# Patient Record
Sex: FEMALE | Race: WHITE | NOT HISPANIC OR LATINO | Employment: UNEMPLOYED | URBAN - METROPOLITAN AREA
[De-identification: names, ages, dates, MRNs, and addresses within clinical notes are randomized per-mention and may not be internally consistent; named-entity substitution may affect disease eponyms.]

---

## 2017-03-24 ENCOUNTER — ALLSCRIPTS OFFICE VISIT (OUTPATIENT)
Dept: OTHER | Facility: OTHER | Age: 57
End: 2017-03-24

## 2017-06-30 DIAGNOSIS — Z12.31 ENCOUNTER FOR SCREENING MAMMOGRAM FOR MALIGNANT NEOPLASM OF BREAST: ICD-10-CM

## 2017-07-01 ENCOUNTER — ALLSCRIPTS OFFICE VISIT (OUTPATIENT)
Dept: OTHER | Facility: OTHER | Age: 57
End: 2017-07-01

## 2018-01-10 NOTE — MISCELLANEOUS
Message  Return to work or school:    She is able to return to work on  8/25/16      Unable to work 8/22 through 8/24/16 due to sciatica          Signatures   Electronically signed by : Ry Jules MD; Aug 25 2016  9:07AM EST                       (Author)

## 2018-01-13 VITALS
BODY MASS INDEX: 27.46 KG/M2 | WEIGHT: 165 LBS | RESPIRATION RATE: 18 BRPM | HEART RATE: 69 BPM | SYSTOLIC BLOOD PRESSURE: 115 MMHG | DIASTOLIC BLOOD PRESSURE: 77 MMHG

## 2018-01-14 NOTE — MISCELLANEOUS
Message   Recorded as Task   Date: 06/24/2016 09:38 AM, Created By: Vega Mcallister   Task Name: Medical Complaint Callback   Assigned To: [address] [place] [id number]   Regarding Patient: Bisi Lewis, Status: Active   Comment:    Blanca Arias - 24 Jun 2016 9:38 AM     TASK CREATED  Caller: Self; Medical Complaint; [phone] (Home); [phone] (Work)  Pt came in on 6/20/16 LBP and had an x-ray  She was called with the test results and she has compression in the cervical area  Her question is should she come to the f/u on 6/27 1 week or go to a specialist?  [facility] [phone] - 24 Jun 2016 12:52 PM     TASK REPLIED TO: Previously Assigned To [address] [place] [address]  I think since there has been progression in disc disease lumbar spine she should go directly to Pain Management  (Dr Ho Finch)   Kishore 61 - 24 Jun 2016 1:00 PM     TASK EDITED  Lm for callback        Active Problems    1  Depression with anxiety (300 4) (F41 8)   2  Encounter for screening colonoscopy (V76 51) (Z12 11)   3  Encounter for screening mammogram for malignant neoplasm of breast (V76 12)   (Z12 31)   4  Impingement syndrome of right shoulder (726 2) (M75 41)   5  Need for diphtheria-tetanus-pertussis (Tdap) vaccine (V06 1) (Z23)   6  Nicotine dependence (305 1) (F17 200)   7  Right shoulder pain (719 41) (M25 511)   8  Sciatica of left side (724 3) (M54 32)   9  SLAP shoulder tear (840 7) (S43 059A)    Current Meds   1  Betamethasone Sod Phos & Acet 6 (3-3) MG/ML Injection Suspension; USE AS   DIRECTED; To Be Done: [id number]; Status: HOLD FOR - Administration Ordered   2  Meloxicam 15 MG Oral Tablet; TAKE 1 TABLET DAILY AS NEEDED FOR PAIN;   Therapy: 83WAE5936 to ([id number])  Requested for: [phone]; Last   Rx:36Ipo1595 Ordered   3  Meloxicam 7 5 MG Oral Tablet; TAKE 1 TABLET DAILY AS NEEDED; Therapy: 09Apr2015 to (Evaluate:29Ntd6884)  Requested for: [phone]; Last   Rx:09Apr2015 Ordered   4  PredniSONE 10 MG Oral Tablet; 4tabs today and tomorrow then 3tabs for 2 days then   2tabs for 2 days then 1tab for 2days; Therapy: 20Jun2016 to (Evaluate:28Jun2016)  Requested for: 20Jun2016; Last   Rx:20Jun2016 Ordered   5  TraMADol HCl - 50 MG Oral Tablet; Take one or 2 tablets every 6 hours if needed for   pain; Therapy: 45TWI0827 to (Evaluate:38Sck1247); Last Rx:20Jun2016 Ordered    Allergies    1   No Known Drug Allergies    Signatures   Electronically signed by : Kevin Mares MD; Jun 24 2016  2:10PM EST                       (Co-author)

## 2018-01-16 NOTE — RESULT NOTES
Message    Please notify patient that her urine culture is negative for infection  pt aware         Verified Results  (1) URINALYSIS w URINE C/S REFLEX (will reflex a microscopy if leukocytes, occult blood, or nitrites are not within normal limits) [id number] 12:00AM Shanice Hammonds     Test Name Result Flag Reference   COLOR YELLOW  YELLOW   APPEARANCE CLEAR  CLEAR   SPECIFIC GRAVITY 1 022  1 001-1 035   PH 6 0  5 0-8 0   GLUCOSE NEGATIVE  NEGATIVE   BILIRUBIN NEGATIVE  NEGATIVE   KETONES NEGATIVE  NEGATIVE   OCCULT BLOOD NEGATIVE  NEGATIVE   PROTEIN NEGATIVE  NEGATIVE   NITRITE NEGATIVE  NEGATIVE   LEUKOCYTE ESTERASE 1+ A NEGATIVE   WBC 20-40 /HPF A < OR = 5   RBC 3-10 /HPF A < OR = 2   SQUAMOUS EPITHELIAL CELLS 0-5 /HPF  < OR = 5   BACTERIA FEW /HPF A NONE SEEN   CALCIUM OXALATE CRYSTALS FEW /HPF  NONE OR FEW   HYALINE CAST 0-5 /LPF A NONE SEEN   REFLEXIVE URINE CULTURE      CULTURE INDICATED - RESULTS TO FOLLOW     REFLEXIVE URINE CULTURE [id number] 12:00AM Shanice Hammonds     Test Name Result Flag Reference   CULTURE, URINE, ROUTINE      CULTURE, URINE, ROUTINE         MICRO NUMBER:      [id number]    TEST STATUS:       FINAL    SPECIMEN SOURCE:   URINE    SPECIMEN QUALITY:  ADEQUATE    RESULT:            Multiple organisms present, each less than 10,000                       CFU/mL  These organisms, commonly found on                       external and internal genitalia, are considered                       to be colonizers  No further testing performed

## 2018-01-17 NOTE — MISCELLANEOUS
Provider Comments  Provider Comments:   Patient was a no show for today's PE appt  Signatures   Electronically signed by :  DMITRIY Childress Ra; Jul 1 2017 10:12AM EST                       (Author)

## 2018-01-17 NOTE — PROGRESS NOTES
Assessment    1  SLAP shoulder tear (840 7) (T13 654Z)   2  Impingement syndrome of right shoulder (726 2) (G16 31)    Plan  Impingement syndrome of right shoulder    · Follow-up PRN Evaluation and Treatment  Follow-up  Status: Complete  Done:  07EGC1636  Impingement syndrome of right shoulder, SLAP shoulder tear    · Betamethasone Sod Phos & Acet 6 (3-3) MG/ML Injection Suspension   · Joint Bursa Aspiration &/or Injection Major - POC; Status:Complete;   Done: 93FUM9420    Discussion/Summary    65-year-old female with right shoulder tendinosis as well as a superior labral tear  Patient received her second right subacromial space cortisone injection today  She tolerated it well  She was advised to apply a cold compress today, and take NSAIDs as needed  Follow-up as needed if the pain returns  Plan discussed with Dr Yony Bray  Chief Complaint  Right shoulder pain      History of Present Illness  This is a 65-year-old female who comes in for followup of her right shoulder pain  The last subacromial cortisone injection given in June 2015 has lasted until just recently  She denies any new injury  Her symptoms are similar to how they were before her last injection  Review of Systems    Constitutional: No fever, no chills, feels well, no tiredness, no recent weight gain or loss  Eyes: No complaints of eyesight problems, no red eyes  ENT: no loss of hearing, no nosebleeds, no sore throat  Cardiovascular: No complaints of chest pain, no palpitations, no leg claudication or lower extremity edema  Respiratory: no compliants of shortness of breath, no wheezing, no cough  Gastrointestinal: no complaints of abdominal pain, no constipation, no nausea or diarrhea, no vomiting, no bloody stools  Genitourinary: no complaints of dysuria, no incontinence  Musculoskeletal: as noted in HPI  Integumentary: no complaints of skin rash or lesion, no itching or dry skin, no skin wounds     Neurological: no complaints of headache, no confusion, no numbness or tingling, no dizziness  Endocrine: No complaints of muscle weakness, no feelings of weakness, no frequent urination, no excessive thirst    Psychiatric: No suicidal thoughts, no anxiety, no feelings of depression  ROS reviewed  Active Problems    1  Depression with anxiety (300 4) (F41 8)   2  Encounter for screening colonoscopy (V76 51) (Z12 11)   3  Encounter for screening mammogram for malignant neoplasm of breast (V76 12)   (Z12 31)   4  Impingement syndrome of right shoulder (726 2) (M75 41)   5  Need for diphtheria-tetanus-pertussis (Tdap) vaccine (V06 1) (Z23)   6  Nicotine dependence (305 1) (F17 200)   7  Right shoulder pain (719 41) (M25 511)   8  SLAP shoulder tear (840 7) (N93 945K)    Past Medical History    · History of Drug dependence (304 90) (F19 20)    Surgical History    · History of Simple Bunion Exostectomy (Silver Procedure)   · History of Tubal Ligation    Family History    · Family history of Bipolar Disorder NOS   · Family history of Depression   · Family history of hypertension (V17 49) (Z82 49)    · Family history of cardiac disorder (V17 49) (Z82 49)   · Family history of malignant neoplasm of prostate (V16 42) (Z80 42)    · Family history of Bipolar Disorder NOS    Social History    · Alcohol Use (History)   · occasional   · Current Some Day Smoker (305 1)   · History of Drug Use (305 90)   · crack cocaine, percocet, meth   · Former smoker (V15 82) (C97 434)   · quit 1 year ago   · Marital History - Currently     Current Meds   1  Betamethasone Sod Phos & Acet 6 (3-3) MG/ML Injection Suspension; USE AS   DIRECTED; To Be Done: 79QZH3944; Status: HOLD FOR - Administration Ordered   2  Meloxicam 15 MG Oral Tablet; TAKE 1 TABLET DAILY AS NEEDED FOR PAIN;   Therapy: 96BWG1670 to (Carley Ferreira)  Requested for: [phone]; Last   Rx:56Yfd4817 Ordered   3  Meloxicam 7 5 MG Oral Tablet; TAKE 1 TABLET DAILY AS NEEDED;    Therapy: 65VMO3166 to (Evaluate:58Wdo4590)  Requested for: 74 [phone]; Last   Rx:09Apr2015 Ordered    Allergies    1  No Known Drug Allergies    Vitals   Recorded: 89GBG3390 10:12AM   Heart Rate 64   Systolic 091, LUE, Sitting   Diastolic 83, LUE, Sitting   Height 5 ft 5 in   Weight 157 lb    BMI Calculated 26 13   BSA Calculated 1 79     Physical Exam    Right Shoulder: Appearance: Normal  Tenderness: bicipital groove and greater tuberosity, but not the midshaft clavicle, not the distal clavicle, not the deltoid and not the supraspinatus muscle  Diffuse tenderness throughout  ROM: Full except as noted: Motor: 4/5 forward flexion, but 5/5 internal rotation and 5/5 external rotation  Forward flexion: painful restricted AROM 0-130 degrees and normal PROM  Abduction: painful restricted AROM 0-120 degrees  Special Tests: positive Painful Arc, positive Armando test, positive Empty Can test, positive Langston's test and positive Speed's test, but negative Cross Body Adduction test       Procedure    Procedure: Injection of the right subacromial bursa  Indication:  inflammation  Potential complications include bleeding and infection  Risk and benefits were discussed with the patient  Verbal consent was obtained prior to the procedure  Betadine was used to prep the area  ethyl chloride spray was used as a topical anesthetic  A 22-gauge was used to inject 5 mL of 1% Lidocaine and 1 mL of 6mg/mL betamethasone  A bandage was applied  the patient tolerated the procedure well  Complications: none  Follow-up in the office PRN        Signatures   Electronically signed by : Mao Mark [facility]; Jan 29 2016 10:26AM EST                       (Author)    Electronically signed by : Duarte Perkins MD; Jan 29 2016  1:04PM EST                       (Author)

## 2018-05-17 ENCOUNTER — OFFICE VISIT (OUTPATIENT)
Dept: OBGYN CLINIC | Facility: CLINIC | Age: 58
End: 2018-05-17
Payer: COMMERCIAL

## 2018-05-17 VITALS
HEART RATE: 87 BPM | HEIGHT: 64 IN | WEIGHT: 159 LBS | BODY MASS INDEX: 27.14 KG/M2 | SYSTOLIC BLOOD PRESSURE: 112 MMHG | DIASTOLIC BLOOD PRESSURE: 78 MMHG

## 2018-05-17 DIAGNOSIS — S86.911A KNEE STRAIN, RIGHT, INITIAL ENCOUNTER: Primary | ICD-10-CM

## 2018-05-17 PROCEDURE — 99203 OFFICE O/P NEW LOW 30 MIN: CPT | Performed by: ORTHOPAEDIC SURGERY

## 2018-05-17 RX ORDER — ACETAMINOPHEN 500 MG
500 TABLET ORAL EVERY 6 HOURS PRN
COMMUNITY
End: 2019-02-01

## 2018-05-17 RX ORDER — IBUPROFEN 600 MG/1
600 TABLET ORAL EVERY 6 HOURS PRN
COMMUNITY
End: 2019-02-01

## 2018-05-17 NOTE — ASSESSMENT & PLAN NOTE
Findings consistent with right knee strain and quadriceps weakness  Findings and treatment options were discussed with the patient  Recommend formal physical therapy at this time  She was also given a prescription for hinged knee brace to use with activities  Advised patient not to wear at all times since it can promote more muscle weakness  Continue ice and NSAIDs as needed  Follow up in 6 weeks for re-evaluation  All questions were answered to patient's satisfaction

## 2018-05-17 NOTE — LETTER
May 17, 2018     Patient: Teom Brown   YOB: 1960   Date of Visit: 5/17/2018       To Whom it May Concern:    Hannah Bates is under my professional care  She was seen in my office on 5/17/2018  She may return to work with no restrictions  If you have any questions or concerns, please don't hesitate to call           Sincerely,          Mansi Pablo MD        CC: No Recipients
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
## 2018-08-07 ENCOUNTER — OFFICE VISIT (OUTPATIENT)
Dept: OBGYN CLINIC | Facility: CLINIC | Age: 58
End: 2018-08-07
Payer: COMMERCIAL

## 2018-08-07 VITALS
WEIGHT: 159 LBS | HEIGHT: 64 IN | HEART RATE: 96 BPM | SYSTOLIC BLOOD PRESSURE: 111 MMHG | DIASTOLIC BLOOD PRESSURE: 81 MMHG | BODY MASS INDEX: 27.14 KG/M2

## 2018-08-07 DIAGNOSIS — S86.911D STRAIN OF RIGHT KNEE, SUBSEQUENT ENCOUNTER: Primary | ICD-10-CM

## 2018-08-07 PROCEDURE — 99213 OFFICE O/P EST LOW 20 MIN: CPT | Performed by: ORTHOPAEDIC SURGERY
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
## 2018-08-07 NOTE — ASSESSMENT & PLAN NOTE
Findings consistent with right knee strain and quadriceps weakness  Findings and treatment options were discussed with the patient  Discussed importance of attending physical therapy since she continues to have quadriceps weakness  She will obtain the hinged knee brace to use with activities  Continue ice and NSAIDs as needed  Follow up in 6 weeks for re-evaluation  All questions were answered to patient's satisfaction  Plan discussed with Dr Pasquale Stubbs

## 2018-08-20 ENCOUNTER — OFFICE VISIT (OUTPATIENT)
Dept: FAMILY MEDICINE CLINIC | Facility: HOSPITAL | Age: 58
End: 2018-08-20
Payer: COMMERCIAL

## 2018-08-20 VITALS
DIASTOLIC BLOOD PRESSURE: 64 MMHG | HEART RATE: 106 BPM | SYSTOLIC BLOOD PRESSURE: 122 MMHG | HEIGHT: 65 IN | BODY MASS INDEX: 23.13 KG/M2 | WEIGHT: 138.8 LBS | TEMPERATURE: 99 F

## 2018-08-20 DIAGNOSIS — F33.2 SEVERE EPISODE OF RECURRENT MAJOR DEPRESSIVE DISORDER, WITHOUT PSYCHOTIC FEATURES (HCC): Primary | ICD-10-CM

## 2018-08-20 DIAGNOSIS — F11.10 OPIATE ABUSE, CONTINUOUS (HCC): ICD-10-CM

## 2018-08-20 DIAGNOSIS — F41.1 GENERALIZED ANXIETY DISORDER: ICD-10-CM

## 2018-08-20 PROCEDURE — 3008F BODY MASS INDEX DOCD: CPT | Performed by: NURSE PRACTITIONER

## 2018-08-20 PROCEDURE — 99214 OFFICE O/P EST MOD 30 MIN: CPT | Performed by: NURSE PRACTITIONER

## 2018-08-20 RX ORDER — ESCITALOPRAM OXALATE 10 MG/1
TABLET ORAL
Qty: 30 TABLET | Refills: 1 | Status: SHIPPED | OUTPATIENT
Start: 2018-08-20 | End: 2019-02-11 | Stop reason: HOSPADM

## 2018-08-20 NOTE — LETTER
August 20, 2018     Patient: Katy Lara   YOB: 1960   Date of Visit: 8/20/2018       To Whom it May Concern:    Yumiko Ocamposing is under my professional care  She was seen in my office on 8/20/2018  She may return to work on 8/21/18  If you have any questions or concerns, please don't hesitate to call           Sincerely,          DMITRIY Kirk        CC: No Recipients

## 2018-08-20 NOTE — PROGRESS NOTES
Assessment/Plan:    Severe episode of recurrent major depressive disorder, without psychotic features (HCC)  PHQ-9 strongly positive w/score of 25, lengthy discussion re: start of medication and pt agreeable to use of SSRI, also advise she pursue counseling/therapy and she states she is planning to look into; return in 4 weeks for next med check and call sooner w/any acute mood concerns    Generalized anxiety disorder  BARBARA-7 score strongly positive at 20, pt agreeable to start of SSRI and is planning to pursue counseling/therapy    Opiate abuse, continuous  Pt declines need/ability to pursue inpatient rehab so discussed options for outpatient assistance either through [facility] or [facility]        Diagnoses and all orders for this visit:    Severe episode of recurrent major depressive disorder, without psychotic features ([facility] [address] )  -     CBC and differential; Future  -     Comprehensive metabolic panel; Future  -     TSH, 3rd generation; Future  -     Vitamin D 25 hydroxy; Future  -     Vitamin B12; Future  -     escitalopram (LEXAPRO) 10 mg tablet; Take 1/2 tablet daily for 6 days then 1 tablet daily  -     CBC and differential  -     Comprehensive metabolic panel  -     TSH, 3rd generation  -     Vitamin D 25 hydroxy  -     Vitamin B12    Generalized anxiety disorder    Opiate abuse, continuous          Subjective:      Patient ID: Milan Hilario is a 56 y o  female here for an acute visit  States she has always been a depressed kind of person  Has been struggling more this year since losing both parents within 4 months of each other  Crying a lot  In bed not doing anything  Denies SI/HI but could care less if she lives or not  Has lost 20 pounds with not eating well  Wants to get better for her grandchildren  Has been struggling with opiate use since 2007  Getting percocet off the street  Can take up to 10 percocet in one day  Had been to rehab in 2007 for cocaine abuse   Started a new job and unsure if her job will let her go to rehab  Denies ETOH use  Occ smoker  Denies other med use, no benzodiazepines  The following portions of the patient's history were reviewed and updated as appropriate: allergies, current medications, past family history, past medical history, past social history, past surgical history and problem list     Review of Systems   Constitutional: Positive for activity change, appetite change, fatigue and unexpected weight change  Psychiatric/Behavioral: Positive for dysphoric mood  Negative for suicidal ideas  Objective:      /64   Pulse (!) 106   Temp 99 °F (37 2 °C)   Ht 5' 5" (1 651 m)   Wt 63 kg (138 lb 12 8 oz)   BMI 23 10 kg/m²        Physical Exam   Constitutional: She is oriented to person, place, and time  She appears well-developed and well-nourished  HENT:   Head: Normocephalic and atraumatic  Eyes: Conjunctivae are normal  No scleral icterus  Neck: No thyromegaly present  Cardiovascular: Normal rate and regular rhythm  Pulmonary/Chest: Effort normal and breath sounds normal  No respiratory distress  Neurological: She is alert and oriented to person, place, and time  Skin: Skin is warm and dry  Psychiatric: Her behavior is normal  Thought content normal  She does not express impulsivity or inappropriate judgment  She exhibits a depressed mood  She expresses no suicidal ideation  She expresses no suicidal plans and no homicidal plans  Restless, fidgety demeanor  Freely conversive w/good eye contact    Vitals reviewed

## 2018-08-21 NOTE — ASSESSMENT & PLAN NOTE
BARBARA-7 score strongly positive at 20, pt agreeable to start of SSRI and is planning to pursue counseling/therapy

## 2018-08-21 NOTE — ASSESSMENT & PLAN NOTE
PHQ-9 strongly positive w/score of 25, lengthy discussion re: start of medication and pt agreeable to use of SSRI, also advise she pursue counseling/therapy and she states she is planning to look into; return in 4 weeks for next med check and call sooner w/any acute mood concerns

## 2018-08-21 NOTE — ASSESSMENT & PLAN NOTE
Pt declines need/ability to pursue inpatient rehab so discussed options for outpatient assistance either through [facility] or [facility]

## 2018-11-09 ENCOUNTER — TELEPHONE (OUTPATIENT)
Dept: FAMILY MEDICINE CLINIC | Facility: HOSPITAL | Age: 58
End: 2018-11-09

## 2018-11-09 ENCOUNTER — OFFICE VISIT (OUTPATIENT)
Dept: FAMILY MEDICINE CLINIC | Facility: HOSPITAL | Age: 58
End: 2018-11-09
Payer: COMMERCIAL

## 2018-11-09 VITALS
BODY MASS INDEX: 22.95 KG/M2 | SYSTOLIC BLOOD PRESSURE: 110 MMHG | WEIGHT: 142.8 LBS | HEART RATE: 77 BPM | HEIGHT: 66 IN | TEMPERATURE: 98.1 F | DIASTOLIC BLOOD PRESSURE: 84 MMHG

## 2018-11-09 DIAGNOSIS — Z12.31 ENCOUNTER FOR SCREENING MAMMOGRAM FOR MALIGNANT NEOPLASM OF BREAST: ICD-10-CM

## 2018-11-09 DIAGNOSIS — F33.2 SEVERE EPISODE OF RECURRENT MAJOR DEPRESSIVE DISORDER, WITHOUT PSYCHOTIC FEATURES (HCC): Primary | ICD-10-CM

## 2018-11-09 DIAGNOSIS — F41.1 GENERALIZED ANXIETY DISORDER: ICD-10-CM

## 2018-11-09 DIAGNOSIS — F11.10 OPIATE ABUSE, CONTINUOUS (HCC): ICD-10-CM

## 2018-11-09 PROCEDURE — 3008F BODY MASS INDEX DOCD: CPT | Performed by: NURSE PRACTITIONER

## 2018-11-09 PROCEDURE — 99214 OFFICE O/P EST MOD 30 MIN: CPT | Performed by: NURSE PRACTITIONER

## 2018-11-09 NOTE — LETTER
November 16, 2018     Patient: Beverly Mcgregor   YOB: 1960   Date of Visit: 11/9/2018       To Whom It May Concern: It is my medical opinion that Shirin Vilchis may return to work on 11/19/2018  If you have any questions or concerns, please don't hesitate to call           Sincerely,        DMITRIY Falcon    CC: No Recipients

## 2018-11-09 NOTE — LETTER
November 9, 2018     Patient: Mason Gardiner   YOB: 1960   Date of Visit: 11/9/2018       To Whom it May Concern:    Marshall Alex is under my professional care  She was seen in my office on 11/9/2018  She may return to work on TBD  Please excuse week of 10/29/18 and 11/5/18 for anxiety/depression  If you have any questions or concerns, please don't hesitate to call           Sincerely,          EvertDMITRIY Santamaria        CC: No Recipients

## 2018-11-09 NOTE — ASSESSMENT & PLAN NOTE
PHQ-9 worse from 25 to 27 w/SSRI non-compliance  Lengthy discussion re: best plan from here, and given her outpatient non-compliance and worsening in depressive sx's w/suicidal contemplation I feel it is best she be admitted for inpatient management  She is agreeable and willing  We called insurance for covered inpatient provider, Ty Serrano in network  Offered to escort patient to ER to begin intake process and she declines stating she has to make phone calls to  and employer first and will come back today after doing so  She is currently not an active threat to herself or others, so I feel this is an appropriate option  Will call in a few hours to ensure she has presented to ER

## 2018-11-09 NOTE — PROGRESS NOTES
Assessment/Plan:    Severe episode of recurrent major depressive disorder, without psychotic features (HCC)  PHQ-9 worse from 25 to 27 w/SSRI non-compliance  Lengthy discussion re: best plan from here, and given her outpatient non-compliance and worsening in depressive sx's w/suicidal contemplation I feel it is best she be admitted for inpatient management  She is agreeable and willing  We called insurance for covered inpatient provider, Zac Estrada in network  Offered to escort patient to ER to begin intake process and she declines stating she has to make phone calls to  and employer first and will come back today after doing so  She is currently not an active threat to herself or others, so I feel this is an appropriate option  Will call in a few hours to ensure she has presented to ER  Generalized anxiety disorder  BARBARA-7 score worse from 20 to 21 w/SSRI non-compliance  Referred to ER for consideration for admission  Opiate abuse, continuous  Pt agreeable to inpatient admission and plan to pursue through ER today  Diagnoses and all orders for this visit:    Severe episode of recurrent major depressive disorder, without psychotic features ([facility] )    Generalized anxiety disorder    Opiate abuse, continuous ([facility] )    Encounter for screening mammogram for malignant neoplasm of breast     Order given to schedule mammogram    She declines colonoscopy and flu shot  She plans to schedule gyn exam       Subjective:      Patient ID: Abraham Portillo is a 56 y o  female here for an acute visit  States she took lexapro briefly after last appt in August then stopped  Mood has been bad again and has been in bed for past 2 weeks  Has contemplated suicide but does not have a plan in place  "Not there yet"  Lives w/ who is attentive and aware  Sister also a support  Work is giving her one more chance  Cannot risk losing her job  Hasn't done blood work yet as previously ordered     Hasn't gone to specialist for addiction as recommended at last appt  Still taking percocet and oxycontin every day  The following portions of the patient's history were reviewed and updated as appropriate: allergies, current medications, past medical history, past social history and problem list     Review of Systems   Constitutional: Positive for activity change and fatigue  Respiratory: Negative for shortness of breath  Cardiovascular: Positive for chest pain (couple weeks ago "heart felt heavy") and palpitations (1 episode "heart pounding out of chest")  Psychiatric/Behavioral: Positive for dysphoric mood, sleep disturbance and suicidal ideas (no plan but has contemplated)  Negative for self-injury  The patient is nervous/anxious  Sometimes feels "incoherent"         Objective:      /84 (Patient Position: Sitting, Cuff Size: Standard)   Pulse 77   Temp 98 1 °F (36 7 °C) (Tympanic)   Ht 5' 6" (1 676 m)   Wt 64 8 kg (142 lb 12 8 oz)   BMI 23 05 kg/m²          Physical Exam   Constitutional: She is oriented to person, place, and time  She appears well-developed and well-nourished  No distress  HENT:   Head: Normocephalic and atraumatic  Eyes: Conjunctivae are normal  No scleral icterus  Neck: No thyromegaly present  Cardiovascular: Normal rate and regular rhythm  No murmur heard  Pulmonary/Chest: Effort normal and breath sounds normal  No respiratory distress  Neurological: She is alert and oriented to person, place, and time  Skin: Skin is warm and dry  Psychiatric: Her speech is normal and behavior is normal  Judgment and thought content normal  Her mood appears anxious (sl anxious demeanor)  Her affect is not inappropriate  Cognition and memory are normal    Relaxed, freely conversive w/good eye contact   Vitals reviewed

## 2018-11-09 NOTE — ASSESSMENT & PLAN NOTE
BARBARA-7 score worse from 20 to 21 w/SSRI non-compliance  Referred to ER for consideration for admission

## 2018-11-12 NOTE — TELEPHONE ENCOUNTER
Patient has not returned your call  Checked chart and I do not see that she has gone to ER  Would you like us to try and contact her again?

## 2018-11-13 NOTE — TELEPHONE ENCOUNTER
Pt has not gone to hospital yet--She is trying to get all her "ducks in a row" before she can go to hospital  Just wanted to let you know

## 2018-11-13 NOTE — TELEPHONE ENCOUNTER
Please stress importance of presenting to ER if she is continuing w/suicidal ideations  Otherwise, she should continue the lexapro and schedule f/u with me the first week of December

## 2018-11-16 NOTE — TELEPHONE ENCOUNTER
Patient called back  She stated she is currently not having any suicidal thoughts  She wants a referral to a mental health doctor  She said she is currently taking her lexapro and did schedule an appt in the first week in December  Also asking if she can have a note to go back to work this coming Monday 11/19/18

## 2018-11-16 NOTE — TELEPHONE ENCOUNTER
She should call her insurance and see what local psychiatrists are in her network  In the meantime, if she needs ongoing refills of lexapro from me she will need to make an appt with me

## 2018-11-16 NOTE — TELEPHONE ENCOUNTER
Will inform pt to call insurance for psychiatrist   She is scheduled to see you early December    Will you be issuing a note for her to return to work on 11/19/18

## 2018-12-04 ENCOUNTER — OFFICE VISIT (OUTPATIENT)
Dept: FAMILY MEDICINE CLINIC | Facility: HOSPITAL | Age: 58
End: 2018-12-04
Payer: COMMERCIAL

## 2018-12-04 ENCOUNTER — TELEPHONE (OUTPATIENT)
Dept: FAMILY MEDICINE CLINIC | Facility: HOSPITAL | Age: 58
End: 2018-12-04

## 2018-12-04 VITALS
HEART RATE: 72 BPM | DIASTOLIC BLOOD PRESSURE: 80 MMHG | SYSTOLIC BLOOD PRESSURE: 136 MMHG | BODY MASS INDEX: 23.63 KG/M2 | WEIGHT: 141.8 LBS | TEMPERATURE: 97.8 F | HEIGHT: 65 IN

## 2018-12-04 DIAGNOSIS — G47.9 SLEEP DISTURBANCE: ICD-10-CM

## 2018-12-04 DIAGNOSIS — F41.1 GENERALIZED ANXIETY DISORDER: Primary | ICD-10-CM

## 2018-12-04 DIAGNOSIS — F33.2 SEVERE EPISODE OF RECURRENT MAJOR DEPRESSIVE DISORDER, WITHOUT PSYCHOTIC FEATURES (HCC): ICD-10-CM

## 2018-12-04 PROCEDURE — 3008F BODY MASS INDEX DOCD: CPT | Performed by: NURSE PRACTITIONER

## 2018-12-04 PROCEDURE — 99214 OFFICE O/P EST MOD 30 MIN: CPT | Performed by: NURSE PRACTITIONER

## 2018-12-04 NOTE — ASSESSMENT & PLAN NOTE
BARBARA-7 score improved from 21 to 12 w/consistent use of SSRI  Continue same dose and return in 6 weeks for next OV  Call sooner w/any acute mood concerns/questions

## 2018-12-04 NOTE — ASSESSMENT & PLAN NOTE
PHQ-9 score improved from 27 to 10 w/start of lexapro  Continue same dose and return in 6 weeks for next med check  Call sooner w/any acute mood concerns/questions

## 2018-12-04 NOTE — PROGRESS NOTES
Assessment/Plan:    Generalized anxiety disorder  BARBARA-7 score improved from 21 to 12 w/consistent use of SSRI  Continue same dose and return in 6 weeks for next OV  Call sooner w/any acute mood concerns/questions  Severe episode of recurrent major depressive disorder, without psychotic features ([facility] )  PHQ-9 score improved from 27 to 10 w/start of lexapro  Continue same dose and return in 6 weeks for next med check  Call sooner w/any acute mood concerns/questions  Diagnoses and all orders for this visit:    Generalized anxiety disorder    Severe episode of recurrent major depressive disorder, without psychotic features ([facility] )    Sleep disturbance  Comments:  advise she start melotonin 2-3mg nightly      Will review and complete short term disability forms  She has order to complete mammogram    Flu and colonoscopy declined  States she will schedule pap smear w/gyn  Subjective:      Patient ID: Dejon Viera is a 66 y o  female here for follow up  Started lexapro since being here last month  Has been feeling better since 2 weeks after starting  She now feels like doing things and is focusing better at work  Wants to be around family  She has a number to see a counselor  Her biggest issue is trouble sleeping  Hasn't gone to [facility] yet  States she has been using less narcotics recently  Still has to do blood work and mammogram as previously ordered  She brings along forms for short term disability for leave from 10/29-11/19  The following portions of the patient's history were reviewed and updated as appropriate: allergies, current medications, past medical history, past social history and problem list     Review of Systems   Constitutional: Negative for activity change, appetite change, fatigue and unexpected weight change  Respiratory: Negative for cough and shortness of breath  Cardiovascular: Negative for chest pain and palpitations  Psychiatric/Behavioral: Positive for dysphoric mood and sleep disturbance  The patient is nervous/anxious  Objective:      /80   Pulse 72   Temp 97 8 °F (36 6 °C)   Ht 5' 5" (1 651 m)   Wt 64 3 kg (141 lb 12 8 oz)   BMI 23 60 kg/m²          Physical Exam   Constitutional: She is oriented to person, place, and time  She appears well-developed and well-nourished  No distress  HENT:   Head: Normocephalic and atraumatic  Eyes: Conjunctivae are normal  No scleral icterus  Neck: No thyromegaly present  Cardiovascular: Normal rate and regular rhythm  No murmur heard  Pulmonary/Chest: Effort normal and breath sounds normal  No respiratory distress  Lymphadenopathy:     She has no cervical adenopathy  Neurological: She is alert and oriented to person, place, and time  Skin: Skin is warm and dry  Psychiatric: She has a normal mood and affect  Her behavior is normal  Judgment and thought content normal    Relaxed, freely conversive w/good eye contact   Vitals reviewed  PHQ-9 Depression Screening    PHQ-9:    Frequency of the following problems over the past two weeks:       Little interest or pleasure in doing things:  1 - several days  Feeling down, depressed, or hopeless:  0 - not at all  Trouble falling or staying asleep, or sleeping too much:  3 - nearly every day  Feeling tired or having little energy:  1 - several days  Poor appetite or overeatin - more than half the days  Feeling bad about yourself - or that you are a failure or have let yourself or your family down:  1 - several days  Trouble concentrating on things, such as reading the newspaper or watching television:  1 - several days  Moving or speaking so slowly that other people could have noticed   Or the opposite - being so fidgety or restless that you have been moving around a lot more than usual:  1 - several days  Thoughts that you would be better off dead, or of hurting yourself in some way:  0 - not at all  PHQ-2 Score:  1  PHQ-9 Score:  10         BARBARA-7 Flowsheet Screening      Most Recent Value   Over the last two weeks, how often have you been bothered by the following problems? Feeling nervous, anxious, or on edge  1   Not being able to stop or control worrying  3   Worrying too much about different things  3   Trouble relaxing   1   Being so restless that it's hard to sit still  1   Becoming easily annoyed or irritable   1   Feeling afraid as if something awful might happen  2   How difficult have these problems made it for you to do your work, take care of things at home, or get along with other people?    Somewhat difficult   BARBARA Score   12

## 2018-12-04 NOTE — PATIENT INSTRUCTIONS
Anxiety, Ambulatory Care   GENERAL INFORMATION:   Anxiety  is a condition that causes you to feel excessive worry, uneasiness, or fear  Family or work stress, smoking, caffeine, and alcohol can increase your risk for anxiety  Certain medicines or health conditions can also increase your risk  Anxiety may begin gradually and can become a long-term condition if it is not managed or treated  Common symptoms include the following:   · Fatigue or muscle tightness     · Shaking, restlessness, or irritability     · Problems focusing     · Trouble sleeping     · Feeling jumpy, easily startled, or dizzy     · Rapid heartbeat or shortness of breath  Seek immediate care for the following symptoms:   · Chest pain, tightness, or heaviness that may spread to your shoulders, arms, jaw, neck, or back    · Feeling like hurting yourself or someone else    · Dizziness or feeling lightheaded or faint  Treatment for anxiety  may include medicines to help you feel calm and relaxed, and decrease your symptoms  Healthcare providers will treat any medical conditions that may be causing your symptoms  Manage anxiety:   · Go to counseling as directed  Cognitive behavioral therapy can help you understand and change how you react to events that trigger your symptoms  · Find ways to manage your symptoms  Activities such as exercise, meditation, or listening to music can help you relax  · Practice deep breathing  Breathing can change how your body reacts to stress  Focus on taking slow, deep breaths several times a day, or during an anxiety attack  Breathe in through your nose, and out through your mouth  · Avoid caffeine  Caffeine can make your symptoms worse  Avoid foods or drinks that are meant to increase your energy level  · Limit or avoid alcohol  Ask your healthcare provider if alcohol is safe for you  You may not be able to drink alcohol if you take certain anxiety or depression medicines   Limit alcohol to 1 drink per day if you are a woman  Limit alcohol to 2 drinks per day if you are a man  A drink of alcohol is 12 ounces of beer, 5 ounces of wine, or 1½ ounces of liquor  Follow up with your healthcare provider as directed:  Write down your questions so you remember to ask them during your visits  CARE AGREEMENT:   You have the right to help plan your care  Learn about your health condition and how it may be treated  Discuss treatment options with your caregivers to decide what care you want to receive  You always have the right to refuse treatment  The above information is an  only  It is not intended as medical advice for individual conditions or treatments  Talk to your doctor, nurse or pharmacist before following any medical regimen to see if it is safe and effective for you  © 2014 [address] is for End User's use only and may not be sold, redistributed or otherwise used for commercial purposes  All illustrations and images included in CareNotes® are the copyrighted property of [employer] M , [employer]  or [employer]  Depression   AMBULATORY CARE:   Depression  is a medical condition that causes feelings of sadness or hopelessness that do not go away  Depression may cause you to lose interest in things you used to enjoy  These feelings may interfere with your daily life  Common symptoms include the following:   · Appetite changes, or weight gain or loss    · Trouble going to sleep or staying asleep, or sleeping too much    · Fatigue or lack of energy    · Feeling restless, irritable, or withdrawn    · Feeling worthless, hopeless, discouraged, or guilty    · Trouble concentrating, remembering things, doing daily tasks, or making decisions    · Thoughts about hurting or killing yourself  Call 911 for any of the following:   · You think about harming yourself or someone else  Contact your healthcare provider if:   · Your symptoms do not improve      · You cannot make it to your next appointment  · You have new symptoms  · You have questions or concerns about your condition or care  Treatment for depression  may include medicine to improve or balance your mood  Therapy may also be used to treat your depression  A therapist will help you learn to cope with your thoughts and feelings  Therapy can be done alone or in a group  It may also be done with family members or a significant other  Self-care:   · Get regular physical activity  Try to exercise for 30 minutes, 3 to 5 days a week  Work with your healthcare provider to develop an exercise plan that you enjoy  Physical activity may improve your symptoms  · Get enough sleep  Create a routine to help you relax before bed  You can listen to music, read, or do yoga  Try to go to bed and wake up at the same time every day  Sleep is important for emotional health  · Eat a variety of healthy foods from all of the food groups  A healthy meal plan is low in fat, salt, and added sugar  Ask your healthcare provider for more information about a meal plan that is right for you  · Do not drink alcohol or use drugs  Alcohol and drugs can make your symptoms worse  Follow up with your healthcare provider as directed: Your healthcare provider will monitor your progress at follow-up visits  He or she will also monitor your medicine if you take antidepressants  Your healthcare provider will ask if the medicine is helping  Tell him or her about any side effects or problems you may have with your medicine  The type or amount of medicine may need to be changed  Write down your questions so you remember to ask them during your visits  © 2017 [address] Information is for End User's use only and may not be sold, redistributed or otherwise used for commercial purposes  All illustrations and images included in CareNotes® are the copyrighted property of [employer] M , Inc  or [employer]    The above information is an  only  It is not intended as medical advice for individual conditions or treatments  Talk to your doctor, nurse or pharmacist before following any medical regimen to see if it is safe and effective for you

## 2019-01-18 ENCOUNTER — TELEPHONE (OUTPATIENT)
Dept: FAMILY MEDICINE CLINIC | Facility: HOSPITAL | Age: 59
End: 2019-01-18

## 2019-01-18 NOTE — TELEPHONE ENCOUNTER
Pt called, she has been out of work this week due to her depression  She is asking for a doctor's note to return to work on Monday 1/21   PCB

## 2019-02-01 ENCOUNTER — HOSPITAL ENCOUNTER (INPATIENT)
Facility: HOSPITAL | Age: 59
LOS: 10 days | Discharge: HOME | DRG: 885 | End: 2019-02-11
Attending: PSYCHIATRY & NEUROLOGY | Admitting: PSYCHIATRY & NEUROLOGY
Payer: COMMERCIAL

## 2019-02-01 ENCOUNTER — OFFICE VISIT (OUTPATIENT)
Dept: FAMILY MEDICINE CLINIC | Facility: HOSPITAL | Age: 59
End: 2019-02-01
Payer: COMMERCIAL

## 2019-02-01 ENCOUNTER — HOSPITAL ENCOUNTER (EMERGENCY)
Facility: HOSPITAL | Age: 59
Discharge: STILL ADMITTED OR ADMINISTRATIVE | End: 2019-02-01
Attending: EMERGENCY MEDICINE | Admitting: EMERGENCY MEDICINE
Payer: COMMERCIAL

## 2019-02-01 VITALS
RESPIRATION RATE: 14 BRPM | BODY MASS INDEX: 24.83 KG/M2 | HEIGHT: 65 IN | WEIGHT: 149 LBS | OXYGEN SATURATION: 96 % | DIASTOLIC BLOOD PRESSURE: 59 MMHG | TEMPERATURE: 98.9 F | HEART RATE: 59 BPM | SYSTOLIC BLOOD PRESSURE: 115 MMHG

## 2019-02-01 VITALS
OXYGEN SATURATION: 98 % | DIASTOLIC BLOOD PRESSURE: 84 MMHG | SYSTOLIC BLOOD PRESSURE: 120 MMHG | HEIGHT: 65 IN | TEMPERATURE: 97.9 F | WEIGHT: 149 LBS | HEART RATE: 69 BPM | BODY MASS INDEX: 24.83 KG/M2

## 2019-02-01 DIAGNOSIS — F11.10 OPIATE ABUSE, CONTINUOUS (HCC): ICD-10-CM

## 2019-02-01 DIAGNOSIS — F41.9 ANXIETY: ICD-10-CM

## 2019-02-01 DIAGNOSIS — R45.851 SUICIDAL IDEATION: ICD-10-CM

## 2019-02-01 DIAGNOSIS — F41.1 GENERALIZED ANXIETY DISORDER: ICD-10-CM

## 2019-02-01 DIAGNOSIS — F33.2 SEVERE EPISODE OF RECURRENT MAJOR DEPRESSIVE DISORDER, WITHOUT PSYCHOTIC FEATURES (HCC): Primary | ICD-10-CM

## 2019-02-01 DIAGNOSIS — F32.A DEPRESSION: Primary | ICD-10-CM

## 2019-02-01 DIAGNOSIS — F32.A DEPRESSION: ICD-10-CM

## 2019-02-01 LAB
ALBUMIN SERPL BCP-MCNC: 4 G/DL (ref 3.5–5)
ALP SERPL-CCNC: 75 U/L (ref 46–116)
ALT SERPL W P-5'-P-CCNC: 18 U/L (ref 12–78)
AMPHETAMINES SERPL QL SCN: NEGATIVE
ANION GAP SERPL CALCULATED.3IONS-SCNC: 10 MMOL/L (ref 4–13)
AST SERPL W P-5'-P-CCNC: 19 U/L (ref 5–45)
ATRIAL RATE: 56 BPM
ATRIAL RATE: 63 BPM
BACTERIA UR QL AUTO: ABNORMAL /HPF
BARBITURATES UR QL: NEGATIVE
BASOPHILS # BLD AUTO: 0.05 THOUSANDS/ΜL (ref 0–0.1)
BASOPHILS NFR BLD AUTO: 1 % (ref 0–1)
BENZODIAZ UR QL: NEGATIVE
BILIRUB SERPL-MCNC: 0.4 MG/DL (ref 0.2–1)
BILIRUB UR QL STRIP: NEGATIVE
BUN SERPL-MCNC: 10 MG/DL (ref 5–25)
CALCIUM SERPL-MCNC: 8.8 MG/DL (ref 8.3–10.1)
CHLORIDE SERPL-SCNC: 102 MMOL/L (ref 100–108)
CLARITY UR: CLEAR
CO2 SERPL-SCNC: 28 MMOL/L (ref 21–32)
COCAINE UR QL: NEGATIVE
COLOR UR: YELLOW
CREAT SERPL-MCNC: 0.86 MG/DL (ref 0.6–1.3)
EOSINOPHIL # BLD AUTO: 0.46 THOUSAND/ΜL (ref 0–0.61)
EOSINOPHIL NFR BLD AUTO: 8 % (ref 0–6)
ERYTHROCYTE [DISTWIDTH] IN BLOOD BY AUTOMATED COUNT: 12 % (ref 11.6–15.1)
ETHANOL EXG-MCNC: 0 MG/DL
GFR SERPL CREATININE-BSD FRML MDRD: 76 ML/MIN/1.73SQ M
GLUCOSE SERPL-MCNC: 77 MG/DL (ref 65–140)
GLUCOSE UR STRIP-MCNC: NEGATIVE MG/DL
HCT VFR BLD AUTO: 41.5 % (ref 34.8–46.1)
HGB BLD-MCNC: 14 G/DL (ref 11.5–15.4)
HGB UR QL STRIP.AUTO: ABNORMAL
IMM GRANULOCYTES # BLD AUTO: 0.01 THOUSAND/UL (ref 0–0.2)
IMM GRANULOCYTES NFR BLD AUTO: 0 % (ref 0–2)
KETONES UR STRIP-MCNC: NEGATIVE MG/DL
LEUKOCYTE ESTERASE UR QL STRIP: NEGATIVE
LYMPHOCYTES # BLD AUTO: 1.37 THOUSANDS/ΜL (ref 0.6–4.47)
LYMPHOCYTES NFR BLD AUTO: 24 % (ref 14–44)
MCH RBC QN AUTO: 30.8 PG (ref 26.8–34.3)
MCHC RBC AUTO-ENTMCNC: 33.7 G/DL (ref 31.4–37.4)
MCV RBC AUTO: 91 FL (ref 82–98)
METHADONE UR QL: NEGATIVE
MONOCYTES # BLD AUTO: 0.52 THOUSAND/ΜL (ref 0.17–1.22)
MONOCYTES NFR BLD AUTO: 9 % (ref 4–12)
MUCOUS THREADS UR QL AUTO: ABNORMAL
NEUTROPHILS # BLD AUTO: 3.25 THOUSANDS/ΜL (ref 1.85–7.62)
NEUTS SEG NFR BLD AUTO: 58 % (ref 43–75)
NITRITE UR QL STRIP: NEGATIVE
NON-SQ EPI CELLS URNS QL MICRO: ABNORMAL /HPF
NRBC BLD AUTO-RTO: 0 /100 WBCS
OPIATES UR QL SCN: POSITIVE
P AXIS: 49 DEGREES
P AXIS: 70 DEGREES
PCP UR QL: NEGATIVE
PH UR STRIP.AUTO: 6 [PH] (ref 4.5–8)
PLATELET # BLD AUTO: 251 THOUSANDS/UL (ref 149–390)
PMV BLD AUTO: 9.6 FL (ref 8.9–12.7)
POTASSIUM SERPL-SCNC: 3.8 MMOL/L (ref 3.5–5.3)
PR INTERVAL: 120 MS
PR INTERVAL: 128 MS
PROT SERPL-MCNC: 8.1 G/DL (ref 6.4–8.2)
PROT UR STRIP-MCNC: NEGATIVE MG/DL
QRS AXIS: 77 DEGREES
QRS AXIS: 79 DEGREES
QRSD INTERVAL: 74 MS
QRSD INTERVAL: 80 MS
QT INTERVAL: 406 MS
QT INTERVAL: 420 MS
QTC INTERVAL: 405 MS
QTC INTERVAL: 415 MS
RBC # BLD AUTO: 4.54 MILLION/UL (ref 3.81–5.12)
RBC #/AREA URNS AUTO: ABNORMAL /HPF
SODIUM SERPL-SCNC: 140 MMOL/L (ref 136–145)
SP GR UR STRIP.AUTO: 1.02 (ref 1–1.03)
T WAVE AXIS: 59 DEGREES
T WAVE AXIS: 84 DEGREES
THC UR QL: NEGATIVE
UROBILINOGEN UR QL STRIP.AUTO: 0.2 E.U./DL
VENTRICULAR RATE: 56 BPM
VENTRICULAR RATE: 63 BPM
WBC # BLD AUTO: 5.66 THOUSAND/UL (ref 4.31–10.16)
WBC #/AREA URNS AUTO: ABNORMAL /HPF

## 2019-02-01 PROCEDURE — 85025 COMPLETE CBC W/AUTO DIFF WBC: CPT | Performed by: EMERGENCY MEDICINE

## 2019-02-01 PROCEDURE — 36415 COLL VENOUS BLD VENIPUNCTURE: CPT | Performed by: EMERGENCY MEDICINE

## 2019-02-01 PROCEDURE — 80053 COMPREHEN METABOLIC PANEL: CPT | Performed by: EMERGENCY MEDICINE

## 2019-02-01 PROCEDURE — 81001 URINALYSIS AUTO W/SCOPE: CPT | Performed by: PSYCHIATRY & NEUROLOGY

## 2019-02-01 PROCEDURE — 93010 ELECTROCARDIOGRAM REPORT: CPT | Performed by: INTERNAL MEDICINE

## 2019-02-01 PROCEDURE — 99214 OFFICE O/P EST MOD 30 MIN: CPT | Performed by: NURSE PRACTITIONER

## 2019-02-01 PROCEDURE — 93005 ELECTROCARDIOGRAM TRACING: CPT

## 2019-02-01 PROCEDURE — 80307 DRUG TEST PRSMV CHEM ANLYZR: CPT | Performed by: EMERGENCY MEDICINE

## 2019-02-01 PROCEDURE — 99285 EMERGENCY DEPT VISIT HI MDM: CPT

## 2019-02-01 PROCEDURE — 82075 ASSAY OF BREATH ETHANOL: CPT | Performed by: EMERGENCY MEDICINE

## 2019-02-01 RX ORDER — BENZTROPINE MESYLATE 0.5 MG/1
1 TABLET ORAL EVERY 6 HOURS PRN
Status: CANCELLED | OUTPATIENT
Start: 2019-02-01

## 2019-02-01 RX ORDER — HALOPERIDOL 5 MG/ML
5 INJECTION INTRAMUSCULAR EVERY 6 HOURS PRN
Status: DISCONTINUED | OUTPATIENT
Start: 2019-02-01 | End: 2019-02-11 | Stop reason: HOSPADM

## 2019-02-01 RX ORDER — LORAZEPAM 2 MG/ML
2 INJECTION INTRAMUSCULAR EVERY 6 HOURS PRN
Status: DISCONTINUED | OUTPATIENT
Start: 2019-02-01 | End: 2019-02-11 | Stop reason: HOSPADM

## 2019-02-01 RX ORDER — HALOPERIDOL 5 MG/ML
5 INJECTION INTRAMUSCULAR EVERY 6 HOURS PRN
Status: CANCELLED | OUTPATIENT
Start: 2019-02-01

## 2019-02-01 RX ORDER — DICYCLOMINE HCL 20 MG
20 TABLET ORAL EVERY 6 HOURS PRN
Status: CANCELLED | OUTPATIENT
Start: 2019-02-01

## 2019-02-01 RX ORDER — LORAZEPAM 1 MG/1
1 TABLET ORAL EVERY 8 HOURS PRN
Status: CANCELLED | OUTPATIENT
Start: 2019-02-01

## 2019-02-01 RX ORDER — LORAZEPAM 2 MG/ML
2 INJECTION INTRAMUSCULAR EVERY 6 HOURS PRN
Status: CANCELLED | OUTPATIENT
Start: 2019-02-01

## 2019-02-01 RX ORDER — DICYCLOMINE HCL 20 MG
20 TABLET ORAL EVERY 6 HOURS PRN
Status: DISCONTINUED | OUTPATIENT
Start: 2019-02-01 | End: 2019-02-11 | Stop reason: HOSPADM

## 2019-02-01 RX ORDER — CLONIDINE HYDROCHLORIDE 0.1 MG/1
0.1 TABLET ORAL EVERY 8 HOURS PRN
Status: CANCELLED | OUTPATIENT
Start: 2019-02-01

## 2019-02-01 RX ORDER — LORAZEPAM 1 MG/1
1 TABLET ORAL EVERY 8 HOURS PRN
Status: DISCONTINUED | OUTPATIENT
Start: 2019-02-01 | End: 2019-02-11 | Stop reason: HOSPADM

## 2019-02-01 RX ORDER — ACETAMINOPHEN 325 MG/1
650 TABLET ORAL EVERY 6 HOURS PRN
Status: CANCELLED | OUTPATIENT
Start: 2019-02-01

## 2019-02-01 RX ORDER — LOPERAMIDE HYDROCHLORIDE 2 MG/1
2 CAPSULE ORAL EVERY 4 HOURS PRN
Status: CANCELLED | OUTPATIENT
Start: 2019-02-01

## 2019-02-01 RX ORDER — CLONIDINE HYDROCHLORIDE 0.1 MG/1
0.1 TABLET ORAL EVERY 8 HOURS PRN
Status: DISCONTINUED | OUTPATIENT
Start: 2019-02-01 | End: 2019-02-08

## 2019-02-01 RX ORDER — ONDANSETRON 4 MG/1
4 TABLET, ORALLY DISINTEGRATING ORAL EVERY 8 HOURS PRN
Status: DISCONTINUED | OUTPATIENT
Start: 2019-02-01 | End: 2019-02-05

## 2019-02-01 RX ORDER — BENZTROPINE MESYLATE 1 MG/1
1 TABLET ORAL EVERY 6 HOURS PRN
Status: DISCONTINUED | OUTPATIENT
Start: 2019-02-01 | End: 2019-02-11 | Stop reason: HOSPADM

## 2019-02-01 RX ORDER — HALOPERIDOL 5 MG
5 TABLET ORAL EVERY 8 HOURS PRN
Status: CANCELLED | OUTPATIENT
Start: 2019-02-01

## 2019-02-01 RX ORDER — BENZTROPINE MESYLATE 1 MG/ML
1 INJECTION INTRAMUSCULAR; INTRAVENOUS EVERY 6 HOURS PRN
Status: DISCONTINUED | OUTPATIENT
Start: 2019-02-01 | End: 2019-02-11 | Stop reason: HOSPADM

## 2019-02-01 RX ORDER — BENZTROPINE MESYLATE 1 MG/ML
1 INJECTION INTRAMUSCULAR; INTRAVENOUS EVERY 6 HOURS PRN
Status: CANCELLED | OUTPATIENT
Start: 2019-02-01

## 2019-02-01 RX ORDER — MAGNESIUM HYDROXIDE/ALUMINUM HYDROXICE/SIMETHICONE 120; 1200; 1200 MG/30ML; MG/30ML; MG/30ML
30 SUSPENSION ORAL EVERY 4 HOURS PRN
Status: CANCELLED | OUTPATIENT
Start: 2019-02-01

## 2019-02-01 RX ORDER — ACETAMINOPHEN 325 MG/1
650 TABLET ORAL EVERY 6 HOURS PRN
Status: DISCONTINUED | OUTPATIENT
Start: 2019-02-01 | End: 2019-02-11 | Stop reason: HOSPADM

## 2019-02-01 RX ORDER — TRAZODONE HYDROCHLORIDE 50 MG/1
50 TABLET ORAL
Status: DISCONTINUED | OUTPATIENT
Start: 2019-02-01 | End: 2019-02-11 | Stop reason: HOSPADM

## 2019-02-01 RX ORDER — LOPERAMIDE HYDROCHLORIDE 2 MG/1
2 CAPSULE ORAL EVERY 4 HOURS PRN
Status: DISCONTINUED | OUTPATIENT
Start: 2019-02-01 | End: 2019-02-11 | Stop reason: HOSPADM

## 2019-02-01 RX ORDER — ONDANSETRON 4 MG/1
4 TABLET, ORALLY DISINTEGRATING ORAL EVERY 8 HOURS PRN
Status: CANCELLED | OUTPATIENT
Start: 2019-02-01

## 2019-02-01 RX ORDER — MAGNESIUM HYDROXIDE/ALUMINUM HYDROXICE/SIMETHICONE 120; 1200; 1200 MG/30ML; MG/30ML; MG/30ML
30 SUSPENSION ORAL EVERY 4 HOURS PRN
Status: DISCONTINUED | OUTPATIENT
Start: 2019-02-01 | End: 2019-02-11 | Stop reason: HOSPADM

## 2019-02-01 RX ORDER — TRAZODONE HYDROCHLORIDE 50 MG/1
50 TABLET ORAL
Status: CANCELLED | OUTPATIENT
Start: 2019-02-01

## 2019-02-01 RX ORDER — HALOPERIDOL 5 MG
5 TABLET ORAL EVERY 8 HOURS PRN
Status: DISCONTINUED | OUTPATIENT
Start: 2019-02-01 | End: 2019-02-11 | Stop reason: HOSPADM

## 2019-02-01 RX ADMIN — TRAZODONE HYDROCHLORIDE 50 MG: 50 TABLET ORAL at 21:55

## 2019-02-01 RX ADMIN — CLONIDINE HYDROCHLORIDE 0.1 MG: 0.1 TABLET ORAL at 18:03

## 2019-02-01 NOTE — TREATMENT PLAN
Nurse to meet with patient twice daily to discuss treatment, diagnosis, medications and provide support as needed

## 2019-02-01 NOTE — ED NOTES
Patient is accepted at [facility] [place]  Patient is accepted by Dr Sahra Kilgore per Debbie Collier,       Patient may go to the floor once RN report is received, unit to call for report when ready

## 2019-02-01 NOTE — ED PROVIDER NOTES
History  Chief Complaint   Patient presents with    Psychiatric Evaluation     Patient presents from PCP for increasing depression and anxiety  States that she has been unable to "get out of bed" and has been "forcing myself to eat " Admits to intermittent SI with a plan to "drive fast into a pole " Reports 1 year anniversary of parents death last week  Admits to 4-5 year hx of opioid addiction  reports taking pills off the street for her addiction  This is a 26-year-old female who presents for crisis evaluation from her primary care physician's office  Patient states that she has been in anxious and depressed over the past several months after losing both her parents also recent unemployment  She has some suicidal thoughts occasionally with thought of driving her car into a tree there are guns in her house but she states she does not know how to use them  History provided by:  Patient  Psychiatric Evaluation   Presenting symptoms: depression and suicidal thoughts    Degree of incapacity (severity): Unable to specify  Onset quality:  Gradual  Duration:  2 months  Timing:  Constant  Progression:  Worsening  Context: stressful life event    Associated symptoms: anxiety        Prior to Admission Medications   Prescriptions Last Dose Informant Patient Reported?  Taking?   escitalopram (LEXAPRO) 10 mg tablet   No No   Sig: Take 1/2 tablet daily for 6 days then 1 tablet daily      Facility-Administered Medications: None       Past Medical History:   Diagnosis Date    [facility]     Anxiety     Depression     Drug dependence [facility]     Osteoarthritis     Rheumatoid arthritis [facility]     Substance abuse ([facility]     Tubal pregnancy        Past Surgical History:   Procedure Laterality Date    BREAST IMPLANT      BUNIONECTOMY Bilateral 1990    ECTOPIC PREGNANCY SURGERY      TUBAL LIGATION         Family History   Problem Relation Age of Onset    Bipolar disorder Mother     Depression Mother  Hypertension Mother     Heart disease Father         cardiac    Prostate cancer Father     Bipolar disorder Brother      I have reviewed and agree with the history as documented  Social History   Substance Use Topics    Smoking status: Current Some Day Smoker     Packs/day: 0 50     Types: Cigarettes    Smokeless tobacco: Never Used      Comment: quit 1 year ago    Alcohol use No      Comment: occ        Review of Systems   Musculoskeletal: Positive for arthralgias  Psychiatric/Behavioral: Positive for suicidal ideas  The patient is nervous/anxious  All other systems reviewed and are negative  Physical Exam  Physical Exam   Constitutional: She is oriented to person, place, and time  She appears well-developed and well-nourished  No distress  HENT:   Head: Normocephalic and atraumatic  Nose: Nose normal    Mouth/Throat: Oropharynx is clear and moist    Eyes: Pupils are equal, round, and reactive to light  EOM are normal  Right eye exhibits no discharge  Left eye exhibits no discharge  No scleral icterus  Neck: Neck supple  No tracheal deviation present  Cardiovascular: Normal rate, regular rhythm and intact distal pulses  Exam reveals no gallop and no friction rub  No murmur heard  Pulmonary/Chest: Effort normal and breath sounds normal  No stridor  No respiratory distress  She has no wheezes  She has no rales  Abdominal: Soft  Bowel sounds are normal  She exhibits no distension  There is no tenderness  There is no guarding  Musculoskeletal: Normal range of motion  She exhibits no edema, tenderness or deformity  Neurological: She is alert and oriented to person, place, and time  No cranial nerve deficit  Skin: Skin is warm and dry  No rash noted  She is not diaphoretic  Psychiatric:   Anxious cooperative suicidal thoughts of driving her car into a tree  Nursing note and vitals reviewed        Vital Signs  ED Triage Vitals [02/01/19 1025]   Temperature Pulse Respirations Blood Pressure SpO2   98 1 °F (36 7 °C) 85 18 131/87 95 %      Temp Source Heart Rate Source Patient Position - Orthostatic VS BP Location FiO2 (%)   Temporal Monitor Sitting Right arm --      Pain Score       No Pain           Vitals:    02/01/19 1025 02/01/19 1419   BP: 131/87 115/59   Pulse: 85 59   Patient Position - Orthostatic VS: Sitting Sitting       Visual Acuity      ED Medications  Medications - No data to display    Diagnostic Studies  Results Reviewed     Procedure Component Value Units Date/Time    Comprehensive metabolic panel [[id number]] Collected:  02/01/19 1038    Lab Status:  Final result Specimen:  Blood from Arm, Left Updated:  02/01/19 1124     Sodium 140 mmol/L      Potassium 3 8 mmol/L      Chloride 102 mmol/L      CO2 28 mmol/L      ANION GAP 10 mmol/L      BUN 10 mg/dL      Creatinine 0 86 mg/dL      Glucose 77 mg/dL      Calcium 8 8 mg/dL      AST 19 U/L      ALT 18 U/L      Alkaline Phosphatase 75 U/L      Total Protein 8 1 g/dL      Albumin 4 0 g/dL      Total Bilirubin 0 40 mg/dL      eGFR 76 ml/min/1 73sq m     Narrative:         National Kidney Disease Education Program recommendations are as follows:  GFR calculation is accurate only with a steady state creatinine  Chronic Kidney disease less than 60 ml/min/1 73 sq  meters  Kidney failure less than 15 ml/min/1 73 sq  meters  Rapid drug screen, urine [[id number]]  (Abnormal) Collected:  02/01/19 1041    Lab Status:  Final result Specimen:  Urine from Urine, Clean Catch Updated:  02/01/19 1057     Amph/Meth UR Negative     Barbiturate Ur Negative     Benzodiazepine Urine Negative     Cocaine Urine Negative     Methadone Urine Negative     Opiate Urine Positive (A)     PCP Ur Negative     THC Urine Negative    Narrative:         Presumptive report  If requested, specimen will be sent to reference lab for confirmation  FOR MEDICAL PURPOSES ONLY  IF CONFIRMATION NEEDED PLEASE CONTACT THE LAB WITHIN 5 DAYS      Drug Screen Cutoff Levels:  AMPHETAMINE/METHAMPHETAMINES  1000 ng/mL  BARBITURATES     200 ng/mL  BENZODIAZEPINES     200 ng/mL  COCAINE      300 ng/mL  METHADONE      300 ng/mL  OPIATES      300 ng/mL  PHENCYCLIDINE     25 ng/mL  THC       50 ng/mL    CBC and differential [[id number]]  (Abnormal) Collected:  02/01/19 1038    Lab Status:  Final result Specimen:  Blood from Arm, Left Updated:  02/01/19 1047     WBC 5 66 Thousand/uL      RBC 4 54 Million/uL      Hemoglobin 14 0 g/dL      Hematocrit 41 5 %      MCV 91 fL      MCH 30 8 pg      MCHC 33 7 g/dL      RDW 12 0 %      MPV 9 6 fL      Platelets 493 Thousands/uL      nRBC 0 /100 WBCs      Neutrophils Relative 58 %      Immat GRANS % 0 %      Lymphocytes Relative 24 %      Monocytes Relative 9 %      Eosinophils Relative 8 (H) %      Basophils Relative 1 %      Neutrophils Absolute 3 25 Thousands/µL      Immature Grans Absolute 0 01 Thousand/uL      Lymphocytes Absolute 1 37 Thousands/µL      Monocytes Absolute 0 52 Thousand/µL      Eosinophils Absolute 0 46 Thousand/µL      Basophils Absolute 0 05 Thousands/µL     POCT alcohol breath test [[id number]]  (Normal) Resulted:  02/01/19 1040    Lab Status:  Final result Updated:  02/01/19 1040     EXTBreath Alcohol 0                 No orders to display              Procedures  ECG 12 Lead Documentation  Date/Time: 2/1/2019 12:49 PM  Performed by: Fernanda Merino by: Sanjuanita Brumfield     ECG reviewed by me, the ED Provider: yes    Patient location:  ED  Rate:     ECG rate:  56  Rhythm:     Rhythm: sinus rhythm    Conduction:     Conduction: normal    T waves:     T waves: normal             Phone Contacts  ED Phone Contact    ED Course                               MDM    Disposition  Final diagnoses:   Depression   Anxiety   Suicidal ideation     Time reflects when diagnosis was documented in both MDM as applicable and the Disposition within this note     Time User Action Codes Description Comment    2/1/2019 10:31 AM Manish Vilchis Deyvi Berrios [F32 9] Depression     2/1/2019 10:31 AM Mau Aceves [F41 9] Anxiety     2/1/2019 10:31 AM Mau Aceves [R32 793] Suicidal ideation       ED Disposition     ED Disposition Condition Date/Time Comment    Transfer to [facility]  Fri Feb 1, 2019  2:54 PM       MD Documentation      Most Recent Value   Sending MD Dr Annia Peña MD      Follow-up Information    None         Patient's Medications   Discharge Prescriptions    No medications on file     No discharge procedures on file      ED Provider  Electronically Signed by           Nay Ohara DO  02/01/19 5383

## 2019-02-01 NOTE — ED NOTES
Patient refuses offer for meal at this time   Instructed to notify staff is this changes     Pedro Salter RN  02/01/19 4180

## 2019-02-01 NOTE — PROGRESS NOTES
Assessment/Plan:    Severe episode of recurrent major depressive disorder, without psychotic features ([facility] )  Worsening in PHQ-9 score from 10 to 26 w/SI  Pt requests and I agree consideration for admission is reasonable and necessary at this time  Possibility of bipolar discussed  Will refer to ER for consult w/crisis and possible admission  Generalized anxiety disorder  Worsening in BARBARA-7 score form 12 to 21  Will refer to ER for consult w/crisis and possible admission  Diagnoses and all orders for this visit:    Severe episode of recurrent major depressive disorder, without psychotic features ([facility] )    Generalized anxiety disorder    Opiate abuse, continuous ([facility] )      Flu shot declined  Subjective:      Patient ID: Jania Johnson is a 66 y o  female here for follow up  States she has been bad  Lost her job due to her depression  Has only been out once in the past 3 weeks  Usually in bed and not showering much  Forces herself to eat but usually just once/day  States she is ready to be admitted  Still using opiates daily   brought her to appt today  States he is very supportive  + SI, - HI  Considers driving car into something  Wishes she would not wake up from sleep  Brother has bipolar  The following portions of the patient's history were reviewed and updated as appropriate: allergies, current medications, past family history, past medical history, past social history and problem list     Review of Systems   Constitutional: Positive for activity change and appetite change (no appetite, forces herself to eat, ate once yesterday)  Negative for unexpected weight change  Psychiatric/Behavioral: Positive for dysphoric mood and suicidal ideas  Negative for self-injury  The patient is nervous/anxious            Objective:      /84 (Patient Position: Sitting, Cuff Size: Standard)   Pulse 69   Temp 97 9 °F (36 6 °C) (Tympanic)   Ht 5' 5" (1 651 m)   Wt 67 6 kg (149 lb) SpO2 98%   BMI 24 79 kg/m²          Physical Exam   Constitutional: She is oriented to person, place, and time  She appears well-developed and well-nourished  No distress  Appears tired   HENT:   Head: Normocephalic and atraumatic  Eyes: Conjunctivae are normal  No scleral icterus  Pulmonary/Chest: Effort normal  No respiratory distress  Neurological: She is alert and oriented to person, place, and time  Psychiatric: Her speech is normal and behavior is normal  Judgment normal  Cognition and memory are normal  She exhibits a depressed mood  She expresses suicidal ideation  She expresses suicidal plans  She expresses no homicidal plans  Vitals reviewed

## 2019-02-01 NOTE — ASSESSMENT & PLAN NOTE
Worsening in BARBARA-7 score form 12 to 21  Will refer to ER for consult w/crisis and possible admission

## 2019-02-01 NOTE — ED NOTES
Dr Daniel Shabazz at patient bedside to medically evaluate patient       Joss Chavarria RN  02/01/19 7862

## 2019-02-01 NOTE — PROGRESS NOTES
Pt admitted on a 61 51 81 from [facility] ED with increased anxiety, depression and recent SI to "get into car and drive into something"  This week was the one year anniversary of pt's mother's death which has been a major stressor  Pt's father passed away 2 months prior to mother  Pt self reports opiate addiction since 2007  Pt reports daily use of Percocet "30mg", 4-5 pills/day and Oxycontin 80mg, up to 2 pills/day and would like help to quit  Pt smokes 1/2 PPD and is interested in smoking cessation  Pt is currently taking Lexapro prescribed by PCP, but states she has been been taking it sporadically recently as she was too depressed to see the doctor for a new script  Pt is cooperative and pleasant during interview, rambling and appears restless

## 2019-02-01 NOTE — ASSESSMENT & PLAN NOTE
Worsening in PHQ-9 score from 10 to 26 w/SI  Pt requests and I agree consideration for admission is reasonable and necessary at this time  Possibility of bipolar discussed  Will refer to ER for consult w/crisis and possible admission

## 2019-02-01 NOTE — ED NOTES
Insurance Authorization:   Phone call placed to [employer] number: [phone]    Spoke to Maranda Boxer    7 days approved    Level of care: inpatient psych  Review on 2-7-19   Authorization # [id number]

## 2019-02-01 NOTE — ED NOTES
Frandy Brand from crisis states that he will have a crisis worker call unit for consult     Stephanie Mccabe RN  02/01/19 4363

## 2019-02-01 NOTE — ED NOTES
Patient dressed in blue scrubs and 3 bags of belongings placed in locker      Arleen Slaughter RN  02/01/19 4109

## 2019-02-01 NOTE — ED NOTES
Pt presents after seeing her PCP and indicating SI w/ a plan to drive her car into something due to increased depression and increased anxiety  Crisis assessment being completed remotely via phone after speaking to attending physician and ED RN  Pt is oriented X 4 and was able to answer all assessment questions w/ appropriate elaboration  Pt continues to express SI w/ plan to crash her car into a pole  Pt denies any HI  Pt has increased depression and increased anxiety which she relates to losing her parents a yr ago, losing her job recently, and increased opiod abuse  Pt states she has not been taking care of herself, e g  she forces herself to eat due to not having an appetite, she has not been showering, she stays in bed most of the time, and has only left her house 1 time in the past 3 weeks  Pt states she has abused opiods on a daily basis since 2008 and although she recognizes this as a large part of her issue she is afraid to stop taking them because as soon as she goes a little while without them she withdraws and it is scary and hard  Pt states she has been isolating in her room but rarely sleeps more than an hr or two and she has been hearing whispers and seeing shadows at night which she believes is from sleep deprivation  Pt also expresses feeling of paranoia but not to the point of delusions  Pt further states her irritability is increased but she denies having acted out aggressively in any way  Pt is in agreement w/ I/P psych admission  Pt's rights were explained and the voluntary admission paperwork was also explained in detail  Pt is aware that crisis will fax the 201 to the ED and the RN will review it w/ her and have her sign as per our conversation

## 2019-02-02 LAB
CHOLEST SERPL-MCNC: 188 MG/DL (ref 50–200)
HCG SERPL QL: NEGATIVE
HDLC SERPL-MCNC: 72 MG/DL (ref 40–60)
LDLC SERPL CALC-MCNC: 108 MG/DL (ref 0–100)
NONHDLC SERPL-MCNC: 116 MG/DL
TRIGL SERPL-MCNC: 41 MG/DL
TSH SERPL DL<=0.05 MIU/L-ACNC: 6.21 UIU/ML (ref 0.36–3.74)

## 2019-02-02 PROCEDURE — 99223 1ST HOSP IP/OBS HIGH 75: CPT | Performed by: PSYCHIATRY & NEUROLOGY

## 2019-02-02 PROCEDURE — 84443 ASSAY THYROID STIM HORMONE: CPT | Performed by: PSYCHIATRY & NEUROLOGY

## 2019-02-02 PROCEDURE — 84703 CHORIONIC GONADOTROPIN ASSAY: CPT | Performed by: PSYCHIATRY & NEUROLOGY

## 2019-02-02 PROCEDURE — 86592 SYPHILIS TEST NON-TREP QUAL: CPT | Performed by: PSYCHIATRY & NEUROLOGY

## 2019-02-02 PROCEDURE — 80061 LIPID PANEL: CPT | Performed by: PSYCHIATRY & NEUROLOGY

## 2019-02-02 RX ORDER — MIRTAZAPINE 15 MG/1
15 TABLET, FILM COATED ORAL
Status: DISCONTINUED | OUTPATIENT
Start: 2019-02-02 | End: 2019-02-08

## 2019-02-02 RX ORDER — GABAPENTIN 100 MG/1
100 CAPSULE ORAL 2 TIMES DAILY
Status: DISCONTINUED | OUTPATIENT
Start: 2019-02-02 | End: 2019-02-04

## 2019-02-02 RX ORDER — ESCITALOPRAM OXALATE 10 MG/1
10 TABLET ORAL DAILY
Status: DISCONTINUED | OUTPATIENT
Start: 2019-02-02 | End: 2019-02-04

## 2019-02-02 RX ORDER — GABAPENTIN 100 MG/1
100 CAPSULE ORAL
Status: DISCONTINUED | OUTPATIENT
Start: 2019-02-02 | End: 2019-02-04

## 2019-02-02 RX ADMIN — GABAPENTIN 100 MG: 100 CAPSULE ORAL at 14:25

## 2019-02-02 RX ADMIN — MIRTAZAPINE 15 MG: 15 TABLET, FILM COATED ORAL at 21:34

## 2019-02-02 RX ADMIN — GABAPENTIN 100 MG: 100 CAPSULE ORAL at 17:24

## 2019-02-02 RX ADMIN — MAGNESIUM HYDROXIDE 30 ML: 400 SUSPENSION ORAL at 17:24

## 2019-02-02 RX ADMIN — ESCITALOPRAM OXALATE 10 MG: 10 TABLET ORAL at 09:43

## 2019-02-02 RX ADMIN — GABAPENTIN 100 MG: 100 CAPSULE ORAL at 09:43

## 2019-02-02 NOTE — CONSULTS
Consultation - Moises Shelley 66 y o  female MRN: [id number]    Unit/Bed#: [facility] 258-01 Encounter: [id number]      Assessment/Plan     Assessment:  Depression with suicidal features  Elevated TSH  Plan:  Admit to [facility], orders per psychiatry  Pt is without clinical symptoms consistent with hypothyroidism, outpatient follow up indicated for further trending of TSH/T4    History of Present Illness     HPI: Moises Shelley is a 66y o  year old female who presents with increased depression and anxiety, with new suicidal thoughts  No hx of inpatient behavioral health admission  She requires medical clearance for admission to [facility]  She offers no complaints at this time  Inpatient consult for Medical Clearance for [facility] patient  Consult performed by: Debra Medina  Consult ordered by: Joseph Mishra          Review of Systems   Constitutional: Negative for chills, diaphoresis and fever  Eyes: Negative for visual disturbance  Respiratory: Negative for cough and shortness of breath  Cardiovascular: Negative for chest pain and palpitations  Gastrointestinal: Negative for abdominal pain, diarrhea, nausea and vomiting  Genitourinary: Negative for dysuria, flank pain and frequency  Musculoskeletal: Negative for arthralgias and myalgias  Skin: Negative for color change, rash and wound  Allergic/Immunologic: Negative for immunocompromised state  Neurological: Negative for dizziness and light-headedness  Hematological: Does not bruise/bleed easily  Psychiatric/Behavioral: Positive for dysphoric mood and suicidal ideas  Negative for confusion and sleep disturbance  The patient is not nervous/anxious          Historical Information   Past Medical History:   Diagnosis Date    [facility]     Anxiety     Depression     Drug dependence ([facility] )     Osteoarthritis     Rheumatoid arthritis [facility]     Substance abuse ([facility] )     Tubal pregnancy      Past Surgical History:   Procedure Laterality Date  BREAST IMPLANT      BUNIONECTOMY Bilateral 1990    ECTOPIC PREGNANCY SURGERY      TUBAL LIGATION       Social History   History   Alcohol Use No     History   Drug Use    Frequency: 7 0 times per week    Types: Oxycodone, Prescription     Comment: daily abuse of percocet/oxy     History   Smoking Status    Current Some Day Smoker    Packs/day: 0 50    Types: Cigarettes   Smokeless Tobacco    Never Used     Family History: non-contributory    Meds/Allergies   current meds:   Current Facility-Administered Medications   Medication Dose Route Frequency    acetaminophen (TYLENOL) tablet 650 mg  650 mg Oral Q6H PRN    aluminum-magnesium hydroxide-simethicone (MYLANTA) 200-200-20 mg/5 mL oral suspension 30 mL  30 mL Oral Q4H PRN    benztropine (COGENTIN) injection 1 mg  1 mg Intramuscular Q6H PRN    benztropine (COGENTIN) tablet 1 mg  1 mg Oral Q6H PRN    cloNIDine (CATAPRES) tablet 0 1 mg  0 1 mg Oral Q8H PRN    dicyclomine (BENTYL) tablet 20 mg  20 mg Oral Q6H PRN    escitalopram (LEXAPRO) tablet 10 mg  10 mg Oral Daily    gabapentin (NEURONTIN) capsule 100 mg  100 mg Oral BID    gabapentin (NEURONTIN) capsule 100 mg  100 mg Oral After Lunch    haloperidol (HALDOL) tablet 5 mg  5 mg Oral Q8H PRN    haloperidol lactate (HALDOL) injection 5 mg  5 mg Intramuscular Q6H PRN    loperamide (IMODIUM) capsule 2 mg  2 mg Oral Q4H PRN    LORazepam (ATIVAN) 2 mg/mL injection 2 mg  2 mg Intramuscular Q6H PRN    LORazepam (ATIVAN) tablet 1 mg  1 mg Oral Q8H PRN    magnesium hydroxide (MILK OF MAGNESIA) 400 mg/5 mL oral suspension 30 mL  30 mL Oral Daily PRN    mirtazapine (REMERON) tablet 15 mg  15 mg Oral HS    nicotine polacrilex (NICORETTE) gum 2 mg  2 mg Oral Q2H PRN    ondansetron (ZOFRAN-ODT) dispersible tablet 4 mg  4 mg Oral Q8H PRN    traZODone (DESYREL) tablet 50 mg  50 mg Oral HS PRN     No Known Allergies    Objective   Vitals: Blood pressure 110/62, pulse 69, temperature (!) 97 1 °F (36 2 °C), temperature source Tympanic, resp  rate 18, height 5' 5" (1 651 m), weight 64 7 kg (142 lb 9 6 oz), SpO2 97 %  No intake or output data in the 24 hours ending 02/02/19 1001  Invasive Devices          No matching active lines, drains, or airways          Physical Exam   Constitutional: She is oriented to person, place, and time  She appears well-developed and well-nourished  No distress  HENT:   Head: Normocephalic and atraumatic  Mouth/Throat: Oropharynx is clear and moist  No oropharyngeal exudate  Eyes: Pupils are equal, round, and reactive to light  No scleral icterus  Neck: Neck supple  No JVD present  Cardiovascular: Normal rate and regular rhythm  Exam reveals no gallop and no friction rub  No murmur heard  Pulmonary/Chest: No stridor  No respiratory distress  She has no wheezes  She has no rhonchi  She has no rales  Abdominal: She exhibits no distension  There is no tenderness  There is no guarding  Musculoskeletal: She exhibits no edema  Lymphadenopathy:     She has no cervical adenopathy  Neurological: She is alert and oriented to person, place, and time  No cranial nerve deficit  Skin: Skin is warm and dry  No rash noted  She is not diaphoretic  Psychiatric: She has a normal mood and affect  Her behavior is normal  Judgment and thought content normal    Nursing note and vitals reviewed  Lab Results: I have personally reviewed pertinent reports  Imaging Studies: I have personally reviewed pertinent reports  EKG, Pathology, and Other Studies: I have personally reviewed pertinent reports      VTE Prophylaxis: Reason for no pharmacologic prophylaxis Pt ambulatory

## 2019-02-02 NOTE — TREATMENT PLAN
TREATMENT PLAN REVIEW [facility] Jeanette UTGUI Munoz 66 y o  1960 female MRN: [id number]    [address] [place] [address] Room / Bed: Braulio Pillai[facility] Encounter: [id number]          Admit Date/Time:  2/1/2019  4:05 PM    Treatment Team: Attending Provider: Paula Morrow; Patient Care Assistant: Ann-Marie Benoit; Patient Care Technician: Malcolm Guzmán; Patient Care Technician: Silva Resendiz;  Registered Nurse: Shantal Lomas RN    Diagnosis: Principal Problem:    Severe episode of recurrent major depressive disorder, without psychotic features ([place] [address] )  Active Problems:    Generalized anxiety disorder    Opiate abuse, continuous ([place] [address] )    Patient Strengths/Assets: cooperative, compliant with medication, motivation for treatment/growth, patient is on a voluntary commitment    Patient Barriers/Limitations: low self esteem; opioid abuse    Short Term Goals: decrease in depressive symptoms, decrease in anxiety symptoms, decrease in suicidal thoughts    Long Term Goals: improvement in depression, improvement in anxiety, stabilization of mood, free of suicidal thoughts, acceptance of need for psychiatric medications, acceptance of need for psychiatric treatment, acceptance of need for psychiatric follow up after discharge    Progress Towards Goals: starting psychiatric medications as prescribed, starting opioid detoxification protocol    Recommended Treatment: medication management, patient medication education, group therapy, milieu therapy, continued Behavioral Health psychiatric evaluation/assessment process    Treatment Frequency: daily medication monitoring, group and milieu therapy daily, monitoring through interdisciplinary rounds, monitoring through weekly patient care conferences    Expected Discharge Date: 5-7 days    Discharge Plan: referral for outpatient medication management with a psychiatrist, referral for outpatient psychotherapy    Treatment Plan Created/Updated By: Marija Galaviz

## 2019-02-02 NOTE — PROGRESS NOTES
Pt pleasant during interaction  Visible in milieu throughout shift  Discussed events leading to admission  Pt reports abusing opiate pain medications for over ten years  Pt reports long hx of drug use prior to that  Most recently in rehab in 2007 for crack cocaine use  Pt unsure if she wants to go to rehab after treatment here  Discussed that this would most likely be recommended as the next step  Pt said she is going to consider this  Pt denies any opiate w/d s/s presently  Pt reports feeling restless and twitchy overnight and said that she would suddenly jump and wake up out of her sleep  Encouraged pt to notify staff if having difficulty sleeping at night  Reviewed with pt that opiate w/d meds are ordered prn and to notify staff if experiencing any symptoms  Reviewed new scheduled medications with pt  Pt denies SI on the unit  Pt reports that  and sister are good supports for her  Dr Omi Rose notified of elevated TSH

## 2019-02-02 NOTE — PROGRESS NOTES
Belongings brought in by pt    during visit:    At bedside:  [place]  4 shirts  3 socks  2 pants  Brush  Toothbrush  Slippers  Deodorant  Franks  Coloring book  Shampoo  Conditioner  bodywash  Soap    In [place]:  Pink Duffle bag  1 socks  Yehuda decoration  Flip flops  Folder-pen-paper  Shampoo  Conditioner  bodyscrub

## 2019-02-02 NOTE — PROGRESS NOTES
Pt cooperative, polite, appears restless, twisting hands together, and appears to have muscle spasm in neck/shoulder  Pt accepted clonidine prn withdrawal symptoms  Pt visited with , appeared more physically relaxed  Denies symptoms

## 2019-02-02 NOTE — H&P
Psychiatric Evaluation - [address] [place] 66 y o  female MRN: [id number]  Unit/Bed#: [facility] 258-01 Encounter: [id number]    Assessment/Plan   Principal Problem:    Severe episode of recurrent major depressive disorder, without psychotic features ([facility] 75 )  Active Problems:    Generalized anxiety disorder    Opiate abuse, continuous ([place] [address] )    Plan:   1  Check admission labs  2  Collaborate with family for baseline assessment and disposition planning  3  Continue Lexapro 10 mg daily for depression and anxiety management  4  Add gabapentin 100 mg t i d  For pain, anxiety and additional benefit with opioid withdrawal related anxiety management  5  Add mirtazapine 15 mg at bedtime for insomnia, poor appetite, depression and anxiety management  6  Clonidine 0 1 mg q 8 hours p r n  For opiate withdrawal symptoms and additional symptomatic p r n  Management  7  Patient was educated on importance of rehabilitation once stable, but patient needs time to think about this decision  8  Consider naltrexone once patient is stable for discharge  9  Repeat TSH with free T4 level tomorrow  Risks, benefits and possible side effects of Medications:   Risks, benefits, and possible side effects of medications explained to patient and patient verbalizes understanding  Risks of medications in pregnancy explained if female patient  Patient verbalizes understanding and agrees to notify her doctor if she becomes pregnant  Chief Complaint: "I don't want to go on living like this"    History of Present Illness     Patient is a 66 y o  female presents on 61 51 81 with recent worsening of depression, anxiety with poor sleep, decline in appetite, weight loss, guilt, hopelessness, helplessness and suicidal ideations to drive car into something  Patient denies endorsing manic or psychotic symptoms  Patient reports multiple stressors including her recently losing job and last week was her both parents death anniversary  Patient also reported abusing Percocet and oxycodone on a daily basis  Patient reports using Percocet 30 mg tablet 5-6 pills on a daily basis for last 18 years and also reports using 80 mg of OxyContin 1-2 pills daily  During this evaluation patient is denying opioid withdrawal symptoms  Patient was educated on symptoms of opiate withdrawal and educated that she can request p r n  Medication accordingly  Patient was educated on importance of rehabilitation options once stable  After detailed discussion patient agreed with the above treatment planning and is motivated to get better  She reports good support from  at this point  Medical Review Of Systems:  none    Psychiatric Review Of Systems:  sleep: yes  appetite changes: yes  weight changes: yes  energy/anergy: yes  interest/pleasure/anhedonia: yes  somatic symptoms: yes  anxiety/panic: yes  mike: no  guilty/hopeless: yes  self injurious behavior/risky behavior: no    Historical Information     Past Psychiatric History:   No prior inpatient psychiatric admissions  Currently in treatment with PCP  Past Suicide attempts: no  Past Violent behavior: no  Past Psychiatric medication trial: lexapro    Substance Abuse History:  Opioids: see HPI    I spent time with patient in counseling and education on risk of substance abuse  Assessed him motivation and encouraged patient for treatment  Brief intervention done       Social History     Tobacco History     Smoking Status  Current Some Day Smoker Smoking Frequency  0 5 packs/day Smoking Tobacco Type  Cigarettes    Smokeless Tobacco Use  Never Used          Alcohol History     Alcohol Use Status  No          Drug Use     Drug Use Status  Yes Types  Oxycodone, Prescription Frequency  7 times/week Comment  daily abuse of percocet/oxy          Sexual Activity     Sexually Active  Not Asked          Activities of Daily Living    Not Asked               Additional Substance Use Detail     Questions Responses Substance Use Assessment Substance use within the past 12 months    Alcohol Use Frequency Denies use in past 12 months    Cannabis frequency Never used    Comment: Never used on 2/1/2019     Heroin Frequency Denies use in past 12 months    Cocaine frequency Never used    Comment: Never used on 2/1/2019     Crack Cocaine Frequency Prior dependence    Methamphetamine Frequency Past abuse    Crack Cocaine Longest Abstinence rehab in 2007    Narcotic Frequency Daily    Benzodiazepine Frequency Denies use in past 12 months    Amphetamine frequency Denies use in past 12 months    Narcotic Method Pill    Narcotic 1st Use 2007    Narcotic Last Use & Amount 1/31/19    Barbituate Frequency Denies use use in past 12 months    Inhalant frequency Never used    Comment: Never used on 2/1/2019     Hallucinogen frequency Never used    Comment: Never used on 2/1/2019     Ecstasy frequency Never used    Comment: Never used on 2/1/2019     Other drug frequency Never used    Comment: Never used on 2/1/2019     Opiate frequency Daily    Opiate method Pill    Comment: Pill on 2/1/2019     Opiate last use 1/31/19    Comment: 1/31/2019 on 2/1/2019     Last reviewed by Margaret Shipley RN on 2/1/2019        I have assessed this patient for substance use within the past 12 months    Family Psychiatric History:   Brother with history of bipolar  Late Mother with history of depression    Social History:  Lives with   Denies history of abuse  Denies legal history    Past Medical History:   Diagnosis Date    Addiction to drug ([facility] )     Anxiety     Depression     Drug dependence ([facility] )     Osteoarthritis     Rheumatoid arthritis ([facility] )     Substance abuse ([facility] )     Tubal pregnancy            Meds/Allergies   all current active meds have been reviewed  No Known Allergies    Objective   Vital signs in last 24 hours:  Temp:  [97 1 °F (36 2 °C)-98 9 °F (37 2 °C)] 97 1 °F (36 2 °C)  HR:  [59-85] 69  Resp:  [14-18] 18  BP: (102-139)/(54-92) 110/62    No intake or output data in the 24 hours ending 02/02/19 1014    Mental Status Evaluation:  Appearance:  casually dressed   Behavior:  guarded   Speech:  soft   Mood:  anxious and depressed   Affect:  constricted   Language: naming objects and repeating phrases   Thought Process:  circumstantial   Thought Content:  obsessions   Perceptual Disturbances: None   Risk Potential: Suicidal Ideations without plan, Homicidal Ideations none and Potential for Aggression No   Sensorium:  person and place   Cognition:  grossly intact   Consciousness:  awake    Attention: attention span appeared shorter than expected for age   Intellect: normal   Fund of Knowledge: awareness of current events: fair   Insight:  limited   Judgment: limited   Muscle Strength and Tone: arm(s): bilateral   Gait/Station: normal gait/station and normal balance   Motor Activity: no abnormal movements     Memory: Short and long term memory  fair       Laboratory results:    I have personally reviewed all pertinent laboratory/tests results    Labs in last 72 hours:   Recent Labs      02/01/19   1038  02/02/19   0535   WBC  5 66   --    RBC  4 54   --    HGB  14 0   --    HCT  41 5   --    PLT  251   --    RDW  12 0   --    NEUTROABS  3 25   --    SODIUM  140   --    K  3 8   --    CL  102   --    CO2  28   --    BUN  10   --    CREATININE  0 86   --    GLUC  77   --    CALCIUM  8 8   --    AST  19   --    ALT  18   --    ALKPHOS  75   --    TP  8 1   --    ALB  4 0   --    TBILI  0 40   --    CHOLESTEROL   --   188   HDL   --   72*   TRIG   --   41   LDLCALC   --   108*   [id number]   --   6 210*   PREGSERUM   --   Negative     Admission Labs:   Admission on 02/01/2019   Component Date Value    Color, UA 02/01/2019 Yellow     Clarity, UA 02/01/2019 Clear     Specific Gravity, UA 02/01/2019 1 025     pH, UA 02/01/2019 6 0     Leukocytes, UA 02/01/2019 Negative     Nitrite, UA 02/01/2019 Negative     Protein, UA 02/01/2019 Negative     Glucose, UA 02/01/2019 Negative     Ketones, UA 02/01/2019 Negative     Urobilinogen, UA 02/01/2019 0 2     Bilirubin, UA 02/01/2019 Negative     Blood, UA 02/01/2019 Small*    RBC, UA 02/01/2019 0-1*    WBC, UA 02/01/2019 0-1*    Epithelial Cells 02/01/2019 Occasional     Bacteria, UA 02/01/2019 Occasional     MUCUS THREADS 02/01/2019 Occasional*    Preg, Serum 02/02/2019 Negative     Cholesterol 02/02/2019 188     Triglycerides 02/02/2019 41     HDL, Direct 02/02/2019 72*    LDL Calculated 02/02/2019 108*    Non-HDL-Chol (CHOL-HDL) 02/02/2019 116     TSH 3RD GENERATON 02/02/2019 6 210*     Risks / Benefits of Treatment:     Risks, benefits, and possible side effects of medications explained to patient  The patient verbalizes understanding and agreement for treatment  Counseling / Coordination of Care:     Patient's presentation on admission and proposed treatment plan discussed with treatment team   Diagnosis, medication changes and treatment plan reviewed with patient  Recent stressors discussed with patient     Events leading to admission reviewed with patient  Importance of medication and treatment compliance reviewed with patient  Discussed with patient plan for opioid detoxification protocol and gradual taper of medications to prevent withdrawal symptoms  Inpatient Psychiatric Certification:     Certification: Based upon physical, mental and social evaluations, I certify that inpatient psychiatric services are medically necessary for this patient for a duration of 7 midnights for the treatment of Severe episode of recurrent major depressive disorder, without psychotic features ([facility] )    Available alternative community resources do not meet the patient's mental health care needs  I further attest that an established written individualized plan of care has been implemented and is outlined in the patient's medical records        This note has been constructed using a voice recognition system  There may be translation, syntax,  or grammatical errors  If you have any questions, please contact the dictating provider

## 2019-02-02 NOTE — PROGRESS NOTES
Pt appeared to sleep all night until 0515, without disturbance  Currently having lab work drawn  Still resting in bed

## 2019-02-03 LAB
RPR SER QL: NORMAL
TSH SERPL DL<=0.05 MIU/L-ACNC: 1.33 UIU/ML (ref 0.36–3.74)

## 2019-02-03 PROCEDURE — 84443 ASSAY THYROID STIM HORMONE: CPT | Performed by: PSYCHIATRY & NEUROLOGY

## 2019-02-03 PROCEDURE — 99232 SBSQ HOSP IP/OBS MODERATE 35: CPT | Performed by: PSYCHIATRY & NEUROLOGY

## 2019-02-03 RX ADMIN — ESCITALOPRAM OXALATE 10 MG: 10 TABLET ORAL at 09:57

## 2019-02-03 RX ADMIN — MIRTAZAPINE 15 MG: 15 TABLET, FILM COATED ORAL at 21:46

## 2019-02-03 RX ADMIN — GABAPENTIN 100 MG: 100 CAPSULE ORAL at 18:16

## 2019-02-03 RX ADMIN — GABAPENTIN 100 MG: 100 CAPSULE ORAL at 13:59

## 2019-02-03 RX ADMIN — GABAPENTIN 100 MG: 100 CAPSULE ORAL at 09:57

## 2019-02-03 NOTE — PROGRESS NOTES
Pt friendly and cooperative  Pt had difficulty sitting still or twitchy  She asked is this was from withdrawal   Explained that some may be  It should improve  Explained to her that we have withdrawal prn medications if she felt that she needed them  She explained that she felt fine for now  Pt friendly with her roommate  Both are near same age and circumstance  Explained Lexapro and Gabapentin to her in addition to Metabolic Syndrome  Pt accepted education and handouts  Thanked writer  No BONITA, HI and FERMIN

## 2019-02-03 NOTE — PROGRESS NOTES
Progress Note - [address] [place] 66 y o  female MRN: [id number]  Unit/Bed#: [facility] 258-01 Encounter: [id number]    Assessment/Plan   Principal Problem:    Severe episode of recurrent major depressive disorder, without psychotic features ([facility] 75 )  Active Problems:    Generalized anxiety disorder    Opiate abuse, continuous ([facility] 75 )    Subjective:  Patient is compliant with medication with no acute side effects  Patient continues to report no opioid withdrawal symptoms today  Today patient reported constipation to nursing staff but when I saw her patient reports improvement in constipation and agreed with not adding any medications  Patient again educated that all medication for opiate withdrawal symptoms are as needed and she can request them if she had opioid withdrawal symptoms on the unit  Patient agreed with plan of titrating medications gradually  Patient agreed with slow improvement in sleep but appears frequent awakening last night  Discussed that sleep takes time for improvement and medication dosing will not be increased today  Patient reports feeling mild sedation from gabapentin but reports this only last for few minutes  Agreed with plan of titration of Lexapro and gabapentin from tomorrow based on her toleration and response  Patient educated on importance of milieu therapy participation  She is consenting for safety on the unit      Current Medications:    Current Facility-Administered Medications:  acetaminophen 650 mg Oral Q6H PRN [facility]   aluminum-magnesium hydroxide-simethicone 30 mL Oral Q4H PRN [facility]   benztropine 1 mg Intramuscular Q6H PRN [facility]   benztropine 1 mg Oral Q6H PRN [facility]   cloNIDine 0 1 mg Oral Q8H PRN [facility]   dicyclomine 20 mg Oral Q6H PRN [facility]   escitalopram 10 mg Oral Daily [facility]   gabapentin 100 mg Oral BID [facility]   gabapentin 100 mg Oral After Lunch [facility]   haloperidol 5 mg Oral Q8H PRN [facility]   haloperidol lactate 5 mg Intramuscular Q6H PRN [facility]   loperamide 2 mg Oral Q4H PRN [facility]   LORazepam 2 mg Intramuscular Q6H PRN [facility]   LORazepam 1 mg Oral Q8H PRN [facility]   magnesium hydroxide 30 mL Oral Daily PRN [facility]   mirtazapine 15 mg Oral HS [facility]   nicotine polacrilex 2 mg Oral Q2H PRN [facility]   ondansetron 4 mg Oral Q8H PRN [facility]   [facility]ZODone 50 mg Oral HS [facility]       Behavioral Health Medications: all current active meds have been reviewed  Vital signs in last 24 hours:  Temp:  [98 2 °F (36 8 °C)-98 8 °F (37 1 °C)] 98 8 °F (37 1 °C)  HR:  [65-81] 65  Resp:  [18-20] 20  BP: (107-112)/(59-71) 112/71    Laboratory results:    I have personally reviewed all pertinent laboratory/tests results  Labs in last 72 hours:   Recent Labs      02/01/19   1038   02/02/19   0535  02/03/19   0653   WBC  5 66   --    --    --    RBC  4 54   --    --    --    HGB  14 0   --    --    --    HCT  41 5   --    --    --    PLT  251   --    --    --    RDW  12 0   --    --    --    NEUTROABS  3 25   --    --    --    SODIUM  140   --    --    --    K  3 8   --    --    --    CL  102   --    --    --    CO2  28   --    --    --    BUN  10   --    --    --    CREATININE  0 86   --    --    --    GLUC  77   --    --    --    CALCIUM  8 8   --    --    --    AST  19   --    --    --    ALT  18   --    --    --    ALKPHOS  75   --    --    --    TP  8 1   --    --    --    ALB  4 0   --    --    --    TBILI  0 40   --    --    --    CHOLESTEROL   --    --   188   --    HDL   --    --   72*   --    TRIG   --    --   41   --    LDLCALC   --    --   108*   --    [id number]   --    < >  6 210*  1 332   PREGSERUM   --    --   Negative   --     < > = values in this interval not displayed       Admission Labs:   Admission on 02/01/2019   Component Date Value    Color, UA 02/01/2019 Yellow     Clarity, UA 02/01/2019 Clear     Specific Gravity, UA 02/01/2019 1 025     pH, UA 02/01/2019 6 0     Leukocytes, UA 02/01/2019 Negative     Nitrite, UA 02/01/2019 Negative     Protein, UA 02/01/2019 Negative     Glucose, UA 02/01/2019 Negative     Ketones, UA 02/01/2019 Negative     Urobilinogen, UA 02/01/2019 0 2     Bilirubin, UA 02/01/2019 Negative     Blood, UA 02/01/2019 Small*    RBC, UA 02/01/2019 0-1*    WBC, UA 02/01/2019 0-1*    Epithelial Cells 02/01/2019 Occasional     Bacteria, UA 02/01/2019 Occasional     MUCUS THREADS 02/01/2019 Occasional*    Preg, Serum 02/02/2019 Negative     Cholesterol 02/02/2019 188     Triglycerides 02/02/2019 41     HDL, Direct 02/02/2019 72*    LDL Calculated 02/02/2019 108*    Non-HDL-Chol (CHOL-HDL) 02/02/2019 116     TSH 3RD GENERATON 02/02/2019 6 210*    TSH 3RD GENERATON 02/03/2019 1 332        Psychiatric Review of Systems:  Behavior over the last 24 hours:  unchanged  Sleep: insomnia- improving  Appetite: normal  Medication side effects: No  ROS: no complaints    Mental Status Evaluation:  Appearance:  casually dressed   Behavior:  guarded   Speech:  soft   Mood:  anxious and depressed   Affect:  constricted   Language naming objects and repeating phrases   Thought Process:  circumstantial   Thought Content:  obsessions   Perceptual Disturbances: None   Risk Potential: Suicidal Ideations without plan, Homicidal Ideations none and Potential for Aggression No   Sensorium:  person and place   Cognition:  grossly intact   Consciousness:  awake    Attention: attention span appeared shorter than expected for age   Insight:  limited   Judgment: limited   Intellect fair   Gait/Station: normal gait/station and normal balance   Motor Activity: no abnormal movements     Memory: Short and long term memory  fair     Progress Toward Goals: slow progress    Recommended Treatment:   Continue current medication and await for response before considering further titration  Continue with group therapy, milieu therapy and occupational therapy  Continue following current medications:   Current Facility-Administered Medications:  acetaminophen 650 mg Oral Q6H PRN [facility] [place]   aluminum-magnesium hydroxide-simethicone 30 mL Oral Q4H PRN [facility]   benztropine 1 mg Intramuscular Q6H PRN [facility]   benztropine 1 mg Oral Q6H PRN [facility]   cloNIDine 0 1 mg Oral Q8H PRN [facility]   dicyclomine 20 mg Oral Q6H PRN [facility]   escitalopram 10 mg Oral Daily [facility]   gabapentin 100 mg Oral BID [facility]   gabapentin 100 mg Oral After Lunch [facility]   haloperidol 5 mg Oral Q8H PRN [facility]   haloperidol lactate 5 mg Intramuscular Q6H PRN [facility]   loperamide 2 mg Oral Q4H PRN [facility]   LORazepam 2 mg Intramuscular Q6H PRN [facility]   LORazepam 1 mg Oral Q8H PRN [facility]   magnesium hydroxide 30 mL Oral Daily PRN [facility]   mirtazapine 15 mg Oral HS [facility]   nicotine polacrilex 2 mg Oral Q2H PRN [facility]   ondansetron 4 mg Oral Q8H PRN [facility]   traZODone 50 mg Oral HS PRN [facility]       Risks, benefits and possible side effects of Medications:   Risks, benefits, and possible side effects of medications explained to patient and patient verbalizes understanding  Risks of medications in pregnancy explained if female patient  Patient verbalizes understanding and agrees to notify her doctor if she becomes pregnant  This note has been constructed using a voice recognition system  There may be translation, syntax,  or grammatical errors  If you have any questions, please contact the dictating provider

## 2019-02-03 NOTE — PROGRESS NOTES
Pt reports improved sleep last night  Denies restlessness  Woke several times but able to quickly get back to sleep  Pt reports poor appetite but has been eating as much as she can  C/o poor energy  Pt said she doesn't feel like doing anything and has no motivation to get out of bed at this time  Denies SI  Appears depressed  Denies opiate w/d s/s  VSS  Pt was unsure of opiate w/d s/s to be aware of  Reviewed this with pt  Pt reports doing well on new scheduled medicines  Pt c/o constipation  Pt said she had small painful bowel movement yesterday  Requesting stool softener  Denies any other concerns

## 2019-02-03 NOTE — MALNUTRITION/BMI
This medical record reflects one or more clinical indicators suggestive of malnutrition and/or morbid obesity  Malnutrition Findings:      Degree of Malnutrition: Malnutrition of mild degree  Malnutrition Characteristics: Inadequate energy, Weight loss (Pt presents with mild malnutrition as evidenced by < 75% po intake > 5 days, excessive opiate use that reduces appetite and 11% wt loss in 9 months  Treat with diet and supplements as needed  )    BMI Findings: Body mass index is 23 73 kg/m²  See Nutrition note dated 02/03/2019 for additional details  Completed nutrition assessment is viewable in the nutrition documentation

## 2019-02-04 PROCEDURE — 99232 SBSQ HOSP IP/OBS MODERATE 35: CPT | Performed by: PSYCHIATRY & NEUROLOGY

## 2019-02-04 RX ORDER — GABAPENTIN 100 MG/1
200 CAPSULE ORAL
Status: DISCONTINUED | OUTPATIENT
Start: 2019-02-04 | End: 2019-02-05

## 2019-02-04 RX ORDER — GABAPENTIN 100 MG/1
100 CAPSULE ORAL ONCE
Status: COMPLETED | OUTPATIENT
Start: 2019-02-04 | End: 2019-02-04

## 2019-02-04 RX ORDER — GABAPENTIN 100 MG/1
200 CAPSULE ORAL 2 TIMES DAILY
Status: DISCONTINUED | OUTPATIENT
Start: 2019-02-04 | End: 2019-02-05

## 2019-02-04 RX ORDER — ESCITALOPRAM OXALATE 5 MG/1
5 TABLET ORAL ONCE
Status: COMPLETED | OUTPATIENT
Start: 2019-02-04 | End: 2019-02-04

## 2019-02-04 RX ADMIN — GABAPENTIN 100 MG: 100 CAPSULE ORAL at 11:36

## 2019-02-04 RX ADMIN — GABAPENTIN 200 MG: 100 CAPSULE ORAL at 17:32

## 2019-02-04 RX ADMIN — GABAPENTIN 200 MG: 100 CAPSULE ORAL at 13:06

## 2019-02-04 RX ADMIN — LORAZEPAM 1 MG: 1 TABLET ORAL at 05:28

## 2019-02-04 RX ADMIN — ESCITALOPRAM OXALATE 10 MG: 10 TABLET ORAL at 09:01

## 2019-02-04 RX ADMIN — GABAPENTIN 100 MG: 100 CAPSULE ORAL at 09:01

## 2019-02-04 RX ADMIN — CLONIDINE HYDROCHLORIDE 0.1 MG: 0.1 TABLET ORAL at 09:04

## 2019-02-04 RX ADMIN — ESCITALOPRAM 5 MG: 5 TABLET, FILM COATED ORAL at 11:36

## 2019-02-04 NOTE — PROGRESS NOTES
Med Note: 0913 Pt c/o W/D symptoms of chills, sweating  Requested and received Clonidine  Pt stated med was effective

## 2019-02-04 NOTE — CASE MANAGEMENT
CM met with Pt who had been on the phone frequently throughout the day  Pt said that she was tired, & asked if CM could meet with her tomorrow; CM agreed  CM inquired if there was anyone that needed to be contacted today in regards to her admission, but she said no, she had already contacted everyone that needed to know where she was

## 2019-02-04 NOTE — PROGRESS NOTES
Belongings brought in with visitor:     In locker:  Snacks  Yoohoo drinks  pj pants w/ strings x2  Shirt x2  Jacket w/ strings    At bedside:  Some snacks  Some yoohoo drinks  chapstick  Tooth paste  lotion  Shirts x3  Pants x1  Underwear x1  Sweatshirtx2 (took string out)  Leggings  Jeans x1  PJ pants x1

## 2019-02-04 NOTE — PROGRESS NOTES
Cooperative during interaction  Denies SI/HI  Reports elevated depression and fluctuating anxiety  Denies current opiate withdrawal symptoms  Reports racing thoughts prior to bed which leads to difficult night sleep    Reports BM this am and refused need for stool softener and states she made MD aware this AM

## 2019-02-04 NOTE — PROGRESS NOTES
Pt withdrawn in room,  reports having some irritability and anxiety earlier in shift  Reports minor headache, denies wanting medication for pain  States she will "get through it"  Encouraged to work on [employer] and attempt to attend group, socialize when possible  Pt reports some blunting of feelings  Polite and cooperative, denies SI  Reports positive supports at home, such as granddaughter, sister and brother  Pt does visit dayroom at times throughout shift

## 2019-02-04 NOTE — PROGRESS NOTES
Progress Note - [address] [place] 66 y o  female MRN: [id number]  [facility] Encounter: [id number]    Assessment/Plan   Principal Problem:    Severe episode of recurrent major depressive disorder, without psychotic features ([address] )  Active Problems:    Generalized anxiety disorder    Opiate abuse, continuous (HCC)    Subjective:  Patient remained compliant with medication but continues to express depression and anxiety with passive death wishes and hopelessness  Patient reports feeling safe on the unit  Patient received Ativan p r n  Today morning for increased anxiety  Patient agreed with plan of increasing gabapentin dose and Lexapro dose today for depression and anxiety management  Patient continues to deny endorsing manic or psychotic symptoms  No opioid withdrawal symptoms are noted today  Patient is motivated to consider rehabilitation options once patient is stable  Patient is also motivated to participate in milieu therapy      Current Medications:    Current Facility-Administered Medications:  acetaminophen 650 mg Oral Q6H PRN Compa Stockton   aluminum-magnesium hydroxide-simethicone 30 mL Oral Q4H PRN Compa Stockton   benztropine 1 mg Intramuscular Q6H PRN Compa Stockton   benztropine 1 mg Oral Q6H PRN Compa Stockton   cloNIDine 0 1 mg Oral Q8H PRN Compa Stockton   dicyclomine 20 mg Oral Q6H PRN Compa Stockton   [START ON 2/5/2019] escitalopram 15 mg Oral Daily Compa Stockton   escitalopram 5 mg Oral Once Compa Stockton   gabapentin 100 mg Oral Once Compa Stockton   gabapentin 200 mg Oral After Lunch Compa Stockton   gabapentin 200 mg Oral BID Compa Stockton   haloperidol 5 mg Oral Q8H PRN Compa Stockton   haloperidol lactate 5 mg Intramuscular Q6H PRN Compa Stockton   loperamide 2 mg Oral Q4H PRN Compa Stockton   LORazepam 2 mg Intramuscular Q6H PRN Compa Stockton   LORazepam 1 mg Oral Q8H PRN Compa Stockton   magnesium hydroxide 30 mL Oral Daily PRN Compa Stockton   mirtazapine 15 mg Oral HS Compa Stockton   nicotine polacrilex 2 mg Oral Q2H PRN Compa Stockton   ondansetron 4 mg Oral Q8H PRN Compa Stockton   traZODone 50 mg Oral HS PRN Compa Stockton       Behavioral Health Medications: all current active meds have been reviewed  Vital signs in last 24 hours:  Temp:  [98 7 °F (37 1 °C)-99 3 °F (37 4 °C)] 98 7 °F (37 1 °C)  HR:  [76-88] 88  Resp:  [18-19] 19  BP: (117-118)/(64-69) 117/69    Laboratory results:    I have personally reviewed all pertinent laboratory/tests results    Labs in last 72 hours:   Recent Labs      02/02/19   0535  02/03/19   0653   CHOLESTEROL  188   --    HDL  72*   --    TRIG  41   --    LDLCALC  108*   --    [id number]  6 210*  1 332   PREGSERUM  Negative   --    RPR  Non-Reactive   --      Admission Labs:   Admission on 02/01/2019   Component Date Value    Color, UA 02/01/2019 Yellow     Clarity, UA 02/01/2019 Clear     Specific Gravity, UA 02/01/2019 1 025     pH, UA 02/01/2019 6 0     Leukocytes, UA 02/01/2019 Negative     Nitrite, UA 02/01/2019 Negative     Protein, UA 02/01/2019 Negative     Glucose, UA 02/01/2019 Negative     Ketones, UA 02/01/2019 Negative     Urobilinogen, UA 02/01/2019 0 2     Bilirubin, UA 02/01/2019 Negative     Blood, UA 02/01/2019 Small*    RBC, UA 02/01/2019 0-1*    WBC, UA 02/01/2019 0-1*    Epithelial Cells 02/01/2019 Occasional     Bacteria, UA 02/01/2019 Occasional     MUCUS THREADS 02/01/2019 Occasional*    Preg, Serum 02/02/2019 Negative     Cholesterol 02/02/2019 188     Triglycerides 02/02/2019 41     HDL, Direct 02/02/2019 72*    LDL Calculated 02/02/2019 108*    Non-HDL-Chol (CHOL-HDL) 02/02/2019 116     RPR 02/02/2019 Non-Reactive     TSH 3RD GENERATON 02/02/2019 6 210*    TSH 3RD GENERATON 02/03/2019 1 332        Psychiatric Review of Systems:  Behavior over the last 24 hours:  unchanged  Sleep: normal  Appetite: normal  Medication side effects: No  ROS: no complaints    Mental Status Evaluation:  Appearance:  casually dressed   Behavior:  guarded   Speech:  soft   Mood:  anxious and depressed   Affect:  constricted   Language naming objects and repeating phrases   Thought Process:  circumstantial   Thought Content:  obsessions   Perceptual Disturbances: None   Risk Potential: Suicidal Ideations without plan, Homicidal Ideations none and Potential for Aggression No   Sensorium:  person and place   Cognition:  grossly intact   Consciousness:  awake    Attention: attention span appeared shorter than expected for age   Insight:  limited   Judgment: limited   Intellect fair   Gait/Station: normal gait/station   Motor Activity: no abnormal movements     Memory: Short and long term memory  fair     Progress Toward Goals: slow progress    Recommended Treatment:   Increase gabapentin to 200 mg t i d  For anxiety management  Increase Lexapro to 15 mg daily for depression and anxiety management  Patient again motivated to consider rehabilitation  Continue with group therapy, milieu therapy and occupational therapy      Continue following current medications:   Current Facility-Administered Medications:  acetaminophen 650 mg Oral Q6H PRN Compa Stockton   aluminum-magnesium hydroxide-simethicone 30 mL Oral Q4H PRN Compa Stockton   benztropine 1 mg Intramuscular Q6H PRN Compa Stockton   benztropine 1 mg Oral Q6H PRN Compa Stockton   cloNIDine 0 1 mg Oral Q8H PRN Compa Stockton   dicyclomine 20 mg Oral Q6H PRN Compa Stockton   [START ON 2/5/2019] escitalopram 15 mg Oral Daily Compa Stockton   escitalopram 5 mg Oral Once Compa Stockton   gabapentin 100 mg Oral Once Compa Stockton   gabapentin 200 mg Oral After Lunch Compa Stockton   gabapentin 200 mg Oral BID Compa Stockton   haloperidol 5 mg Oral Q8H PRN Compa Stockton   haloperidol lactate 5 mg Intramuscular Q6H PRN Compa Stockton   loperamide 2 mg Oral Q4H PRN Compa Stockton   LORazepam 2 mg Intramuscular Q6H PRN Compa Stockton   LORazepam 1 mg Oral Q8H PRN Compa Stockton   magnesium hydroxide 30 mL Oral Daily PRN Compa Stockton   mirtazapine 15 mg Oral HS Compa Stockton   nicotine polacrilex 2 mg Oral Q2H PRN Compa Stockton   ondansetron 4 mg Oral Q8H PRN Compa Stockton   traZODone 50 mg Oral HS PRN Compa Stockton       Risks, benefits and possible side effects of Medications:   Risks, benefits, and possible side effects of medications explained to patient and patient verbalizes understanding  Risks of medications in pregnancy explained if female patient  Patient verbalizes understanding and agrees to notify her doctor if she becomes pregnant  This note has been constructed using a voice recognition system  There may be translation, syntax,  or grammatical errors  If you have any questions, please contact the dictating provider

## 2019-02-04 NOTE — PROGRESS NOTES
Pt lying in bed  Seclusive to room all evening  Both roommates were sleeping  Pt said she felt fine  She said she didn't have any pain and that she preferred to stay in her room today  Denied all symptoms  Compliant with meds, meals and attending some groups

## 2019-02-04 NOTE — PROGRESS NOTES
Pt approached staffing requesting PRN for anxiety  Was visibly anxious, rating her anxiety an 8/10  Pt appeared tense and reports poor night sleep  Encouraged pt to notify staff if having difficulty sleeping overnight which pt states she will do so from now on  Noted pt to be slightly tremulous however when this writer pointed this out, pt states "No, I am ok  I'm not shaky " Reviewed w/d s/s pt may experience however denied all these symptoms stating "No, it's just a lot of anxiety  It's really bad " Pt remained pleasant in conversation and returned to room  Agreeable to notify staff if symptoms do not improve

## 2019-02-04 NOTE — PROGRESS NOTES
Pt received Ativan 1mg PO PRN for anxiety 8/10- [address]una 18  Appears effective within the hour as pt observed resting comfortably in room at this time

## 2019-02-05 PROCEDURE — 99232 SBSQ HOSP IP/OBS MODERATE 35: CPT | Performed by: PSYCHIATRY & NEUROLOGY

## 2019-02-05 RX ORDER — GABAPENTIN 300 MG/1
300 CAPSULE ORAL 2 TIMES DAILY
Status: DISCONTINUED | OUTPATIENT
Start: 2019-02-05 | End: 2019-02-11 | Stop reason: HOSPADM

## 2019-02-05 RX ORDER — ONDANSETRON 4 MG/1
4 TABLET, ORALLY DISINTEGRATING ORAL EVERY 6 HOURS PRN
Status: DISCONTINUED | OUTPATIENT
Start: 2019-02-05 | End: 2019-02-11 | Stop reason: HOSPADM

## 2019-02-05 RX ORDER — ONDANSETRON 2 MG/ML
4 INJECTION INTRAMUSCULAR; INTRAVENOUS EVERY 6 HOURS PRN
Status: DISCONTINUED | OUTPATIENT
Start: 2019-02-05 | End: 2019-02-11 | Stop reason: HOSPADM

## 2019-02-05 RX ORDER — GABAPENTIN 300 MG/1
300 CAPSULE ORAL
Status: DISCONTINUED | OUTPATIENT
Start: 2019-02-05 | End: 2019-02-11 | Stop reason: HOSPADM

## 2019-02-05 RX ADMIN — ONDANSETRON 4 MG: 4 TABLET, ORALLY DISINTEGRATING ORAL at 12:54

## 2019-02-05 RX ADMIN — TRAZODONE HYDROCHLORIDE 50 MG: 50 TABLET ORAL at 00:27

## 2019-02-05 RX ADMIN — LORAZEPAM 2 MG: 2 INJECTION, SOLUTION INTRAMUSCULAR; INTRAVENOUS at 21:57

## 2019-02-05 RX ADMIN — DICYCLOMINE HYDROCHLORIDE 20 MG: 20 TABLET ORAL at 15:44

## 2019-02-05 RX ADMIN — CLONIDINE HYDROCHLORIDE 0.1 MG: 0.1 TABLET ORAL at 13:21

## 2019-02-05 RX ADMIN — LORAZEPAM 1 MG: 1 TABLET ORAL at 16:25

## 2019-02-05 RX ADMIN — GABAPENTIN 200 MG: 100 CAPSULE ORAL at 07:42

## 2019-02-05 RX ADMIN — ONDANSETRON 4 MG: 2 INJECTION, SOLUTION INTRAMUSCULAR; INTRAVENOUS at 17:04

## 2019-02-05 RX ADMIN — ESCITALOPRAM OXALATE 15 MG: 5 TABLET, FILM COATED ORAL at 07:42

## 2019-02-05 RX ADMIN — MIRTAZAPINE 15 MG: 15 TABLET, FILM COATED ORAL at 00:25

## 2019-02-05 RX ADMIN — CLONIDINE HYDROCHLORIDE 0.1 MG: 0.1 TABLET ORAL at 20:00

## 2019-02-05 RX ADMIN — LOPERAMIDE HYDROCHLORIDE 2 MG: 2 CAPSULE ORAL at 12:54

## 2019-02-05 NOTE — PROGRESS NOTES
Patient reports vomiting not long after taking Clonidine  She is unsure if pill stayed down  She reports feeling really bad  MD made aware

## 2019-02-05 NOTE — PROGRESS NOTES
Pt reported she vomited up bentyl, feels "terrible"  Accepted ativan prn for wd symptoms  Pt instructed to dissolve under tongue  Pt reports only partially effective

## 2019-02-05 NOTE — PROGRESS NOTES
Patient denies SI/HI/AVH  Reports anxiety a 6/10 and depression an 8/10  Pt was requesting a medication for anxiety  Patient states she normally has high anxiety in the morning when thinking about "getting her day started" and would normally "pop a pill" such as a Percocet or Oxycontin  Discussed learning/utilizing new coping techniques  Encouraged group  Patient verbalized understanding  Provided patient with a sound machine for relaxation  She reports drinking prune juice for constipation but now has diarrhea  Will continue to monitor

## 2019-02-05 NOTE — PROGRESS NOTES
Pt reports continued nausea, tearful and distressed  Administered Zofran IM prn nausea  Pt resting in bed  No vomiting reported

## 2019-02-05 NOTE — PROGRESS NOTES
Pt unable to take scheduled gabapentin at 1400 dt wd symptoms nausea, vomiting  Pt administered bentyl prn stomach cramps at 1544  Pt tearful due to wd symptoms, continue to monitor  Pt reported vomiting following taking medication

## 2019-02-05 NOTE — PROGRESS NOTES
Patient experiencing withdrawal sx of nausea, vomiting and diarrhea  Pt vomited after taking Zofran and Imodium  PRN Clonidine given with a ginger ale  Pt is resting in bed

## 2019-02-05 NOTE — PROGRESS NOTES
Pt was administered PRN Zofran and Imodium at 1254 for nausea and diarrhea  On follow up, pt had vomited and had one additional episode of diarrhea  Pt has been given ice chips for hydration  Resting in bed  Will continue to monitor and offer PRNs as appropriate

## 2019-02-05 NOTE — PROGRESS NOTES
Progress Note - [address] [place] 66 y o  female MRN: [id number]  Unit/Bed#: [facility] 258-01 Encounter: [id number]    Assessment/Plan   Principal Problem:    Severe episode of recurrent major depressive disorder, without psychotic features ([facility] )  Active Problems:    Generalized anxiety disorder    Opiate abuse, continuous ([facility] )    Subjective:  Patient is compliant with medication with no acute side effects  Patient reports mild improvement in mood and anxiety with persistence of passive death wishes and hopelessness  Patient does report improvement in sleep and appetite  Discussed the importance of slow improvement on a daily basis  Patient denies endorsing manic or psychotic symptoms  Patient initially was not complaining of opiate withdrawal symptom in the morning but later in the afternoon patient complained of nausea and 1 episode of vomiting  Discussed the importance of utilizing p r n   Medications for symptomatic treatment for opioid withdrawal      Current Medications:    Current Facility-Administered Medications:  acetaminophen 650 mg Oral Q6H PRN Compa Stockton   aluminum-magnesium hydroxide-simethicone 30 mL Oral Q4H PRN Compa Stockton   benztropine 1 mg Intramuscular Q6H PRN Compa Stockton   benztropine 1 mg Oral Q6H PRN Compa Stockton   cloNIDine 0 1 mg Oral Q8H PRN Compa Stockton   dicyclomine 20 mg Oral Q6H PRN Compa Stockton   escitalopram 15 mg Oral Daily Compa Stockton   gabapentin 300 mg Oral After Lunch Compa Stockton   gabapentin 300 mg Oral BID Compa Stockton   haloperidol 5 mg Oral Q8H PRN Compa Stockton   haloperidol lactate 5 mg Intramuscular Q6H PRN Compa Stockton   loperamide 2 mg Oral Q4H PRN Compa Stockton   LORazepam 2 mg Intramuscular Q6H PRN Compa Stockton   LORazepam 1 mg Oral Q8H PRN Compa Stockton   magnesium hydroxide 30 mL Oral Daily PRN Compa Stockton   mirtazapine 15 mg Oral HS Compa Stockton   nicotine polacrilex 2 mg Oral Q2H PRN Compa Mahin   ondansetron 4 mg Oral Q6H PRN Compa Stockton   traZODone 50 mg Oral HS PRN Compa Stockton       Behavioral Health Medications: all current active meds have been reviewed  Vital signs in last 24 hours:  Temp:  [98 5 °F (36 9 °C)-99 °F (37 2 °C)] 98 5 °F (36 9 °C)  HR:  [] 52  Resp:  [16-20] 20  BP: (104-157)/(56-80) 133/80    Laboratory results:    I have personally reviewed all pertinent laboratory/tests results    Labs in last 72 hours:   Recent Labs      02/03/19   0653   [id number]  1 332     Admission Labs:   Admission on 02/01/2019   Component Date Value    Color, UA 02/01/2019 Yellow     Clarity, UA 02/01/2019 Clear     Specific Gravity, UA 02/01/2019 1 025     pH, UA 02/01/2019 6 0     Leukocytes, UA 02/01/2019 Negative     Nitrite, UA 02/01/2019 Negative     Protein, UA 02/01/2019 Negative     Glucose, UA 02/01/2019 Negative     Ketones, UA 02/01/2019 Negative     Urobilinogen, UA 02/01/2019 0 2     Bilirubin, UA 02/01/2019 Negative     Blood, UA 02/01/2019 Small*    RBC, UA 02/01/2019 0-1*    WBC, UA 02/01/2019 0-1*    Epithelial Cells 02/01/2019 Occasional     Bacteria, UA 02/01/2019 Occasional     MUCUS THREADS 02/01/2019 Occasional*    Preg, Serum 02/02/2019 Negative     Cholesterol 02/02/2019 188     Triglycerides 02/02/2019 41     HDL, Direct 02/02/2019 72*    LDL Calculated 02/02/2019 108*    Non-HDL-Chol (CHOL-HDL) 02/02/2019 116     RPR 02/02/2019 Non-Reactive     TSH 3RD GENERATON 02/02/2019 6 210*    TSH 3RD GENERATON 02/03/2019 1 332        Psychiatric Review of Systems:  Behavior over the last 24 hours:  unchanged  Sleep: slow improvement  Appetite: normal  Medication side effects: No  ROS: nausea and vomiting    Mental Status Evaluation:  Appearance:  casually dressed   Behavior:  guarded   Speech:  soft   Mood:  anxious and depressed   Affect:  constricted   Language naming objects   Thought Process:  circumstantial   Thought Content:  obsessions Perceptual Disturbances: None   Risk Potential: Suicidal Ideations without plan, Homicidal Ideations none and Potential for Aggression No   Sensorium:  person and place   Cognition:  grossly intact   Consciousness:  awake    Attention: attention span appeared shorter than expected for age   Insight:  limited   Judgment: limited   Intellect fair   Gait/Station: normal gait/station   Motor Activity: no abnormal movements     Memory: Short and long term memory  fair     Progress Toward Goals: slow progress    Recommended Treatment:   Symptomatic management of opioid withdrawal recommended  Continue with group therapy, milieu therapy and occupational therapy  Continue following current medications:   Current Facility-Administered Medications:  acetaminophen 650 mg Oral Q6H PRN Compa Stockton   aluminum-magnesium hydroxide-simethicone 30 mL Oral Q4H PRN Compa Stockton   benztropine 1 mg Intramuscular Q6H PRN Compa Stockton   benztropine 1 mg Oral Q6H PRN Compa Stockton   cloNIDine 0 1 mg Oral Q8H PRN Compa Stockton   dicyclomine 20 mg Oral Q6H PRN Compa Stockton   escitalopram 15 mg Oral Daily Compa Stockton   gabapentin 300 mg Oral After Lunch Compa Stockton   gabapentin 300 mg Oral BID Compa Stockton   haloperidol 5 mg Oral Q8H PRN Compa Stockton   haloperidol lactate 5 mg Intramuscular Q6H PRN Compa Stockton   loperamide 2 mg Oral Q4H PRN Compa Stockton   LORazepam 2 mg Intramuscular Q6H PRN Compa Stockton   LORazepam 1 mg Oral Q8H PRN Compa Stockton   magnesium hydroxide 30 mL Oral Daily PRN Compa Stockton   mirtazapine 15 mg Oral HS Compa Stockton   nicotine polacrilex 2 mg Oral Q2H PRN Compa Stockton   ondansetron 4 mg Oral Q6H PRN Compa Stockton   traZODone 50 mg Oral HS PRN Compa Stockton       Risks, benefits and possible side effects of Medications:   Risks, benefits, and possible side effects of medications explained to patient and patient verbalizes understanding     Risks of medications in pregnancy explained if female patient  Patient verbalizes understanding and agrees to notify her doctor if she becomes pregnant  This note has been constructed using a voice recognition system  There may be translation, syntax,  or grammatical errors  If you have any questions, please contact the dictating provider

## 2019-02-05 NOTE — CASE MANAGEMENT
CM & Pt reviewed & signed Tx Plan; Pt declined a copy  CM & Pt reviewed & signed an ASHLYN for her  Hang Wong  Pt is a 67 y/o female who reported that she has been addicted to pain pills for over 10 years  Pt said that she had been hurt & initially prescribed them, but then her doctor stopped them & she started to buy them & get prescriptions from different people  Pt said that this has caused financially trouble for her & her , & she has been isolating & not eating  Pt said that she hadn't been sleeping & that is why she thinks she was having auditory & visual hallucinations  Pt became tearful, stating she doesn't want to live like this  Pt said that she has always been upfront & honest with her PCP, so they knew she was abusing pain pills & wouldn't prescribe them for her  Pt is  & lives with her  & her niece is currently staying with them too  She said she has two children who are in the mid-thirties  Pt's parents are both  & passed within a short time of each other, one year ago  She identified the aniversary of their deaths as a trigger/stressor  Pt is the youngest of 6 children, & said that they are supportive of each other  Pt reported one of her brothers does have a history of bipolar disorder & has been hospitalized & takes medication daily for this  Pt denied any previous hospitalizations & said that many years ago she did see a therapist & maybe a psychiatrist, but couldn't remember any names  Pt's PCP is Silvio Solorio & she denied any medical concerns  Pt said that she wasn't sure if she was having withdraw, but she felt very depressed & that scared her  CM reviewed s/s of withdraw & Pt agreed she was having diarrhea & hot/cold spells  Pt tearful stating she doesn't want to be depressed    CM & Pt discussed her long standing abuse of pain pills & Pt said that she would take one as soon as she got up & would continue to take them through the day as she felt she needed them  CM asked what she was trying not to feel, & she said she didn't know  She said she currently felt very depressed & anxious without them, but she doesn't want to go back to how she was feeling either  Pt said that she takes 4-6 30mg Percocets daily, & could take more if she could get them  She said that she takes Oxy 80mg 1-2 times per day, & said she could miss a day or two of the Oxy, but needed the Percocets  Pt reported a history of smoking & snorting crack cocaine & meth, but said that she has been clean from those substances since 2007 when she completed rehab at [facility]  Pt said that she has been smoking more cigarettes lately, however, a pack would usually last her 2 days or so  CM & Pt talked about inpatient rehab & Pt reports uncertainty if she wants to do this  Pt said that she knows the benefits of going & completing a program, however, she also wants to go home  CM talked about IOP, however, Pt said that transportation would be a barrier  CM asked if she had discussed this with her , but she said no; she thinks he will tell her to go to rehab  CM offered to provide her with some information on some local rehabs & she could review it & discuss it with him & let CM know tomorrow  Pt reported that she stopped school in 11th grade & never go her GED  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, however, was fired about 3 weeks ago due to missing work  Pt said that she would be too depressed to go to work, or too high on pain pills & called out a lot  Pt said that her sister did help her apply for unemployment last Thurs, before she came to the hospital     Pt denied any legal issues  Pt reported that her  does own guns, "but I'm good with that, no issues"  Pt said that she usually drives, however, at this time she said she would be afraid to drive as she doesn't feel right in her head      CM provided Pt with information on [facility]  CM contacted Pt's , Ambrose Treadwell, @ [phone] & to review concerns & he said that she told him that she had an appointment to get help, but she bailed out  She rescheduled, but missed that one too  She had a third appointment & he talked to her about getting help for the mental part as well as the physical   He reported that a while ago, Pt went into her room for about 3 weeks & barely came out  He said that they worked with her & she came out & was going a little better, but the anniversary of her parents' death came, & she went back into the room for a about a week and a half  He said that she cries every morning & every night, & often throughout the day; this has been going on since her parents   He said that prior to their death, she seemed more angry than tearful  He said that Pt's drug use has been a financial burden, however, Pt questions him constantly & he must give her receipts for anything he spends money on  He said that he tries to help Pt, however, she tends to twist his words & make it seem like he is being mean to her  He said that their oldest granddaughter had come with him to visit Pt last night & he gave her $40 for gas  The granddaughter told Pt she needed money to get other things, & Pt told Ambrose Treadwell to give her more money, but he said that now tonight when he visits, she will probably yell him for giving her money  He reported that twenty-some years ago or so, Pt had a tubal pregnancy & was told she couldn't have anymore children  He said that in the next 3 weeks, Pt's personality changed & she has never been the same since that time  CM asked if there were any pills left in the home, & he said not to his knowledge, however, he didn't know where Pt was getting them from, just that she could get them fairly easily  CM spoke about Pt going to rehab & he agreed he would visit with her tonight to discuss this   CM reviewed that Pt has had surprisingly mild s/s of withdraw & he reported he had been surprised as well, as there have been 4-5 occassions when she couldn't get pills & within a day or two she would become violently sick to the point she would want to go to the ER  CM inquired if he thought she had brought some into the hospital with her, & he said no because on Saturday morning she had called & said she wanted picked up so she could get what she needed, but then agreed she would stick it out & get help  CM inquired about firearms in the home, & Selwyn Santos said that he has a gun safe(key) & the ammo is in a different room(combination) which Pt doesn't know, nor does she have access to the key  CM agreed to provide on-going updates

## 2019-02-05 NOTE — PLAN OF CARE
Problem: Nutrition/Hydration-ADULT  Goal: Nutrient/Hydration intake appropriate for improving, restoring or maintaining nutritional needs  Monitor and assess patient's nutrition/hydration status for malnutrition (ex- brittle hair, bruises, dry skin, pale skin and conjunctiva, muscle wasting, smooth red tongue, and disorientation)  Collaborate with interdisciplinary team and initiate plan and interventions as ordered  Monitor patient's weight and dietary intake as ordered or per policy  Utilize nutrition screening tool and intervene per policy  Determine patient's food preferences and provide high-protein, high-caloric foods as appropriate       INTERVENTIONS:  - Monitor oral intake, urinary output, labs, and treatment plans  - Assess nutrition and hydration status and recommend course of action  - Evaluate amount of meals eaten  - Assist patient with eating if necessary   - Allow adequate time for meals  - Recommend/ encourage appropriate diets, oral nutritional supplements, and vitamin/mineral supplements  - Order, calculate, and assess calorie counts as needed  - Recommend, monitor, and adjust tube feedings and TPN/PPN based on assessed needs  - Assess need for intravenous fluids  - Provide specific nutrition/hydration education as appropriate  - Include patient/family/caregiver in decisions related to nutrition   Outcome: Progressing      Problem: DEPRESSION  Goal: Will be euthymic at discharge  INTERVENTIONS:  - Administer medication as ordered  - Provide emotional support via 1:1 interaction with staff  - Encourage involvement in milieu/groups/activities  - Monitor for social isolation   Outcome: Progressing      Problem: SUBSTANCE USE/ABUSE  Goal: Will have no detox symptoms and will verbalize plan for changing substance-related behavior  INTERVENTIONS:  - Monitor physical status and assess for symptoms of withdrawal  - Administer medication as ordered  - Provide emotional support with 1 on 1 interaction with staff  - Encourage recovery focused program/ addiction education  - Assess for verbalization of changing behaviors related to substance abuse  - Initiate consults and referrals as appropriate (Case Management, Spiritual Care, etc )   Outcome: Progressing    Goal: By discharge, will develop insight into their chemical dependency and sustain motivation to continue in recovery  INTERVENTIONS:  - Attends all daily group sessions and scheduled AA groups  - Actively practices coping skills through participation in the therapeutic community and adherence to program rules  - Reviews and completes assignments from individual treatment plan  - Assist patient development of understanding of their personal cycle of addiction and relapse triggers   Outcome: Progressing    Goal: By discharge, patient will have ongoing treatment plan addressing chemical dependency  INTERVENTIONS:  - Assist patient with resources and/or appointments for ongoing recovery based living   Outcome: Progressing      Problem: SLEEP DISTURBANCE  Goal: Will exhibit normal sleeping pattern  Interventions:  -  Assess the patients sleep pattern, noting recent changes  - Administer medication as ordered  - Decrease environmental stimuli, including noise, as appropriate during the night  - Encourage the patient to actively participate in unit groups and or exercise during the day to enhance ability to achieve adequate sleep at night  - Assess the patient, in the morning, encouraging a description of sleep experience   Outcome: Progressing      Problem: Ineffective Coping  Goal: Identifies healthy coping skills  Outcome: Progressing    Goal: Participates in unit activities  Interventions:  - Provide therapeutic environment   - Provide required programming   - Redirect inappropriate behaviors    Outcome: Progressing      Problem: DISCHARGE PLANNING  Goal: Discharge to home or other facility with appropriate resources  INTERVENTIONS:  - Identify barriers to discharge w/patient and caregiver  - Arrange for needed discharge resources and transportation as appropriate  - Identify discharge learning needs (meds, wound care, etc )  - Arrange for interpretive services to assist at discharge as needed  - Refer to Case Management Department for coordinating discharge planning if the patient needs post-hospital services based on physician/advanced practitioner order or complex needs related to functional status, cognitive ability, or social support system   Outcome: Progressing

## 2019-02-05 NOTE — PLAN OF CARE
Problem: Ineffective Coping  Goal: Participates in unit activities  Interventions:  - Provide therapeutic environment   - Provide required programming   - Redirect inappropriate behaviors    Outcome: Progressing  Pt attended a m  Groups  She presented as calm and cooperative  Pt expressed having a headache and feeling low motivation and did not want to be around peers  She declined to attend afternoon groups due to headache

## 2019-02-05 NOTE — PROGRESS NOTES
Requested & received trazodone 50 mg po prn/sleep at 0027  Good effect obtained, as observed asleep in bed within the hr  & remains asleep presently  Will continue to monitor

## 2019-02-06 PROCEDURE — 99232 SBSQ HOSP IP/OBS MODERATE 35: CPT | Performed by: PSYCHIATRY & NEUROLOGY

## 2019-02-06 RX ORDER — CLONIDINE HYDROCHLORIDE 0.1 MG/1
0.1 TABLET ORAL EVERY 12 HOURS SCHEDULED
Status: DISCONTINUED | OUTPATIENT
Start: 2019-02-06 | End: 2019-02-07

## 2019-02-06 RX ORDER — IBUPROFEN 400 MG/1
400 TABLET ORAL EVERY 6 HOURS PRN
Status: DISCONTINUED | OUTPATIENT
Start: 2019-02-06 | End: 2019-02-11 | Stop reason: HOSPADM

## 2019-02-06 RX ADMIN — GABAPENTIN 300 MG: 300 CAPSULE ORAL at 09:11

## 2019-02-06 RX ADMIN — CLONIDINE HYDROCHLORIDE 0.1 MG: 0.1 TABLET ORAL at 21:36

## 2019-02-06 RX ADMIN — CLONIDINE HYDROCHLORIDE 0.1 MG: 0.1 TABLET ORAL at 13:54

## 2019-02-06 RX ADMIN — ESCITALOPRAM OXALATE 15 MG: 5 TABLET, FILM COATED ORAL at 09:11

## 2019-02-06 RX ADMIN — ONDANSETRON 4 MG: 4 TABLET, ORALLY DISINTEGRATING ORAL at 00:36

## 2019-02-06 RX ADMIN — GABAPENTIN 300 MG: 300 CAPSULE ORAL at 13:04

## 2019-02-06 RX ADMIN — MIRTAZAPINE 15 MG: 15 TABLET, FILM COATED ORAL at 21:36

## 2019-02-06 NOTE — PLAN OF CARE
Problem: DISCHARGE PLANNING  Goal: Discharge to home or other facility with appropriate resources  INTERVENTIONS:  - Identify barriers to discharge w/patient and caregiver  - Arrange for needed discharge resources and transportation as appropriate  - Identify discharge learning needs (meds, wound care, etc )  - Arrange for interpretive services to assist at discharge as needed  - Refer to Case Management Department for coordinating discharge planning if the patient needs post-hospital services based on physician/advanced practitioner order or complex needs related to functional status, cognitive ability, or social support system   Outcome: Not Progressing  Pt going through withdraw at this time & stating she doesn't want to go to rehab

## 2019-02-06 NOTE — PROGRESS NOTES
Pt sleeping in room, appears comfortable  Slept during dinner  In evening, pt denies having wd symptoms, no abdominal distress  Reports sleeping has helped greatly  Ate crackers, offered ginger ale  Pt appreciative

## 2019-02-06 NOTE — PROGRESS NOTES
The patient may receive an Im dose of ativan 2 mg, she has been unable to tolerate any PO medication   A medical consult should be requested if symptoms of nausea and diarrhea persist   Discussed with RN

## 2019-02-06 NOTE — PROGRESS NOTES
Pt reported abdominal discomfort, nausea,and cramping  Elevated bp and mild tremor observed, administered clonidine for wd symptoms  Pt provided with ginger ale and crackers  Pt vomited within a short period of time, no relief of symptoms  Physician consulted  Pt administered Ativan IM  Pt appears to rest comfortably  Pt unable to take scheduled neurontin or remeron during shift due to inability to tolerate oral  meds

## 2019-02-06 NOTE — PROGRESS NOTES
Pt continues to C/O epigastric pain and generalized body aches with intermittent diarrhea, not able to tolerate fluids vomited after all PO Meds, insight DR and NP notified of symptoms will continue to monitor  Pt given  Ativan IM to help alleviate symptoms

## 2019-02-06 NOTE — CASE MANAGEMENT
CM contacted [facility] @ [phone] & spoke with Mena Collier, to inquire if they provide transportation for IOP  He said that for the initially assessment the patient would need her own transportation, & they would discuss at the assessment transportation for treatment  He said it is possible they would provide transportation  CM agreed to call back if Pt is interested in attending there  CM contacted [facility] @ [phone] & spoke with Jose Nava who said that they can provide transportation, but it depends on where the Pt lives & what her insurance covers  CM agreed to call back if Pt is interested in attending there

## 2019-02-06 NOTE — CASE MANAGEMENT
CM contacted Pt's , Iraida Busby, @ [phone] to provide an update  He said that Pt did call earlier & then all of the sudden she hung up, & he figured that she must have gotten sick again  Iraida Busby said that he doesn't think Pt has most recently been using to get high, but rather just trying to avoid the withdraw  He did find it out that it took this many days for Pt to begin withdrawing & said that typically Pt would get sick within a day or 2 of not having her pills  CM reviewed calls made to IOP & that [facility] said that they may be able to provide transportation  CM said that at this time Pt is interested in rehab, however, they will discuss this again once she is through the withdraw

## 2019-02-06 NOTE — PROGRESS NOTES
Seclusive to room, lying in bed  C/o opiate withdrawal symptoms of nausea, informed patient to allow staff to witness if she does vomit  Denies other symptoms of withdrawal at this time  Denies SI/HI  Depression remains elevated and fluctuating anxiety  Previously at [facility] in 2007 for crack cocaine and meth abuse  Disinterested in attending inpatient rehab but is considering OP treatment  Reports spouse is a support  No questions or concerns

## 2019-02-07 PROCEDURE — 99232 SBSQ HOSP IP/OBS MODERATE 35: CPT | Performed by: PSYCHIATRY & NEUROLOGY

## 2019-02-07 RX ADMIN — TRAZODONE HYDROCHLORIDE 50 MG: 50 TABLET ORAL at 21:03

## 2019-02-07 RX ADMIN — ESCITALOPRAM OXALATE 15 MG: 5 TABLET, FILM COATED ORAL at 08:08

## 2019-02-07 RX ADMIN — GABAPENTIN 300 MG: 300 CAPSULE ORAL at 13:26

## 2019-02-07 RX ADMIN — MIRTAZAPINE 15 MG: 15 TABLET, FILM COATED ORAL at 21:04

## 2019-02-07 RX ADMIN — GABAPENTIN 300 MG: 300 CAPSULE ORAL at 08:09

## 2019-02-07 RX ADMIN — GABAPENTIN 300 MG: 300 CAPSULE ORAL at 17:31

## 2019-02-07 RX ADMIN — LOPERAMIDE HYDROCHLORIDE 2 MG: 2 CAPSULE ORAL at 03:44

## 2019-02-07 NOTE — CASE MANAGEMENT
CM met with Pt, who reported that she was feeling a little better today  Pt said that she still had some diarrhea, however, she did eat some breakfast & was feeling okay so far  Pt said that she did get to talk to her  a little bit this morning about rehab, & he is going to support her either way  CM talked to her about [facility] having inpatient as well as PHP & IOP  Pt seemed interested in possibly doing inpatient initially & then being able to step down to a lower level of care  CM reviewed she didn't need to make decision today, but she would need to be thinking about what she wants to do  Pt agreeable & said she was going to try to get up & walk around a bit  She said that she feels lightheaded at times, & then goes & lays back down  Pt reported she is trying to drink fluids & did have some crackers to try to eat too  CM contacted Pt's , Toni Guzman, [phone] who reported that he had just gotten off the phone with Pt, & she sounds like she is doing better  He said that they talked about her next steps & she seems to be on-board with what the doctor is recommending  Toni Guzman reported he plans to visit Pt tonight & bring something clothing & stuff she is requesting  CM agreed to check in with him tomorrow regarding how the visit went

## 2019-02-07 NOTE — PROGRESS NOTES
Medication note:  Patient received iImmodium for diarrhea  Patient instructed to increase fluids to avoid dehydration  Will continue to monitor

## 2019-02-07 NOTE — PROGRESS NOTES
Pt sitting up and talking to roommate, smiling, appears calm with positive mood  Denies wd symptoms, reports she is working on increasing intake despite not being hungry  Pt reports she is sitting up prior to getting up and walking to prevent dizziness  Ate lightly, reports poor appetite  Positive visit with , smiling  Expresses she discussed going to rehab, unsure if ready to immediately return home

## 2019-02-07 NOTE — PROGRESS NOTES
Medication note: clonidine held this am for low BP  Checked several times during shift  At this time, bp=94/57  Pt c/o feeling weak and lightheaded  Clonidine held, pt lying in bed resting, fluids encouraged

## 2019-02-07 NOTE — PROGRESS NOTES
Belongings Brought in During visit with :     At bedside:  2 pants  3 underwear  Sweater  2 shirts    In Locker:  Tweezers    In Fridge:  2 iced coffee

## 2019-02-07 NOTE — PLAN OF CARE
Problem: DISCHARGE PLANNING  Goal: Discharge to home or other facility with appropriate resources  INTERVENTIONS:  - Identify barriers to discharge w/patient and caregiver  - Arrange for needed discharge resources and transportation as appropriate  - Identify discharge learning needs (meds, wound care, etc )  - Arrange for interpretive services to assist at discharge as needed  - Refer to Case Management Department for coordinating discharge planning if the patient needs post-hospital services based on physician/advanced practitioner order or complex needs related to functional status, cognitive ability, or social support system   Outcome: Progressing  Pt's s/s of withdraw lessening & she is still considering inpatient rehab

## 2019-02-07 NOTE — PROGRESS NOTES
Pt had a restless night of sleep  Offered sound machine which pt accepted  Remains calm and cooperative

## 2019-02-07 NOTE — PROGRESS NOTES
Progress Note - [address] [place] 66 y o  female MRN: [id number]  Unit/Bed#: [facility] 258-01 Encounter: [id number]    Assessment/Plan   Principal Problem:    Severe episode of recurrent major depressive disorder, without psychotic features ([facility] 75 )  Active Problems:    Generalized anxiety disorder    Opiate abuse, continuous ([facility] )    Subjective:  Patient is compliant with medications and reports feeling dizzy after starting standing dose of clonidine with hypotension  Patient instructed on importance of moving her legs and standing up with support  Patient reports slow improvement in nausea but eating less for the fear of vomiting  Patient remained depressed with passive death wishes  Patient reports slow improvement in opioid withdrawal symptoms and agreed with plan of not increasing medication further to prevent worsening of GI symptoms  Patient is questioning her decision of going to rehabilitation  She wants to speak with her  and discuss this with him before making final decision  She does report feeling safe on the unit and is consenting for safety on the unit      Current Medications:    Current Facility-Administered Medications:  acetaminophen 650 mg Oral Q6H PRN Compa Stockton   aluminum-magnesium hydroxide-simethicone 30 mL Oral Q4H PRN Compa Stockton   benztropine 1 mg Intramuscular Q6H PRN Compa Stockton   benztropine 1 mg Oral Q6H PRN Compa [place]ton   cloNIDine 0 1 mg Oral Q8H PRN Compa [place]ton   dicyclomine 20 mg Oral Q6H PRN Compa Stockton   escitalopram 15 mg Oral Daily Compa [place]ton   gabapentin 300 mg Oral After Lunch Compa Stockton   gabapentin 300 mg Oral BID Compa Stockton   haloperidol 5 mg Oral Q8H PRN Compa Stockton   haloperidol lactate 5 mg Intramuscular Q6H PRN Compa Stockton   ibuprofen 400 mg Oral Q6H PRN Compa Stockton   loperamide 2 mg Oral Q4H PRN Compa Stockton   LORazepam 2 mg Intramuscular Q6H PRN Compa Stockton   LORazepam 1 mg Oral Q8H PRN Compa Stockton   magnesium hydroxide 30 mL Oral Daily PRN Compa Stockton   mirtazapine 15 mg Oral HS Compa Stockton   nicotine polacrilex 2 mg Oral Q2H PRN Compa Stockton   ondansetron 4 mg Intramuscular Q6H PRN Compa Stockton   ondansetron 4 mg Oral Q6H PRN Compa Stockton   traZODone 50 mg Oral HS PRN Compa Stockton       Behavioral Health Medications: all current active meds have been reviewed  Vital signs in last 24 hours:  Temp:  [96 5 °F (35 8 °C)-98 °F (36 7 °C)] 98 °F (36 7 °C)  HR:  [55-88] 88  Resp:  [14-18] 16  BP: ()/(57-77) 94/57    Laboratory results:    I have personally reviewed all pertinent laboratory/tests results  Labs in last 72 hours: No results for input(s): WBC, RBC, HGB, HCT, PLT, RDW, NEUTROABS, SODIUM, K, CL, CO2, BUN, CREATININE, GLUC, GLUF, CALCIUM, AST, ALT, ALKPHOS, TP, ALB, TBILI, CHOLESTEROL, HDL, TRIG, LDLCALC, VALPROICTOT, CARBAMAZEPIN, LITHIUM, AMMONIA, [id number], FREET4, T3FREE, PREGTESTUR, PREGSERUM, HCG, HCGQUANT, RPR in the last 72 hours      Invalid input(s):  RBC  Admission Labs:   Admission on 02/01/2019   Component Date Value    Color, UA 02/01/2019 Yellow     Clarity, UA 02/01/2019 Clear     Specific Gravity, UA 02/01/2019 1 025     pH, UA 02/01/2019 6 0     Leukocytes, UA 02/01/2019 Negative     Nitrite, UA 02/01/2019 Negative     Protein, UA 02/01/2019 Negative     Glucose, UA 02/01/2019 Negative     Ketones, UA 02/01/2019 Negative     Urobilinogen, UA 02/01/2019 0 2     Bilirubin, UA 02/01/2019 Negative     Blood, UA 02/01/2019 Small*    RBC, UA 02/01/2019 0-1*    WBC, UA 02/01/2019 0-1*    Epithelial Cells 02/01/2019 Occasional     Bacteria, UA 02/01/2019 Occasional     MUCUS THREADS 02/01/2019 Occasional*    Preg, Serum 02/02/2019 Negative     Cholesterol 02/02/2019 188     Triglycerides 02/02/2019 41     HDL, Direct 02/02/2019 72*    LDL Calculated 02/02/2019 108*    Non-HDL-Chol (CHOL-HDL) 02/02/2019 116     RPR 02/02/2019 Non-Reactive     TSH [facility]RD [facility] 02/02/2019 6 210*    TSH 3RD [facility] 02/03/2019 1 332        Psychiatric Review of Systems:  Behavior over the last 24 hours:  Slow changes  Sleep: insomnia  Appetite: poor  Medication side effects: yes- dizziness  ROS: nausea and headache    Mental Status Evaluation:  Appearance:  casually dressed   Behavior:  guarded   Speech:  soft   Mood:  anxious and depressed   Affect:  constricted   Language naming objects   Thought Process:  circumstantial   Thought Content:  obsessions   Perceptual Disturbances: None   Risk Potential: Suicidal Ideations without plan, Homicidal Ideations none and Potential for Aggression No   Sensorium:  person and place   Cognition:  grossly intact   Consciousness:  awake    Attention: attention span appeared shorter than expected for age   Insight:  limited   Judgment: limited   Intellect fair   Gait/Station: normal gait/station   Motor Activity: no abnormal movements     Memory: Short and long term memory  fair     Progress Toward Goals: slow progress    Recommended Treatment:   Switch clonidine 0 1 mg b i d  Standing dose to 0 1 mg q 8 hours p r n  For opiate withdrawal symptoms-this is done as patient has remained hypotensive and complaining of dizziness with clonidine  Continue current treatment and await for opioid withdrawal symptoms improvement and plan disposition accordingly  Continue with group therapy, milieu therapy and occupational therapy      Continue following current medications:   Current Facility-Administered Medications:  acetaminophen 650 mg Oral Q6H PRN Compa Stockton   aluminum-magnesium hydroxide-simethicone 30 mL Oral Q4H PRN Compa Stockton   benztropine 1 mg Intramuscular Q6H PRN Compa Stockton   benztropine 1 mg Oral Q6H PRN Compa Stock[place]   cloNIDine 0 1 mg Oral Q8H PRN Compa [place]   dicyclomine 20 mg Oral Q6H PRN Compa Stockton   escitalopram 15 mg Oral Daily Compa [place]   gabapentin 300 mg Oral After [employer] Compa Stockton   gabapentin 300 mg Oral BID Compa [place]   haloperidol 5 mg Oral Q8H PRN Compa Stockton   haloperidol lactate 5 mg Intramuscular Q6H PRN Compa [place]ton   ibuprofen 400 mg Oral Q6H PRN Compajuany Stockton   loperamide 2 mg Oral Q4H PRN Compajuany Stockton   LORazepam 2 mg Intramuscular Q6H PRN Compajuany Stockton   LORazepam 1 mg Oral Q8H PRN Compajuany Stockton   magnesium hydroxide 30 mL Oral Daily PRN Compa Stockton   mirtazapine 15 mg Oral HS Compa [place]ton   nicotine polacrilex 2 mg Oral Q2H PRN Compa [place]   ondansetron 4 mg Intramuscular Q6H PRN Compa Stockton   ondansetron 4 mg Oral Q6H PRN Compa Stockton   traZODone 50 mg Oral HS PRN Compa Stockton       Risks, benefits and possible side effects of Medications:   Risks, benefits, and possible side effects of medications explained to patient and patient verbalizes understanding  Risks of medications in pregnancy explained if female patient  Patient verbalizes understanding and agrees to notify her doctor if she becomes pregnant  This note has been constructed using a voice recognition system  There may be translation, syntax,  or grammatical errors  If you have any questions, please contact the dictating provider

## 2019-02-07 NOTE — PROGRESS NOTES
OOB walking the halls briefly  Says she feels weak and is currently drinking water  Reports diarrhea, but was unwitnessed  Denies SI/HI, AH/VH  Depression remains elevated and anxiety fluctuates  Remains uncertain whether she wants to attend inpatient rehab and plans to speak with her  later today

## 2019-02-08 PROCEDURE — 99232 SBSQ HOSP IP/OBS MODERATE 35: CPT | Performed by: PSYCHIATRY & NEUROLOGY

## 2019-02-08 RX ORDER — MIRTAZAPINE 15 MG/1
30 TABLET, FILM COATED ORAL
Status: DISCONTINUED | OUTPATIENT
Start: 2019-02-08 | End: 2019-02-11 | Stop reason: HOSPADM

## 2019-02-08 RX ORDER — NALTREXONE HYDROCHLORIDE 50 MG/1
25 TABLET, FILM COATED ORAL DAILY
Status: DISCONTINUED | OUTPATIENT
Start: 2019-02-08 | End: 2019-02-08

## 2019-02-08 RX ADMIN — ESCITALOPRAM OXALATE 15 MG: 5 TABLET, FILM COATED ORAL at 08:09

## 2019-02-08 RX ADMIN — IBUPROFEN 400 MG: 400 TABLET ORAL at 20:08

## 2019-02-08 RX ADMIN — LORAZEPAM 1 MG: 1 TABLET ORAL at 21:11

## 2019-02-08 RX ADMIN — NALTREXONE HYDROCHLORIDE 25 MG: 50 TABLET, FILM COATED ORAL at 14:36

## 2019-02-08 RX ADMIN — GABAPENTIN 300 MG: 300 CAPSULE ORAL at 08:09

## 2019-02-08 RX ADMIN — GABAPENTIN 300 MG: 300 CAPSULE ORAL at 14:37

## 2019-02-08 RX ADMIN — ACETAMINOPHEN 650 MG: 325 TABLET, FILM COATED ORAL at 21:11

## 2019-02-08 RX ADMIN — MIRTAZAPINE 30 MG: 15 TABLET, FILM COATED ORAL at 21:10

## 2019-02-08 NOTE — PROGRESS NOTES
Progress Note - Behavioral Health     Libra Melton 66 y o  female MRN: [id number]  Unit/Bed#: [facility] 258-01 Encounter: [id number]    Libra Melton is a Pt with h/o depression, anxiety, and polysubstance abuse (but most recently opiate abuse)--Oxycontin, Percocet, h/o crack, methamphetamine abuse  She was seen for continuing care and reviewed with treatment team   Pt reported "I'm pushing through "  She denies any further withdrawal Sxs at present and states that going through withdrawal Sxs has motivated her not to use drugs again  She presently denies cravings but is very interested in starting Naltrexone to prevent future cravings  Pt reports "I still get a little bit anxiety" which causes some irritability and early morning awakening, which then leads to a delay in the start of her day  She has depression--currently rated 5/10 and reports worries over going into rehab, but at the same time, is now resolved to do so  She states "I came this far, so I'm [place] Farm do it "  She reports sometimes thinking "I don't care if I don't wake up", but denies SI or suicidal intent or plan  She states that overall her anxiety and depression are improved and also presently denies any HI or psychotic Sxs  No present c/o dizziness from Clonidine, and she feels she does not need it anymore  Floor team relays that Pt has been calm, cooperative and medication compliant, not c/o withdrawal over the past 19 hours  She is sometimes restless and required Trazodone for sleep last night per nursing  She had a nice visit with her  yesterday  Pt conveyed her interest for rehab to the  who is arranging for Pt to go to the [facility] at point of future discharge        Sleep:early awakening  Appetite: improving  Medication side effects: None per Pt    ROS: No complaints per Pt    Labs/EKG: Reviewed    Mental Status Evaluation:  Appearance:  Dressed, Good eye contact, Adequate hygiene   Behavior:  Calm, cooperative, pleasant with an anxious bearing   Speech:  Clear, normal rate and volume   Mood:  Less anxious, Less depressed   Affect:  Appropriately broad   Thought Process:  Organized, Goal directed   Associations: Intact associations   Thought Content:  No verbalized delusions   Perceptual Disturbances: Pt denies any hallucinations or paranoia and does not appear to be responding to internal stimuli   Risk Potential: Pt presently denies SI or HI    Sensorium:  Self, Place, Day of the week, Month, Year   Memory:  short term memory grossly intact   Consciousness:  alert, awake   Attention: Attention span appropriate for age   Insight:  Limited and Improving   Judgment: Limited, Improving   Gait/Station: Normal gait/station   Motor Activity: No abnormal movements     Vitals:    02/08/19 0743   BP: 107/70   Pulse: 74   Resp: 16   Temp: (!) 97 1 °F (36 2 °C)   SpO2:        Admission on 02/01/2019   Component Date Value    Color, UA 02/01/2019 Yellow     Clarity, UA 02/01/2019 Clear     Specific Gravity, UA 02/01/2019 1 025     pH, UA 02/01/2019 6 0     Leukocytes, UA 02/01/2019 Negative     Nitrite, UA 02/01/2019 Negative     Protein, UA 02/01/2019 Negative     Glucose, UA 02/01/2019 Negative     Ketones, UA 02/01/2019 Negative     Urobilinogen, UA 02/01/2019 0 2     Bilirubin, UA 02/01/2019 Negative     Blood, UA 02/01/2019 Small*    RBC, UA 02/01/2019 0-1*    WBC, UA 02/01/2019 0-1*    Epithelial Cells 02/01/2019 Occasional     Bacteria, UA 02/01/2019 Occasional     MUCUS THREADS 02/01/2019 Occasional*    Preg, Serum 02/02/2019 Negative     Cholesterol 02/02/2019 188     Triglycerides 02/02/2019 41     HDL, Direct 02/02/2019 72*    LDL Calculated 02/02/2019 108*    Non-HDL-Chol (CHOL-HDL) 02/02/2019 116     RPR 02/02/2019 Non-Reactive     TSH 3RD GENERATON 02/02/2019 6 210*    TSH 3RD GENERATON 02/03/2019 1 332        Progress Toward Goals:  Based on today's interview and review of prior notes, Pt is making gradual progress and shows motivation to stay clean from drugs  I will formally discontinue Clonidine, as her withdrawal is resolved and she has not taken this medicine in a few days  Start Naltrexone 25mg (1) tab po qd for potential cravings, and increase Mirtazapine for depression  Assessment/Plan   Principal Problem:    Severe episode of recurrent major depressive disorder, without psychotic features ([facility] )  Active Problems:    Generalized anxiety disorder    Opiate abuse, continuous ([facility] )      Recommended Treatment: Continue with pharmacotherapy, group therapy, milieu therapy and occupational therapy    The patient will be maintained on the following medications:    Current Facility-Administered Medications:  acetaminophen 650 mg Oral Q6H PRN Compa Stockton   aluminum-magnesium hydroxide-simethicone 30 mL Oral Q4H PRN Compa Stockton   benztropine 1 mg Intramuscular Q6H PRN Compa Stockton   benztropine 1 mg Oral Q6H PRN Compa Stockton   dicyclomine 20 mg Oral Q6H PRN Compa Stockton   escitalopram 15 mg Oral Daily Compa Stockton   gabapentin 300 mg Oral After Lunch Compa Stockton   gabapentin 300 mg Oral BID Compa Stockton   haloperidol 5 mg Oral Q8H PRN Compa Stockton   haloperidol lactate 5 mg Intramuscular Q6H PRN Compa Stockton   ibuprofen 400 mg Oral Q6H PRN Compa Stockton   loperamide 2 mg Oral Q4H PRN Compa Stockton   LORazepam 2 mg Intramuscular Q6H PRN Compa Stockton   LORazepam 1 mg Oral Q8H PRN Compa Stockton   magnesium hydroxide 30 mL Oral Daily PRN Compa Stockton   mirtazapine 30 mg Oral HS Kathleen Hurley PA-C   naltrexone 25 mg Oral Daily Kathleen Hurley PA-C   nicotine polacrilex 2 mg Oral Q2H PRN Compa Stockton   ondansetron 4 mg Intramuscular Q6H PRN Compa Stockton   ondansetron 4 mg Oral Q6H PRN Compa Stockton   traZODone 50 mg Oral HS PRN Compa Stockton       Risks, benefits and possible side effects of Medications:   Risks, benefits, and possible side effects of medications explained to patient and patient verbalizes understanding

## 2019-02-08 NOTE — DISCHARGE SUMMARY
Discharge Summary - Renetta Escobar R [place] 66 y o  female MRN: [id number]  Unit/Bed#: -01 Encounter: [id number]        Admission Date: 2/1/2019         Discharge Date:     Attending Psychiatrist: Mosetta Gowers MD    Reason for Admission/HPI:   History of Present Illness     Omid Overton is a 66 y o  female with h/o Major Depression without psychotic features, Generalized Anxiety Disorder, and h/o Polysubstance Abuse (most recently opiates in the form of Oxycontin and Percocet, and a past abuse of crack and methamphetamine)  She was referred to the ER for crisis evaluation during a 2/1/2019 visit with her family practitioner Sangeeta ARORA where Pt's BARBARA-7 score was 21 and PHQ-( score was 26 and Pt expressed suicidal ideations (SI))  Both test scores were noted to be worsening in comparison to previous scores overall  Pt brought herself to the [facility] ER on 2/1/2019 and reported depression, anxiety and SI with plan to drive her car into a pole very fast   She also made reference to access to guns  She denied any homicidal ideations (HI)  She was unmotivated and also not eating well or sleeping well, and believed that the auditory hallucinations (heard whispers) and visual hallucinations (shadows) she was having were from sleep deprivation  The primary trigger for her decompensation was the recent 1 year anniversary of her parent's death  She also admitted to a h/o opiate addiction since approx 2007--with daily use of Percocet 30mg (4-5) pills per day and Oxycontin 80mg (2 pills/day)  Her PMD office was managing her psychiatric conditions and current psychiatric medication was reported: Escitalopram 10mg qd  An ethanol breath test was negative and a UDS + for opiates in the ER  After medical clearance the Patient was admitted on 2/1/2019 to the [facility] psychiatric unit on a voluntarily 201 commitment basis  Hospital Course:    On the unit Pt was initially evaluated by psychiatrist Dayne Gagnon MD whose HPI is as follows:    " [ Chief Complaint: "I don't want to go on living like this"        History of Present Illness        Patient is a 66 y o  female presents on 12 with recent worsening of depression, anxiety with poor sleep, decline in appetite, weight loss, guilt, hopelessness, helplessness and suicidal ideations to drive car into something  Patient denies endorsing manic or psychotic symptoms  Patient reports multiple stressors including her recently losing job and last week was her both parents death anniversary  Patient also reported abusing Percocet and oxycodone on a daily basis  Patient reports using Percocet 30 mg tablet 5-6 pills on a daily basis for last 18 years and also reports using 80 mg of OxyContin 1-2 pills daily  During this evaluation patient is denying opioid withdrawal symptoms  Patient was educated on symptoms of opiate withdrawal and educated that she can request p r n  Medication accordingly  Patient was educated on importance of rehabilitation options once stable  After detailed discussion patient agreed with the above treatment planning and is motivated to get better  She reports good support from  at this point      Medical Review Of Systems:  none     Psychiatric Review Of Systems:  sleep: yes  appetite changes: yes  weight changes: yes  energy/anergy: yes  interest/pleasure/anhedonia: yes  somatic symptoms: yes  anxiety/panic: yes  mike: no  guilty/hopeless: yes  self injurious behavior/risky behavior: no                                       ]  "      Escitalopram 10mg qd was continued for management of depression and anxiety and titrated to discharge dose of 15mg qd  Gabapentin was started at 100mg tid for anxiety, pain and opioid withdrawal related anxiety and mood support and titrated to discharge dose of 300mg tid    Mirtazapine was started at 15mg qhs for insomnia, depression, anxiety and to boost appetite, and titrated to discharge dose of 30mg qhs  Clonidine 0 1mg q8hrs was started for opiate withdrawal Sxs and later discontinued because of lack of further need as Pt recovered  Also for withdrawal Sxs of N/V/diarrhea, Zofran and Imodium were given with benefit  For abdominal cramps, Ziyad was given  Pt told nursing staff that her most recent rehab was in 2007 for crack cocaine abuse  Pt was pleasant and cooperative on admission and c/o anxiousness, restlessness, yet also vegetative mood Sxs  Nursing noticed tremulousness of her body though Pt denied this  She required Lorazepam 1mg prn doses multiple times for anxiety and withdrawal and Trazodone 50mg prn dose multiple times for insomnia--and both medicines were beneficial   She was seclusive to her room for the majority of her stay and did not not attend groups  However,  with medication mgt  Pt's mood and anxiety improved  Suicidal ideations gradually reduced to passive death wish  One time dose of Naltrexone 25mg was tried and Pt felt nauseous on it so it was discontinued  The team and Pt agreed that this medication will be started again in the future, at a time selected by her outpatient provider  She at first refused an inpatient rehab program, but eventually reconsidered and Case Mgt referred Pt to the [facility] for it's rehab and IOP opportunities  However, on day of discharge, Pt stated she changed her mind regarding rehab  She states she does not have any current drug cravings and she states that before she did not allow her family to be in on her troubles  She is now accepting of their support and feels this is enough to prevent relapses  She does intend to go to outpatient D&A counseling/NA meetings and also will f/u with outpatient psychiatry for continuity of care  Her anxiety and depression are much less and manageable and she presently denies SI, HI, withdrawal Sxs, or any psychotic Sxs          Mental Status Evaluation:  Appearance:  Dressed, Good eye contact, Adequate hygiene   Behavior:  Calm, cooperative, pleasant, with an anxious bearing   Speech:  Clear, normal rate and volume   Mood:  Less anxious, Less depressed   Affect:  Appropriately broad   Thought Process:  Organized, Goal directed   Associations: Intact associations   Thought Content:  No verbalized delusions   Perceptual Disturbances: Pt denies any hallucinations or paranoia and does not appear to be responding to internal stimuli   Risk Potential: Pt presently denies SI or HI    Sensorium:  Self, Place, Day of the week, Month, Year   Memory:  short term memory grossly intact   Consciousness:  alert, awake   Attention: Good   Insight:  Limited   Judgment: Limited   Gait/Station: Normal gait/station   Motor Activity: No abnormal movements     Vitals:    02/11/19 0806   BP: 123/87   Pulse: 74   Resp: 18   Temp: (!) 97 3 °F (36 3 °C)   SpO2:        Admission on 02/01/2019   Component Date Value    Color, UA 02/01/2019 Yellow     Clarity, UA 02/01/2019 Clear     Specific Gravity, UA 02/01/2019 1 025     pH, UA 02/01/2019 6 0     Leukocytes, UA 02/01/2019 Negative     Nitrite, UA 02/01/2019 Negative     Protein, UA 02/01/2019 Negative     Glucose, UA 02/01/2019 Negative     Ketones, UA 02/01/2019 Negative     Urobilinogen, UA 02/01/2019 0 2     Bilirubin, UA 02/01/2019 Negative     Blood, UA 02/01/2019 Small*    RBC, UA 02/01/2019 0-1*    WBC, UA 02/01/2019 0-1*    Epithelial Cells 02/01/2019 Occasional     Bacteria, UA 02/01/2019 Occasional     MUCUS THREADS 02/01/2019 Occasional*    Preg, Serum 02/02/2019 Negative     Cholesterol 02/02/2019 188     Triglycerides 02/02/2019 41     HDL, Direct 02/02/2019 72*    LDL Calculated 02/02/2019 108*    Non-HDL-Chol (CHOL-HDL) 02/02/2019 116     RPR 02/02/2019 Non-Reactive     TSH 3RD GENERATON 02/02/2019 6 210*    TSH 3RD GENERATON 02/03/2019 1 332        Discharge Diagnosis:   Principal Problem:    Severe episode of recurrent major depressive disorder, without psychotic features ([facility] )  Active Problems:    Generalized anxiety disorder    Opiate abuse, continuous ([facility] )  Resolved Problems:    * No resolved hospital problems  *      Discharge Medications:  See after visit summary for reconciled discharge medications provided to patient and family  Discharge instructions/Information to patient and family:   See after visit summary for information provided to patient and family  Provisions for Follow-Up Care:  See after visit summary for information related to follow-up care and any pertinent home health orders  Discharge Statement   I spent 45 minutes discharging the patient  This time was spent on the day of discharge  I had direct contact with the patient on the day of discharge  I discussed plan and treatment with Pt  Risks, benefits, and possible side effects of medications explained to patient and patient verbalizes understanding

## 2019-02-08 NOTE — PROGRESS NOTES
Pleasant and cooperative  Agreeable to inpatient rehab and states CM has mentioned [facility] which would be close so family could visit  Improved depression  Denies SI/HI  Denies withdrawal symptoms at this time

## 2019-02-08 NOTE — PROGRESS NOTES
At start of shift, pt reports that she is "in a mood" and "does not want to be here " Pt quickly became increasingly anxious and tearful, reports she is not sure if she wants to go to rehab or if she needs it, does not believe she will use again after the withdrawal she went through  Pt was verbally deescalated and was able to quickly calm down, declined PRN medication for anxiety  Pt states "I think I just had my first panic attack " Dr Kiersten Cristobal notified, naltrexone discontinued  Pt had a good visit with her  but was otherwise seclusive for much of the shift  Pleasant in conversation, cooperative

## 2019-02-09 PROCEDURE — 99232 SBSQ HOSP IP/OBS MODERATE 35: CPT | Performed by: PSYCHIATRY & NEUROLOGY

## 2019-02-09 RX ORDER — QUETIAPINE FUMARATE 25 MG/1
50 TABLET, FILM COATED ORAL
Status: DISCONTINUED | OUTPATIENT
Start: 2019-02-09 | End: 2019-02-11 | Stop reason: HOSPADM

## 2019-02-09 RX ADMIN — ONDANSETRON 4 MG: 4 TABLET, ORALLY DISINTEGRATING ORAL at 17:33

## 2019-02-09 RX ADMIN — GABAPENTIN 300 MG: 300 CAPSULE ORAL at 10:35

## 2019-02-09 RX ADMIN — QUETIAPINE FUMARATE 50 MG: 25 TABLET ORAL at 21:22

## 2019-02-09 RX ADMIN — GABAPENTIN 300 MG: 300 CAPSULE ORAL at 14:17

## 2019-02-09 RX ADMIN — ONDANSETRON 4 MG: 4 TABLET, ORALLY DISINTEGRATING ORAL at 07:43

## 2019-02-09 RX ADMIN — GABAPENTIN 300 MG: 300 CAPSULE ORAL at 17:33

## 2019-02-09 RX ADMIN — BENZTROPINE MESYLATE 1 MG: 1 TABLET ORAL at 23:36

## 2019-02-09 RX ADMIN — ESCITALOPRAM OXALATE 15 MG: 5 TABLET, FILM COATED ORAL at 10:35

## 2019-02-09 RX ADMIN — MIRTAZAPINE 30 MG: 15 TABLET, FILM COATED ORAL at 21:21

## 2019-02-09 NOTE — PROGRESS NOTES
Progress Note - [address] [place] 66 y o  female MRN: @MRN   Unit/Bed#: [facility] 074-43 Encounter: [id number]        The patient was seen for continuing care and reviewed with staff  Report from staff regarding this patient received and discussed, and records reviewed prior to seeing this patient   Still depressed and anxious, no cravings  Sleep is poor, "mind rolling"  Anxious  Appetite is better  Medication side effects:no complaints and lightheadedness  ROS: No     Mental Status Evaluation:  Casually dressed, calm and anxious mood, restricted affect  Normal rate and volume of speech  Byron thoughts  Denied suicidal thoughts  No paranoid delusions  Not responding to internal stimuli  Language is appropriate  Associations are intact  Alert oriented x3  Insight and judgment improved  Laboratory results:  I have personally reviewed all pertinent laboratory results  Progress Toward Goals: improving progressively    Assessment/Plan   Principal Problem:    Severe episode of recurrent major depressive disorder, without psychotic features (HCC)  Active Problems:    Generalized anxiety disorder    Opiate abuse, continuous ([address] )      Recommended Treatment: start Seroquel 50 mg hs for racing thoughts and anxiety  Continue the rest of her medications  Planned medication and treatment changes: All current active medications have been reviewed  Continue treatment with group therapy, milieu therapy, occupational therapy and medication management  Risks / Benefits of Treatment:    Risks, benefits, and possible side effects of medications explained to patient and patient verbalizes understanding and agreement for treatment  Counseling / Coordination of Care:    Patient's progress reviewed with nursing staff  ** Please Note: This note has been constructed using a voice recognition system   **

## 2019-02-09 NOTE — PROGRESS NOTES
Pt more visible on unit this evening  Pt c/o mild nausea and received Zofran at 17:33  Pt reports symptoms have improved since this morning and she is feeling better overall  Denies SI   Mild anxiety and depression  Pt is forward thinking, hopeful for the future

## 2019-02-09 NOTE — PROGRESS NOTES
Med Note: Pt requested and received Zofran this AM for c/o nausea  Pt vomited small amount approx 1 hr later  Pt took AM meds late due to vomiting  Pt ate small amount of lunch  Will monitor

## 2019-02-09 NOTE — PROGRESS NOTES
Pt reports feeling better around lunch today  Nausea decreased and was able to take medication and eat lunch  Pt reports being discouraged by "set back"  RN provided encouragement and support; pt receptive and appreciative  Will continue to monitor

## 2019-02-09 NOTE — PROGRESS NOTES
Pt is c/o nausea this morning  Received Zofran at 07:43  One episode of vomiting  Pt is currently resting in bed  Refuses breakfast at this time  Pt with depressed mood, reports feeling "like crap" since taking Naltrexone yesterday  Naltrexone has been discontinued  Will continue to monitor and support

## 2019-02-10 PROCEDURE — 99231 SBSQ HOSP IP/OBS SF/LOW 25: CPT | Performed by: PSYCHIATRY & NEUROLOGY

## 2019-02-10 RX ORDER — BENZTROPINE MESYLATE 1 MG/1
TABLET ORAL
Status: COMPLETED
Start: 2019-02-10 | End: 2019-02-09

## 2019-02-10 RX ADMIN — ESCITALOPRAM OXALATE 15 MG: 5 TABLET, FILM COATED ORAL at 09:34

## 2019-02-10 RX ADMIN — GABAPENTIN 300 MG: 300 CAPSULE ORAL at 17:01

## 2019-02-10 RX ADMIN — MIRTAZAPINE 30 MG: 15 TABLET, FILM COATED ORAL at 21:24

## 2019-02-10 RX ADMIN — GABAPENTIN 300 MG: 300 CAPSULE ORAL at 09:34

## 2019-02-10 RX ADMIN — GABAPENTIN 300 MG: 300 CAPSULE ORAL at 14:14

## 2019-02-10 RX ADMIN — QUETIAPINE FUMARATE 50 MG: 25 TABLET ORAL at 21:24

## 2019-02-10 NOTE — PROGRESS NOTES
Progress Note - [id number] [place] 66 y o  female MRN: @MRN   Unit/Bed#: [facility] 258-01 Encounter: [id number]    Per nursing report and sign out, patient she has been pleasant, slept well, no significant issues past 24hr    Oliva Ramirez reports today that "I am doing better than I thought"  Some depression, "the usual"  Still has mind that 'rolls' at night  Some awakenings  Started seroquel, but then got a roommate last night   (that kept her up), so poor sleep  "I got to start making goals  I am going to some groups"  No SI or HI, no AVH  Energy: was low, slowly improving  Sleep: decreased  Appetite: normal  Medication side effects: No   ROS: no complaints    Mental Status Evaluation:    Appearance:  age appropriate   Behavior:  pleasant, cooperative, with good eye contact   Speech:  Normal volume, regular rate and rhythm   Mood:  depressed and anxious   Affect:  constricted       Thought Process:  Linear and goal directed   Associations: intact associations   Thought Content:  normal and appropriate   Perceptual Disturbances: no auditory or visual hallcunations   Risk Potential: Suicidal ideation - None  Homicidal ideation - None  Potential for aggression - No   Sensorium:  A&Ox3   Cognition:  grossly intact   Consciousness:  Alert & Awake   Memory:  recent and remote memory grossly intact   Attention: attention span and concentration are age appropriate   Intellect: within normal limits       Insight:  fair   Judgment: fair   Muscle Strength  Muscle Tone normal  normal   Gait/Station: normal gait/station with good balance   Motor Activity: no abnormal movements       Vital signs in last 24 hours:    Temp:  [98 2 °F (36 8 °C)-99 5 °F (37 5 °C)] 98 2 °F (36 8 °C)  HR:  [54-88] 88  Resp:  [16-18] 16  BP: (120-130)/(77-82) 130/78    Laboratory results:  I have personally reviewed all pertinent laboratory/tests results      Progress Toward Goals:  slow improvement  Assessment/Plan Principal Problem:    Severe episode of recurrent major depressive disorder, without psychotic features ([address] )  Active Problems:    Generalized anxiety disorder    Opiate abuse, continuous (HCC)      Thersia Fall is tolerating treatment well, some progress    Recommended Treatment:     Planned medication and treatment changes: All current active medications have been reviewed  Encourage group therapy, milieu therapy and occupational therapy  [address] checks as unit standard unless ordered or noted otherwise      Current Facility-Administered Medications:  acetaminophen 650 mg Oral Q6H PRN Compa Stockton   aluminum-magnesium hydroxide-simethicone 30 mL Oral Q4H PRN Compa Stockton   benztropine 1 mg Intramuscular Q6H PRN Compa Stockton   benztropine 1 mg Oral Q6H PRN Compa Stockton   dicyclomine 20 mg Oral Q6H PRN Compa Stockton   escitalopram 15 mg Oral Daily Compa Stockton   gabapentin 300 mg Oral After Lunch Compa Stockton   gabapentin 300 mg Oral BID Compa Stockton   haloperidol 5 mg Oral Q8H PRN Compa Stockton   haloperidol lactate 5 mg Intramuscular Q6H PRN Compa Stockton   ibuprofen 400 mg Oral Q6H PRN Compa Stockton   loperamide 2 mg Oral Q4H PRN Compa Stockton   LORazepam 2 mg Intramuscular Q6H PRN Compa Stockton   LORazepam 1 mg Oral Q8H PRN Compa Stockton   magnesium hydroxide 30 mL Oral Daily PRN Compa Stockton   mirtazapine 30 mg Oral HS Kathleen Hurley PA-C   nicotine polacrilex 2 mg Oral Q2H PRN Compa Stockton   ondansetron 4 mg Intramuscular Q6H PRN Compa Stockton   ondansetron 4 mg Oral Q6H PRN Compa Stockton   QUEtiapine 50 mg Oral HS Dorinda Soria MD   traZODone 50 mg Oral HS PRN Compa Stockton       1) continue current treatment  2) Continue to support patient and engage them in the programs available as feasible and appropriate  Continue case management support and therapy  Continue discharge planning      Risks / Benefits of Treatment:    Risks, benefits, and possible side effects of medications explained to patient and patient verbalizes understanding and agreement for treatment  Counseling / Coordination of Care:    Patient's progress reviewed with nursing staff  Medications, treatment progress and treatment plan reviewed with patient        Mary Novoa III, DO  2/10/2019  8:42 AM

## 2019-02-10 NOTE — PROGRESS NOTES
Pt is pleasant during conversation this morning  Pt indicating she is unsure if she wants to go to rehab at this time  Would like to discharge to home tomorrow  Pt states she no longer has cravings to use opiates after going through withdrawal   RN encouraged pt to consider rehab and reminded pt she may experience increased cravings/triggers when returning home  Pt reports "restless legs" overnight and may speak to the doctor re: medication to help with symptoms  Will continue to monitor

## 2019-02-10 NOTE — PROGRESS NOTES
Pt   Given  Cogentin  1 mg po  For  Restless leg  Syndrome  At 2336  Med effective  Pt   Dimas Wright

## 2019-02-10 NOTE — PROGRESS NOTES
Pt is calm and cooperative, pleasant during interaction  Pt attended dinner and has been social with peers  Currently watching a movie with peers in [address]  Continues to state she would like to discharge to home  Will continue to monitor

## 2019-02-11 VITALS
HEIGHT: 65 IN | OXYGEN SATURATION: 97 % | SYSTOLIC BLOOD PRESSURE: 123 MMHG | TEMPERATURE: 97.3 F | HEART RATE: 74 BPM | BODY MASS INDEX: 23.76 KG/M2 | WEIGHT: 142.6 LBS | DIASTOLIC BLOOD PRESSURE: 87 MMHG | RESPIRATION RATE: 18 BRPM

## 2019-02-11 PROCEDURE — 99239 HOSP IP/OBS DSCHRG MGMT >30: CPT | Performed by: PSYCHIATRY & NEUROLOGY

## 2019-02-11 RX ORDER — TRAZODONE HYDROCHLORIDE 50 MG/1
50 TABLET ORAL
Qty: 30 TABLET | Refills: 1 | Status: SHIPPED | OUTPATIENT
Start: 2019-02-11 | End: 2019-03-08

## 2019-02-11 RX ORDER — GABAPENTIN 300 MG/1
300 CAPSULE ORAL 3 TIMES DAILY
Qty: 90 CAPSULE | Refills: 1 | Status: SHIPPED | OUTPATIENT
Start: 2019-02-11 | End: 2019-03-08 | Stop reason: SDUPTHER

## 2019-02-11 RX ORDER — ESCITALOPRAM OXALATE 5 MG/1
15 TABLET ORAL DAILY
Qty: 90 TABLET | Refills: 0 | Status: SHIPPED | OUTPATIENT
Start: 2019-02-12 | End: 2019-03-08 | Stop reason: SDUPTHER

## 2019-02-11 RX ORDER — QUETIAPINE FUMARATE 50 MG/1
50 TABLET, FILM COATED ORAL
Qty: 30 TABLET | Refills: 1 | Status: SHIPPED | OUTPATIENT
Start: 2019-02-11 | End: 2019-03-08 | Stop reason: SDUPTHER

## 2019-02-11 RX ORDER — MIRTAZAPINE 30 MG/1
30 TABLET, FILM COATED ORAL
Qty: 30 TABLET | Refills: 1 | Status: SHIPPED | OUTPATIENT
Start: 2019-02-11 | End: 2019-03-08

## 2019-02-11 RX ADMIN — ESCITALOPRAM OXALATE 15 MG: 5 TABLET, FILM COATED ORAL at 08:47

## 2019-02-11 RX ADMIN — GABAPENTIN 300 MG: 300 CAPSULE ORAL at 08:47

## 2019-02-11 NOTE — DISCHARGE INSTR - OTHER ORDERS
Included in discharge folder is information on [facility], at [address] in [facility]  Included in discharge folder is a listing of NA meetings within 5 miles of zip code [address]  [employer] also offers from free [address] you should check out while you are there  [facility]  24/7: [phone]  [place]  [address]    [facility] has 3 crisis centers:   [employer] at [facility] at [facility] at [facility] services are accessible by phone and through three [facility] hospital Emergency Rooms to individuals, families, and law enforcement  Psychiatric and treatment planning is available, providing for evaluation and disposition and the immediate start of medication and treatment  Substance abuse crisis issues are also addressed  Access to psychiatric consultation is limited to a few hours each weekday  These services are available 24/7 at [facility] at [facility] ([place] and [address]; [phone]) and [employer] at [facility] [address], [facility], [address]; [phone]  The most active crisis unit is [facility] at [address], [address] [facility]; [phone])  Suicide Prevention Lifeline  (202) 983-TALK or (672) [place] 1694 (923) 397-VASL    Crisis Text Line is free, 24/7 support for those in crisis  Text HOME to [facility] from anywhere in the [place] to text with a trained Crisis Counselor  Peer Recovery Warmline through [facility], hours of operation are 1:00 PM to 5:00 PM, Monday through Friday, except major holidays    Warmline#:[phone]    [facility] [address] [phone] ·   [address], [address]  Provides free, anonymous and confidential telephone helpline services to help with severe mental health issues to financial concerns, family issues or simply a desire to be heard

## 2019-02-11 NOTE — CASE MANAGEMENT
CM met with Pt, who reported she thinks she just had her first panic attack  Pt said that she began questioning if she should be here or what she is even doing & Pt said that she just couldn't stop the thoughts  Pt said that she was able to speak to a nurse & calm down, but she was scared/worried  CM & Pt talked at length about her discharge plans & she continues to be uncertain if she wants to do inpatient rehab versus outpatient  Pt & CM talked about people, places, & things  CM encouraged Pt to go to inpatient, for continued treatment, & that it is voluntary  Pt agreeing but at the same time stating she wants to get home to her family  She said that her  will support her either way, & she will continue to talk to him about it over the weekend

## 2019-02-11 NOTE — CASE MANAGEMENT
CM met with Pt, who reported that she decided she does not want to do inpatient rehab  Pt said that she had been pushing her family all away during her addiction, & now she is starting to reach out to them again & draw closer to them, & she thinks they will provide most of her support  Pt is agreeable with IOP & would like to stay close to [facility]  CM & Pt reviewed & signed ROIs for her PCP & [facility]  CM contacted [facility] @ [phone] & spoke with Raymond Bhagat, who reported they do not schedule intake appointments & Pt would have to go through the open access  CM contacted Pt's PCP office @ [phone] & spoke with Severa Rumps who reported Pt does not have any upcoming scheduled appointments as this time  CM contacted Pt's , Michelle Pedersen, @ [phone] who reported that a lot of Pt's family visited over the weekend  She was upset that her brother came in to visit, because she didn't know who had told him she was ever in the hospital    Michelle Pedersen expressed concerns with having to tell Pt one of her brothers is sick, & how she will react  CM reviewed Pt will need to complete a walk-in assessment at [address] [place] [address] he agreed he would take her tomorrow    Michelle Millerk will provide transportation home today at 10:30 AM

## 2019-02-11 NOTE — DISCHARGE INSTR - LAB
Contact Information: If you have any questions, concerns, pended studies, tests and/or procedures, or emergencies regarding your inpatient behavioral health visit  Please contact { [phone]} and ask to speak to a , nurse or physician  A contact is available 24 hours/ 7 days a week at this number  Summary of Procedures Performed During your Stay:  Below is a list of major procedures performed during your hospital stay and a summary of results:  {PROCEDURES:96144}    Pending Studies (From admission, onward)    None        If studies are pending at discharge, follow up with your PCP and/or referring provider

## 2019-02-12 NOTE — CASE MANAGEMENT
Pt's Summary of Care was electronically forwarded to [address] [place] [address]  CM contacted Pt at [phone] & left a message to make her aware that [facility] was closed today due to inclement weather  CM encouraged her to check their website tomorrow for closures/delays

## 2019-02-22 DIAGNOSIS — Z12.11 SCREENING FOR COLON CANCER: Primary | ICD-10-CM

## 2019-03-08 ENCOUNTER — OFFICE VISIT (OUTPATIENT)
Dept: FAMILY MEDICINE CLINIC | Facility: HOSPITAL | Age: 59
End: 2019-03-08
Payer: COMMERCIAL

## 2019-03-08 VITALS
HEART RATE: 90 BPM | WEIGHT: 152.8 LBS | DIASTOLIC BLOOD PRESSURE: 88 MMHG | BODY MASS INDEX: 25.46 KG/M2 | TEMPERATURE: 97.3 F | OXYGEN SATURATION: 98 % | SYSTOLIC BLOOD PRESSURE: 120 MMHG | HEIGHT: 65 IN

## 2019-03-08 DIAGNOSIS — F11.10 OPIATE ABUSE, CONTINUOUS (HCC): ICD-10-CM

## 2019-03-08 DIAGNOSIS — F33.2 SEVERE EPISODE OF RECURRENT MAJOR DEPRESSIVE DISORDER, WITHOUT PSYCHOTIC FEATURES (HCC): Primary | ICD-10-CM

## 2019-03-08 DIAGNOSIS — F41.1 GENERALIZED ANXIETY DISORDER: ICD-10-CM

## 2019-03-08 PROCEDURE — 4004F PT TOBACCO SCREEN RCVD TLK: CPT | Performed by: NURSE PRACTITIONER

## 2019-03-08 PROCEDURE — 99214 OFFICE O/P EST MOD 30 MIN: CPT | Performed by: NURSE PRACTITIONER

## 2019-03-08 PROCEDURE — 3008F BODY MASS INDEX DOCD: CPT | Performed by: NURSE PRACTITIONER

## 2019-03-08 RX ORDER — GABAPENTIN 300 MG/1
300 CAPSULE ORAL 3 TIMES DAILY
Qty: 90 CAPSULE | Refills: 1 | Status: SHIPPED | OUTPATIENT
Start: 2019-03-08 | End: 2019-04-24 | Stop reason: SDUPTHER

## 2019-03-08 RX ORDER — QUETIAPINE FUMARATE 50 MG/1
50 TABLET, FILM COATED ORAL
Qty: 30 TABLET | Refills: 1 | Status: SHIPPED | OUTPATIENT
Start: 2019-03-08 | End: 2019-04-11 | Stop reason: SDUPTHER

## 2019-03-08 RX ORDER — ESCITALOPRAM OXALATE 5 MG/1
15 TABLET ORAL DAILY
Qty: 90 TABLET | Refills: 1 | Status: SHIPPED | OUTPATIENT
Start: 2019-03-08 | End: 2019-05-12 | Stop reason: SDUPTHER

## 2019-03-08 NOTE — PROGRESS NOTES
Assessment/Plan:    Severe episode of recurrent major depressive disorder, without psychotic features ([facility] )  Review/discussion of hospital records  Mood much improved on current meds w/PHQ-9 score of 5  Will continue same meds - refills issued - and return in 2 months for next med check  Call sooner w/any acute mood concerns/questions  Generalized anxiety disorder  BARBARA-7 score improved tremendously to 11  Continue same meds and return in 2 months  Call sooner w/any anxiety concerns  Diagnoses and all orders for this visit:    Severe episode of recurrent major depressive disorder, without psychotic features (HCC)  -     QUEtiapine (SEROquel) 50 mg tablet; Take 1 tablet (50 mg total) by mouth daily at bedtime for 30 days  -     escitalopram (LEXAPRO) 5 mg tablet; Take 3 tablets (15 mg total) by mouth daily for 30 days    Generalized anxiety disorder  -     QUEtiapine (SEROquel) 50 mg tablet; Take 1 tablet (50 mg total) by mouth daily at bedtime for 30 days  -     gabapentin (NEURONTIN) 300 mg capsule; Take 1 capsule (300 mg total) by mouth 3 (three) times a day for 30 days  -     escitalopram (LEXAPRO) 5 mg tablet; Take 3 tablets (15 mg total) by mouth daily for 30 days    Opiate abuse, continuous ([facility] )      States she will schedule mammogram and pap smear  Declines colonoscopy but has received cologuard to complete  Subjective:      Patient ID: Evette David is a 66 y o  female here for routine OV  Here for f/u to hospital  Was admitted to psych unit and discharged on 2/1/19  States she is much better  Energy is better  Sleeping well on seroquel  Was put on gabapentin for RLS and "jumpy arms"  Has improved  Following w/therapy every 2 weeks  Not following w/psychiatry  Will need med refills soon  Was taken off opiates when in hospital and did have bad withdrawal for a few days  Has been clean since and has no desire to use again     Brother recently diagnosed w/throat cancer and she is his caretaker  Believes she is handling OK  States she lost her job and employer is fighting her on unemployment  Has a court hearing coming up  The following portions of the patient's history were reviewed and updated as appropriate: allergies, current medications, past family history, past medical history, past social history, past surgical history and problem list     Review of Systems   Constitutional: Positive for fatigue (but improved)  Psychiatric/Behavioral: Positive for dysphoric mood  Negative for sleep disturbance and suicidal ideas  The patient is nervous/anxious  Objective:      /88 (Patient Position: Sitting, Cuff Size: Standard)   Pulse 90   Temp (!) 97 3 °F (36 3 °C) (Tympanic)   Ht 5' 5" (1 651 m)   Wt 69 3 kg (152 lb 12 8 oz)   SpO2 98%   BMI 25 43 kg/m²          Physical Exam   Constitutional: She is oriented to person, place, and time  She appears well-developed and well-nourished  No distress  HENT:   Head: Normocephalic and atraumatic  Eyes: Conjunctivae are normal  No scleral icterus  Pulmonary/Chest: Effort normal  No respiratory distress  Neurological: She is alert and oriented to person, place, and time  Psychiatric: She has a normal mood and affect  Her behavior is normal  Judgment and thought content normal    Relaxed, freely conversive w/good eye contact    Vitals reviewed

## 2019-03-08 NOTE — ASSESSMENT & PLAN NOTE
BARBARA-7 score improved tremendously to 11  Continue same meds and return in 2 months  Call sooner w/any anxiety concerns

## 2019-03-08 NOTE — ASSESSMENT & PLAN NOTE
Review/discussion of hospital records  Mood much improved on current meds w/PHQ-9 score of 5  Will continue same meds - refills issued - and return in 2 months for next med check  Call sooner w/any acute mood concerns/questions

## 2019-03-12 ENCOUNTER — TELEPHONE (OUTPATIENT)
Dept: FAMILY MEDICINE CLINIC | Facility: HOSPITAL | Age: 59
End: 2019-03-12

## 2019-03-12 NOTE — TELEPHONE ENCOUNTER
Pt requesting letter from Lee Ann Zhang to help with unemployement  States she had spoke to her regarding this  Needs the date she started treating pt for depression/anxiety, and that she continues to be treated for it

## 2019-03-15 NOTE — TELEPHONE ENCOUNTER
Yes, let patient know I will write letter tomorrow so she can  next week  Please send this back to me as a reminder

## 2019-04-05 ENCOUNTER — OFFICE VISIT (OUTPATIENT)
Dept: FAMILY MEDICINE CLINIC | Facility: HOSPITAL | Age: 59
End: 2019-04-05
Payer: COMMERCIAL

## 2019-04-05 VITALS
HEART RATE: 87 BPM | OXYGEN SATURATION: 97 % | HEIGHT: 66 IN | WEIGHT: 158.6 LBS | DIASTOLIC BLOOD PRESSURE: 86 MMHG | SYSTOLIC BLOOD PRESSURE: 118 MMHG | BODY MASS INDEX: 25.49 KG/M2 | TEMPERATURE: 97.7 F

## 2019-04-05 DIAGNOSIS — F41.1 GENERALIZED ANXIETY DISORDER: ICD-10-CM

## 2019-04-05 DIAGNOSIS — Z12.31 ENCOUNTER FOR SCREENING MAMMOGRAM FOR MALIGNANT NEOPLASM OF BREAST: ICD-10-CM

## 2019-04-05 DIAGNOSIS — F33.2 SEVERE EPISODE OF RECURRENT MAJOR DEPRESSIVE DISORDER, WITHOUT PSYCHOTIC FEATURES (HCC): Primary | ICD-10-CM

## 2019-04-05 PROCEDURE — 3008F BODY MASS INDEX DOCD: CPT | Performed by: NURSE PRACTITIONER

## 2019-04-05 PROCEDURE — 4004F PT TOBACCO SCREEN RCVD TLK: CPT | Performed by: NURSE PRACTITIONER

## 2019-04-05 PROCEDURE — 99213 OFFICE O/P EST LOW 20 MIN: CPT | Performed by: NURSE PRACTITIONER

## 2019-04-10 DIAGNOSIS — F41.1 GENERALIZED ANXIETY DISORDER: ICD-10-CM

## 2019-04-10 DIAGNOSIS — F33.2 SEVERE EPISODE OF RECURRENT MAJOR DEPRESSIVE DISORDER, WITHOUT PSYCHOTIC FEATURES (HCC): ICD-10-CM

## 2019-04-10 RX ORDER — GABAPENTIN 300 MG/1
CAPSULE ORAL
Qty: 90 CAPSULE | Refills: 1 | OUTPATIENT
Start: 2019-04-10

## 2019-04-10 RX ORDER — QUETIAPINE FUMARATE 50 MG/1
TABLET, FILM COATED ORAL
Qty: 30 TABLET | Refills: 1 | OUTPATIENT
Start: 2019-04-10

## 2019-04-11 DIAGNOSIS — F41.1 GENERALIZED ANXIETY DISORDER: ICD-10-CM

## 2019-04-11 DIAGNOSIS — F33.2 SEVERE EPISODE OF RECURRENT MAJOR DEPRESSIVE DISORDER, WITHOUT PSYCHOTIC FEATURES (HCC): ICD-10-CM

## 2019-04-12 RX ORDER — QUETIAPINE FUMARATE 50 MG/1
TABLET, FILM COATED ORAL
Qty: 30 TABLET | Refills: 1 | Status: SHIPPED | OUTPATIENT
Start: 2019-04-12 | End: 2019-06-08 | Stop reason: SDUPTHER

## 2019-04-24 DIAGNOSIS — F41.1 GENERALIZED ANXIETY DISORDER: ICD-10-CM

## 2019-04-26 ENCOUNTER — TELEPHONE (OUTPATIENT)
Dept: FAMILY MEDICINE CLINIC | Facility: HOSPITAL | Age: 59
End: 2019-04-26

## 2019-04-26 DIAGNOSIS — F41.1 GENERALIZED ANXIETY DISORDER: ICD-10-CM

## 2019-04-26 RX ORDER — GABAPENTIN 300 MG/1
300 CAPSULE ORAL 3 TIMES DAILY
Qty: 90 CAPSULE | Refills: 0 | Status: CANCELLED | OUTPATIENT
Start: 2019-04-26 | End: 2019-05-26

## 2019-04-26 RX ORDER — GABAPENTIN 300 MG/1
300 CAPSULE ORAL 3 TIMES DAILY
Qty: 90 CAPSULE | Refills: 0 | Status: SHIPPED | OUTPATIENT
Start: 2019-04-26 | End: 2019-05-22 | Stop reason: SDUPTHER

## 2019-05-12 DIAGNOSIS — F41.1 GENERALIZED ANXIETY DISORDER: ICD-10-CM

## 2019-05-12 DIAGNOSIS — F33.2 SEVERE EPISODE OF RECURRENT MAJOR DEPRESSIVE DISORDER, WITHOUT PSYCHOTIC FEATURES (HCC): ICD-10-CM

## 2019-05-13 RX ORDER — ESCITALOPRAM OXALATE 5 MG/1
TABLET ORAL
Qty: 90 TABLET | Refills: 0 | Status: SHIPPED | OUTPATIENT
Start: 2019-05-13 | End: 2019-06-09 | Stop reason: SDUPTHER

## 2019-05-22 DIAGNOSIS — F41.1 GENERALIZED ANXIETY DISORDER: ICD-10-CM

## 2019-05-22 RX ORDER — GABAPENTIN 300 MG/1
300 CAPSULE ORAL 3 TIMES DAILY
Qty: 90 CAPSULE | Refills: 0 | Status: SHIPPED | OUTPATIENT
Start: 2019-05-22 | End: 2019-07-15 | Stop reason: SDUPTHER

## 2019-06-08 DIAGNOSIS — F41.1 GENERALIZED ANXIETY DISORDER: ICD-10-CM

## 2019-06-08 DIAGNOSIS — F33.2 SEVERE EPISODE OF RECURRENT MAJOR DEPRESSIVE DISORDER, WITHOUT PSYCHOTIC FEATURES (HCC): ICD-10-CM

## 2019-06-08 RX ORDER — QUETIAPINE FUMARATE 50 MG/1
TABLET, FILM COATED ORAL
Qty: 30 TABLET | Refills: 0 | Status: SHIPPED | OUTPATIENT
Start: 2019-06-08 | End: 2019-07-08 | Stop reason: SDUPTHER

## 2019-06-09 DIAGNOSIS — F41.1 GENERALIZED ANXIETY DISORDER: ICD-10-CM

## 2019-06-09 DIAGNOSIS — F33.2 SEVERE EPISODE OF RECURRENT MAJOR DEPRESSIVE DISORDER, WITHOUT PSYCHOTIC FEATURES (HCC): ICD-10-CM

## 2019-06-11 ENCOUNTER — OFFICE VISIT (OUTPATIENT)
Dept: FAMILY MEDICINE CLINIC | Facility: HOSPITAL | Age: 59
End: 2019-06-11
Payer: COMMERCIAL

## 2019-06-11 VITALS
WEIGHT: 161.2 LBS | BODY MASS INDEX: 26.86 KG/M2 | HEART RATE: 87 BPM | TEMPERATURE: 99.2 F | SYSTOLIC BLOOD PRESSURE: 118 MMHG | OXYGEN SATURATION: 97 % | HEIGHT: 65 IN | DIASTOLIC BLOOD PRESSURE: 82 MMHG

## 2019-06-11 DIAGNOSIS — F41.1 GENERALIZED ANXIETY DISORDER: ICD-10-CM

## 2019-06-11 DIAGNOSIS — F33.2 SEVERE EPISODE OF RECURRENT MAJOR DEPRESSIVE DISORDER, WITHOUT PSYCHOTIC FEATURES (HCC): Primary | ICD-10-CM

## 2019-06-11 PROCEDURE — 99214 OFFICE O/P EST MOD 30 MIN: CPT | Performed by: NURSE PRACTITIONER

## 2019-06-11 PROCEDURE — 3008F BODY MASS INDEX DOCD: CPT | Performed by: NURSE PRACTITIONER

## 2019-06-11 PROCEDURE — 4004F PT TOBACCO SCREEN RCVD TLK: CPT | Performed by: NURSE PRACTITIONER

## 2019-06-11 RX ORDER — ESCITALOPRAM OXALATE 20 MG/1
TABLET ORAL
Qty: 30 TABLET | Refills: 1 | Status: SHIPPED | OUTPATIENT
Start: 2019-06-11 | End: 2019-08-03 | Stop reason: SDUPTHER

## 2019-07-08 DIAGNOSIS — F33.2 SEVERE EPISODE OF RECURRENT MAJOR DEPRESSIVE DISORDER, WITHOUT PSYCHOTIC FEATURES (HCC): ICD-10-CM

## 2019-07-08 DIAGNOSIS — F41.1 GENERALIZED ANXIETY DISORDER: ICD-10-CM

## 2019-07-08 RX ORDER — QUETIAPINE FUMARATE 50 MG/1
TABLET, FILM COATED ORAL
Qty: 30 TABLET | Refills: 1 | Status: SHIPPED | OUTPATIENT
Start: 2019-07-08 | End: 2019-07-15 | Stop reason: SDUPTHER

## 2019-07-15 ENCOUNTER — TELEPHONE (OUTPATIENT)
Dept: FAMILY MEDICINE CLINIC | Facility: HOSPITAL | Age: 59
End: 2019-07-15

## 2019-07-15 DIAGNOSIS — F41.1 GENERALIZED ANXIETY DISORDER: ICD-10-CM

## 2019-07-15 DIAGNOSIS — F33.2 SEVERE EPISODE OF RECURRENT MAJOR DEPRESSIVE DISORDER, WITHOUT PSYCHOTIC FEATURES (HCC): ICD-10-CM

## 2019-07-15 RX ORDER — QUETIAPINE FUMARATE 50 MG/1
50 TABLET, FILM COATED ORAL DAILY
Qty: 30 TABLET | Refills: 1 | Status: SHIPPED | OUTPATIENT
Start: 2019-07-15 | End: 2020-04-24

## 2019-07-15 RX ORDER — GABAPENTIN 300 MG/1
300 CAPSULE ORAL 3 TIMES DAILY
Qty: 90 CAPSULE | Refills: 0 | Status: SHIPPED | OUTPATIENT
Start: 2019-07-15 | End: 2019-11-08 | Stop reason: ALTCHOICE

## 2019-07-15 RX ORDER — GABAPENTIN 300 MG/1
300 CAPSULE ORAL 3 TIMES DAILY
Qty: 90 CAPSULE | Refills: 0 | Status: SHIPPED | OUTPATIENT
Start: 2019-07-15 | End: 2019-07-15 | Stop reason: SDUPTHER

## 2019-07-15 NOTE — TELEPHONE ENCOUNTER
Pt was taken off the neurontin in may because she wasn't having spasms but she would like to please go back on it because the spasms are coming back

## 2019-07-18 ENCOUNTER — OFFICE VISIT (OUTPATIENT)
Dept: URGENT CARE | Facility: CLINIC | Age: 59
End: 2019-07-18
Payer: COMMERCIAL

## 2019-07-18 VITALS
BODY MASS INDEX: 27.83 KG/M2 | RESPIRATION RATE: 16 BRPM | HEART RATE: 73 BPM | SYSTOLIC BLOOD PRESSURE: 130 MMHG | HEIGHT: 64 IN | DIASTOLIC BLOOD PRESSURE: 80 MMHG | WEIGHT: 163 LBS | OXYGEN SATURATION: 98 % | TEMPERATURE: 97.2 F

## 2019-07-18 DIAGNOSIS — R19.7 DIARRHEA, UNSPECIFIED TYPE: Primary | ICD-10-CM

## 2019-07-18 DIAGNOSIS — F11.23 OPIOID WITHDRAWAL (HCC): ICD-10-CM

## 2019-07-18 PROCEDURE — 99213 OFFICE O/P EST LOW 20 MIN: CPT | Performed by: PHYSICIAN ASSISTANT

## 2019-07-18 NOTE — PROGRESS NOTES
NAME: Feroz Reddy is a 66 y o  female  : 1960    MRN: [id number]      Assessment and Plan   Diarrhea, unspecified type [R19 7]  1  Diarrhea, unspecified type     2  Opioid withdrawal ([facility] )      Explained to the patient that she would have to go to ED for further evaluation and observation due to opiate withdrawal    Patient requesting methadone  Once I explained to her that I cannot provide her with methadone  Again telling her that the ED would be the best place to go to manager symptoms  Patient became belligerent and stormed out of the room yelling in the waiting room leaving [facility]  Verner Body was seen today for vomiting  Diagnoses and all orders for this visit:    Diarrhea, unspecified type    Opioid withdrawal [facility])        Patient Instructions   There are no Patient Instructions on file for this visit  Proceed to ER if symptoms worsen  Chief Complaint     Chief Complaint   Patient presents with    Vomiting     and diarrhea x2days; pt reports being an opioid user and thinks shes going through withdrawal; pt buys oxycodone off the streets and hasn't had a pill since Monday - has no more money to buy more, pt reports taking 160mg of oxycodone/day         History of Present Illness     59yo Patient with history of substance abuse comes to the clinic today with complaints of abdominal cramping and diarrhea  X 2 day  Pt reports she "thinks" that she is going through withdrawal    States that she typically takes 120mg-160mg oxycodone a day states now she is out of money, the last time she had X sick hold on was on Monday  Patient reports having diarrhea that began yesterday states "I went all day "   Patient reports she now has abdominal cramping and needs to get " something" before her symptoms get worse  Patient requesting methadone for symptoms  Pt reports methadone   Admits to nausea, vomiting  Denies  constipation     Denies blood in stool or black tarry stools  Denies fever, chest pain, SOB  Review of Systems   Review of Systems   Constitutional: Negative for chills, fatigue and fever  Respiratory: Negative  Cardiovascular: Negative  Gastrointestinal: Positive for abdominal pain, diarrhea, nausea and vomiting  Negative for constipation  Current Medications       Current Outpatient Medications:     escitalopram (LEXAPRO) 20 mg tablet, TAKE 1 TABLET BY MOUTH EVERY DAY, Disp: 30 tablet, Rfl: 1    gabapentin (NEURONTIN) 300 mg capsule, TAKE 1 CAPSULE (300 MG TOTAL) BY MOUTH 3 (THREE) TIMES A DAY FOR 30 DAYS, Disp: 90 capsule, Rfl: 0    QUEtiapine (SEROquel) 50 mg tablet, Take 1 tablet (50 mg total) by mouth daily, Disp: 30 tablet, Rfl: 1    Current Allergies     Allergies as of 07/18/2019    (No Known Allergies)              Past Medical History:   Diagnosis Date    [facility] drug ([facility] )     Anxiety     Depression     [facility]     Osteoarthritis     Rheumatoid arthritis [facility]     Substance abuse ([facility] )     Tubal pregnancy        Past Surgical History:   Procedure Laterality Date    BREAST IMPLANT      BUNIONECTOMY Bilateral 1990    ECTOPIC PREGNANCY SURGERY      TUBAL LIGATION         Family History   Problem Relation Age of Onset    Bipolar disorder Mother     Depression Mother     Hypertension Mother     Heart disease Father         cardiac    Prostate cancer Father     Bipolar disorder Brother          Medications have been verified      The following portions of the patient's history were reviewed and updated as appropriate: allergies, current medications, past family history, past medical history, past social history, past surgical history and problem list     Objective   /80   Pulse 73   Temp (!) 97 2 °F (36 2 °C) (Tympanic)   Resp 16   Ht 5' 4" (1 626 m)   Wt 73 9 kg (163 lb)   SpO2 98%   BMI 27 98 kg/m²      Physical Exam     Physical Exam   Constitutional: Vital signs are normal  Patient left before exam can be performed  Nursing note and vitals reviewed        Peggy Schilling PA-C

## 2019-08-03 DIAGNOSIS — F33.2 SEVERE EPISODE OF RECURRENT MAJOR DEPRESSIVE DISORDER, WITHOUT PSYCHOTIC FEATURES (HCC): ICD-10-CM

## 2019-08-03 DIAGNOSIS — F41.1 GENERALIZED ANXIETY DISORDER: ICD-10-CM

## 2019-08-05 RX ORDER — ESCITALOPRAM OXALATE 20 MG/1
TABLET ORAL
Qty: 30 TABLET | Refills: 0 | Status: SHIPPED | OUTPATIENT
Start: 2019-08-05 | End: 2019-08-31 | Stop reason: SDUPTHER

## 2019-08-31 DIAGNOSIS — F33.2 SEVERE EPISODE OF RECURRENT MAJOR DEPRESSIVE DISORDER, WITHOUT PSYCHOTIC FEATURES (HCC): ICD-10-CM

## 2019-08-31 DIAGNOSIS — F41.1 GENERALIZED ANXIETY DISORDER: ICD-10-CM

## 2019-09-02 RX ORDER — ESCITALOPRAM OXALATE 20 MG/1
TABLET ORAL
Qty: 30 TABLET | Refills: 0 | Status: SHIPPED | OUTPATIENT
Start: 2019-09-02 | End: 2020-06-16 | Stop reason: HOSPADM

## 2019-11-08 ENCOUNTER — OFFICE VISIT (OUTPATIENT)
Dept: FAMILY MEDICINE CLINIC | Facility: HOSPITAL | Age: 59
End: 2019-11-08
Payer: COMMERCIAL

## 2019-11-08 VITALS
HEIGHT: 66 IN | OXYGEN SATURATION: 99 % | HEART RATE: 84 BPM | TEMPERATURE: 97.5 F | BODY MASS INDEX: 26.61 KG/M2 | DIASTOLIC BLOOD PRESSURE: 88 MMHG | SYSTOLIC BLOOD PRESSURE: 118 MMHG | WEIGHT: 165.6 LBS

## 2019-11-08 DIAGNOSIS — F33.2 SEVERE EPISODE OF RECURRENT MAJOR DEPRESSIVE DISORDER, WITHOUT PSYCHOTIC FEATURES (HCC): ICD-10-CM

## 2019-11-08 DIAGNOSIS — F11.10 OPIATE ABUSE, CONTINUOUS (HCC): ICD-10-CM

## 2019-11-08 DIAGNOSIS — F41.1 GENERALIZED ANXIETY DISORDER: Primary | ICD-10-CM

## 2019-11-08 PROCEDURE — 4004F PT TOBACCO SCREEN RCVD TLK: CPT | Performed by: NURSE PRACTITIONER

## 2019-11-08 PROCEDURE — 99213 OFFICE O/P EST LOW 20 MIN: CPT | Performed by: NURSE PRACTITIONER

## 2019-11-08 RX ORDER — BUPRENORPHINE HYDROCHLORIDE AND NALOXONE HYDROCHLORIDE DIHYDRATE 8; 2 MG/1; MG/1
TABLET SUBLINGUAL
Refills: 1 | COMMUNITY
Start: 2019-11-01 | End: 2021-11-29 | Stop reason: ALTCHOICE

## 2019-11-08 NOTE — ASSESSMENT & PLAN NOTE
PHQ score remains elevated at 24 on daily lexapro as rx'd per psych  Advise she maintain close f/u as scheduled (next appt next week) and discuss other alternative med options  Continue monthly as planned (cannot be more frequent due to financial strain)  Will defer ongoing management to psych now that she is established

## 2019-11-08 NOTE — ASSESSMENT & PLAN NOTE
BARBARA score remains elevated at 21 on daily escitalopram    Maintain psych and therapy follow up as scheduled

## 2020-01-03 ENCOUNTER — TELEPHONE (OUTPATIENT)
Dept: FAMILY MEDICINE CLINIC | Facility: HOSPITAL | Age: 60
End: 2020-01-03

## 2020-01-03 NOTE — TELEPHONE ENCOUNTER
Pt would like to know per her chart what the dates are of her shoulder and knee injuries   shes filling out paperwork for her disability and its due now

## 2020-01-06 ENCOUNTER — TELEPHONE (OUTPATIENT)
Dept: FAMILY MEDICINE CLINIC | Facility: HOSPITAL | Age: 60
End: 2020-01-06

## 2020-01-06 NOTE — TELEPHONE ENCOUNTER
Spoke to patient  Decided to schedule an appointment for Friday with Haven Epley for knee and shoulder pain   JANA

## 2020-01-06 NOTE — TELEPHONE ENCOUNTER
Having pain in her right shoulder again  Asking if Sonia Flynn can refer her back to [facility] or does she need to come in for an appt?  PCB

## 2020-04-23 ENCOUNTER — TELEPHONE (OUTPATIENT)
Dept: FAMILY MEDICINE CLINIC | Facility: HOSPITAL | Age: 60
End: 2020-04-23

## 2020-04-24 ENCOUNTER — OFFICE VISIT (OUTPATIENT)
Dept: FAMILY MEDICINE CLINIC | Facility: HOSPITAL | Age: 60
End: 2020-04-24
Payer: COMMERCIAL

## 2020-04-24 VITALS
TEMPERATURE: 98.2 F | DIASTOLIC BLOOD PRESSURE: 80 MMHG | BODY MASS INDEX: 26.89 KG/M2 | OXYGEN SATURATION: 99 % | SYSTOLIC BLOOD PRESSURE: 102 MMHG | WEIGHT: 161.4 LBS | HEIGHT: 65 IN | HEART RATE: 59 BPM

## 2020-04-24 DIAGNOSIS — N89.8 VAGINAL ODOR: ICD-10-CM

## 2020-04-24 DIAGNOSIS — F41.1 GENERALIZED ANXIETY DISORDER: ICD-10-CM

## 2020-04-24 DIAGNOSIS — F11.21 OPIOID DEPENDENCE IN REMISSION (HCC): ICD-10-CM

## 2020-04-24 DIAGNOSIS — Z12.4 SCREENING FOR CERVICAL CANCER: ICD-10-CM

## 2020-04-24 DIAGNOSIS — Z01.419 GYNECOLOGIC EXAM NORMAL: Primary | ICD-10-CM

## 2020-04-24 DIAGNOSIS — N89.8 VAGINAL DISCHARGE: ICD-10-CM

## 2020-04-24 DIAGNOSIS — Z12.31 ENCOUNTER FOR SCREENING MAMMOGRAM FOR MALIGNANT NEOPLASM OF BREAST: ICD-10-CM

## 2020-04-24 LAB — SL AMB POCT FECES OCC BLD: NEGATIVE

## 2020-04-24 PROCEDURE — 82270 OCCULT BLOOD FECES: CPT | Performed by: NURSE PRACTITIONER

## 2020-04-24 PROCEDURE — 99396 PREV VISIT EST AGE 40-64: CPT | Performed by: NURSE PRACTITIONER

## 2020-04-24 RX ORDER — BUSPIRONE HYDROCHLORIDE 15 MG/1
15 TABLET ORAL DAILY
COMMUNITY
Start: 2020-04-13 | End: 2020-06-16 | Stop reason: HOSPADM

## 2020-04-24 RX ORDER — QUETIAPINE FUMARATE 100 MG/1
100 TABLET, FILM COATED ORAL
COMMUNITY
Start: 2020-04-13 | End: 2020-06-16 | Stop reason: HOSPADM

## 2020-04-27 LAB
CYTOLOGIST CVX/VAG CYTO: NORMAL
DX ICD CODE: NORMAL
HPV I/H RISK 1 DNA CVX QL PROBE+SIG AMP: NEGATIVE
OTHER STN SPEC: NORMAL
PATH REPORT.FINAL DX SPEC: NORMAL
SL AMB NOTE:: NORMAL
SL AMB SPECIMEN ADEQUACY: NORMAL

## 2020-05-01 ENCOUNTER — TELEPHONE (OUTPATIENT)
Dept: FAMILY MEDICINE CLINIC | Facility: HOSPITAL | Age: 60
End: 2020-05-01

## 2020-05-02 ENCOUNTER — TELEPHONE (OUTPATIENT)
Dept: FAMILY MEDICINE CLINIC | Facility: HOSPITAL | Age: 60
End: 2020-05-02

## 2020-05-08 DIAGNOSIS — N76.0 BACTERIAL VAGINOSIS: Primary | ICD-10-CM

## 2020-05-08 DIAGNOSIS — B96.89 BACTERIAL VAGINOSIS: Primary | ICD-10-CM

## 2020-05-08 DIAGNOSIS — N89.8 VAGINAL DISCHARGE: ICD-10-CM

## 2020-05-13 LAB
A VAGINAE DNA VAG QL NAA+PROBE: ABNORMAL SCORE
BVAB2 DNA VAG QL NAA+PROBE: ABNORMAL SCORE
C ALBICANS DNA VAG QL NAA+PROBE: NEGATIVE
C GLABRATA DNA VAG QL NAA+PROBE: NEGATIVE
MEGA1 DNA VAG QL NAA+PROBE: ABNORMAL SCORE
T VAGINALIS RRNA SPEC QL NAA+PROBE: NEGATIVE

## 2020-05-14 ENCOUNTER — TELEPHONE (OUTPATIENT)
Dept: FAMILY MEDICINE CLINIC | Facility: HOSPITAL | Age: 60
End: 2020-05-14

## 2020-05-14 RX ORDER — METRONIDAZOLE 7.5 MG/G
1 GEL VAGINAL 2 TIMES DAILY
Qty: 70 G | Refills: 0 | Status: SHIPPED | OUTPATIENT
Start: 2020-05-14 | End: 2020-06-16 | Stop reason: HOSPADM

## 2020-05-19 ENCOUNTER — TELEPHONE (OUTPATIENT)
Dept: FAMILY MEDICINE CLINIC | Facility: HOSPITAL | Age: 60
End: 2020-05-19

## 2020-06-09 ENCOUNTER — HOSPITAL ENCOUNTER (EMERGENCY)
Facility: HOSPITAL | Age: 60
End: 2020-06-10
Attending: EMERGENCY MEDICINE | Admitting: EMERGENCY MEDICINE
Payer: COMMERCIAL

## 2020-06-09 DIAGNOSIS — Z72.0 TOBACCO ABUSE: ICD-10-CM

## 2020-06-09 DIAGNOSIS — R45.851 DEPRESSION WITH SUICIDAL IDEATION: Primary | ICD-10-CM

## 2020-06-09 DIAGNOSIS — F32.A DEPRESSION WITH SUICIDAL IDEATION: Primary | ICD-10-CM

## 2020-06-09 LAB
ALBUMIN SERPL BCP-MCNC: 4 G/DL (ref 3.5–5)
ALP SERPL-CCNC: 92 U/L (ref 46–116)
ALT SERPL W P-5'-P-CCNC: 29 U/L (ref 12–78)
AMPHETAMINES SERPL QL SCN: NEGATIVE
ANION GAP SERPL CALCULATED.3IONS-SCNC: 7 MMOL/L (ref 4–13)
AST SERPL W P-5'-P-CCNC: 40 U/L (ref 5–45)
BARBITURATES UR QL: NEGATIVE
BASOPHILS # BLD AUTO: 0.04 THOUSANDS/ΜL (ref 0–0.1)
BASOPHILS NFR BLD AUTO: 1 % (ref 0–1)
BENZODIAZ UR QL: NEGATIVE
BILIRUB SERPL-MCNC: 0.3 MG/DL (ref 0.2–1)
BUN SERPL-MCNC: 11 MG/DL (ref 5–25)
CALCIUM SERPL-MCNC: 8.8 MG/DL (ref 8.3–10.1)
CHLORIDE SERPL-SCNC: 104 MMOL/L (ref 100–108)
CO2 SERPL-SCNC: 29 MMOL/L (ref 21–32)
COCAINE UR QL: NEGATIVE
CREAT SERPL-MCNC: 0.95 MG/DL (ref 0.6–1.3)
EOSINOPHIL # BLD AUTO: 0.21 THOUSAND/ΜL (ref 0–0.61)
EOSINOPHIL NFR BLD AUTO: 3 % (ref 0–6)
ERYTHROCYTE [DISTWIDTH] IN BLOOD BY AUTOMATED COUNT: 12.2 % (ref 11.6–15.1)
ETHANOL EXG-MCNC: 0 MG/DL
GFR SERPL CREATININE-BSD FRML MDRD: 67 ML/MIN/1.73SQ M
GLUCOSE SERPL-MCNC: 96 MG/DL (ref 65–140)
HCT VFR BLD AUTO: 37.3 % (ref 34.8–46.1)
HGB BLD-MCNC: 12.7 G/DL (ref 11.5–15.4)
IMM GRANULOCYTES # BLD AUTO: 0.02 THOUSAND/UL (ref 0–0.2)
IMM GRANULOCYTES NFR BLD AUTO: 0 % (ref 0–2)
LYMPHOCYTES # BLD AUTO: 1.66 THOUSANDS/ΜL (ref 0.6–4.47)
LYMPHOCYTES NFR BLD AUTO: 25 % (ref 14–44)
MCH RBC QN AUTO: 30.7 PG (ref 26.8–34.3)
MCHC RBC AUTO-ENTMCNC: 34 G/DL (ref 31.4–37.4)
MCV RBC AUTO: 90 FL (ref 82–98)
METHADONE UR QL: NEGATIVE
MONOCYTES # BLD AUTO: 0.66 THOUSAND/ΜL (ref 0.17–1.22)
MONOCYTES NFR BLD AUTO: 10 % (ref 4–12)
NEUTROPHILS # BLD AUTO: 4.02 THOUSANDS/ΜL (ref 1.85–7.62)
NEUTS SEG NFR BLD AUTO: 61 % (ref 43–75)
NRBC BLD AUTO-RTO: 0 /100 WBCS
OPIATES UR QL SCN: NEGATIVE
PCP UR QL: NEGATIVE
PLATELET # BLD AUTO: 236 THOUSANDS/UL (ref 149–390)
PMV BLD AUTO: 9.7 FL (ref 8.9–12.7)
POTASSIUM SERPL-SCNC: 3.6 MMOL/L (ref 3.5–5.3)
PROT SERPL-MCNC: 7.6 G/DL (ref 6.4–8.2)
RBC # BLD AUTO: 4.14 MILLION/UL (ref 3.81–5.12)
SARS-COV-2 RNA RESP QL NAA+PROBE: NEGATIVE
SODIUM SERPL-SCNC: 140 MMOL/L (ref 136–145)
THC UR QL: NEGATIVE
TSH SERPL DL<=0.05 MIU/L-ACNC: 5.02 UIU/ML (ref 0.36–3.74)
WBC # BLD AUTO: 6.61 THOUSAND/UL (ref 4.31–10.16)

## 2020-06-09 PROCEDURE — 87635 SARS-COV-2 COVID-19 AMP PRB: CPT | Performed by: EMERGENCY MEDICINE

## 2020-06-09 PROCEDURE — 99285 EMERGENCY DEPT VISIT HI MDM: CPT

## 2020-06-09 PROCEDURE — 84443 ASSAY THYROID STIM HORMONE: CPT | Performed by: EMERGENCY MEDICINE

## 2020-06-09 PROCEDURE — 80307 DRUG TEST PRSMV CHEM ANLYZR: CPT

## 2020-06-09 PROCEDURE — 93005 ELECTROCARDIOGRAM TRACING: CPT

## 2020-06-09 PROCEDURE — 85025 COMPLETE CBC W/AUTO DIFF WBC: CPT

## 2020-06-09 PROCEDURE — 99285 EMERGENCY DEPT VISIT HI MDM: CPT | Performed by: EMERGENCY MEDICINE

## 2020-06-09 PROCEDURE — 82075 ASSAY OF BREATH ETHANOL: CPT

## 2020-06-09 PROCEDURE — 36415 COLL VENOUS BLD VENIPUNCTURE: CPT

## 2020-06-09 PROCEDURE — 80053 COMPREHEN METABOLIC PANEL: CPT

## 2020-06-09 RX ORDER — QUETIAPINE FUMARATE 100 MG/1
100 TABLET, FILM COATED ORAL ONCE
Status: COMPLETED | OUTPATIENT
Start: 2020-06-09 | End: 2020-06-09

## 2020-06-09 RX ORDER — FAMOTIDINE 20 MG/1
20 TABLET, FILM COATED ORAL ONCE
Status: COMPLETED | OUTPATIENT
Start: 2020-06-09 | End: 2020-06-09

## 2020-06-09 RX ADMIN — QUETIAPINE FUMARATE 100 MG: 100 TABLET ORAL at 21:35

## 2020-06-09 RX ADMIN — FAMOTIDINE 20 MG: 20 TABLET ORAL at 22:07

## 2020-06-10 VITALS
TEMPERATURE: 97.9 F | OXYGEN SATURATION: 97 % | BODY MASS INDEX: 24.16 KG/M2 | SYSTOLIC BLOOD PRESSURE: 108 MMHG | WEIGHT: 145 LBS | DIASTOLIC BLOOD PRESSURE: 64 MMHG | HEIGHT: 65 IN | RESPIRATION RATE: 20 BRPM | HEART RATE: 72 BPM

## 2020-06-10 LAB
ATRIAL RATE: 66 BPM
P AXIS: 55 DEGREES
PR INTERVAL: 148 MS
QRS AXIS: 80 DEGREES
QRSD INTERVAL: 90 MS
QT INTERVAL: 420 MS
QTC INTERVAL: 440 MS
T WAVE AXIS: 29 DEGREES
VENTRICULAR RATE: 66 BPM

## 2020-06-10 PROCEDURE — 93010 ELECTROCARDIOGRAM REPORT: CPT | Performed by: INTERNAL MEDICINE

## 2020-06-10 PROCEDURE — 99283 EMERGENCY DEPT VISIT LOW MDM: CPT | Performed by: PHYSICIAN ASSISTANT

## 2020-06-10 RX ORDER — BUSPIRONE HYDROCHLORIDE 15 MG/1
15 TABLET ORAL DAILY
Status: CANCELLED | OUTPATIENT
Start: 2020-06-11

## 2020-06-10 RX ORDER — ESCITALOPRAM OXALATE 20 MG/1
20 TABLET ORAL DAILY
Status: DISCONTINUED | OUTPATIENT
Start: 2020-06-10 | End: 2020-06-11 | Stop reason: HOSPADM

## 2020-06-10 RX ORDER — MAGNESIUM HYDROXIDE/ALUMINUM HYDROXICE/SIMETHICONE 120; 1200; 1200 MG/30ML; MG/30ML; MG/30ML
30 SUSPENSION ORAL EVERY 4 HOURS PRN
Status: CANCELLED | OUTPATIENT
Start: 2020-06-10

## 2020-06-10 RX ORDER — ESCITALOPRAM OXALATE 20 MG/1
20 TABLET ORAL DAILY
Status: CANCELLED | OUTPATIENT
Start: 2020-06-11

## 2020-06-10 RX ORDER — HALOPERIDOL 2 MG/1
2 TABLET ORAL EVERY 8 HOURS PRN
Status: CANCELLED | OUTPATIENT
Start: 2020-06-10

## 2020-06-10 RX ORDER — QUETIAPINE FUMARATE 100 MG/1
100 TABLET, FILM COATED ORAL
Status: CANCELLED | OUTPATIENT
Start: 2020-06-11

## 2020-06-10 RX ORDER — HYDROXYZINE HYDROCHLORIDE 25 MG/1
50 TABLET, FILM COATED ORAL EVERY 6 HOURS PRN
Status: DISCONTINUED | OUTPATIENT
Start: 2020-06-10 | End: 2020-06-11 | Stop reason: HOSPADM

## 2020-06-10 RX ORDER — POLYETHYLENE GLYCOL 3350 17 G/17G
17 POWDER, FOR SOLUTION ORAL DAILY PRN
Status: CANCELLED | OUTPATIENT
Start: 2020-06-10

## 2020-06-10 RX ORDER — QUETIAPINE FUMARATE 100 MG/1
100 TABLET, FILM COATED ORAL
Status: DISCONTINUED | OUTPATIENT
Start: 2020-06-10 | End: 2020-06-11 | Stop reason: HOSPADM

## 2020-06-10 RX ORDER — HALOPERIDOL 5 MG/ML
2 INJECTION INTRAMUSCULAR EVERY 6 HOURS PRN
Status: CANCELLED | OUTPATIENT
Start: 2020-06-10

## 2020-06-10 RX ORDER — MINERAL OIL AND PETROLATUM 150; 830 MG/G; MG/G
1 OINTMENT OPHTHALMIC
Status: CANCELLED | OUTPATIENT
Start: 2020-06-10

## 2020-06-10 RX ORDER — LORAZEPAM 2 MG/ML
1 INJECTION INTRAMUSCULAR EVERY 6 HOURS PRN
Status: CANCELLED | OUTPATIENT
Start: 2020-06-10

## 2020-06-10 RX ORDER — BISACODYL 10 MG
10 SUPPOSITORY, RECTAL RECTAL DAILY PRN
Status: CANCELLED | OUTPATIENT
Start: 2020-06-10

## 2020-06-10 RX ORDER — BUSPIRONE HYDROCHLORIDE 15 MG/1
15 TABLET ORAL DAILY
Status: DISCONTINUED | OUTPATIENT
Start: 2020-06-11 | End: 2020-06-11 | Stop reason: HOSPADM

## 2020-06-10 RX ORDER — AMOXICILLIN 250 MG
1 CAPSULE ORAL DAILY PRN
Status: CANCELLED | OUTPATIENT
Start: 2020-06-10

## 2020-06-10 RX ORDER — BUSPIRONE HYDROCHLORIDE 15 MG/1
15 TABLET ORAL 2 TIMES DAILY
Status: DISCONTINUED | OUTPATIENT
Start: 2020-06-10 | End: 2020-06-10

## 2020-06-10 RX ORDER — LORAZEPAM 1 MG/1
1 TABLET ORAL EVERY 6 HOURS PRN
Status: CANCELLED | OUTPATIENT
Start: 2020-06-10

## 2020-06-10 RX ADMIN — QUETIAPINE FUMARATE 100 MG: 100 TABLET ORAL at 21:47

## 2020-06-10 RX ADMIN — ESCITALOPRAM OXALATE 20 MG: 20 TABLET ORAL at 09:45

## 2020-06-10 RX ADMIN — BUSPIRONE HYDROCHLORIDE 15 MG: 15 TABLET ORAL at 09:45

## 2020-06-11 ENCOUNTER — HOSPITAL ENCOUNTER (INPATIENT)
Facility: HOSPITAL | Age: 60
LOS: 5 days | Discharge: HOME | DRG: 885 | End: 2020-06-16
Attending: PSYCHIATRY & NEUROLOGY | Admitting: PSYCHIATRY & NEUROLOGY
Payer: COMMERCIAL

## 2020-06-11 DIAGNOSIS — F41.1 GENERALIZED ANXIETY DISORDER: ICD-10-CM

## 2020-06-11 DIAGNOSIS — K21.9 GERD (GASTROESOPHAGEAL REFLUX DISEASE): Primary | ICD-10-CM

## 2020-06-11 DIAGNOSIS — R45.851 DEPRESSION WITH SUICIDAL IDEATION: ICD-10-CM

## 2020-06-11 DIAGNOSIS — F33.2 SEVERE EPISODE OF RECURRENT MAJOR DEPRESSIVE DISORDER, WITHOUT PSYCHOTIC FEATURES (HCC): ICD-10-CM

## 2020-06-11 DIAGNOSIS — F32.A DEPRESSION WITH SUICIDAL IDEATION: ICD-10-CM

## 2020-06-11 PROBLEM — Z51.81 ENCOUNTER FOR MONITORING SUBOXONE MAINTENANCE THERAPY: Status: ACTIVE | Noted: 2020-06-11

## 2020-06-11 PROBLEM — Z79.899 ENCOUNTER FOR MONITORING SUBOXONE MAINTENANCE THERAPY: Status: ACTIVE | Noted: 2020-06-11

## 2020-06-11 PROBLEM — R07.89 CHEST WALL PAIN: Status: ACTIVE | Noted: 2020-06-11

## 2020-06-11 PROBLEM — R79.89 ELEVATED TSH: Status: ACTIVE | Noted: 2020-06-11

## 2020-06-11 PROBLEM — Z79.891 ENCOUNTER FOR MONITORING SUBOXONE MAINTENANCE THERAPY: Status: ACTIVE | Noted: 2020-06-11

## 2020-06-11 LAB
ALBUMIN SERPL BCP-MCNC: 3.3 G/DL (ref 3.5–5)
ALP SERPL-CCNC: 78 U/L (ref 46–116)
ALT SERPL W P-5'-P-CCNC: 28 U/L (ref 12–78)
ANION GAP SERPL CALCULATED.3IONS-SCNC: 5 MMOL/L (ref 4–13)
AST SERPL W P-5'-P-CCNC: 30 U/L (ref 5–45)
BASOPHILS # BLD AUTO: 0.04 THOUSANDS/ΜL (ref 0–0.1)
BASOPHILS NFR BLD AUTO: 1 % (ref 0–1)
BILIRUB SERPL-MCNC: 0.29 MG/DL (ref 0.2–1)
BUN SERPL-MCNC: 11 MG/DL (ref 5–25)
CALCIUM SERPL-MCNC: 8.6 MG/DL (ref 8.3–10.1)
CHLORIDE SERPL-SCNC: 108 MMOL/L (ref 100–108)
CO2 SERPL-SCNC: 27 MMOL/L (ref 21–32)
CREAT SERPL-MCNC: 0.68 MG/DL (ref 0.6–1.3)
EOSINOPHIL # BLD AUTO: 0.22 THOUSAND/ΜL (ref 0–0.61)
EOSINOPHIL NFR BLD AUTO: 5 % (ref 0–6)
ERYTHROCYTE [DISTWIDTH] IN BLOOD BY AUTOMATED COUNT: 12.5 % (ref 11.6–15.1)
GFR SERPL CREATININE-BSD FRML MDRD: 97 ML/MIN/1.73SQ M
GLUCOSE SERPL-MCNC: 94 MG/DL (ref 65–140)
HCT VFR BLD AUTO: 38.1 % (ref 34.8–46.1)
HGB BLD-MCNC: 12.3 G/DL (ref 11.5–15.4)
IMM GRANULOCYTES # BLD AUTO: 0 THOUSAND/UL (ref 0–0.2)
IMM GRANULOCYTES NFR BLD AUTO: 0 % (ref 0–2)
LYMPHOCYTES # BLD AUTO: 1.69 THOUSANDS/ΜL (ref 0.6–4.47)
LYMPHOCYTES NFR BLD AUTO: 37 % (ref 14–44)
MCH RBC QN AUTO: 30.1 PG (ref 26.8–34.3)
MCHC RBC AUTO-ENTMCNC: 32.3 G/DL (ref 31.4–37.4)
MCV RBC AUTO: 93 FL (ref 82–98)
MONOCYTES # BLD AUTO: 0.44 THOUSAND/ΜL (ref 0.17–1.22)
MONOCYTES NFR BLD AUTO: 10 % (ref 4–12)
NEUTROPHILS # BLD AUTO: 2.16 THOUSANDS/ΜL (ref 1.85–7.62)
NEUTS SEG NFR BLD AUTO: 47 % (ref 43–75)
NRBC BLD AUTO-RTO: 0 /100 WBCS
PLATELET # BLD AUTO: 213 THOUSANDS/UL (ref 149–390)
PMV BLD AUTO: 10.2 FL (ref 8.9–12.7)
POTASSIUM SERPL-SCNC: 3.9 MMOL/L (ref 3.5–5.3)
PROT SERPL-MCNC: 6.7 G/DL (ref 6.4–8.2)
RBC # BLD AUTO: 4.09 MILLION/UL (ref 3.81–5.12)
SODIUM SERPL-SCNC: 140 MMOL/L (ref 136–145)
TSH SERPL DL<=0.05 MIU/L-ACNC: 6.54 UIU/ML (ref 0.36–3.74)
WBC # BLD AUTO: 4.55 THOUSAND/UL (ref 4.31–10.16)

## 2020-06-11 PROCEDURE — 99223 1ST HOSP IP/OBS HIGH 75: CPT | Performed by: PSYCHIATRY & NEUROLOGY

## 2020-06-11 PROCEDURE — 99252 IP/OBS CONSLTJ NEW/EST SF 35: CPT | Performed by: PHYSICIAN ASSISTANT

## 2020-06-11 PROCEDURE — 80053 COMPREHEN METABOLIC PANEL: CPT | Performed by: PSYCHIATRY & NEUROLOGY

## 2020-06-11 PROCEDURE — 84443 ASSAY THYROID STIM HORMONE: CPT | Performed by: PSYCHIATRY & NEUROLOGY

## 2020-06-11 PROCEDURE — 85025 COMPLETE CBC W/AUTO DIFF WBC: CPT | Performed by: PSYCHIATRY & NEUROLOGY

## 2020-06-11 RX ORDER — LORAZEPAM 1 MG/1
1 TABLET ORAL EVERY 6 HOURS PRN
Status: DISCONTINUED | OUTPATIENT
Start: 2020-06-11 | End: 2020-06-16 | Stop reason: HOSPADM

## 2020-06-11 RX ORDER — ACETAMINOPHEN 325 MG/1
650 TABLET ORAL EVERY 6 HOURS PRN
Status: DISCONTINUED | OUTPATIENT
Start: 2020-06-11 | End: 2020-06-16 | Stop reason: HOSPADM

## 2020-06-11 RX ORDER — AMOXICILLIN 250 MG
1 CAPSULE ORAL DAILY PRN
Status: DISCONTINUED | OUTPATIENT
Start: 2020-06-11 | End: 2020-06-11

## 2020-06-11 RX ORDER — ACETAMINOPHEN 325 MG/1
650 TABLET ORAL EVERY 4 HOURS PRN
Status: DISCONTINUED | OUTPATIENT
Start: 2020-06-11 | End: 2020-06-16 | Stop reason: HOSPADM

## 2020-06-11 RX ORDER — HYDROXYZINE HYDROCHLORIDE 25 MG/1
25 TABLET, FILM COATED ORAL EVERY 6 HOURS PRN
Status: DISCONTINUED | OUTPATIENT
Start: 2020-06-11 | End: 2020-06-16 | Stop reason: HOSPADM

## 2020-06-11 RX ORDER — BENZTROPINE MESYLATE 1 MG/ML
1 INJECTION INTRAMUSCULAR; INTRAVENOUS EVERY 6 HOURS PRN
Status: DISCONTINUED | OUTPATIENT
Start: 2020-06-11 | End: 2020-06-16 | Stop reason: HOSPADM

## 2020-06-11 RX ORDER — BISACODYL 10 MG
10 SUPPOSITORY, RECTAL RECTAL DAILY PRN
Status: DISCONTINUED | OUTPATIENT
Start: 2020-06-11 | End: 2020-06-11

## 2020-06-11 RX ORDER — BUSPIRONE HYDROCHLORIDE 15 MG/1
15 TABLET ORAL DAILY
Status: DISCONTINUED | OUTPATIENT
Start: 2020-06-11 | End: 2020-06-11

## 2020-06-11 RX ORDER — HYDROXYZINE 50 MG/1
50 TABLET, FILM COATED ORAL EVERY 6 HOURS PRN
Status: DISCONTINUED | OUTPATIENT
Start: 2020-06-11 | End: 2020-06-16 | Stop reason: HOSPADM

## 2020-06-11 RX ORDER — QUETIAPINE FUMARATE 200 MG/1
200 TABLET, FILM COATED ORAL
Status: DISCONTINUED | OUTPATIENT
Start: 2020-06-11 | End: 2020-06-16 | Stop reason: HOSPADM

## 2020-06-11 RX ORDER — BUSPIRONE HYDROCHLORIDE 15 MG/1
30 TABLET ORAL DAILY
Status: DISCONTINUED | OUTPATIENT
Start: 2020-06-12 | End: 2020-06-16 | Stop reason: HOSPADM

## 2020-06-11 RX ORDER — AMOXICILLIN 250 MG
1 CAPSULE ORAL DAILY PRN
Status: DISCONTINUED | OUTPATIENT
Start: 2020-06-11 | End: 2020-06-16 | Stop reason: HOSPADM

## 2020-06-11 RX ORDER — MAGNESIUM HYDROXIDE/ALUMINUM HYDROXICE/SIMETHICONE 120; 1200; 1200 MG/30ML; MG/30ML; MG/30ML
30 SUSPENSION ORAL EVERY 4 HOURS PRN
Status: DISCONTINUED | OUTPATIENT
Start: 2020-06-11 | End: 2020-06-16 | Stop reason: HOSPADM

## 2020-06-11 RX ORDER — ESCITALOPRAM OXALATE 20 MG/1
20 TABLET ORAL DAILY
Status: DISCONTINUED | OUTPATIENT
Start: 2020-06-11 | End: 2020-06-16 | Stop reason: HOSPADM

## 2020-06-11 RX ORDER — LIDOCAINE 50 MG/G
1 PATCH TOPICAL DAILY
Status: DISCONTINUED | OUTPATIENT
Start: 2020-06-12 | End: 2020-06-16 | Stop reason: HOSPADM

## 2020-06-11 RX ORDER — POLYETHYLENE GLYCOL 3350 17 G/17G
17 POWDER, FOR SOLUTION ORAL DAILY PRN
Status: DISCONTINUED | OUTPATIENT
Start: 2020-06-11 | End: 2020-06-16 | Stop reason: HOSPADM

## 2020-06-11 RX ORDER — HALOPERIDOL 2 MG/1
2 TABLET ORAL EVERY 8 HOURS PRN
Status: DISCONTINUED | OUTPATIENT
Start: 2020-06-11 | End: 2020-06-16 | Stop reason: HOSPADM

## 2020-06-11 RX ORDER — BENZTROPINE MESYLATE 1 MG/1
1 TABLET ORAL EVERY 6 HOURS PRN
Status: DISCONTINUED | OUTPATIENT
Start: 2020-06-11 | End: 2020-06-16 | Stop reason: HOSPADM

## 2020-06-11 RX ORDER — OLANZAPINE 10 MG/1
10 INJECTION, POWDER, LYOPHILIZED, FOR SOLUTION INTRAMUSCULAR EVERY 8 HOURS PRN
Status: DISCONTINUED | OUTPATIENT
Start: 2020-06-11 | End: 2020-06-16 | Stop reason: HOSPADM

## 2020-06-11 RX ORDER — LORAZEPAM 2 MG/ML
1 INJECTION INTRAMUSCULAR EVERY 6 HOURS PRN
Status: DISCONTINUED | OUTPATIENT
Start: 2020-06-11 | End: 2020-06-16 | Stop reason: HOSPADM

## 2020-06-11 RX ORDER — MINERAL OIL AND PETROLATUM 150; 830 MG/G; MG/G
1 OINTMENT OPHTHALMIC
Status: DISCONTINUED | OUTPATIENT
Start: 2020-06-11 | End: 2020-06-16 | Stop reason: HOSPADM

## 2020-06-11 RX ORDER — BISACODYL 10 MG
10 SUPPOSITORY, RECTAL RECTAL DAILY PRN
Status: DISCONTINUED | OUTPATIENT
Start: 2020-06-11 | End: 2020-06-16 | Stop reason: HOSPADM

## 2020-06-11 RX ORDER — QUETIAPINE FUMARATE 100 MG/1
100 TABLET, FILM COATED ORAL
Status: DISCONTINUED | OUTPATIENT
Start: 2020-06-11 | End: 2020-06-11

## 2020-06-11 RX ORDER — ACETAMINOPHEN 325 MG/1
975 TABLET ORAL EVERY 6 HOURS PRN
Status: DISCONTINUED | OUTPATIENT
Start: 2020-06-11 | End: 2020-06-16 | Stop reason: HOSPADM

## 2020-06-11 RX ORDER — OLANZAPINE 10 MG/1
10 TABLET ORAL EVERY 8 HOURS PRN
Status: DISCONTINUED | OUTPATIENT
Start: 2020-06-11 | End: 2020-06-16 | Stop reason: HOSPADM

## 2020-06-11 RX ORDER — BUPRENORPHINE AND NALOXONE 8; 2 MG/1; MG/1
1 FILM, SOLUBLE BUCCAL; SUBLINGUAL 2 TIMES DAILY
Status: DISCONTINUED | OUTPATIENT
Start: 2020-06-11 | End: 2020-06-16 | Stop reason: HOSPADM

## 2020-06-11 RX ORDER — HALOPERIDOL 5 MG/ML
2 INJECTION INTRAMUSCULAR EVERY 6 HOURS PRN
Status: DISCONTINUED | OUTPATIENT
Start: 2020-06-11 | End: 2020-06-16 | Stop reason: HOSPADM

## 2020-06-11 RX ADMIN — QUETIAPINE FUMARATE 200 MG: 200 TABLET ORAL at 21:14

## 2020-06-11 RX ADMIN — ESCITALOPRAM OXALATE 20 MG: 20 TABLET ORAL at 08:51

## 2020-06-11 RX ADMIN — BUPRENORPHINE AND NALOXONE 1 FILM: 8; 2 FILM BUCCAL; SUBLINGUAL at 17:46

## 2020-06-11 RX ADMIN — BUPRENORPHINE AND NALOXONE 1 FILM: 8; 2 FILM BUCCAL; SUBLINGUAL at 11:14

## 2020-06-11 RX ADMIN — BUSPIRONE HYDROCHLORIDE 15 MG: 15 TABLET ORAL at 08:51

## 2020-06-12 PROCEDURE — 99232 SBSQ HOSP IP/OBS MODERATE 35: CPT | Performed by: PSYCHIATRY & NEUROLOGY

## 2020-06-12 RX ADMIN — ESCITALOPRAM OXALATE 20 MG: 20 TABLET ORAL at 08:49

## 2020-06-12 RX ADMIN — LIDOCAINE 1 PATCH: 50 PATCH TOPICAL at 08:52

## 2020-06-12 RX ADMIN — QUETIAPINE FUMARATE 200 MG: 200 TABLET ORAL at 21:02

## 2020-06-12 RX ADMIN — ALUMINUM HYDROXIDE, MAGNESIUM HYDROXIDE, AND SIMETHICONE 30 ML: 200; 200; 20 SUSPENSION ORAL at 21:04

## 2020-06-12 RX ADMIN — BUPRENORPHINE AND NALOXONE 1 FILM: 8; 2 FILM BUCCAL; SUBLINGUAL at 17:29

## 2020-06-12 RX ADMIN — BUPRENORPHINE AND NALOXONE 1 FILM: 8; 2 FILM BUCCAL; SUBLINGUAL at 08:49

## 2020-06-12 RX ADMIN — BUSPIRONE HYDROCHLORIDE 30 MG: 15 TABLET ORAL at 08:49

## 2020-06-13 PROCEDURE — 99232 SBSQ HOSP IP/OBS MODERATE 35: CPT | Performed by: PSYCHIATRY & NEUROLOGY

## 2020-06-13 RX ADMIN — BUPRENORPHINE AND NALOXONE 1 FILM: 8; 2 FILM BUCCAL; SUBLINGUAL at 08:52

## 2020-06-13 RX ADMIN — QUETIAPINE FUMARATE 200 MG: 200 TABLET ORAL at 21:15

## 2020-06-13 RX ADMIN — ALUMINUM HYDROXIDE, MAGNESIUM HYDROXIDE, AND SIMETHICONE 30 ML: 200; 200; 20 SUSPENSION ORAL at 17:53

## 2020-06-13 RX ADMIN — BUSPIRONE HYDROCHLORIDE 30 MG: 15 TABLET ORAL at 08:52

## 2020-06-13 RX ADMIN — ESCITALOPRAM OXALATE 20 MG: 20 TABLET ORAL at 08:52

## 2020-06-13 RX ADMIN — BUPRENORPHINE AND NALOXONE 1 FILM: 8; 2 FILM BUCCAL; SUBLINGUAL at 17:27

## 2020-06-13 RX ADMIN — LIDOCAINE 1 PATCH: 50 PATCH TOPICAL at 08:55

## 2020-06-14 PROCEDURE — 99232 SBSQ HOSP IP/OBS MODERATE 35: CPT | Performed by: PSYCHIATRY & NEUROLOGY

## 2020-06-14 RX ADMIN — ALUMINUM HYDROXIDE, MAGNESIUM HYDROXIDE, AND SIMETHICONE 30 ML: 200; 200; 20 SUSPENSION ORAL at 17:17

## 2020-06-14 RX ADMIN — ESCITALOPRAM OXALATE 20 MG: 20 TABLET ORAL at 08:53

## 2020-06-14 RX ADMIN — QUETIAPINE FUMARATE 200 MG: 200 TABLET ORAL at 21:04

## 2020-06-14 RX ADMIN — BUSPIRONE HYDROCHLORIDE 30 MG: 15 TABLET ORAL at 08:53

## 2020-06-14 RX ADMIN — LIDOCAINE 1 PATCH: 50 PATCH TOPICAL at 08:53

## 2020-06-14 RX ADMIN — BUPRENORPHINE AND NALOXONE 1 FILM: 8; 2 FILM BUCCAL; SUBLINGUAL at 17:16

## 2020-06-14 RX ADMIN — BUPRENORPHINE AND NALOXONE 1 FILM: 8; 2 FILM BUCCAL; SUBLINGUAL at 08:53

## 2020-06-15 LAB — T4 FREE SERPL-MCNC: 0.75 NG/DL (ref 0.76–1.46)

## 2020-06-15 PROCEDURE — 84439 ASSAY OF FREE THYROXINE: CPT | Performed by: PHYSICIAN ASSISTANT

## 2020-06-15 PROCEDURE — 99232 SBSQ HOSP IP/OBS MODERATE 35: CPT | Performed by: PHYSICIAN ASSISTANT

## 2020-06-15 RX ORDER — PANTOPRAZOLE SODIUM 20 MG/1
20 TABLET, DELAYED RELEASE ORAL
Status: DISCONTINUED | OUTPATIENT
Start: 2020-06-15 | End: 2020-06-16 | Stop reason: HOSPADM

## 2020-06-15 RX ADMIN — BUSPIRONE HYDROCHLORIDE 15 MG: 15 TABLET ORAL at 10:53

## 2020-06-15 RX ADMIN — BUSPIRONE HYDROCHLORIDE 15 MG: 15 TABLET ORAL at 09:16

## 2020-06-15 RX ADMIN — BUPRENORPHINE AND NALOXONE 1 FILM: 8; 2 FILM BUCCAL; SUBLINGUAL at 17:07

## 2020-06-15 RX ADMIN — ESCITALOPRAM OXALATE 20 MG: 20 TABLET ORAL at 09:17

## 2020-06-15 RX ADMIN — BUPRENORPHINE AND NALOXONE 1 FILM: 8; 2 FILM BUCCAL; SUBLINGUAL at 09:17

## 2020-06-15 RX ADMIN — LIDOCAINE 1 PATCH: 50 PATCH TOPICAL at 10:57

## 2020-06-15 RX ADMIN — QUETIAPINE FUMARATE 200 MG: 200 TABLET ORAL at 21:06

## 2020-06-15 RX ADMIN — PANTOPRAZOLE SODIUM 20 MG: 20 TABLET, DELAYED RELEASE ORAL at 12:03

## 2020-06-15 RX ADMIN — ALUMINUM HYDROXIDE, MAGNESIUM HYDROXIDE, AND SIMETHICONE 30 ML: 200; 200; 20 SUSPENSION ORAL at 10:57

## 2020-06-16 VITALS
BODY MASS INDEX: 27.17 KG/M2 | WEIGHT: 159.17 LBS | HEART RATE: 55 BPM | TEMPERATURE: 97 F | HEIGHT: 64 IN | RESPIRATION RATE: 18 BRPM | SYSTOLIC BLOOD PRESSURE: 109 MMHG | DIASTOLIC BLOOD PRESSURE: 70 MMHG | OXYGEN SATURATION: 94 %

## 2020-06-16 PROCEDURE — 99239 HOSP IP/OBS DSCHRG MGMT >30: CPT | Performed by: PHYSICIAN ASSISTANT

## 2020-06-16 RX ORDER — QUETIAPINE FUMARATE 200 MG/1
200 TABLET, FILM COATED ORAL
Qty: 7 TABLET | Refills: 0 | Status: SHIPPED | OUTPATIENT
Start: 2020-06-16 | End: 2020-12-14 | Stop reason: HOSPADM

## 2020-06-16 RX ORDER — PANTOPRAZOLE SODIUM 20 MG/1
20 TABLET, DELAYED RELEASE ORAL
Qty: 30 TABLET | Refills: 0 | Status: SHIPPED | OUTPATIENT
Start: 2020-06-17 | End: 2020-09-22

## 2020-06-16 RX ORDER — ESCITALOPRAM OXALATE 20 MG/1
20 TABLET ORAL DAILY
Qty: 7 TABLET | Refills: 0 | Status: SHIPPED | OUTPATIENT
Start: 2020-06-17 | End: 2020-12-14 | Stop reason: HOSPADM

## 2020-06-16 RX ORDER — BUSPIRONE HYDROCHLORIDE 30 MG/1
30 TABLET ORAL DAILY
Qty: 7 TABLET | Refills: 0 | Status: SHIPPED | OUTPATIENT
Start: 2020-06-17 | End: 2020-12-14 | Stop reason: HOSPADM

## 2020-06-16 RX ADMIN — PANTOPRAZOLE SODIUM 20 MG: 20 TABLET, DELAYED RELEASE ORAL at 06:14

## 2020-06-16 RX ADMIN — BUSPIRONE HYDROCHLORIDE 30 MG: 15 TABLET ORAL at 08:42

## 2020-06-16 RX ADMIN — BUPRENORPHINE AND NALOXONE 1 FILM: 8; 2 FILM BUCCAL; SUBLINGUAL at 08:42

## 2020-06-16 RX ADMIN — ESCITALOPRAM OXALATE 20 MG: 20 TABLET ORAL at 08:42

## 2020-06-16 RX ADMIN — LIDOCAINE 1 PATCH: 50 PATCH TOPICAL at 09:52

## 2020-07-27 ENCOUNTER — TELEMEDICINE (OUTPATIENT)
Dept: FAMILY MEDICINE CLINIC | Facility: HOSPITAL | Age: 60
End: 2020-07-27
Payer: COMMERCIAL

## 2020-07-27 VITALS — HEIGHT: 65 IN | WEIGHT: 155 LBS | TEMPERATURE: 99.2 F | BODY MASS INDEX: 25.83 KG/M2

## 2020-07-27 DIAGNOSIS — F33.2 SEVERE EPISODE OF RECURRENT MAJOR DEPRESSIVE DISORDER, WITHOUT PSYCHOTIC FEATURES (HCC): ICD-10-CM

## 2020-07-27 DIAGNOSIS — F41.1 GENERALIZED ANXIETY DISORDER: ICD-10-CM

## 2020-07-27 DIAGNOSIS — Z12.11 SCREENING FOR COLON CANCER: ICD-10-CM

## 2020-07-27 DIAGNOSIS — K21.9 GASTROESOPHAGEAL REFLUX DISEASE, ESOPHAGITIS PRESENCE NOT SPECIFIED: Primary | ICD-10-CM

## 2020-07-27 PROBLEM — R07.89 CHEST WALL PAIN: Status: RESOLVED | Noted: 2020-06-11 | Resolved: 2020-07-27

## 2020-07-27 PROBLEM — F11.10 OPIATE ABUSE, CONTINUOUS (HCC): Status: RESOLVED | Noted: 2018-08-20 | Resolved: 2020-07-27

## 2020-07-27 PROCEDURE — 4004F PT TOBACCO SCREEN RCVD TLK: CPT | Performed by: NURSE PRACTITIONER

## 2020-07-27 PROCEDURE — 3008F BODY MASS INDEX DOCD: CPT | Performed by: NURSE PRACTITIONER

## 2020-07-27 PROCEDURE — 1111F DSCHRG MED/CURRENT MED MERGE: CPT | Performed by: NURSE PRACTITIONER

## 2020-07-27 PROCEDURE — 99214 OFFICE O/P EST MOD 30 MIN: CPT | Performed by: NURSE PRACTITIONER

## 2020-07-27 RX ORDER — FAMOTIDINE 40 MG/1
TABLET, FILM COATED ORAL
Qty: 30 TABLET | Refills: 0 | Status: SHIPPED | OUTPATIENT
Start: 2020-07-27 | End: 2020-08-31 | Stop reason: SDUPTHER

## 2020-07-27 NOTE — ASSESSMENT & PLAN NOTE
Rx issued to start pepcid q HS   Continue pantoprazole q am    If no benefit x1 month or if worse sx's, call for consult to GI for further eval

## 2020-07-27 NOTE — PROGRESS NOTES
Virtual Regular Visit      Assessment/Plan:    Problem List Items Addressed This Visit        Digestive    Gastroesophageal reflux disease - Primary     Rx issued to start pepcid q HS  Continue pantoprazole q am    If no benefit x1 month or if worse sx's, call for consult to GI for further eval          Relevant Medications    famotidine (PEPCID) 40 MG tablet       Other    Severe episode of recurrent major depressive disorder, without psychotic features ([address] )     Poorly controlled MDD w/PHQ score of 23  Maintain close follow up w/[facility] as planned (weekly therapy, medical management w/psych NP)  Currently denies SI/HI/safety concerns  She has supportive support system  Generalized anxiety disorder     Poorly controlled w/BARBARA score of 17, she attributes to increase in situational stress  Maintain close f/u with psych as established  Other Visit Diagnoses     Screening for colon cancer        Relevant Orders    Ambulatory referral to Gastroenterology               Reason for visit is   Chief Complaint   Patient presents with    Heartburn    Depression    Virtual Regular Visit        Encounter provider DMITRIY Loyd    Provider located at [address] [place] [address]  [address] [place] [address]      Recent Visits  No visits were found meeting these conditions  Showing recent visits within past 7 days and meeting all other requirements     Today's Visits  Date Type Provider Dept   07/27/20 Telemedicine DMITRIY Loyd [facility], [facility]   Showing today's visits and meeting all other requirements     Future Appointments  No visits were found meeting these conditions  Showing future appointments within next 150 days and meeting all other requirements        The patient was identified by name and date of birth   Abnermaki Miley was informed that this is a telemedicine visit and that the visit is being conducted through [place] and patient was informed that this is not a secure, HIPAA-complaint platform  She agrees to proceed     My office door was closed  No one else was in the room  She acknowledged consent and understanding of privacy and security of the video platform  The patient has agreed to participate and understands they can discontinue the visit at any time  Patient is aware this is a billable service  Subjective  Omid Overton is a 64 y o  female w/GERD sx's and depression  States she is hanging in there  States they sold trailer and moved to [place] but it didn't work out  She now has to live between two family members houses  She is trying to not breakdown again  Her sister, daughter and  know she is struggling  Talks to therapist at [facility] once/week (last was this morning)  Follows w/psych NP at [facility]  Denies SI/HI currently  Was in hospital last month for SI  Denies relapse w/opiate use  Has acid reflux all the time into throat and mouth  Burns throat  Was given med (pantoprazole) from [facility]  Has helped but is still bothersome          Past Medical History:   Diagnosis Date    Addiction to drug [facility])     Anxiety     Chest wall pain 6/11/2020    Depression     Drug dependence ([facility] )     Opiate abuse, continuous ([facility] ) 8/20/2018    Osteoarthritis     Rheumatoid arthritis ([facility] )     [facility]     Tubal pregnancy        Past Surgical History:   Procedure Laterality Date    BREAST IMPLANT      BUNIONECTOMY Bilateral 1990    ECTOPIC PREGNANCY SURGERY      TUBAL LIGATION         Current Outpatient Medications   Medication Sig Dispense Refill    buprenorphine-naloxone (SUBOXONE) 8-2 mg per SL tablet Place 1 Tablet By Sublingual Route 2 time per day allow to dissolve slowly in mouth without chewing or swallowing  1    busPIRone (BUSPAR) 30 MG tablet Take 1 tablet (30 mg total) by mouth daily At 9am 7 tablet 0    escitalopram (LEXAPRO) 20 mg tablet Take 1 tablet (20 mg total) by mouth daily At 9am 7 tablet 0    pantoprazole (PROTONIX) 20 mg tablet Take 1 tablet (20 mg total) by mouth daily in the early morning 30 tablet 0    QUEtiapine (SEROquel) 200 mg tablet Take 1 tablet (200 mg total) by mouth daily at bedtime 7 tablet 0    famotidine (PEPCID) 40 MG tablet Take 1 tablet at bedtime 30 tablet 0     No current facility-administered medications for this visit  No Known Allergies    Review of Systems   Constitutional: Negative for appetite change and unexpected weight change  HENT: Positive for sore throat  Negative for trouble swallowing  Gastrointestinal: Positive for abdominal pain (GERD)  Negative for nausea and vomiting  Psychiatric/Behavioral: Positive for dysphoric mood  Negative for self-injury and suicidal ideas  The patient is nervous/anxious  Video Exam    Vitals:    07/27/20 1350   Temp: 99 2 °F (37 3 °C)   Weight: 70 3 kg (155 lb)   Height: 5' 5" (1 651 m)       Physical Exam   Constitutional: She is oriented to person, place, and time  She appears well-developed and well-nourished  No distress  HENT:   Head: Normocephalic and atraumatic  Pulmonary/Chest: Effort normal  No respiratory distress  Neurological: She is alert and oriented to person, place, and time  Psychiatric: Her speech is normal and behavior is normal  Judgment and thought content normal  She exhibits a depressed mood  Tearful through visit  Freely conversive w/good eye contact   Vitals reviewed  BARBARA-7 Flowsheet Screening      Most Recent Value   Over the last two weeks, how often have you been bothered by the following problems?     Feeling nervous, anxious, or on edge  3   Not being able to stop or control worrying  1   Worrying too much about different things  3   Trouble relaxing   3   Being so restless that it's hard to sit still  2   Becoming easily annoyed or irritable   3   Feeling afraid as if something awful might happen  2 How difficult have these problems made it for you to do your work, take care of things at home, or get along with other people? Extremely difficult   BARBARA Score   17          PHQ-9 Depression Screening    PHQ-9:    Frequency of the following problems over the past two weeks:       Little interest or pleasure in doing things:  3 - nearly every day  Feeling down, depressed, or hopeless:  3 - nearly every day  Trouble falling or staying asleep, or sleeping too much:  3 - nearly every day  Feeling tired or having little energy:  3 - nearly every day  Poor appetite or overeatin - several days  Feeling bad about yourself - or that you are a failure or have let yourself or your family down:  3 - nearly every day  Trouble concentrating on things, such as reading the newspaper or watching television:  3 - nearly every day  Moving or speaking so slowly that other people could have noticed  Or the opposite - being so fidgety or restless that you have been moving around a lot more than usual:  3 - nearly every day  Thoughts that you would be better off dead, or of hurting yourself in some way:  1 - several days  PHQ-2 Score:  6  PHQ-9 Score:  23         I spent 15 minutes with patient today in which greater than 50% of the time was spent in counseling/coordination of care regarding symptoms and plan of care      VIRTUAL VISIT [place] acknowledges that she has consented to an online visit or consultation  She understands that the online visit is based solely on information provided by her, and that, in the absence of a face-to-face physical evaluation by the physician, the diagnosis she receives is both limited and provisional in terms of accuracy and completeness  This is not intended to replace a full medical face-to-face evaluation by the physician  Moises Roldan understands and accepts these terms

## 2020-07-27 NOTE — ASSESSMENT & PLAN NOTE
Poorly controlled w/BARBARA score of 17, she attributes to increase in situational stress  Maintain close f/u with psych as established

## 2020-07-27 NOTE — ASSESSMENT & PLAN NOTE
Poorly controlled MDD w/PHQ score of 23  Maintain close follow up w/[facility] as planned (weekly therapy, medical management w/psych NP)  Currently denies SI/HI/safety concerns  She has supportive support system

## 2020-07-28 ENCOUNTER — TELEPHONE (OUTPATIENT)
Dept: FAMILY MEDICINE CLINIC | Facility: HOSPITAL | Age: 60
End: 2020-07-28

## 2020-07-28 NOTE — LETTER
2020    [address]  [address] [facility]      To Whom It May Concern,    Per our current medical records, Kailey Zavala ( 1960) has been a patient of Nicolasa Lezama soham since at least   Specifically, she has been under my care since 2018 for anxiety and depression requiring medication and psychotherapy management  Due to her poorly controlled mental health, she has also required a couple inpatient hospitalizations at [place] [address] and has been unable to consistently maintain employment as a result of these disabilities  She currently maintains psychiatric care through [facility], and more than likely will need to do so for at least a year if not longer  If you have any questions or concerns, please don't hesitate to call      Sincerely,             DMITRIY Waite        CC: No Recipients

## 2020-07-28 NOTE — TELEPHONE ENCOUNTER
Shaggy Ivan is one of her clients  Sarahaugusto Love also sign a communication form that we can speak with her  [facility] the [facility] stating what type of disability and that she is on medication  How long she been a patient  Also that her disability will definitely will last more than a year  Please fax it to [phone]

## 2020-08-31 DIAGNOSIS — K21.9 GASTROESOPHAGEAL REFLUX DISEASE, ESOPHAGITIS PRESENCE NOT SPECIFIED: ICD-10-CM

## 2020-08-31 NOTE — TELEPHONE ENCOUNTER
Please call patient for an update on her GERD symptoms   If pepcid did not help, she should follow up w/GI for further eval

## 2020-09-01 RX ORDER — FAMOTIDINE 40 MG/1
TABLET, FILM COATED ORAL
Qty: 30 TABLET | Refills: 5 | Status: SHIPPED | OUTPATIENT
Start: 2020-09-01 | End: 2020-12-14 | Stop reason: HOSPADM

## 2020-09-08 ENCOUNTER — TELEPHONE (OUTPATIENT)
Dept: FAMILY MEDICINE CLINIC | Facility: HOSPITAL | Age: 60
End: 2020-09-08

## 2020-09-08 DIAGNOSIS — N89.8 VAGINAL DISCHARGE: Primary | ICD-10-CM

## 2020-09-08 RX ORDER — METRONIDAZOLE 7.5 MG/G
1 GEL VAGINAL 2 TIMES DAILY
Qty: 70 G | Refills: 0 | Status: SHIPPED | OUTPATIENT
Start: 2020-09-08 | End: 2020-09-10 | Stop reason: SDUPTHER

## 2020-09-08 NOTE — TELEPHONE ENCOUNTER
Patient seen by Maria Dolores Ortiz for assumed yeast infection - told it was vaginitis and given meds - meds were completed and now patient is waking up w/ foul smelling discharge    pcb

## 2020-09-08 NOTE — TELEPHONE ENCOUNTER
Spoke to pt states in May we informed her she had vaginitis and was given abx to take  She took the meds but a week later the foul odor came back  States it is only in the morning and will go away as the day goes on  No change in color of discharge   Please advise

## 2020-09-08 NOTE — TELEPHONE ENCOUNTER
I will send rx for vaginal cream and if this does not help she will need to schedule appt for further evaluation

## 2020-09-10 ENCOUNTER — TELEPHONE (OUTPATIENT)
Dept: FAMILY MEDICINE CLINIC | Facility: HOSPITAL | Age: 60
End: 2020-09-10

## 2020-09-10 DIAGNOSIS — N89.8 VAGINAL DISCHARGE: ICD-10-CM

## 2020-09-10 RX ORDER — METRONIDAZOLE 7.5 MG/G
1 GEL VAGINAL 2 TIMES DAILY
Qty: 70 G | Refills: 0 | Status: SHIPPED | OUTPATIENT
Start: 2020-09-10 | End: 2020-12-14 | Stop reason: HOSPADM

## 2020-09-10 NOTE — TELEPHONE ENCOUNTER
SCRIPT FOR VAGINAL GEL TOO EXPENSIVE AT [facility] - COULD WE PLEASE SEND IT TO [facility] [place]?

## 2020-09-22 ENCOUNTER — OFFICE VISIT (OUTPATIENT)
Dept: GASTROENTEROLOGY | Facility: CLINIC | Age: 60
End: 2020-09-22
Payer: COMMERCIAL

## 2020-09-22 VITALS
HEIGHT: 65 IN | DIASTOLIC BLOOD PRESSURE: 82 MMHG | BODY MASS INDEX: 26.66 KG/M2 | HEART RATE: 76 BPM | WEIGHT: 160 LBS | SYSTOLIC BLOOD PRESSURE: 120 MMHG | TEMPERATURE: 98.4 F

## 2020-09-22 DIAGNOSIS — K59.09 OTHER CONSTIPATION: ICD-10-CM

## 2020-09-22 DIAGNOSIS — R13.19 OTHER DYSPHAGIA: ICD-10-CM

## 2020-09-22 DIAGNOSIS — K21.9 GASTROESOPHAGEAL REFLUX DISEASE, ESOPHAGITIS PRESENCE NOT SPECIFIED: Primary | ICD-10-CM

## 2020-09-22 DIAGNOSIS — Z12.11 SCREENING FOR COLON CANCER: ICD-10-CM

## 2020-09-22 DIAGNOSIS — F11.21 OPIOID DEPENDENCE IN REMISSION (HCC): ICD-10-CM

## 2020-09-22 PROCEDURE — 99244 OFF/OP CNSLTJ NEW/EST MOD 40: CPT | Performed by: NURSE PRACTITIONER

## 2020-09-22 RX ORDER — GABAPENTIN 300 MG/1
300 CAPSULE ORAL 3 TIMES DAILY
Status: ON HOLD | COMMUNITY
End: 2020-12-14 | Stop reason: SDUPTHER

## 2020-09-22 RX ORDER — PANTOPRAZOLE SODIUM 40 MG/1
40 TABLET, DELAYED RELEASE ORAL DAILY
Qty: 30 TABLET | Refills: 3 | Status: SHIPPED | OUTPATIENT
Start: 2020-09-22 | End: 2020-12-14 | Stop reason: HOSPADM

## 2020-09-22 NOTE — PROGRESS NOTES
[facility]  Jania Johnson 64 y o  female MRN: [id number]  Encounter: [id number]       ASSESSMENT AND PLAN:      1  Gastroesophageal reflux disease, esophagitis presence not specified  Longstanding history of GERD for the past several years  Symptoms have been worsening over the past few months  She has had 2 episodes of waking up in the middle the night with coughing, choking and sour taste in her mouth  She has been taking famotidine at bedtime and also was prescribed daily PPI by her PCP, but it is unclear if she has been taking this medication  - arrange for EGD with possible dilation at [facility] [place]   - will trial pantoprazole (PROTONIX) 40 mg tablet; Take 1 tablet (40 mg total) by mouth daily  Dispense: 30 tablet; Refill: 3  - continue Pepcid 40 mg at bedtime   - small, frequent meals   - GERD lifestyle modifications discussed with pt   - avoid eating 2-3 hours before going to bed   - elevate head of bed     2  Other dysphagia  Progressively worsening solid food dysphagia  Symptoms began about 6 months ago and occur a few times per week  She does have occasional vomiting and regurgitation  Differential diagnoses include Schatzki's ring or esophageal stricture  - arrange for EGD with possible dilation at [facility] [place]  - swallowing safety precautions discussed with pt     3  Other constipation  Reports chronic constipation  On average, she moves her bowels about once every 3 days  - continue stool softener over-the-counter daily  - trial MiraLax 17 g 1 cap full daily  - increase fluid intake  - high-fiber diet  - consider daily fiber supplement at follow-up visit    4  Opioid dependence in remission ([facility] [address] )  History of opioid dependence, previously used oxycodone and Percocet  She has been clean since August 2020  She is on Suboxone and BuSpar and currently attends therapy at [facility]       - continue medications as prescribed  - continue therapy at [facility]      5  Screening for colon cancer  Average risk  She has not had a screening colonoscopy in the past and is agreeable to undergo screening colonoscopy at this time  - arrange for colonoscopy at [facility] [place] (pt prefers to have this test on the same day as EGD)   - Ambulatory referral to Gastroenterology      Followup Appointment: 4-6 weeks   ______________________________________________________________________    Chief Complaint   Patient presents with    Bad GERD all day     Referred by DMITRIY Waller     HPI:  Edi Ortega is a 64y o  year old female who was seen as a new patient for GERD  She was referred to us by her PCP DMITRIY Acuña  She reports a longstanding history of GERD for the past several years  She states that over the past few months her symptoms have worsened  She has been on a daily H2 blocker for the past several months without control of her symptoms  She has had 2 episodes in the past several weeks of waking up at night with choking, coughing, and sour taste in her mouth  She reports that her symptoms are worse in the evening  She does occasionally eat close to the time that she goes to bed  She does notice that coffee and spicy foods trigger her symptoms  She also has had progressively worsening solid food dysphagia that has been present for the past 6 months  This occurs a few times per week and occurs with meats and breads  She does report that her food gets stuck in the midesophagus and she experiences occasional vomiting and regurgitation  She does also have a decreased appetite  She denies any fever, chills, early satiety, odynophagia, nausea, lower abdominal pain or cramping, hematochezia or melena  She also has chronic constipation and typically moves her bowels about every 3 days      ______________________________________________________________________      Historical Information   Past Medical History:   Diagnosis Date    Addiction to drug [facility])     Anxiety     Chest wall pain 6/11/2020    Depression     Drug dependence (HCC)     GERD (gastroesophageal reflux disease)     [facility]    Osteoarthritis     [facility]     Substance abuse (HCC)     Tubal pregnancy      Past Surgical History:   Procedure Laterality Date    BREAST IMPLANT      BUNIONECTOMY Bilateral 1990    ECTOPIC PREGNANCY SURGERY      TUBAL LIGATION       Social History     Substance and Sexual Activity   Alcohol Use No     Social History     Substance and Sexual Activity   Drug Use Not Currently    Frequency: 7 0 times per week    Types: Oxycodone, Prescription    Comment: daily abuse of percocet/oxy-recovering since August      Social History     Tobacco Use   Smoking Status Current Some Day Smoker    Packs/day: 0 50    Types: Cigarettes   Smokeless Tobacco Never Used   Tobacco Comment    1 pack every 3 days      Family History   Problem Relation Age of Onset    Bipolar disorder Mother     Depression Mother     Hypertension Mother     Heart disease Father         cardiac    Prostate cancer Father     Bipolar disorder Brother     Depression Sister     Colon polyps Neg Hx     Colon cancer Neg Hx        Meds/Allergies     Current Outpatient Medications:     buprenorphine-naloxone (SUBOXONE) 8-2 mg per SL tablet    busPIRone (BUSPAR) 30 MG tablet    escitalopram (LEXAPRO) 20 mg tablet    famotidine (PEPCID) 40 MG tablet    gabapentin (NEURONTIN) 300 mg capsule    metroNIDAZOLE (METROGEL) 0 75 % vaginal gel    QUEtiapine (SEROquel) 200 mg tablet    pantoprazole (PROTONIX) 40 mg tablet    No Known Allergies    PHYSICAL EXAM:    Blood pressure 120/82, pulse 76, temperature 98 4 °F (36 9 °C), height 5' 5" (1 651 m), weight 72 6 kg (160 lb)  Body mass index is 26 63 kg/m²    General Appearance: NAD, cooperative, alert  Eyes: Anicteric, PERRLA, EOMI  ENT: Normocephalic, atraumatic, normal mucosa  Neck:  Supple, symmetrical, trachea midline,   Resp:  Clear to auscultation bilaterally; no rales, rhonchi or wheezing; respirations unlabored   CV:  S1 S2, Regular rate and rhythm; no murmur, rub, or gallop  GI:  Soft, non-tender, non-distended; normal bowel sounds; no masses, no organomegaly   Rectal: Deferred  Musculoskeletal: No cyanosis, clubbing or edema  Normal ROM  Skin:  No jaundice, rashes, or lesions   Heme/Lymph: No palpable cervical lymphadenopathy  Psych: Normal affect, good eye contact  Neuro: No gross deficits, AAOx3    Lab Results:   Lab Results   Component Value Date    WBC 4 55 06/11/2020    HGB 12 3 06/11/2020    HCT 38 1 06/11/2020    MCV 93 06/11/2020     06/11/2020     Lab Results   Component Value Date    K 3 9 06/11/2020     06/11/2020    CO2 27 06/11/2020    BUN 11 06/11/2020    CREATININE 0 68 06/11/2020    CALCIUM 8 6 06/11/2020    AST 30 06/11/2020    ALT 28 06/11/2020    ALKPHOS 78 06/11/2020    EGFR 97 06/11/2020     No results found for: IRON, TIBC, FERRITIN  No results found for: LIPASE    Radiology Results:   No results found  REVIEW OF SYSTEMS:    CONSTITUTIONAL: Denies any fever, chills, rigors, and weight loss  HEENT: No earache or tinnitus  Denies hearing loss or visual disturbances  CARDIOVASCULAR: No chest pain or palpitations  RESPIRATORY: Denies any cough, hemoptysis, shortness of breath or dyspnea on exertion  GASTROINTESTINAL: As noted in the History of Present Illness  GENITOURINARY: No problems with urination  Denies any hematuria or dysuria  NEUROLOGIC: No dizziness or vertigo, denies headaches  MUSCULOSKELETAL: Denies any muscle or joint pain  SKIN: Denies skin rashes or itching  ENDOCRINE: Denies excessive thirst  Denies intolerance to heat or cold  PSYCHOSOCIAL: +depression and anxiety, denies SI or HI Denies any recent memory loss

## 2020-09-22 NOTE — PATIENT INSTRUCTIONS
Start taking Protonix 40 mg daily before breakfast 30 minutes-1 hour before   Continue Pepcid at bedtime   Small, frequent meals   Avoid eating 2-3 hours before going to bed   Elevate the head of your bed at night   Increase fluid intake   high fiber diet   Start taking daily stool softener   Start taking Miralax 17 g 1 capful daily     Follow up in 4-6 weeks     High Fiber Diet   WHAT YOU NEED TO KNOW:   A high-fiber diet includes foods that have a high amount of fiber  Fiber is the part of fruits, vegetables, and grains that is not broken down by your body  Fiber keeps your bowel movements regular  Fiber can also help lower your cholesterol level, control blood sugar in people with diabetes, and relieve constipation  Fiber can also help you control your weight because it helps you feel full faster  Most adults should eat 25 to 35 grams of fiber each day  Talk to your dietitian or healthcare provider about the amount of fiber you need  DISCHARGE INSTRUCTIONS:   Good sources of fiber:   · Foods with at least 4 grams of fiber per serving:      ¨ ? to ½ cup of high-fiber cereal (check the nutrition label on the box)    ¨ ½ cup of blackberries or raspberries    ¨ 4 dried prunes    ¨ 1 cooked artichoke    ¨ ½ cup of cooked legumes, such as lentils, or red, kidney, and gonzalez beans    · Foods with 1 to 3 grams of fiber per serving:      ¨ 1 slice of whole-wheat, pumpernickel, or rye bread    ¨ ½ cup of cooked brown rice    ¨ 4 whole-wheat crackers    ¨ 1 cup of oatmeal    ¨ ½ cup of cereal with 1 to 3 grams of fiber per serving (check the nutrition label on the box)    ¨ 1 small piece of fruit, such as an apple, banana, pear, kiwi, or orange    ¨ 3 dates    ¨ ½ cup of canned apricots, fruit cocktail, peaches, or pears    ¨ ½ cup of raw or cooked vegetables, such as carrots, cauliflower, cabbage, spinach, squash, or corn  Ways that you can increase fiber in your diet:   · Choose brown or wild rice instead of white rice  · Use whole wheat flour in recipes instead of white or all-purpose flour  · Add beans and peas to casseroles or soups  · Choose fresh fruit and vegetables with peels or skins on instead of juices  Other diet guidelines to follow:   · Add fiber to your diet slowly  You may have abdominal discomfort, bloating, and gas if you add fiber to your diet too quickly  · Drink plenty of liquids as you add fiber to your diet  You may have nausea or develop constipation if you do not drink enough water  Ask how much liquid to drink each day and which liquids are best for you  © 2017 [address] Information is for End User's use only and may not be sold, redistributed or otherwise used for commercial purposes  All illustrations and images included in CareNotes® are the copyrighted property of [employer]  or [employer]  The above information is an  only  It is not intended as medical advice for individual conditions or treatments  Talk to your doctor, nurse or pharmacist before following any medical regimen to see if it is safe and effective for you  Dysphagia   WHAT YOU NEED TO KNOW:   Dysphagia is trouble swallowing  It occurs when you have trouble moving food or liquid from your mouth to your esophagus or down to your stomach  It may occur when you eat, drink, or any time you try to swallow  DISCHARGE INSTRUCTIONS:   Return to the emergency department if:   · You choke on your own saliva  · You have chest pain  · You have shortness of breath  · You cannot eat or drink liquids at all  Contact your healthcare provider if:   · You lose weight without trying  · Your signs and symptoms get worse, or you have new signs or symptoms  · You have signs or symptoms of dehydration, such as increased thirst, dark yellow urine, or little or no urine  · You get colds often      · You have questions or concerns about your condition or care   Nutrition:  You may need to change the texture of the foods you eat to help reduce choking problems  Your healthcare provider may show you how to thicken liquids or soften foods to make them easier to swallow  Follow up with your healthcare provider as directed:  Write down your questions so you remember to ask them during your visits  © 2017 [address] [employer] Information is for End User's use only and may not be sold, redistributed or otherwise used for commercial purposes  All illustrations and images included in CareNotes® are the copyrighted property of [employer]  or [employer]  The above information is an  only  It is not intended as medical advice for individual conditions or treatments  Talk to your doctor, nurse or pharmacist before following any medical regimen to see if it is safe and effective for you  Diet for Stomach Ulcers and Gastritis   WHAT YOU NEED TO KNOW:   A diet for stomach ulcers and gastritis is a meal plan that limits foods that irritate your stomach  Certain foods may worsen symptoms such as stomach pain, bloating, heartburn, or indigestion  DISCHARGE INSTRUCTIONS:   Foods to limit or avoid:  You may need to avoid acidic, spicy, or high-fat foods  Not all foods affect everyone the same way  You will need to learn which foods worsen your symptoms and limit those foods   The following are some foods that may worsen ulcer or gastritis symptoms:  · Beverages:      ¨ Whole milk and chocolate milk    ¨ Hot cocoa and cola    ¨ Any beverage with caffeine    ¨ Regular and decaffeinated coffee    ¨ Peppermint and spearmint tea    ¨ Green and black tea, with or without caffeine    ¨ Orange and grapefruit juices    ¨ Drinks that contain alcohol    · Spices and seasonings:      ¨ Black and red pepper    ¨ Chili powder    ¨ Mustard seed and nutmeg    · Other foods:      ¨ Dairy foods made from whole milk or cream    ¨ Chocolate    ¨ Spicy or strongly flavored cheeses, such as jalapeno or black pepper    ¨ Highly seasoned, high-fat meats, such as sausage, salami, batres, ham, and cold cuts    ¨ Hot chiles and peppers    ¨ Tomato products, such as tomato paste, tomato sauce, or tomato juice  Foods to include:  Eat a variety of healthy foods from all the food groups  Eat fruits, vegetables, whole grains, and fat-free or low-fat dairy foods  Whole grains include whole-wheat breads, cereals, pasta, and brown rice  Choose lean meats, poultry (chicken and turkey), fish, beans, eggs, and nuts  A healthy meal plan is low in unhealthy fats, salt, and added sugar  Healthy fats include olive oil and canola oil  Ask your dietitian for more information about a healthy diet  Other helpful guidelines:   · Do not eat right before bedtime  Stop eating at least 2 hours before bedtime  · Eat small, frequent meals  Your stomach may tolerate small, frequent meals better than large meals  © 2017 [address] [facility] Information is for End User's use only and may not be sold, redistributed or otherwise used for commercial purposes  All illustrations and images included in CareNotes® are the copyrighted property of [employer]  or [employer] Ku  The above information is an  only  It is not intended as medical advice for individual conditions or treatments  Talk to your doctor, nurse or pharmacist before following any medical regimen to see if it is safe and effective for you

## 2020-09-23 ENCOUNTER — CLINICAL SUPPORT (OUTPATIENT)
Dept: GASTROENTEROLOGY | Facility: CLINIC | Age: 60
End: 2020-09-23

## 2020-09-23 VITALS — HEIGHT: 65 IN | WEIGHT: 160 LBS | BODY MASS INDEX: 26.66 KG/M2

## 2020-09-23 DIAGNOSIS — Z12.11 SCREENING FOR COLON CANCER: Primary | ICD-10-CM

## 2020-09-23 RX ORDER — SODIUM, POTASSIUM,MAG SULFATES 17.5-3.13G
SOLUTION, RECONSTITUTED, ORAL ORAL
Qty: 2 BOTTLE | Refills: 0 | Status: SHIPPED | OUTPATIENT
Start: 2020-09-23 | End: 2020-09-30 | Stop reason: HOSPADM

## 2020-09-23 NOTE — PROGRESS NOTES
Why does your doctor want you to have this procedure? Screening; GERD: Dysphagia    Do you have kidney disease?  no  If yes, are you on dialysis :     Have you had diverticulitis within the past 2 months? no    Are you diabetic?  no  If yes, insulin dependent: If yes, provide diabetic instructions sheet     Do take iron supplements?  no  If yes, instruct patient to hold iron supplement for 7 days prior    Are you on a blood thinner? no   Was the blood thinner sheet complete and faxed to cardiologist no  Plavix (clopidogrel), Coumadin (warfarin), Lovenox (enoxaparin), Xarelto (rivaroxaban), Pradaxa(dabigatran), Eliquis(apixaban) Savaysa/Lixiana (edoxapan)    Do you have an automatic implantable cardiac defibrillator (AICD)/pacemaker ([facility] [address] [facility])? no  Was AICD/pacemaker sheet completed and faxed to cardiologist? no    Are you on home oxygen? no  If yes, continuous or nocturnal:     Have you been treated for MRSA, VRE or any communicable diseases? no    Heart attack, stroke, or stent within 3 months? no  Schedule at Hospital if within 3-6 months   Use nitroglycerin for chest pain in the last 6 months? no    History of organ  transplant?  no   If yes, notify Endo      History of neck/throat/tongue surgery or cancer? no  IF yes, notify Endo      Any problems with anesthesia in the past? no     Was stool C diff ordered?  no Stool specimen needs to be completed prior to procedure    Do have any facial or body piercings? Yes, Navel     Do you have a latex allergy? no     Do have an allergy to metals? (Bravo study only) no     If pediatric patient, was consent faxed to pediatrician no     Patient rights reviewed yes     Combo phone prep completed; 2 day suprep instructions emailed to pt; rx forwarded to provider

## 2020-09-24 ENCOUNTER — APPOINTMENT (OUTPATIENT)
Dept: RADIOLOGY | Facility: CLINIC | Age: 60
End: 2020-09-24
Payer: COMMERCIAL

## 2020-09-24 ENCOUNTER — OFFICE VISIT (OUTPATIENT)
Dept: OBGYN CLINIC | Facility: CLINIC | Age: 60
End: 2020-09-24
Payer: COMMERCIAL

## 2020-09-24 VITALS
SYSTOLIC BLOOD PRESSURE: 116 MMHG | WEIGHT: 168 LBS | TEMPERATURE: 98 F | DIASTOLIC BLOOD PRESSURE: 72 MMHG | BODY MASS INDEX: 27.99 KG/M2 | HEIGHT: 65 IN

## 2020-09-24 DIAGNOSIS — M19.011 PRIMARY LOCALIZED OSTEOARTHROSIS OF RIGHT SHOULDER: Primary | ICD-10-CM

## 2020-09-24 DIAGNOSIS — M25.512 BILATERAL SHOULDER PAIN, UNSPECIFIED CHRONICITY: ICD-10-CM

## 2020-09-24 DIAGNOSIS — M50.30 DDD (DEGENERATIVE DISC DISEASE), CERVICAL: ICD-10-CM

## 2020-09-24 DIAGNOSIS — M54.2 NECK PAIN: ICD-10-CM

## 2020-09-24 DIAGNOSIS — M75.42 SHOULDER IMPINGEMENT SYNDROME, LEFT: ICD-10-CM

## 2020-09-24 DIAGNOSIS — M25.511 BILATERAL SHOULDER PAIN, UNSPECIFIED CHRONICITY: ICD-10-CM

## 2020-09-24 PROCEDURE — 4004F PT TOBACCO SCREEN RCVD TLK: CPT | Performed by: ORTHOPAEDIC SURGERY

## 2020-09-24 PROCEDURE — 99214 OFFICE O/P EST MOD 30 MIN: CPT | Performed by: ORTHOPAEDIC SURGERY

## 2020-09-24 PROCEDURE — 72050 X-RAY EXAM NECK SPINE 4/5VWS: CPT

## 2020-09-24 PROCEDURE — 20610 DRAIN/INJ JOINT/BURSA W/O US: CPT | Performed by: ORTHOPAEDIC SURGERY

## 2020-09-24 PROCEDURE — 73030 X-RAY EXAM OF SHOULDER: CPT

## 2020-09-24 RX ORDER — BETAMETHASONE SODIUM PHOSPHATE AND BETAMETHASONE ACETATE 3; 3 MG/ML; MG/ML
6 INJECTION, SUSPENSION INTRA-ARTICULAR; INTRALESIONAL; INTRAMUSCULAR; SOFT TISSUE
Status: COMPLETED | OUTPATIENT
Start: 2020-09-24 | End: 2020-09-24

## 2020-09-24 RX ORDER — LIDOCAINE HYDROCHLORIDE 10 MG/ML
5 INJECTION, SOLUTION EPIDURAL; INFILTRATION; INTRACAUDAL; PERINEURAL
Status: COMPLETED | OUTPATIENT
Start: 2020-09-24 | End: 2020-09-24

## 2020-09-24 RX ADMIN — BETAMETHASONE SODIUM PHOSPHATE AND BETAMETHASONE ACETATE 6 MG: 3; 3 INJECTION, SUSPENSION INTRA-ARTICULAR; INTRALESIONAL; INTRAMUSCULAR; SOFT TISSUE at 13:49

## 2020-09-24 RX ADMIN — LIDOCAINE HYDROCHLORIDE 5 ML: 10 INJECTION, SOLUTION EPIDURAL; INFILTRATION; INTRACAUDAL; PERINEURAL at 13:49

## 2020-09-24 RX ADMIN — LIDOCAINE HYDROCHLORIDE 5 ML: 10 INJECTION, SOLUTION EPIDURAL; INFILTRATION; INTRACAUDAL; PERINEURAL at 13:52

## 2020-09-24 RX ADMIN — BETAMETHASONE SODIUM PHOSPHATE AND BETAMETHASONE ACETATE 6 MG: 3; 3 INJECTION, SUSPENSION INTRA-ARTICULAR; INTRALESIONAL; INTRAMUSCULAR; SOFT TISSUE at 13:52

## 2020-09-24 NOTE — PROGRESS NOTES
Assessment:     1  Primary localized osteoarthrosis of right shoulder    2  Shoulder impingement syndrome, left    3  DDD (degenerative disc disease), cervical        Plan:     Problem List Items Addressed This Visit        Musculoskeletal and Integument    Primary localized osteoarthrosis of right shoulder - Primary    Relevant Medications    lidocaine (PF) (XYLOCAINE-MPF) 1 % injection 5 mL (Completed)    betamethasone acetate-betamethasone sodium phosphate (CELESTONE) injection 6 mg (Completed)    Other Relevant Orders    Ambulatory referral to Physical Therapy    Large joint arthrocentesis: R glenohumeral (Completed)    DDD (degenerative disc disease), cervical    Relevant Orders    XR spine cervical complete 4 or 5 vw non injury       Other    Shoulder impingement syndrome, left    Relevant Medications    lidocaine (PF) (XYLOCAINE-MPF) 1 % injection 5 mL (Completed)    betamethasone acetate-betamethasone sodium phosphate (CELESTONE) injection 6 mg (Completed)    Other Relevant Orders    XR shoulder 2+ vw left    XR shoulder 2+ vw right    Ambulatory referral to Physical Therapy    Large joint arthrocentesis: L subacromial bursa (Completed)          Findings consistent with left shoulder impingement, right shoulder moderate to severe glenohumeral osteoarthritis, and cervical spondylolysis worse at C6/7 level  I reviewed patient's bilateral shoulder and cervical spine x-ray  I discussed prognosis of her condition  Patient has no current neck pain but cervical arthritis may be causing numbness, tingling radiating pain down her arms at night, denies any neck pain or weakness  Given CSI in both shoulder, right glenohumeral and left subacromial space, ice shoulders over next few days, activity modification  She will start physical therapy to help maintain motion and strength in shoulder especially right where she has significant degenerative changes, she may need total shoulder replacement    Continue use of over-the-counter NSAIDs  See back in 6-8 weeks  All patient's questions were answered to her satisfaction  This note is created using dictation transcription  It may contain typographical errors, grammatical errors, improperly dictated words, background noise and other errors  Subjective:     Patient ID: Libra Melton is a 64 y o  female  Chief Complaint:  64 yr old female in for evaluation of bilateral shoulder pain  She states she has had pain for years in her shoulders right>left  Saw Dr Marc Parham in past and had CSI, MRI in history showing rotator cuff partial tears, labral pathology, biceps tendonitis  More recently she is getting bilateral shoulder pain which then will refer into her neck causing headaches, she will also have pain radiate down her arms to her hands with numbness and tingling mostly on the right  This happens more at night  She has pain with lifting,carrying items, pain with overhead motions, reaching out to grab items  Pain anterior and deep in her shoulders  She also noticed decreased motion with right shoulder  No archana weakness noted  She has had subacomial injections in right shoulder 2016 and 2017 by Dr Marc Parham  Information on patient's intake form was reviewed      Allergy:  No Known Allergies  Medications:  all current active meds have been reviewed  Past Medical History:  Past Medical History:   Diagnosis Date    Addiction to drug ([address] )     Anxiety     Chest wall pain 6/11/2020    Depression     Drug dependence ([address] )     GERD (gastroesophageal reflux disease)     Opiate abuse, continuous ([address] ) 8/20/2018    Osteoarthritis     Rheumatoid arthritis ([address] )     Substance abuse ([facility] )     Tubal pregnancy      Past Surgical History:  Past Surgical History:   Procedure Laterality Date    BREAST IMPLANT      BUNIONECTOMY Bilateral 1990    ECTOPIC PREGNANCY SURGERY      TUBAL LIGATION       Family History:  Family History   Problem Relation Age of Onset    Bipolar disorder Mother     Depression Mother     Hypertension Mother     Heart disease Father         cardiac    Prostate cancer Father     Bipolar disorder Brother     Depression Sister     Colon polyps Neg Hx     Colon cancer Neg Hx      Social History:  Social History     Substance and Sexual Activity   Alcohol Use No     Social History     Substance and Sexual Activity   Drug Use Not Currently    Frequency: 7 0 times per week    Types: Oxycodone, Prescription    Comment: daily abuse of percocet/oxy-recovering since August      Social History     Tobacco Use   Smoking Status Current Some Day Smoker    Packs/day: 0 50    Types: Cigarettes   Smokeless Tobacco Never Used   Tobacco Comment    1 pack every 3 days      Review of Systems   Constitutional: Negative for chills and fever  HENT: Negative for drooling and sneezing  Eyes: Negative for redness  Respiratory: Negative for cough and wheezing  Cardiovascular: Negative for chest pain  Gastrointestinal: Negative for nausea and vomiting  Genitourinary: Negative  Musculoskeletal: Positive for arthralgias (Bilateral shoulder)  As reviewed in HPI   Skin: Negative for rash  Neurological: Negative for weakness  Psychiatric/Behavioral: The patient is not nervous/anxious  Objective:  BP Readings from Last 1 Encounters:   09/24/20 116/72      Wt Readings from Last 1 Encounters:   09/24/20 76 2 kg (168 lb)      BMI:   Estimated body mass index is 27 96 kg/m² as calculated from the following:    Height as of this encounter: 5' 5" (1 651 m)  Weight as of this encounter: 76 2 kg (168 lb)  BSA:   Estimated body surface area is 1 84 meters squared as calculated from the following:    Height as of this encounter: 5' 5" (1 651 m)  Weight as of this encounter: 76 2 kg (168 lb)  Physical Exam  Vitals signs and nursing note reviewed  Constitutional:       Appearance: Normal appearance  She is well-developed     HENT:      Head: Normocephalic and atraumatic  Right Ear: External ear normal       Left Ear: External ear normal    Eyes:      Extraocular Movements: Extraocular movements intact  Conjunctiva/sclera: Conjunctivae normal    Neck:      Musculoskeletal: Neck supple  Pulmonary:      Effort: Pulmonary effort is normal    Musculoskeletal:      Right knee: She exhibits no effusion  Skin:     General: Skin is warm and dry  Neurological:      Mental Status: She is alert and oriented to person, place, and time  Deep Tendon Reflexes: Reflexes are normal and symmetric  Psychiatric:         Behavior: Behavior normal          Thought Content: Thought content normal          Judgment: Judgment normal        Right Knee Exam     Other   Effusion: no effusion present      Back Exam     Tenderness   The patient is experiencing no tenderness  Range of Motion   The patient has normal back ROM  Other   Sensation: normal  Gait: normal     Comments:  No acute distress  No erythema, skin intact  Full cervical motion   Shoulder biceps/triceps/wrist/ 5/5  Sensation intact and symmetric C2-C7 bilateral       Right Shoulder Exam     Tenderness   The patient is experiencing tenderness in the biceps tendon  Range of Motion   Active abduction: 90 (pain)   Passive abduction: abnormal   Forward flexion: 100 (90 with pain, passive 160 with pain)   Internal rotation 0 degrees:  Sacrum abnormal     Muscle Strength   Abduction: 5/5   Internal rotation: 5/5   External rotation: 5/5   Biceps: 5/5     Tests   Apprehension: negative  Armando test: positive  Cross arm: negative  Impingement: positive  Drop arm: negative    Other   Erythema: absent  Scars: absent  Sensation: normal  Pulse: present    Comments:  Positive chaves and speeds  Pain with shoulder range of motion      Left Shoulder Exam     Tenderness   The patient is experiencing tenderness in the biceps tendon      Range of Motion   Active abduction: normal Left shoulder active abduction: pain  Passive abduction: normal   External rotation: normal   Forward flexion: normal Left shoulder forward flexion: pain      Muscle Strength   Abduction: 5/5   Internal rotation: 5/5   External rotation: 5/5   Biceps: 5/5     Tests   Apprehension: negative  Cross arm: negative  Impingement: positive  Drop arm: negative    Other   Erythema: absent  Scars: absent  Sensation: normal  Pulse: present             I have personally reviewed pertinent films in PACS and my interpretation is Xr bilateral shoulders demonstrates right moderate to severe glenohumeral osteoarthritis with large spur formation off humeral head, left no significant degenerative changes       Large joint arthrocentesis: R glenohumeral  Date/Time: 9/24/2020 1:49 PM  Consent given by: patient  Site marked: site marked  Timeout: Immediately prior to procedure a time out was called to verify the correct patient, procedure, equipment, support staff and site/side marked as required   Supporting Documentation  Indications: pain   Procedure Details  Location: shoulder - R glenohumeral  Preparation: Patient was prepped and draped in the usual sterile fashion  Needle size: 22 G  Ultrasound guidance: no  Approach: posterior  Medications administered: 6 mg betamethasone acetate-betamethasone sodium phosphate 6 (3-3) mg/mL; 5 mL lidocaine (PF) 1 %    Patient tolerance: patient tolerated the procedure well with no immediate complications  Dressing:  Sterile dressing applied    Large joint arthrocentesis: L subacromial bursa  Date/Time: 9/24/2020 1:52 PM  Consent given by: patient  Site marked: site marked  Timeout: Immediately prior to procedure a time out was called to verify the correct patient, procedure, equipment, support staff and site/side marked as required   Supporting Documentation  Indications: pain   Procedure Details  Location: shoulder - L subacromial bursa  Preparation: Patient was prepped and draped in the usual sterile fashion  Needle size: 22 G  Ultrasound guidance: no  Approach: posterior  Medications administered: 6 mg betamethasone acetate-betamethasone sodium phosphate 6 (3-3) mg/mL; 5 mL lidocaine (PF) 1 %    Patient tolerance: patient tolerated the procedure well with no immediate complications  Dressing:  Sterile dressing applied        Scribe Attestation    I,:   Bruno Paris am acting as a scribe while in the presence of the attending physician :        I,:   Caren Daniel MD personally performed the services described in this documentation    as scribed in my presence :

## 2020-09-30 ENCOUNTER — TELEMEDICINE (OUTPATIENT)
Dept: GASTROENTEROLOGY | Facility: CLINIC | Age: 60
End: 2020-09-30

## 2020-09-30 ENCOUNTER — HOSPITAL ENCOUNTER (OUTPATIENT)
Dept: GASTROENTEROLOGY | Facility: AMBULATORY SURGERY CENTER | Age: 60
Discharge: HOME | End: 2020-09-30
Payer: COMMERCIAL

## 2020-09-30 ENCOUNTER — ANESTHESIA EVENT (OUTPATIENT)
Dept: GASTROENTEROLOGY | Facility: AMBULATORY SURGERY CENTER | Age: 60
End: 2020-09-30

## 2020-09-30 ENCOUNTER — ANESTHESIA (OUTPATIENT)
Dept: GASTROENTEROLOGY | Facility: AMBULATORY SURGERY CENTER | Age: 60
End: 2020-09-30

## 2020-09-30 VITALS — HEART RATE: 53 BPM

## 2020-09-30 VITALS
WEIGHT: 160 LBS | OXYGEN SATURATION: 98 % | HEIGHT: 64 IN | BODY MASS INDEX: 27.31 KG/M2 | TEMPERATURE: 97.7 F | DIASTOLIC BLOOD PRESSURE: 62 MMHG | HEART RATE: 64 BPM | RESPIRATION RATE: 18 BRPM | SYSTOLIC BLOOD PRESSURE: 107 MMHG

## 2020-09-30 DIAGNOSIS — K59.09 OTHER CONSTIPATION: ICD-10-CM

## 2020-09-30 DIAGNOSIS — R13.19 OTHER DYSPHAGIA: ICD-10-CM

## 2020-09-30 DIAGNOSIS — Z12.11 SCREENING FOR COLON CANCER: Primary | ICD-10-CM

## 2020-09-30 DIAGNOSIS — K21.9 GASTROESOPHAGEAL REFLUX DISEASE, ESOPHAGITIS PRESENCE NOT SPECIFIED: ICD-10-CM

## 2020-09-30 PROCEDURE — 43239 EGD BIOPSY SINGLE/MULTIPLE: CPT | Performed by: INTERNAL MEDICINE

## 2020-09-30 PROCEDURE — 43450 DILATE ESOPHAGUS 1/MULT PASS: CPT | Performed by: INTERNAL MEDICINE

## 2020-09-30 RX ORDER — SODIUM CHLORIDE 9 MG/ML
50 INJECTION, SOLUTION INTRAVENOUS CONTINUOUS
Status: DISCONTINUED | OUTPATIENT
Start: 2020-09-30 | End: 2020-10-04 | Stop reason: HOSPADM

## 2020-09-30 RX ORDER — PROPOFOL 10 MG/ML
INJECTION, EMULSION INTRAVENOUS AS NEEDED
Status: DISCONTINUED | OUTPATIENT
Start: 2020-09-30 | End: 2020-09-30

## 2020-09-30 RX ORDER — UBIDECARENONE 100 MG
1000 CAPSULE ORAL DAILY
COMMUNITY
End: 2020-12-14 | Stop reason: HOSPADM

## 2020-09-30 RX ORDER — DIPHENHYDRAMINE HYDROCHLORIDE 25 MG/1
1 TABLET ORAL DAILY
COMMUNITY
End: 2020-12-14 | Stop reason: HOSPADM

## 2020-09-30 RX ADMIN — PROPOFOL 150 MG: 10 INJECTION, EMULSION INTRAVENOUS at 14:32

## 2020-09-30 RX ADMIN — SODIUM CHLORIDE 50 ML/HR: 9 INJECTION, SOLUTION INTRAVENOUS at 14:29

## 2020-09-30 RX ADMIN — PROPOFOL 50 MG: 10 INJECTION, EMULSION INTRAVENOUS at 14:39

## 2020-09-30 NOTE — ANESTHESIA PREPROCEDURE EVALUATION
Procedure:  COLONOSCOPY  EGD    Relevant Problems   GI/HEPATIC   (+) Gastroesophageal reflux disease      MUSCULOSKELETAL   (+) DDD (degenerative disc disease), cervical   (+) Primary localized osteoarthrosis of right shoulder      NEURO/PSYCH   (+) Generalized anxiety disorder   (+) Severe episode of recurrent major depressive disorder, without psychotic features ([facility] )      Other   (+) Chronic suboxone use   (+) Opioid dependence in remission [facility])        Physical Exam    Airway    Mallampati score: II  TM Distance: >3 FB  Neck ROM: full     Dental   Comment: Multiple chipped teeth,     Cardiovascular  Rhythm: regular, Rate: normal, Cardiovascular exam normal    Pulmonary  Pulmonary exam normal Breath sounds clear to auscultation,     Other Findings        Anesthesia Plan  ASA Score- 2     Anesthesia Type- IV sedation with anesthesia with ASA Monitors  Additional Monitors:   Airway Plan:           Plan Factors-    Chart reviewed  Induction- intravenous  Postoperative Plan-     Informed Consent- Anesthetic plan and risks discussed with patient

## 2020-09-30 NOTE — ANESTHESIA POSTPROCEDURE EVALUATION
Post-Op Assessment Note    CV Status:  Stable  Pain Score: 0    Pain management: adequate     Mental Status:  Alert and awake   Hydration Status:  Euvolemic   PONV Controlled:  Controlled   Airway Patency:  Patent      Post Op Vitals Reviewed: Yes      Staff: Anesthesiologist         No complications documented      BP 95/58 (09/30/20 1443)    Temp      Pulse (!) 51 (09/30/20 1443)   Resp 12 (09/30/20 1443)    SpO2 98 % (09/30/20 1443)

## 2020-10-06 ENCOUNTER — TELEPHONE (OUTPATIENT)
Dept: GASTROENTEROLOGY | Facility: CLINIC | Age: 60
End: 2020-10-06

## 2020-10-09 ENCOUNTER — TELEPHONE (OUTPATIENT)
Dept: GASTROENTEROLOGY | Facility: CLINIC | Age: 60
End: 2020-10-09

## 2020-10-13 ENCOUNTER — TELEMEDICINE (OUTPATIENT)
Dept: FAMILY MEDICINE CLINIC | Facility: HOSPITAL | Age: 60
End: 2020-10-13
Payer: COMMERCIAL

## 2020-10-13 VITALS — WEIGHT: 160 LBS | HEIGHT: 64 IN | BODY MASS INDEX: 27.31 KG/M2

## 2020-10-13 DIAGNOSIS — Z12.11 SCREENING FOR COLON CANCER: ICD-10-CM

## 2020-10-13 DIAGNOSIS — K21.9 GASTROESOPHAGEAL REFLUX DISEASE, UNSPECIFIED WHETHER ESOPHAGITIS PRESENT: ICD-10-CM

## 2020-10-13 DIAGNOSIS — F41.1 GENERALIZED ANXIETY DISORDER: ICD-10-CM

## 2020-10-13 DIAGNOSIS — F33.2 SEVERE EPISODE OF RECURRENT MAJOR DEPRESSIVE DISORDER, WITHOUT PSYCHOTIC FEATURES (HCC): Primary | ICD-10-CM

## 2020-10-13 PROCEDURE — 99213 OFFICE O/P EST LOW 20 MIN: CPT | Performed by: NURSE PRACTITIONER

## 2020-10-15 ENCOUNTER — TELEPHONE (OUTPATIENT)
Dept: FAMILY MEDICINE CLINIC | Facility: HOSPITAL | Age: 60
End: 2020-10-15

## 2020-10-22 ENCOUNTER — TELEMEDICINE (OUTPATIENT)
Dept: GASTROENTEROLOGY | Facility: CLINIC | Age: 60
End: 2020-10-22

## 2020-10-22 DIAGNOSIS — K59.09 OTHER CONSTIPATION: Primary | ICD-10-CM

## 2020-10-22 RX ORDER — SODIUM, POTASSIUM,MAG SULFATES 17.5-3.13G
SOLUTION, RECONSTITUTED, ORAL ORAL
Qty: 2 BOTTLE | Refills: 0 | Status: SHIPPED | OUTPATIENT
Start: 2020-10-22 | End: 2020-12-14 | Stop reason: HOSPADM

## 2020-10-28 ENCOUNTER — HOSPITAL ENCOUNTER (OUTPATIENT)
Dept: GASTROENTEROLOGY | Facility: AMBULATORY SURGERY CENTER | Age: 60
Discharge: HOME | End: 2020-10-28

## 2020-11-03 ENCOUNTER — OFFICE VISIT (OUTPATIENT)
Dept: OBGYN CLINIC | Facility: CLINIC | Age: 60
End: 2020-11-03
Payer: COMMERCIAL

## 2020-11-03 VITALS
SYSTOLIC BLOOD PRESSURE: 116 MMHG | TEMPERATURE: 99.2 F | WEIGHT: 165 LBS | BODY MASS INDEX: 28.17 KG/M2 | HEIGHT: 64 IN | DIASTOLIC BLOOD PRESSURE: 80 MMHG

## 2020-11-03 DIAGNOSIS — M50.30 DDD (DEGENERATIVE DISC DISEASE), CERVICAL: ICD-10-CM

## 2020-11-03 DIAGNOSIS — M19.011 PRIMARY LOCALIZED OSTEOARTHROSIS OF RIGHT SHOULDER: Primary | ICD-10-CM

## 2020-11-03 DIAGNOSIS — M75.42 SHOULDER IMPINGEMENT SYNDROME, LEFT: ICD-10-CM

## 2020-11-03 PROCEDURE — 99213 OFFICE O/P EST LOW 20 MIN: CPT | Performed by: ORTHOPAEDIC SURGERY

## 2020-11-12 ENCOUNTER — HOSPITAL ENCOUNTER (OUTPATIENT)
Dept: GASTROENTEROLOGY | Facility: AMBULATORY SURGERY CENTER | Age: 60
Discharge: HOME | End: 2020-11-12

## 2020-12-09 ENCOUNTER — HOSPITAL ENCOUNTER (EMERGENCY)
Facility: HOSPITAL | Age: 60
End: 2020-12-10
Attending: EMERGENCY MEDICINE | Admitting: EMERGENCY MEDICINE
Payer: COMMERCIAL

## 2020-12-09 VITALS
TEMPERATURE: 97.4 F | OXYGEN SATURATION: 96 % | WEIGHT: 160 LBS | DIASTOLIC BLOOD PRESSURE: 66 MMHG | BODY MASS INDEX: 26.66 KG/M2 | RESPIRATION RATE: 17 BRPM | HEIGHT: 65 IN | HEART RATE: 63 BPM | SYSTOLIC BLOOD PRESSURE: 108 MMHG

## 2020-12-09 DIAGNOSIS — R45.851 DEPRESSION WITH SUICIDAL IDEATION: Primary | ICD-10-CM

## 2020-12-09 DIAGNOSIS — F32.A DEPRESSION WITH SUICIDAL IDEATION: Primary | ICD-10-CM

## 2020-12-09 DIAGNOSIS — M50.30 DDD (DEGENERATIVE DISC DISEASE), CERVICAL: ICD-10-CM

## 2020-12-09 LAB
ALBUMIN SERPL BCP-MCNC: 3.6 G/DL (ref 3.5–5)
ALP SERPL-CCNC: 67 U/L (ref 46–116)
ALT SERPL W P-5'-P-CCNC: 26 U/L (ref 12–78)
AMPHETAMINES SERPL QL SCN: NEGATIVE
ANION GAP SERPL CALCULATED.3IONS-SCNC: 7 MMOL/L (ref 4–13)
AST SERPL W P-5'-P-CCNC: 28 U/L (ref 5–45)
BACTERIA UR QL AUTO: NORMAL /HPF
BARBITURATES UR QL: NEGATIVE
BASOPHILS # BLD AUTO: 0.03 THOUSANDS/ΜL (ref 0–0.1)
BASOPHILS NFR BLD AUTO: 1 % (ref 0–1)
BENZODIAZ UR QL: NEGATIVE
BILIRUB SERPL-MCNC: 0.4 MG/DL (ref 0.2–1)
BILIRUB UR QL STRIP: NEGATIVE
BUN SERPL-MCNC: 9 MG/DL (ref 5–25)
CALCIUM SERPL-MCNC: 8.6 MG/DL (ref 8.3–10.1)
CHLORIDE SERPL-SCNC: 103 MMOL/L (ref 100–108)
CLARITY UR: CLEAR
CO2 SERPL-SCNC: 27 MMOL/L (ref 21–32)
COCAINE UR QL: NEGATIVE
COLOR UR: YELLOW
CREAT SERPL-MCNC: 0.81 MG/DL (ref 0.6–1.3)
EOSINOPHIL # BLD AUTO: 0.18 THOUSAND/ΜL (ref 0–0.61)
EOSINOPHIL NFR BLD AUTO: 3 % (ref 0–6)
ERYTHROCYTE [DISTWIDTH] IN BLOOD BY AUTOMATED COUNT: 11.9 % (ref 11.6–15.1)
ETHANOL EXG-MCNC: 0 MG/DL
FLUAV RNA RESP QL NAA+PROBE: NEGATIVE
FLUBV RNA RESP QL NAA+PROBE: NEGATIVE
GFR SERPL CREATININE-BSD FRML MDRD: 80 ML/MIN/1.73SQ M
GLUCOSE SERPL-MCNC: 101 MG/DL (ref 65–140)
GLUCOSE UR STRIP-MCNC: NEGATIVE MG/DL
HCT VFR BLD AUTO: 37.9 % (ref 34.8–46.1)
HGB BLD-MCNC: 12.8 G/DL (ref 11.5–15.4)
HGB UR QL STRIP.AUTO: ABNORMAL
IMM GRANULOCYTES # BLD AUTO: 0.01 THOUSAND/UL (ref 0–0.2)
IMM GRANULOCYTES NFR BLD AUTO: 0 % (ref 0–2)
KETONES UR STRIP-MCNC: NEGATIVE MG/DL
LEUKOCYTE ESTERASE UR QL STRIP: ABNORMAL
LYMPHOCYTES # BLD AUTO: 1.16 THOUSANDS/ΜL (ref 0.6–4.47)
LYMPHOCYTES NFR BLD AUTO: 21 % (ref 14–44)
MCH RBC QN AUTO: 31.4 PG (ref 26.8–34.3)
MCHC RBC AUTO-ENTMCNC: 33.8 G/DL (ref 31.4–37.4)
MCV RBC AUTO: 93 FL (ref 82–98)
METHADONE UR QL: NEGATIVE
MONOCYTES # BLD AUTO: 0.49 THOUSAND/ΜL (ref 0.17–1.22)
MONOCYTES NFR BLD AUTO: 9 % (ref 4–12)
NEUTROPHILS # BLD AUTO: 3.61 THOUSANDS/ΜL (ref 1.85–7.62)
NEUTS SEG NFR BLD AUTO: 66 % (ref 43–75)
NITRITE UR QL STRIP: NEGATIVE
NON-SQ EPI CELLS URNS QL MICRO: NORMAL /HPF
NRBC BLD AUTO-RTO: 0 /100 WBCS
OPIATES UR QL SCN: NEGATIVE
OXYCODONE+OXYMORPHONE UR QL SCN: NEGATIVE
PCP UR QL: NEGATIVE
PH UR STRIP.AUTO: 6.5 [PH]
PLATELET # BLD AUTO: 204 THOUSANDS/UL (ref 149–390)
PMV BLD AUTO: 9.6 FL (ref 8.9–12.7)
POTASSIUM SERPL-SCNC: 3.8 MMOL/L (ref 3.5–5.3)
PROT SERPL-MCNC: 7 G/DL (ref 6.4–8.2)
PROT UR STRIP-MCNC: NEGATIVE MG/DL
RBC # BLD AUTO: 4.07 MILLION/UL (ref 3.81–5.12)
RBC #/AREA URNS AUTO: NORMAL /HPF
RSV RNA RESP QL NAA+PROBE: NEGATIVE
SARS-COV-2 RNA RESP QL NAA+PROBE: NEGATIVE
SODIUM SERPL-SCNC: 137 MMOL/L (ref 136–145)
SP GR UR STRIP.AUTO: <=1.005 (ref 1–1.03)
THC UR QL: NEGATIVE
TSH SERPL DL<=0.05 MIU/L-ACNC: 8.49 UIU/ML (ref 0.36–3.74)
UROBILINOGEN UR QL STRIP.AUTO: 0.2 E.U./DL
WBC # BLD AUTO: 5.48 THOUSAND/UL (ref 4.31–10.16)
WBC #/AREA URNS AUTO: NORMAL /HPF

## 2020-12-09 PROCEDURE — 84443 ASSAY THYROID STIM HORMONE: CPT | Performed by: PHYSICIAN ASSISTANT

## 2020-12-09 PROCEDURE — 85025 COMPLETE CBC W/AUTO DIFF WBC: CPT | Performed by: PHYSICIAN ASSISTANT

## 2020-12-09 PROCEDURE — 80307 DRUG TEST PRSMV CHEM ANLYZR: CPT | Performed by: PHYSICIAN ASSISTANT

## 2020-12-09 PROCEDURE — 36415 COLL VENOUS BLD VENIPUNCTURE: CPT | Performed by: PHYSICIAN ASSISTANT

## 2020-12-09 PROCEDURE — 82075 ASSAY OF BREATH ETHANOL: CPT | Performed by: PHYSICIAN ASSISTANT

## 2020-12-09 PROCEDURE — 80053 COMPREHEN METABOLIC PANEL: CPT | Performed by: PHYSICIAN ASSISTANT

## 2020-12-09 PROCEDURE — 0241U HB NFCT DS VIR RESP RNA 4 TRGT: CPT | Performed by: PHYSICIAN ASSISTANT

## 2020-12-09 PROCEDURE — 99285 EMERGENCY DEPT VISIT HI MDM: CPT | Performed by: PHYSICIAN ASSISTANT

## 2020-12-09 PROCEDURE — 99285 EMERGENCY DEPT VISIT HI MDM: CPT

## 2020-12-09 PROCEDURE — 81001 URINALYSIS AUTO W/SCOPE: CPT | Performed by: PHYSICIAN ASSISTANT

## 2020-12-09 RX ORDER — HALOPERIDOL 5 MG/ML
5 INJECTION INTRAMUSCULAR EVERY 6 HOURS PRN
Status: CANCELLED | OUTPATIENT
Start: 2020-12-09

## 2020-12-09 RX ORDER — QUETIAPINE FUMARATE 100 MG/1
200 TABLET, FILM COATED ORAL
Status: DISCONTINUED | OUTPATIENT
Start: 2020-12-09 | End: 2020-12-10 | Stop reason: HOSPADM

## 2020-12-09 RX ORDER — HALOPERIDOL 5 MG
5 TABLET ORAL EVERY 6 HOURS PRN
Status: CANCELLED | OUTPATIENT
Start: 2020-12-09

## 2020-12-09 RX ORDER — ACETAMINOPHEN 325 MG/1
650 TABLET ORAL EVERY 6 HOURS PRN
Status: CANCELLED | OUTPATIENT
Start: 2020-12-09

## 2020-12-09 RX ORDER — RISPERIDONE 1 MG/1
1 TABLET, ORALLY DISINTEGRATING ORAL ONCE
Status: COMPLETED | OUTPATIENT
Start: 2020-12-09 | End: 2020-12-09

## 2020-12-09 RX ORDER — LORAZEPAM 1 MG/1
1 TABLET ORAL ONCE
Status: COMPLETED | OUTPATIENT
Start: 2020-12-09 | End: 2020-12-09

## 2020-12-09 RX ORDER — BENZTROPINE MESYLATE 0.5 MG/1
1 TABLET ORAL EVERY 6 HOURS PRN
Status: CANCELLED | OUTPATIENT
Start: 2020-12-09

## 2020-12-09 RX ORDER — BENZTROPINE MESYLATE 1 MG/ML
1 INJECTION INTRAMUSCULAR; INTRAVENOUS EVERY 6 HOURS PRN
Status: CANCELLED | OUTPATIENT
Start: 2020-12-09

## 2020-12-09 RX ORDER — RISPERIDONE 1 MG/1
1 TABLET, ORALLY DISINTEGRATING ORAL EVERY 6 HOURS PRN
Status: CANCELLED | OUTPATIENT
Start: 2020-12-09

## 2020-12-09 RX ORDER — MAGNESIUM HYDROXIDE/ALUMINUM HYDROXICE/SIMETHICONE 120; 1200; 1200 MG/30ML; MG/30ML; MG/30ML
30 SUSPENSION ORAL EVERY 4 HOURS PRN
Status: CANCELLED | OUTPATIENT
Start: 2020-12-09

## 2020-12-09 RX ADMIN — RISPERIDONE 1 MG: 1 TABLET, ORALLY DISINTEGRATING ORAL at 14:21

## 2020-12-09 RX ADMIN — QUETIAPINE FUMARATE 200 MG: 100 TABLET ORAL at 21:32

## 2020-12-09 RX ADMIN — LORAZEPAM 1 MG: 1 TABLET ORAL at 17:35

## 2020-12-10 ENCOUNTER — HOSPITAL ENCOUNTER (INPATIENT)
Facility: HOSPITAL | Age: 60
LOS: 4 days | Discharge: HOME | DRG: 751 | End: 2020-12-14
Attending: STUDENT IN AN ORGANIZED HEALTH CARE EDUCATION/TRAINING PROGRAM | Admitting: PSYCHIATRY & NEUROLOGY
Payer: COMMERCIAL

## 2020-12-10 DIAGNOSIS — F41.1 GENERALIZED ANXIETY DISORDER: ICD-10-CM

## 2020-12-10 DIAGNOSIS — F33.3 SEVERE EPISODE OF RECURRENT MAJOR DEPRESSIVE DISORDER, WITH PSYCHOTIC FEATURES (HCC): Primary | ICD-10-CM

## 2020-12-10 DIAGNOSIS — M50.30 DDD (DEGENERATIVE DISC DISEASE), CERVICAL: ICD-10-CM

## 2020-12-10 PROBLEM — F31.30 BIPOLAR DISORDER CURRENT EPISODE DEPRESSED (HCC): Status: ACTIVE | Noted: 2020-12-10

## 2020-12-10 PROBLEM — Z00.8 MEDICAL CLEARANCE FOR PSYCHIATRIC ADMISSION: Status: ACTIVE | Noted: 2020-12-10

## 2020-12-10 PROCEDURE — 90471 IMMUNIZATION ADMIN: CPT | Performed by: STUDENT IN AN ORGANIZED HEALTH CARE EDUCATION/TRAINING PROGRAM

## 2020-12-10 PROCEDURE — 99223 1ST HOSP IP/OBS HIGH 75: CPT | Performed by: STUDENT IN AN ORGANIZED HEALTH CARE EDUCATION/TRAINING PROGRAM

## 2020-12-10 PROCEDURE — 90682 RIV4 VACC RECOMBINANT DNA IM: CPT | Performed by: STUDENT IN AN ORGANIZED HEALTH CARE EDUCATION/TRAINING PROGRAM

## 2020-12-10 PROCEDURE — 93005 ELECTROCARDIOGRAM TRACING: CPT

## 2020-12-10 PROCEDURE — 99253 IP/OBS CNSLTJ NEW/EST LOW 45: CPT | Performed by: PHYSICIAN ASSISTANT

## 2020-12-10 RX ORDER — HALOPERIDOL 5 MG/ML
5 INJECTION INTRAMUSCULAR EVERY 6 HOURS PRN
Status: DISCONTINUED | OUTPATIENT
Start: 2020-12-10 | End: 2020-12-14 | Stop reason: HOSPADM

## 2020-12-10 RX ORDER — GABAPENTIN 300 MG/1
300 CAPSULE ORAL 3 TIMES DAILY
Status: DISCONTINUED | OUTPATIENT
Start: 2020-12-10 | End: 2020-12-14 | Stop reason: HOSPADM

## 2020-12-10 RX ORDER — BUPRENORPHINE AND NALOXONE 8; 2 MG/1; MG/1
1 FILM, SOLUBLE BUCCAL; SUBLINGUAL 2 TIMES DAILY
Status: DISCONTINUED | OUTPATIENT
Start: 2020-12-10 | End: 2020-12-14 | Stop reason: HOSPADM

## 2020-12-10 RX ORDER — HALOPERIDOL 5 MG
5 TABLET ORAL
Status: DISCONTINUED | OUTPATIENT
Start: 2020-12-10 | End: 2020-12-14 | Stop reason: HOSPADM

## 2020-12-10 RX ORDER — BENZTROPINE MESYLATE 1 MG/1
1 TABLET ORAL EVERY 6 HOURS PRN
Status: DISCONTINUED | OUTPATIENT
Start: 2020-12-10 | End: 2020-12-14 | Stop reason: HOSPADM

## 2020-12-10 RX ORDER — HALOPERIDOL 5 MG
5 TABLET ORAL EVERY 6 HOURS PRN
Status: DISCONTINUED | OUTPATIENT
Start: 2020-12-10 | End: 2020-12-14 | Stop reason: HOSPADM

## 2020-12-10 RX ORDER — QUETIAPINE FUMARATE 200 MG/1
200 TABLET, FILM COATED ORAL ONCE
Status: COMPLETED | OUTPATIENT
Start: 2020-12-10 | End: 2020-12-10

## 2020-12-10 RX ORDER — MAGNESIUM HYDROXIDE/ALUMINUM HYDROXICE/SIMETHICONE 120; 1200; 1200 MG/30ML; MG/30ML; MG/30ML
30 SUSPENSION ORAL EVERY 4 HOURS PRN
Status: DISCONTINUED | OUTPATIENT
Start: 2020-12-10 | End: 2020-12-14 | Stop reason: HOSPADM

## 2020-12-10 RX ORDER — RISPERIDONE 1 MG/1
1 TABLET, ORALLY DISINTEGRATING ORAL EVERY 6 HOURS PRN
Status: DISCONTINUED | OUTPATIENT
Start: 2020-12-10 | End: 2020-12-14 | Stop reason: HOSPADM

## 2020-12-10 RX ORDER — BENZTROPINE MESYLATE 1 MG/ML
1 INJECTION INTRAMUSCULAR; INTRAVENOUS EVERY 6 HOURS PRN
Status: DISCONTINUED | OUTPATIENT
Start: 2020-12-10 | End: 2020-12-14 | Stop reason: HOSPADM

## 2020-12-10 RX ORDER — ACETAMINOPHEN 325 MG/1
650 TABLET ORAL EVERY 6 HOURS PRN
Status: DISCONTINUED | OUTPATIENT
Start: 2020-12-10 | End: 2020-12-13

## 2020-12-10 RX ADMIN — INFLUENZA A VIRUS A/HAWAII/70/2019 (H1N1) RECOMBINANT HEMAGGLUTININ ANTIGEN, INFLUENZA A VIRUS A/MINNESOTA/41/2019 (H3N2) RECOMBINANT HEMAGGLUTININ ANTIGEN, INFLUENZA B VIRUS B/WASHINGTON/02/2019 RECOMBINANT HEMAGGLUTININ ANTIGEN, AND INFLUENZA B VIRUS B/PHUKET/3073/2013 RECOMBINANT HEMAGGLUTININ ANTIGEN 0.5 ML: 45; 45; 45; 45 INJECTION INTRAMUSCULAR at 14:13

## 2020-12-10 RX ADMIN — ACETAMINOPHEN 650 MG: 325 TABLET ORAL at 13:44

## 2020-12-10 RX ADMIN — HALOPERIDOL 5 MG: 5 TABLET ORAL at 11:21

## 2020-12-10 RX ADMIN — BUPRENORPHINE AND NALOXONE 1 FILM: 8; 2 FILM BUCCAL; SUBLINGUAL at 21:43

## 2020-12-10 RX ADMIN — QUETIAPINE FUMARATE 200 MG: 200 TABLET ORAL at 02:57

## 2020-12-10 RX ADMIN — GABAPENTIN 300 MG: 300 CAPSULE ORAL at 15:57

## 2020-12-10 RX ADMIN — HALOPERIDOL 5 MG: 5 TABLET ORAL at 21:43

## 2020-12-10 RX ADMIN — SERTRALINE HYDROCHLORIDE 50 MG: 50 TABLET ORAL at 12:11

## 2020-12-10 RX ADMIN — BUPRENORPHINE AND NALOXONE 1 FILM: 8; 2 FILM BUCCAL; SUBLINGUAL at 12:12

## 2020-12-10 RX ADMIN — GABAPENTIN 300 MG: 300 CAPSULE ORAL at 21:43

## 2020-12-10 RX ADMIN — GABAPENTIN 300 MG: 300 CAPSULE ORAL at 12:15

## 2020-12-11 LAB
ALBUMIN SERPL BCP-MCNC: 3.2 G/DL (ref 3.5–5)
ALP SERPL-CCNC: 62 U/L (ref 46–116)
ALT SERPL W P-5'-P-CCNC: 23 U/L (ref 12–78)
ANION GAP SERPL CALCULATED.3IONS-SCNC: 7 MMOL/L (ref 4–13)
AST SERPL W P-5'-P-CCNC: 24 U/L (ref 5–45)
ATRIAL RATE: 58 BPM
BASOPHILS # BLD AUTO: 0.03 THOUSANDS/ΜL (ref 0–0.1)
BASOPHILS NFR BLD AUTO: 1 % (ref 0–1)
BILIRUB SERPL-MCNC: 0.3 MG/DL (ref 0.2–1)
BUN SERPL-MCNC: 9 MG/DL (ref 5–25)
CALCIUM ALBUM COR SERPL-MCNC: 9.1 MG/DL (ref 8.3–10.1)
CALCIUM SERPL-MCNC: 8.5 MG/DL (ref 8.3–10.1)
CHLORIDE SERPL-SCNC: 104 MMOL/L (ref 100–108)
CHOLEST SERPL-MCNC: 180 MG/DL (ref 50–200)
CO2 SERPL-SCNC: 30 MMOL/L (ref 21–32)
CREAT SERPL-MCNC: 0.76 MG/DL (ref 0.6–1.3)
EOSINOPHIL # BLD AUTO: 0.24 THOUSAND/ΜL (ref 0–0.61)
EOSINOPHIL NFR BLD AUTO: 6 % (ref 0–6)
ERYTHROCYTE [DISTWIDTH] IN BLOOD BY AUTOMATED COUNT: 11.9 % (ref 11.6–15.1)
GFR SERPL CREATININE-BSD FRML MDRD: 87 ML/MIN/1.73SQ M
GLUCOSE SERPL-MCNC: 84 MG/DL (ref 65–140)
HCT VFR BLD AUTO: 37.9 % (ref 34.8–46.1)
HDLC SERPL-MCNC: 47 MG/DL
HGB BLD-MCNC: 12.3 G/DL (ref 11.5–15.4)
IMM GRANULOCYTES # BLD AUTO: 0 THOUSAND/UL (ref 0–0.2)
IMM GRANULOCYTES NFR BLD AUTO: 0 % (ref 0–2)
LDLC SERPL CALC-MCNC: 106 MG/DL (ref 0–100)
LYMPHOCYTES # BLD AUTO: 1.46 THOUSANDS/ΜL (ref 0.6–4.47)
LYMPHOCYTES NFR BLD AUTO: 36 % (ref 14–44)
MCH RBC QN AUTO: 30.7 PG (ref 26.8–34.3)
MCHC RBC AUTO-ENTMCNC: 32.5 G/DL (ref 31.4–37.4)
MCV RBC AUTO: 95 FL (ref 82–98)
MONOCYTES # BLD AUTO: 0.39 THOUSAND/ΜL (ref 0.17–1.22)
MONOCYTES NFR BLD AUTO: 10 % (ref 4–12)
NEUTROPHILS # BLD AUTO: 1.91 THOUSANDS/ΜL (ref 1.85–7.62)
NEUTS SEG NFR BLD AUTO: 47 % (ref 43–75)
NONHDLC SERPL-MCNC: 133 MG/DL
NRBC BLD AUTO-RTO: 0 /100 WBCS
P AXIS: 64 DEGREES
PLATELET # BLD AUTO: 217 THOUSANDS/UL (ref 149–390)
PMV BLD AUTO: 9.8 FL (ref 8.9–12.7)
POTASSIUM SERPL-SCNC: 3.8 MMOL/L (ref 3.5–5.3)
PR INTERVAL: 146 MS
PROT SERPL-MCNC: 6.5 G/DL (ref 6.4–8.2)
QRS AXIS: 77 DEGREES
QRSD INTERVAL: 84 MS
QT INTERVAL: 420 MS
QTC INTERVAL: 412 MS
RBC # BLD AUTO: 4.01 MILLION/UL (ref 3.81–5.12)
RPR SER QL: NORMAL
SODIUM SERPL-SCNC: 141 MMOL/L (ref 136–145)
T WAVE AXIS: 38 DEGREES
TRIGL SERPL-MCNC: 135 MG/DL
TSH SERPL DL<=0.05 MIU/L-ACNC: 7.89 UIU/ML (ref 0.36–3.74)
VENTRICULAR RATE: 58 BPM
WBC # BLD AUTO: 4.03 THOUSAND/UL (ref 4.31–10.16)

## 2020-12-11 PROCEDURE — 93010 ELECTROCARDIOGRAM REPORT: CPT | Performed by: INTERNAL MEDICINE

## 2020-12-11 PROCEDURE — 86592 SYPHILIS TEST NON-TREP QUAL: CPT | Performed by: STUDENT IN AN ORGANIZED HEALTH CARE EDUCATION/TRAINING PROGRAM

## 2020-12-11 PROCEDURE — 84443 ASSAY THYROID STIM HORMONE: CPT | Performed by: STUDENT IN AN ORGANIZED HEALTH CARE EDUCATION/TRAINING PROGRAM

## 2020-12-11 PROCEDURE — 80053 COMPREHEN METABOLIC PANEL: CPT | Performed by: STUDENT IN AN ORGANIZED HEALTH CARE EDUCATION/TRAINING PROGRAM

## 2020-12-11 PROCEDURE — 80061 LIPID PANEL: CPT | Performed by: STUDENT IN AN ORGANIZED HEALTH CARE EDUCATION/TRAINING PROGRAM

## 2020-12-11 PROCEDURE — 85025 COMPLETE CBC W/AUTO DIFF WBC: CPT | Performed by: STUDENT IN AN ORGANIZED HEALTH CARE EDUCATION/TRAINING PROGRAM

## 2020-12-11 PROCEDURE — 99232 SBSQ HOSP IP/OBS MODERATE 35: CPT | Performed by: STUDENT IN AN ORGANIZED HEALTH CARE EDUCATION/TRAINING PROGRAM

## 2020-12-11 RX ORDER — LORAZEPAM 1 MG/1
1 TABLET ORAL EVERY 8 HOURS PRN
Status: DISCONTINUED | OUTPATIENT
Start: 2020-12-11 | End: 2020-12-14 | Stop reason: HOSPADM

## 2020-12-11 RX ORDER — RISPERIDONE 0.5 MG/1
0.5 TABLET, FILM COATED ORAL EVERY 6 HOURS PRN
Status: DISCONTINUED | OUTPATIENT
Start: 2020-12-11 | End: 2020-12-14 | Stop reason: HOSPADM

## 2020-12-11 RX ORDER — HYDROXYZINE HYDROCHLORIDE 25 MG/1
25 TABLET, FILM COATED ORAL EVERY 6 HOURS PRN
Status: DISCONTINUED | OUTPATIENT
Start: 2020-12-11 | End: 2020-12-14 | Stop reason: HOSPADM

## 2020-12-11 RX ORDER — HYDROXYZINE 50 MG/1
50 TABLET, FILM COATED ORAL EVERY 6 HOURS PRN
Status: DISCONTINUED | OUTPATIENT
Start: 2020-12-11 | End: 2020-12-14 | Stop reason: HOSPADM

## 2020-12-11 RX ORDER — OLANZAPINE 10 MG/1
10 TABLET ORAL EVERY 8 HOURS PRN
Status: DISCONTINUED | OUTPATIENT
Start: 2020-12-11 | End: 2020-12-14 | Stop reason: HOSPADM

## 2020-12-11 RX ADMIN — HYDROXYZINE HYDROCHLORIDE 50 MG: 50 TABLET, FILM COATED ORAL at 12:20

## 2020-12-11 RX ADMIN — LORAZEPAM 1 MG: 1 TABLET ORAL at 14:15

## 2020-12-11 RX ADMIN — SERTRALINE HYDROCHLORIDE 50 MG: 50 TABLET ORAL at 08:07

## 2020-12-11 RX ADMIN — GABAPENTIN 300 MG: 300 CAPSULE ORAL at 16:38

## 2020-12-11 RX ADMIN — HALOPERIDOL 5 MG: 5 TABLET ORAL at 21:21

## 2020-12-11 RX ADMIN — GABAPENTIN 300 MG: 300 CAPSULE ORAL at 08:07

## 2020-12-11 RX ADMIN — BUPRENORPHINE AND NALOXONE 1 FILM: 8; 2 FILM BUCCAL; SUBLINGUAL at 08:07

## 2020-12-11 RX ADMIN — GABAPENTIN 300 MG: 300 CAPSULE ORAL at 21:21

## 2020-12-11 RX ADMIN — BUPRENORPHINE AND NALOXONE 1 FILM: 8; 2 FILM BUCCAL; SUBLINGUAL at 20:00

## 2020-12-12 PROCEDURE — 99232 SBSQ HOSP IP/OBS MODERATE 35: CPT | Performed by: PSYCHIATRY & NEUROLOGY

## 2020-12-12 RX ADMIN — BUPRENORPHINE AND NALOXONE 1 FILM: 8; 2 FILM BUCCAL; SUBLINGUAL at 08:04

## 2020-12-12 RX ADMIN — SERTRALINE HYDROCHLORIDE 50 MG: 50 TABLET ORAL at 08:04

## 2020-12-12 RX ADMIN — GABAPENTIN 300 MG: 300 CAPSULE ORAL at 21:10

## 2020-12-12 RX ADMIN — HALOPERIDOL 5 MG: 5 TABLET ORAL at 21:10

## 2020-12-12 RX ADMIN — GABAPENTIN 300 MG: 300 CAPSULE ORAL at 08:04

## 2020-12-12 RX ADMIN — ACETAMINOPHEN 650 MG: 325 TABLET ORAL at 13:48

## 2020-12-12 RX ADMIN — BUPRENORPHINE AND NALOXONE 1 FILM: 8; 2 FILM BUCCAL; SUBLINGUAL at 20:00

## 2020-12-12 RX ADMIN — HYDROXYZINE HYDROCHLORIDE 50 MG: 50 TABLET, FILM COATED ORAL at 09:39

## 2020-12-12 RX ADMIN — GABAPENTIN 300 MG: 300 CAPSULE ORAL at 15:59

## 2020-12-12 RX ADMIN — ACETAMINOPHEN 650 MG: 325 TABLET ORAL at 21:09

## 2020-12-13 PROCEDURE — 99232 SBSQ HOSP IP/OBS MODERATE 35: CPT | Performed by: PSYCHIATRY & NEUROLOGY

## 2020-12-13 RX ORDER — IBUPROFEN 400 MG/1
400 TABLET ORAL EVERY 6 HOURS PRN
Status: DISCONTINUED | OUTPATIENT
Start: 2020-12-13 | End: 2020-12-14 | Stop reason: HOSPADM

## 2020-12-13 RX ORDER — ACETAMINOPHEN 325 MG/1
325 TABLET ORAL EVERY 6 HOURS PRN
Status: DISCONTINUED | OUTPATIENT
Start: 2020-12-13 | End: 2020-12-14 | Stop reason: HOSPADM

## 2020-12-13 RX ADMIN — ALUMINUM HYDROXIDE, MAGNESIUM HYDROXIDE, AND SIMETHICONE 30 ML: 200; 200; 20 SUSPENSION ORAL at 18:53

## 2020-12-13 RX ADMIN — HALOPERIDOL 5 MG: 5 TABLET ORAL at 21:14

## 2020-12-13 RX ADMIN — ALUMINUM HYDROXIDE, MAGNESIUM HYDROXIDE, AND SIMETHICONE 30 ML: 200; 200; 20 SUSPENSION ORAL at 06:27

## 2020-12-13 RX ADMIN — SERTRALINE HYDROCHLORIDE 50 MG: 50 TABLET ORAL at 08:05

## 2020-12-13 RX ADMIN — BUPRENORPHINE AND NALOXONE 1 FILM: 8; 2 FILM BUCCAL; SUBLINGUAL at 08:05

## 2020-12-13 RX ADMIN — BUPRENORPHINE AND NALOXONE 1 FILM: 8; 2 FILM BUCCAL; SUBLINGUAL at 20:05

## 2020-12-13 RX ADMIN — HYDROXYZINE HYDROCHLORIDE 25 MG: 25 TABLET, FILM COATED ORAL at 09:40

## 2020-12-13 RX ADMIN — IBUPROFEN 400 MG: 400 TABLET ORAL at 12:59

## 2020-12-13 RX ADMIN — GABAPENTIN 300 MG: 300 CAPSULE ORAL at 15:49

## 2020-12-13 RX ADMIN — LORAZEPAM 1 MG: 1 TABLET ORAL at 17:36

## 2020-12-13 RX ADMIN — GABAPENTIN 300 MG: 300 CAPSULE ORAL at 08:05

## 2020-12-13 RX ADMIN — GABAPENTIN 300 MG: 300 CAPSULE ORAL at 21:14

## 2020-12-14 VITALS
RESPIRATION RATE: 17 BRPM | HEART RATE: 72 BPM | TEMPERATURE: 97.8 F | WEIGHT: 166.89 LBS | DIASTOLIC BLOOD PRESSURE: 75 MMHG | BODY MASS INDEX: 28.49 KG/M2 | HEIGHT: 64 IN | OXYGEN SATURATION: 94 % | SYSTOLIC BLOOD PRESSURE: 119 MMHG

## 2020-12-14 PROCEDURE — 99239 HOSP IP/OBS DSCHRG MGMT >30: CPT | Performed by: PHYSICIAN ASSISTANT

## 2020-12-14 RX ORDER — HALOPERIDOL 5 MG
5 TABLET ORAL
Qty: 7 TABLET | Refills: 0 | Status: SHIPPED | OUTPATIENT
Start: 2020-12-14 | End: 2022-02-07 | Stop reason: HOSPADM

## 2020-12-14 RX ORDER — HYDROXYZINE 50 MG/1
50 TABLET, FILM COATED ORAL EVERY 8 HOURS PRN
Qty: 10 TABLET | Refills: 0 | Status: SHIPPED | OUTPATIENT
Start: 2020-12-14 | End: 2022-02-07 | Stop reason: HOSPADM

## 2020-12-14 RX ORDER — GABAPENTIN 300 MG/1
300 CAPSULE ORAL 3 TIMES DAILY
Qty: 21 CAPSULE | Refills: 0 | Status: SHIPPED | OUTPATIENT
Start: 2020-12-14 | End: 2022-02-07 | Stop reason: HOSPADM

## 2020-12-14 RX ADMIN — LORAZEPAM 1 MG: 1 TABLET ORAL at 10:06

## 2020-12-14 RX ADMIN — SERTRALINE HYDROCHLORIDE 50 MG: 50 TABLET ORAL at 08:04

## 2020-12-14 RX ADMIN — BUPRENORPHINE AND NALOXONE 1 FILM: 8; 2 FILM BUCCAL; SUBLINGUAL at 08:04

## 2020-12-14 RX ADMIN — GABAPENTIN 300 MG: 300 CAPSULE ORAL at 08:04

## 2020-12-15 DIAGNOSIS — K21.9 GASTROESOPHAGEAL REFLUX DISEASE, UNSPECIFIED WHETHER ESOPHAGITIS PRESENT: Primary | ICD-10-CM

## 2020-12-15 RX ORDER — PANTOPRAZOLE SODIUM 40 MG/1
TABLET, DELAYED RELEASE ORAL
Qty: 30 TABLET | Refills: 1 | Status: SHIPPED | OUTPATIENT
Start: 2020-12-15 | End: 2021-10-18 | Stop reason: SDUPTHER

## 2020-12-18 ENCOUNTER — TELEPHONE (OUTPATIENT)
Dept: FAMILY MEDICINE CLINIC | Facility: HOSPITAL | Age: 60
End: 2020-12-18

## 2020-12-21 ENCOUNTER — OFFICE VISIT (OUTPATIENT)
Dept: FAMILY MEDICINE CLINIC | Facility: HOSPITAL | Age: 60
End: 2020-12-21
Payer: COMMERCIAL

## 2020-12-21 VITALS
HEART RATE: 66 BPM | SYSTOLIC BLOOD PRESSURE: 120 MMHG | TEMPERATURE: 96.7 F | DIASTOLIC BLOOD PRESSURE: 82 MMHG | BODY MASS INDEX: 27.86 KG/M2 | WEIGHT: 167.2 LBS | HEIGHT: 65 IN

## 2020-12-21 DIAGNOSIS — Z23 ENCOUNTER FOR IMMUNIZATION: ICD-10-CM

## 2020-12-21 DIAGNOSIS — N89.8 VAGINAL DISCHARGE: Primary | ICD-10-CM

## 2020-12-21 DIAGNOSIS — Z12.11 SCREENING FOR COLON CANCER: ICD-10-CM

## 2020-12-21 DIAGNOSIS — Z12.31 ENCOUNTER FOR SCREENING MAMMOGRAM FOR MALIGNANT NEOPLASM OF BREAST: ICD-10-CM

## 2020-12-21 PROCEDURE — 3008F BODY MASS INDEX DOCD: CPT | Performed by: NURSE PRACTITIONER

## 2020-12-21 PROCEDURE — 99213 OFFICE O/P EST LOW 20 MIN: CPT | Performed by: NURSE PRACTITIONER

## 2020-12-21 PROCEDURE — 90471 IMMUNIZATION ADMIN: CPT | Performed by: NURSE PRACTITIONER

## 2020-12-21 PROCEDURE — 90732 PPSV23 VACC 2 YRS+ SUBQ/IM: CPT | Performed by: NURSE PRACTITIONER

## 2020-12-21 PROCEDURE — 4004F PT TOBACCO SCREEN RCVD TLK: CPT | Performed by: NURSE PRACTITIONER

## 2020-12-23 LAB
A VAGINAE DNA VAG QL NAA+PROBE: NORMAL SCORE
BVAB2 DNA VAG QL NAA+PROBE: NORMAL SCORE
C ALBICANS DNA VAG QL NAA+PROBE: NEGATIVE
C GLABRATA DNA VAG QL NAA+PROBE: NEGATIVE
C TRACH RRNA SPEC QL NAA+PROBE: NEGATIVE
MEGA1 DNA VAG QL NAA+PROBE: NORMAL SCORE
N GONORRHOEA RRNA SPEC QL NAA+PROBE: NEGATIVE
T VAGINALIS RRNA SPEC QL NAA+PROBE: NEGATIVE

## 2021-01-04 ENCOUNTER — HOSPITAL ENCOUNTER (EMERGENCY)
Facility: HOSPITAL | Age: 61
End: 2021-01-05
Attending: EMERGENCY MEDICINE
Payer: COMMERCIAL

## 2021-01-04 DIAGNOSIS — F31.9 BIPOLAR DISORDER (HCC): ICD-10-CM

## 2021-01-04 DIAGNOSIS — R45.851 DEPRESSION WITH SUICIDAL IDEATION: Primary | ICD-10-CM

## 2021-01-04 DIAGNOSIS — F32.A DEPRESSION WITH SUICIDAL IDEATION: Primary | ICD-10-CM

## 2021-01-04 LAB
AMPHETAMINES SERPL QL SCN: NEGATIVE
ANION GAP SERPL CALCULATED.3IONS-SCNC: 9 MMOL/L (ref 4–13)
BARBITURATES UR QL: NEGATIVE
BASOPHILS # BLD AUTO: 0.03 THOUSANDS/ΜL (ref 0–0.1)
BASOPHILS NFR BLD AUTO: 1 % (ref 0–1)
BENZODIAZ UR QL: NEGATIVE
BUN SERPL-MCNC: 11 MG/DL (ref 5–25)
CALCIUM SERPL-MCNC: 9.5 MG/DL (ref 8.3–10.1)
CHLORIDE SERPL-SCNC: 103 MMOL/L (ref 100–108)
CO2 SERPL-SCNC: 27 MMOL/L (ref 21–32)
COCAINE UR QL: NEGATIVE
CREAT SERPL-MCNC: 0.75 MG/DL (ref 0.6–1.3)
EOSINOPHIL # BLD AUTO: 0.83 THOUSAND/ΜL (ref 0–0.61)
EOSINOPHIL NFR BLD AUTO: 16 % (ref 0–6)
ERYTHROCYTE [DISTWIDTH] IN BLOOD BY AUTOMATED COUNT: 12 % (ref 11.6–15.1)
ETHANOL EXG-MCNC: 0 MG/DL
FLUAV RNA RESP QL NAA+PROBE: NEGATIVE
FLUBV RNA RESP QL NAA+PROBE: NEGATIVE
GFR SERPL CREATININE-BSD FRML MDRD: 88 ML/MIN/1.73SQ M
GLUCOSE SERPL-MCNC: 77 MG/DL (ref 65–140)
HCT VFR BLD AUTO: 41.2 % (ref 34.8–46.1)
HGB BLD-MCNC: 13.6 G/DL (ref 11.5–15.4)
IMM GRANULOCYTES # BLD AUTO: 0.06 THOUSAND/UL (ref 0–0.2)
IMM GRANULOCYTES NFR BLD AUTO: 1 % (ref 0–2)
LYMPHOCYTES # BLD AUTO: 1.5 THOUSANDS/ΜL (ref 0.6–4.47)
LYMPHOCYTES NFR BLD AUTO: 30 % (ref 14–44)
MCH RBC QN AUTO: 30.5 PG (ref 26.8–34.3)
MCHC RBC AUTO-ENTMCNC: 33 G/DL (ref 31.4–37.4)
MCV RBC AUTO: 92 FL (ref 82–98)
METHADONE UR QL: NEGATIVE
MONOCYTES # BLD AUTO: 0.37 THOUSAND/ΜL (ref 0.17–1.22)
MONOCYTES NFR BLD AUTO: 7 % (ref 4–12)
NEUTROPHILS # BLD AUTO: 2.29 THOUSANDS/ΜL (ref 1.85–7.62)
NEUTS SEG NFR BLD AUTO: 45 % (ref 43–75)
OPIATES UR QL SCN: NEGATIVE
OXYCODONE+OXYMORPHONE UR QL SCN: NEGATIVE
PCP UR QL: NEGATIVE
PLATELET # BLD AUTO: 247 THOUSANDS/UL (ref 149–390)
PMV BLD AUTO: 9.5 FL (ref 8.9–12.7)
POTASSIUM SERPL-SCNC: 3.7 MMOL/L (ref 3.5–5.3)
RBC # BLD AUTO: 4.46 MILLION/UL (ref 3.81–5.12)
RSV RNA RESP QL NAA+PROBE: NEGATIVE
SARS-COV-2 RNA RESP QL NAA+PROBE: NEGATIVE
SODIUM SERPL-SCNC: 139 MMOL/L (ref 136–145)
THC UR QL: NEGATIVE
WBC # BLD AUTO: 5.08 THOUSAND/UL (ref 4.31–10.16)

## 2021-01-04 PROCEDURE — 99285 EMERGENCY DEPT VISIT HI MDM: CPT

## 2021-01-04 PROCEDURE — 85025 COMPLETE CBC W/AUTO DIFF WBC: CPT | Performed by: EMERGENCY MEDICINE

## 2021-01-04 PROCEDURE — 99285 EMERGENCY DEPT VISIT HI MDM: CPT | Performed by: EMERGENCY MEDICINE

## 2021-01-04 PROCEDURE — 0241U HB NFCT DS VIR RESP RNA 4 TRGT: CPT | Performed by: EMERGENCY MEDICINE

## 2021-01-04 PROCEDURE — 80307 DRUG TEST PRSMV CHEM ANLYZR: CPT | Performed by: EMERGENCY MEDICINE

## 2021-01-04 PROCEDURE — 80048 BASIC METABOLIC PNL TOTAL CA: CPT | Performed by: EMERGENCY MEDICINE

## 2021-01-04 PROCEDURE — 82075 ASSAY OF BREATH ETHANOL: CPT | Performed by: EMERGENCY MEDICINE

## 2021-01-04 PROCEDURE — 36415 COLL VENOUS BLD VENIPUNCTURE: CPT | Performed by: EMERGENCY MEDICINE

## 2021-01-04 RX ORDER — HYDROXYZINE HYDROCHLORIDE 25 MG/1
50 TABLET, FILM COATED ORAL ONCE
Status: COMPLETED | OUTPATIENT
Start: 2021-01-04 | End: 2021-01-04

## 2021-01-04 RX ORDER — HALOPERIDOL 5 MG
5 TABLET ORAL ONCE
Status: COMPLETED | OUTPATIENT
Start: 2021-01-04 | End: 2021-01-04

## 2021-01-04 RX ADMIN — HALOPERIDOL 5 MG: 5 TABLET ORAL at 22:53

## 2021-01-04 RX ADMIN — HYDROXYZINE HYDROCHLORIDE 50 MG: 25 TABLET, FILM COATED ORAL at 22:53

## 2021-01-05 VITALS
HEART RATE: 67 BPM | BODY MASS INDEX: 27.31 KG/M2 | WEIGHT: 160 LBS | OXYGEN SATURATION: 99 % | HEIGHT: 64 IN | DIASTOLIC BLOOD PRESSURE: 67 MMHG | TEMPERATURE: 97.9 F | RESPIRATION RATE: 20 BRPM | SYSTOLIC BLOOD PRESSURE: 108 MMHG

## 2021-01-05 RX ORDER — QUETIAPINE FUMARATE 100 MG/1
100 TABLET, FILM COATED ORAL
COMMUNITY
End: 2022-02-07 | Stop reason: HOSPADM

## 2021-01-05 RX ORDER — BUPRENORPHINE AND NALOXONE 8; 2 MG/1; MG/1
1 FILM, SOLUBLE BUCCAL; SUBLINGUAL DAILY
Status: DISCONTINUED | OUTPATIENT
Start: 2021-01-05 | End: 2021-01-05 | Stop reason: HOSPADM

## 2021-01-05 RX ORDER — GABAPENTIN 300 MG/1
300 CAPSULE ORAL 3 TIMES DAILY
Status: DISCONTINUED | OUTPATIENT
Start: 2021-01-05 | End: 2021-01-05 | Stop reason: HOSPADM

## 2021-01-05 RX ORDER — PANTOPRAZOLE SODIUM 40 MG/1
40 TABLET, DELAYED RELEASE ORAL ONCE
Status: COMPLETED | OUTPATIENT
Start: 2021-01-05 | End: 2021-01-05

## 2021-01-05 RX ADMIN — GABAPENTIN 300 MG: 300 CAPSULE ORAL at 09:46

## 2021-01-05 RX ADMIN — BUPRENORPHINE AND NALOXONE 1 FILM: 8; 2 FILM BUCCAL; SUBLINGUAL at 09:47

## 2021-01-05 RX ADMIN — PANTOPRAZOLE SODIUM 40 MG: 40 TABLET, DELAYED RELEASE ORAL at 09:46

## 2021-01-05 RX ADMIN — SERTRALINE HYDROCHLORIDE 50 MG: 50 TABLET ORAL at 09:46

## 2021-01-05 NOTE — ED NOTES
Patients medications brought from home, CW notified that transport can now be set up to [facility], [place]  01/05/21 1325

## 2021-01-05 NOTE — ED NOTES
Patient signed a voluntary admission form and requested referral back to [facility] 2W  Referral was sent to Intake Unit in case they have a bed tomorrow  She was told, however, that there was no bed there today and we would also be searching at other facilities  She agreed with this  She does take suboxone, however, and needs placement at a facility that can continue to provide that

## 2021-01-05 NOTE — EMTALA/ACUTE CARE TRANSFER
[facility] EMERGENCY DEPARTMENT  [address] [facility] [address]  Dept: [phone]      RPVIIY TRANSFER CONSENT    NAME Burgess Martin                                         1960                              MRN [id number]    I have been informed of my rights regarding examination, treatment, and transfer   by Dr Carlos A Paulino MD    Benefits: Continuity of care    Risks:        Consent for Transfer:  I acknowledge that my medical condition has been evaluated and explained to me by the emergency department physician or other qualified medical person and/or my attending physician, who has recommended that I be transferred to the service of  Accepting Physician: Meg Toussaint at [address], [address] : [place]  The above potential benefits of such transfer, the potential risks associated with such transfer, and the probable risks of not being transferred have been explained to me, and I fully understand them  The doctor has explained that, in my case, the benefits of transfer outweigh the risks  I agree to be transferred  I authorize the performance of emergency medical procedures and treatments upon me in both transit and upon arrival at the receiving facility  Additionally, I authorize the release of any and all medical records to the receiving facility and request they be transported with me, if possible  I understand that the safest mode of transportation during a medical emergency is an ambulance and that the Hospital advocates the use of this mode of transport  Risks of traveling to the receiving facility by car, including absence of medical control, life sustaining equipment, such as oxygen, and medical personnel has been explained to me and I fully understand them  (CAROLINA CORRECT BOX BELOW)  [  ]  I consent to the stated transfer and to be transported by ambulance/helicopter    [  ]  I consent to the stated transfer, but refuse transportation by ambulance and accept full responsibility for my transportation by car  I understand the risks of non-ambulance transfers and I exonerate the Hospital and its staff from any deterioration in my condition that results from this refusal     X___________________________________________    DATE  21  TIME________  Signature of patient or legally responsible individual signing on patient behalf           RELATIONSHIP TO PATIENT_________________________          Provider Certification    NAME Guerita Sy                                         1960                              MRN [id number]    A medical screening exam was performed on the above named patient  Based on the examination:    Condition Necessitating Transfer The primary encounter diagnosis was Depression with suicidal ideation  A diagnosis of Bipolar disorder ([facility] [address] ) was also pertinent to this visit  Patient Condition: The patient has been stabilized such that within reasonable medical probability, no material deterioration of the patient condition or the condition of the unborn child(osvaldo) is likely to result from the transfer    Reason for Transfer: Level of Care needed not available at this facility    Transfer Requirements: [address] [facility]   · Space available and qualified personnel available for treatment as acknowledged by    · Agreed to accept transfer and to provide appropriate medical treatment as acknowledged by       Etta Baez  · Appropriate medical records of the examination and treatment of the patient are provided at the time of transfer   [address] _______  · Transfer will be performed by qualified personnel from [facility]  and appropriate transfer equipment as required, including the use of necessary and appropriate life support measures      Provider Certification: I have examined the patient and explained the following risks and benefits of being transferred/refusing transfer to the patient/family:  The patient is stable for psychiatric transfer because they are medically stable, and is protected from harming him/herself or others during transport      Based on these reasonable risks and benefits to the patient and/or the unborn child(osvaldo), and based upon the information available at the time of the patients examination, I certify that the medical benefits reasonably to be expected from the provision of appropriate medical treatments at another medical facility outweigh the increasing risks, if any, to the individuals medical condition, and in the case of labor to the unborn child, from effecting the transfer      X____________________________________________ DATE 01/05/21        TIME_______      ORIGINAL - SEND TO MEDICAL RECORDS   COPY - SEND WITH PATIENT DURING TRANSFER

## 2021-01-05 NOTE — ED NOTES
Received call from from [address] [place] [address], spoke with Shayna Falcon, who states patient is tentatively accepted pending letter from Anthony Powers ([phone])[id number] Suboxone dose  Patient's bed will be held there at this time  Accepting Physician is Dr Slime Simpson, once letter is received and Suboxone confirmed  Patient can be set up for transportation, and get nurse report number

## 2021-01-05 NOTE — ED NOTES
Phone call placed to [facility], spoke with Li Johnson for update, they have not reviewed patient yet, and will call once reviewed

## 2021-01-05 NOTE — ED NOTES
Patient is reporting increased depression and suicidal thoughts since she ran out of her psychiatric medications a week after her discharge from inpatient treatment last month  She says the earliest appointment she could get with her [facility] psychiatrist was next week, and she was only discharged with 7 days worth of medications with no refills  She says her depression began to increase, her insomnia and lack of appetite returned, she began to feel hopeless, and recently began having thoughts of overdosing on her sister's sleeping pills  She says she no longer feels safe, and needs to be hospitalized again  She denies HI and psychosis  She continues to take prescribed suboxone

## 2021-01-05 NOTE — ED NOTES
Bed Search Efforts    [place]- no beds  [place]- no beds,   [place]- can review, cannot make final determination without letter from Suboxone prescriber   Chart faxed  [facility]- no beds, per [facility]- no beds, per Dangelo Pierce

## 2021-01-05 NOTE — ED NOTES
Insurance Authorization for admission:   Phone call placed to [facility]  Phone number: [phone]  Spoke to Lanie John     3 days approved  Level of care: [facility]/201  Review on TBD  Authorization #accepting to call upon arrival for authorization number     EVS (Eligibility Verification System) called - [phone]  Automated system indicates: [facility] Recipient ID [id number]    Insurance Authorization for Transportation:     To be set up with [facility]

## 2021-01-05 NOTE — ED NOTES
Patient ambulated to bathroom without difficulty  Patient brushed teeth  Patient provided with snacks  Requested for something to sleep and nurse notified       Jacki Hansen  01/05/21 2681

## 2021-01-05 NOTE — ED NOTES
Received a call from Rochelle long at [facility]  She will fax the letter that has been requested  Will set up transport once the letter is received

## 2021-01-05 NOTE — ED PROVIDER NOTES
History  Chief Complaint   Patient presents with    Psychiatric Evaluation     Patient presents to the ER with suicidal thoughts  Patient has been out of her medications for the past two weeks, unable to get them filled and now is feeling suicidal      73-year-old female with history of bipolar disorder, major depression, BARBARA, rheumatoid arthritis and substance abuse on chronic Suboxone therapy feels suicidal   Patient was inpatient at [address] [place] [address] for depression with SI few weeks ago  She states that her medications were changed and after taking it for 1 week she was unable to obtain follow-up at [facility]  Family doctor will not represcribe her medications  Last week she was manic; that has resolved and she feels more depressed now  She is have having insomnia, anorexia increased depression and plan to take extra pills, go to sleep and not wake up  She says there is a strong family history of depression with multiple suicide attempts  She has no access to a gun  She is living with her sister who tries to help her  She denies any recent illness or injury  Prior to Admission Medications   Prescriptions Last Dose Informant Patient Reported? Taking?    Multiple Vitamins-Minerals (MULTIVITAMIN ADULT PO)   Yes Yes   Sig: Take by mouth   QUEtiapine (SEROquel) 100 mg tablet   Yes Yes   Sig: Take 100 mg by mouth daily at bedtime   buprenorphine-naloxone (SUBOXONE) 8-2 mg per SL tablet  Self Yes No   Sig: Place 1 Tablet By Sublingual Route 2 time per day allow to dissolve slowly in mouth without chewing or swallowing   cyanocobalamin (VITAMIN B-12) 250 MCG tablet   Yes Yes   Sig: Take 250 mcg by mouth daily   gabapentin (NEURONTIN) 300 mg capsule   No No   Sig: Take 1 capsule (300 mg total) by mouth 3 (three) times a day At 9am, 4pm, 9pm   haloperidol (HALDOL) 5 mg tablet   No No   Sig: Take 1 tablet (5 mg total) by mouth daily at bedtime   hydrOXYzine HCL (ATARAX) 50 mg tablet   No No Sig: Take 1 tablet (50 mg total) by mouth every 8 (eight) hours as needed (moderate anxiety)   pantoprazole (PROTONIX) 40 mg tablet   No No   Sig: TAKE ONE TABLET BY MOUTH EVERY DAY   sertraline (ZOLOFT) 50 mg tablet   No No   Sig: Take 1 tablet (50 mg total) by mouth daily At 9am      Facility-Administered Medications: None       Past Medical History:   Diagnosis Date    Addiction to drug ([place] [address] )     Anxiety     Chest wall pain 6/11/2020    Depression     Drug dependence ([place] [address] )     GERD (gastroesophageal reflux disease)     Opiate abuse, continuous ([place] [address] ) 8/20/2018    Osteoarthritis     Rheumatoid arthritis ([place] [address] )     Substance abuse ([place] [address] )     Tubal pregnancy        Past Surgical History:   Procedure Laterality Date    BREAST IMPLANT      BUNIONECTOMY Bilateral 1990    ECTOPIC PREGNANCY SURGERY      TUBAL LIGATION         Family History   Problem Relation Age of Onset    Bipolar disorder Mother     Depression Mother     Hypertension Mother     Heart disease Father         cardiac    Prostate cancer Father     Bipolar disorder Brother     Depression Sister     Colon polyps Neg Hx     Colon cancer Neg Hx      I have reviewed and agree with the history as documented  E-Cigarette/Vaping    E-Cigarette Use Never User      E-Cigarette/Vaping Substances    Nicotine No     THC No     CBD No     Flavoring No     Other No     Unknown No      Social History     Tobacco Use    Smoking status: Current Some Day Smoker     Packs/day: 0 25     Years: 30 00     Pack years: 7 50     Types: Cigarettes    Smokeless tobacco: Never Used    Tobacco comment: 1 pack every 3 days    Substance Use Topics    Alcohol use: No    Drug use: Not Currently     Frequency: 7 0 times per week     Types: Oxycodone, Prescription     Comment: daily abuse of percocet/oxy-recovering since August        Review of Systems   Constitutional: Negative  HENT: Negative  Eyes: Negative  Respiratory: Negative  Cardiovascular: Negative  Gastrointestinal: Negative  Endocrine: Negative  Genitourinary: Negative  Musculoskeletal: Positive for arthralgias (Chronic)  Skin: Negative  Allergic/Immunologic: Negative  Neurological: Negative  Hematological: Negative  Psychiatric/Behavioral: Positive for decreased concentration, dysphoric mood, sleep disturbance and suicidal ideas  Negative for hallucinations  The patient is nervous/anxious and is hyperactive  All other systems reviewed and are negative  Physical Exam  Physical Exam  Vitals signs and nursing note reviewed  Constitutional:       General: She is not in acute distress  Appearance: She is well-developed and normal weight  She is not ill-appearing, toxic-appearing or diaphoretic  HENT:      Head: Normocephalic and atraumatic  Right Ear: External ear normal       Left Ear: External ear normal    Eyes:      Conjunctiva/sclera: Conjunctivae normal       Pupils: Pupils are equal, round, and reactive to light  Neck:      Musculoskeletal: Normal range of motion and neck supple  No neck rigidity  Cardiovascular:      Rate and Rhythm: Normal rate and regular rhythm  Pulses: Normal pulses  Heart sounds: No murmur  Pulmonary:      Effort: Pulmonary effort is normal       Breath sounds: Normal breath sounds  Abdominal:      General: Bowel sounds are normal       Palpations: Abdomen is soft  Tenderness: There is no abdominal tenderness  There is no guarding or rebound  Musculoskeletal: Normal range of motion  General: No tenderness or signs of injury  Skin:     General: Skin is warm and dry  Capillary Refill: Capillary refill takes less than 2 seconds  Findings: No rash  Neurological:      General: No focal deficit present  Mental Status: She is alert and oriented to person, place, and time  Mental status is at baseline  Cranial Nerves: No cranial nerve deficit        Sensory: No sensory deficit  Motor: No weakness  Coordination: Coordination normal       Gait: Gait normal       Deep Tendon Reflexes: Reflexes are normal and symmetric  Psychiatric:         Behavior: Behavior normal       Comments: Depressed mood  Cooperative, well dressed, well groomed, attentive  Not attending to inner stimuli         Vital Signs  ED Triage Vitals [01/04/21 1904]   Temperature Pulse Respirations Blood Pressure SpO2   98 1 °F (36 7 °C) 69 18 150/78 98 %      Temp Source Heart Rate Source Patient Position - Orthostatic VS BP Location FiO2 (%)   Oral Monitor Sitting Left arm --      Pain Score       --           Vitals:    01/04/21 1904 01/05/21 0323 01/05/21 0948   BP: 150/78 104/67 108/67   Pulse: 69 68 67   Patient Position - Orthostatic VS: Sitting           Visual Acuity      ED Medications  Medications   hydrOXYzine HCL (ATARAX) tablet 50 mg (50 mg Oral Given 1/4/21 2253)   haloperidol (HALDOL) tablet 5 mg (5 mg Oral Given 1/4/21 2253)   pantoprazole (PROTONIX) EC tablet 40 mg (40 mg Oral Given 1/5/21 0946)       Diagnostic Studies  Results Reviewed     Procedure Component Value Units Date/Time    COVID19, Influenza A/B, RSV PCR, [facility] [[id number]]  (Normal) Collected: 01/04/21 1954    Lab Status: Final result Specimen: Nasopharyngeal Swab Updated: 01/04/21 2115     SARS-CoV-2 Negative     INFLUENZA A PCR Negative     INFLUENZA B PCR Negative     RSV PCR Negative    Narrative: This test has been authorized by FDA under an EUA (Emergency Use Assay) for use by authorized laboratories  Clinical caution and judgement should be used with the interpretation of these results with consideration of the clinical impression and other laboratory testing  Testing reported as "Positive" or "Negative" has been proven to be accurate according to standard laboratory validation requirements  All testing is performed with control materials showing appropriate reactivity at standard intervals      Rapid drug screen, urine [[id number]]  (Normal) Collected: 01/04/21 1954    Lab Status: Final result Specimen: Urine, Clean Catch Updated: 01/04/21 2037     Amph/Meth UR Negative     Barbiturate Ur Negative     Benzodiazepine Urine Negative     Cocaine Urine Negative     Methadone Urine Negative     Opiate Urine Negative     PCP Ur Negative     THC Urine Negative     Oxycodone Urine Negative    Narrative:      FOR MEDICAL PURPOSES ONLY  IF CONFIRMATION NEEDED PLEASE CONTACT THE LAB WITHIN 5 DAYS      Drug Screen Cutoff Levels:  AMPHETAMINE/METHAMPHETAMINES  1000 ng/mL  BARBITURATES     200 ng/mL  BENZODIAZEPINES     200 ng/mL  COCAINE      300 ng/mL  METHADONE      300 ng/mL  OPIATES      300 ng/mL  PHENCYCLIDINE     25 ng/mL  THC       50 ng/mL  OXYCODONE      100 ng/mL    Basic metabolic panel [[id number]] Collected: 01/04/21 1954    Lab Status: Final result Specimen: Blood from Hand, Right Updated: 01/04/21 2024     Sodium 139 mmol/L      Potassium 3 7 mmol/L      Chloride 103 mmol/L      CO2 27 mmol/L      ANION GAP 9 mmol/L      BUN 11 mg/dL      Creatinine 0 75 mg/dL      Glucose 77 mg/dL      Calcium 9 5 mg/dL      eGFR 88 ml/min/1 73sq m     Narrative:      [place] guidelines for Chronic Kidney Disease (CKD):     Stage 1 with normal or high GFR (GFR > 90 mL/min/1 73 square meters)    Stage 2 Mild CKD (GFR = 60-89 mL/min/1 73 square meters)    Stage 3A Moderate CKD (GFR = 45-59 mL/min/1 73 square meters)    Stage 3B Moderate CKD (GFR = 30-44 mL/min/1 73 square meters)    Stage 4 Severe CKD (GFR = 15-29 mL/min/1 73 square meters)    Stage 5 End Stage CKD (GFR <15 mL/min/1 73 square meters)  Note: GFR calculation is accurate only with a steady state creatinine    CBC and differential [[id number]]  (Abnormal) Collected: 01/04/21 1954    Lab Status: Final result Specimen: Blood from Hand, Right Updated: 01/04/21 2013     WBC 5 08 Thousand/uL      RBC 4 46 Million/uL      Hemoglobin 13 6 g/dL      Hematocrit 41 2 %      MCV 92 fL      MCH 30 5 pg      MCHC 33 0 g/dL      RDW 12 0 %      MPV 9 5 fL      Platelets 832 Thousands/uL      Neutrophils Relative 45 %      Immat GRANS % 1 %      Lymphocytes Relative 30 %      Monocytes Relative 7 %      Eosinophils Relative 16 %      Basophils Relative 1 %      Neutrophils Absolute 2 29 Thousands/µL      Immature Grans Absolute 0 06 Thousand/uL      Lymphocytes Absolute 1 50 Thousands/µL      Monocytes Absolute 0 37 Thousand/µL      Eosinophils Absolute 0 83 Thousand/µL      Basophils Absolute 0 03 Thousands/µL     POCT alcohol breath test [[id number]]  (Normal) Resulted: 01/04/21 1954    Lab Status: Final result Updated: 01/04/21 1954     EXTBreath Alcohol 0 000                 No orders to display              Procedures  Procedures         ED Course  ED Course as of Jan 06 0922 Mon Jan 04, 2021   2339 Patient signed 12  Bed search has begun  Evening medications were prescribed  Patient will be                                SBIRT 20yo+      Most Recent Value   SBIRT (22 yo +)   In order to provide better care to our patients, we are screening all of our patients for alcohol and drug use  Would it be okay to ask you these screening questions? No Filed at: 01/04/2021 2135                    MDM  Number of Diagnoses or Management Options  Bipolar disorder ([facility] [address] ): established and worsening  Depression with suicidal ideation: established and worsening  Diagnosis management comments: Medically cleared  Signed 201  Seen by crisis  Await bed placement          Amount and/or Complexity of Data Reviewed  Clinical lab tests: ordered and reviewed  Discuss the patient with other providers: yes  Independent visualization of images, tracings, or specimens: yes        Disposition  Final diagnoses:   Depression with suicidal ideation   Bipolar disorder ([facility] [address] )     Time reflects when diagnosis was documented in both MDM as applicable and the Disposition within this note Time User Action Codes Description Comment    1/4/2021 11:39 PM Jimbo Iron Add [F32 9,  D51 [id number]] Depression with suicidal ideation     1/4/2021 11:39 PM Jimbo Iron Add [F31 9] Bipolar disorder [facility])       ED Disposition     ED Disposition Condition Date/Time Comment    Transfer to [facility] Jan 5, 2021  1:49 PM Rima Carbajal should be transferred out to inpatient psych and has been medically cleared          MD Documentation      Most Recent Value   Patient Condition  The patient has been stabilized such that within reasonable medical probability, no material deterioration of the patient condition or the condition of the unborn child(osvaldo) is likely to result from the transfer   Reason for Transfer  Level of Care needed not available at this facility   Benefits of Transfer  Continuity of care   Accepting Physician  [address], [address] and [address])  CTS   Sending MD  [facility]   Provider Certification  The patient is stable for psychiatric transfer because they are medically stable, and is protected from harming him/herself or others during transport      RN Documentation      [facility] [address])  CTS      Follow-up Information    None         Discharge Medication List as of 1/5/2021  2:26 PM      CONTINUE these medications which have NOT CHANGED    Details   buprenorphine-naloxone (SUBOXONE) 8-2 mg per SL tablet Place 1 Tablet By Sublingual Route 2 time per day allow to dissolve slowly in mouth without chewing or swallowing, Historical Med      cyanocobalamin (VITAMIN B-12) 250 MCG tablet Take 250 mcg by mouth daily, Historical Med      gabapentin (NEURONTIN) 300 mg capsule Take 1 capsule (300 mg total) by mouth 3 (three) times a day At 9am, 4pm, 9pm, Starting Mon 12/14/2020, Print      haloperidol (HALDOL) 5 mg tablet Take 1 tablet (5 mg total) by mouth daily at bedtime, Starting Mon 12/14/2020, Print      hydrOXYzine HCL (ATARAX) 50 mg tablet Take 1 tablet (50 mg total) by mouth every 8 (eight) hours as needed (moderate anxiety), Starting Mon 12/14/2020, Print      Multiple Vitamins-Minerals (MULTIVITAMIN ADULT PO) Take by mouth, Historical Med      pantoprazole (PROTONIX) 40 mg tablet TAKE ONE TABLET BY MOUTH EVERY DAY, Normal      QUEtiapine (SEROquel) 100 mg tablet Take 100 mg by mouth daily at bedtime, Historical Med      sertraline (ZOLOFT) 50 mg tablet Take 1 tablet (50 mg total) by mouth daily At 9am, Starting Tue 12/15/2020, Print           No discharge procedures on file      PDMP Review       Value Time User    PDMP Reviewed  Yes 1/5/2021  9:10 AM Ana Cristina Elizabeth DO          ED Provider  Electronically Signed by           Mariza Kenny DO  01/06/21 7044

## 2021-01-05 NOTE — ED NOTES
Patients medication put into blue bin in locked medication cabinet until patient is transported      Dez Schultz, [facility]  01/05/21 5971

## 2021-01-05 NOTE — ED NOTES
Patients instructed to have family bring her suboxone in tomorrow for placement      Rhiannon Wells RN  01/05/21 0159

## 2021-01-05 NOTE — ED NOTES
For patient to be able to go to [place], she needs to have her suboxone to take with her   Patient made aware      Yevgeniy Ramirez RN  01/05/21 8739

## 2021-01-05 NOTE — ED NOTES
Patient is accepted at [facility]  Patient is accepted by Dr Kraig Sims is arranged with CTS  Transportation is scheduled for [id number] [phone]

## 2021-01-12 ENCOUNTER — TRANSITIONAL CARE MANAGEMENT (OUTPATIENT)
Dept: FAMILY MEDICINE CLINIC | Facility: HOSPITAL | Age: 61
End: 2021-01-12

## 2021-01-13 ENCOUNTER — OFFICE VISIT (OUTPATIENT)
Dept: OBGYN CLINIC | Facility: CLINIC | Age: 61
End: 2021-01-13
Payer: COMMERCIAL

## 2021-01-13 VITALS
SYSTOLIC BLOOD PRESSURE: 110 MMHG | TEMPERATURE: 97.2 F | WEIGHT: 164 LBS | HEIGHT: 64 IN | BODY MASS INDEX: 28 KG/M2 | DIASTOLIC BLOOD PRESSURE: 68 MMHG

## 2021-01-13 DIAGNOSIS — M19.011 PRIMARY LOCALIZED OSTEOARTHROSIS OF RIGHT SHOULDER: Primary | ICD-10-CM

## 2021-01-13 PROCEDURE — 99213 OFFICE O/P EST LOW 20 MIN: CPT | Performed by: ORTHOPAEDIC SURGERY

## 2021-01-13 PROCEDURE — 3008F BODY MASS INDEX DOCD: CPT | Performed by: ORTHOPAEDIC SURGERY

## 2021-01-13 PROCEDURE — 20610 DRAIN/INJ JOINT/BURSA W/O US: CPT | Performed by: ORTHOPAEDIC SURGERY

## 2021-01-13 PROCEDURE — 4004F PT TOBACCO SCREEN RCVD TLK: CPT | Performed by: ORTHOPAEDIC SURGERY

## 2021-01-13 RX ORDER — LIDOCAINE HYDROCHLORIDE 10 MG/ML
5 INJECTION, SOLUTION EPIDURAL; INFILTRATION; INTRACAUDAL; PERINEURAL
Status: COMPLETED | OUTPATIENT
Start: 2021-01-13 | End: 2021-01-13

## 2021-01-13 RX ORDER — BETAMETHASONE SODIUM PHOSPHATE AND BETAMETHASONE ACETATE 3; 3 MG/ML; MG/ML
6 INJECTION, SUSPENSION INTRA-ARTICULAR; INTRALESIONAL; INTRAMUSCULAR; SOFT TISSUE
Status: COMPLETED | OUTPATIENT
Start: 2021-01-13 | End: 2021-01-13

## 2021-01-13 RX ADMIN — BETAMETHASONE SODIUM PHOSPHATE AND BETAMETHASONE ACETATE 6 MG: 3; 3 INJECTION, SUSPENSION INTRA-ARTICULAR; INTRALESIONAL; INTRAMUSCULAR; SOFT TISSUE at 14:32

## 2021-01-13 RX ADMIN — LIDOCAINE HYDROCHLORIDE 5 ML: 10 INJECTION, SOLUTION EPIDURAL; INFILTRATION; INTRACAUDAL; PERINEURAL at 14:32

## 2021-01-13 NOTE — ASSESSMENT & PLAN NOTE
Findings consistent with right shoulder osteoarthritis  Patient has increases pain today in her right shoulder  She states that she has stopped going to physical therapy due to not being able to afford  Patient states that she is using the topical pain relief creams without relief  Patient is requesting a CSI today  Risk and benefits were discussed with the patient and elected to proceed  Patient tolerated the procedure well without any immediate complications  Patient was advised to ice her shoulder if she has soreness  Patient will follow up on an as needed basis  All patient's questions were answered to her satisfaction  This note is created using dictation transcription  It may contain typographical errors, grammatical errors, improperly dictated words, background noise and other errors

## 2021-01-13 NOTE — PROGRESS NOTES
Assessment:     1  Primary localized osteoarthrosis of right shoulder        Plan:     Problem List Items Addressed This Visit        Musculoskeletal and Integument    Primary localized osteoarthrosis of right shoulder - Primary     Findings consistent with right shoulder osteoarthritis  Patient has increases pain today in her right shoulder  She states that she has stopped going to physical therapy due to not being able to afford  Patient states that she is using the topical pain relief creams without relief  Patient is requesting a CSI today  Risk and benefits were discussed with the patient and elected to proceed  Patient tolerated the procedure well without any immediate complications  Patient was advised to ice her shoulder if she has soreness  Patient will follow up on an as needed basis  All patient's questions were answered to her satisfaction  This note is created using dictation transcription  It may contain typographical errors, grammatical errors, improperly dictated words, background noise and other errors  Relevant Medications    lidocaine (PF) (XYLOCAINE-MPF) 1 % injection 5 mL (Completed)    betamethasone acetate-betamethasone sodium phosphate (CELESTONE) injection 6 mg (Completed)    Other Relevant Orders    Large joint arthrocentesis: R glenohumeral (Completed)          Subjective:     Patient ID: Luis Antonio Matthews is a 64 y o  female  Chief Complaint:  63 yo female following up for osteoarthritis of her right shoulder, DDD of cervical spine, and shoulder impingement syndrome  At the last visit she was advised to continue physical therapy, Aspercreme/voltren gel, and NSAID's for pain relief  The injection in her right shoulder did provide her with good pain you relief for couple months  Her pain is gradually returned  Her last injection was done in September 2020       Allergy:  No Known Allergies  Medications:  all current active meds have been reviewed  Past Medical History:  Past Medical History:   Diagnosis Date    Addiction to drug [facility])     Anxiety     Chest wall pain 6/11/2020    Depression     Drug dependence (HCC)     GERD (gastroesophageal reflux disease)     Opiate abuse, continuous ([facility] [address] ) 8/20/2018    Osteoarthritis     Rheumatoid arthritis ([facility] [address] )     [facility]     Tubal pregnancy      Past Surgical History:  Past Surgical History:   Procedure Laterality Date    BREAST IMPLANT      BUNIONECTOMY Bilateral 1990    ECTOPIC PREGNANCY SURGERY      TUBAL LIGATION       Family History:  Family History   Problem Relation Age of Onset    Bipolar disorder Mother     Depression Mother     Hypertension Mother     Heart disease Father         cardiac    Prostate cancer Father     Bipolar disorder Brother     Depression Sister     Colon polyps Neg Hx     Colon cancer Neg Hx      Social History:  Social History     Substance and Sexual Activity   Alcohol Use No     Social History     Substance and Sexual Activity   Drug Use Not Currently    Frequency: 7 0 times per week    Types: Oxycodone, Prescription    Comment: daily abuse of percocet/oxy-recovering since August      Social History     Tobacco Use   Smoking Status Current Some Day Smoker    Packs/day: 0 25    Years: 30 00    Pack years: 7 50    Types: Cigarettes   Smokeless Tobacco Never Used   Tobacco Comment    1 pack every 3 days      Review of Systems   Constitutional: Negative  HENT: Negative  Eyes: Negative  Respiratory: Negative  Cardiovascular: Negative  Gastrointestinal: Negative  Endocrine: Negative  Genitourinary: Negative  Musculoskeletal: Positive for arthralgias (Right shoulder) and neck pain  Joint pain  Muscle pain  Pain while walking  Immobility or loss of function     Skin: Negative  Allergic/Immunologic: Negative  Neurological: Positive for numbness          Waking up at night  Memory problems  Twitching  Tingling  Trouble falling asleep Hematological: Negative  Psychiatric/Behavioral: Positive for confusion  All other systems reviewed and are negative  Objective:  BP Readings from Last 1 Encounters:   01/13/21 110/68      Wt Readings from Last 1 Encounters:   01/13/21 74 4 kg (164 lb)      BMI:   Estimated body mass index is 28 15 kg/m² as calculated from the following:    Height as of this encounter: 5' 4" (1 626 m)  Weight as of this encounter: 74 4 kg (164 lb)  BSA:   Estimated body surface area is 1 8 meters squared as calculated from the following:    Height as of this encounter: 5' 4" (1 626 m)  Weight as of this encounter: 74 4 kg (164 lb)  Physical Exam  Vitals signs and nursing note reviewed  Constitutional:       Appearance: Normal appearance  She is well-developed  HENT:      Head: Normocephalic and atraumatic  Right Ear: External ear normal       Left Ear: External ear normal    Eyes:      Extraocular Movements: Extraocular movements intact  Conjunctiva/sclera: Conjunctivae normal    Neck:      Musculoskeletal: Neck supple  Pulmonary:      Effort: Pulmonary effort is normal    Musculoskeletal:      Right knee: She exhibits no effusion  Skin:     General: Skin is warm and dry  Neurological:      Mental Status: She is alert and oriented to person, place, and time  Deep Tendon Reflexes: Reflexes are normal and symmetric  Psychiatric:         Mood and Affect: Mood normal          Behavior: Behavior normal        Right Knee Exam     Other   Effusion: no effusion present      Right Shoulder Exam     Tenderness   The patient is experiencing no tenderness  Range of Motion   The patient has normal right shoulder ROM  Right shoulder forward flexion: Pain at terminal range       Tests   Cross arm: negative  Drop arm: negative    Other   Erythema: absent  Sensation: normal  Pulse: present    Comments:  Crepitation with shoulder range of motion            Large joint arthrocentesis: R glenohumeral  Universal Protocol:  Consent: Verbal consent obtained    Risks and benefits: risks, benefits and alternatives were discussed  Consent given by: patient  Patient understanding: patient states understanding of the procedure being performed  Site marked: the operative site was marked  Patient identity confirmed: verbally with patient    Supporting Documentation  Indications: pain   Procedure Details  Location: shoulder (Right ) - R glenohumeral  Preparation: Patient was prepped and draped in the usual sterile fashion  Needle size: 22 G  Ultrasound guidance: no  Approach: posterolateral  Medications administered: 5 mL lidocaine (PF) 1 %; 6 mg betamethasone acetate-betamethasone sodium phosphate 6 (3-3) mg/mL    Patient tolerance: patient tolerated the procedure well with no immediate complications  Dressing:  Sterile dressing applied          No new images for review today     Scribe Attestation    I,:  Julio C Dejesus MA am acting as a scribe while in the presence of the attending physician :       I,:  Deanna Yang MD personally performed the services described in this documentation    as scribed in my presence :

## 2021-05-25 ENCOUNTER — OFFICE VISIT (OUTPATIENT)
Dept: OBGYN CLINIC | Facility: CLINIC | Age: 61
End: 2021-05-25
Payer: COMMERCIAL

## 2021-05-25 VITALS
WEIGHT: 181 LBS | SYSTOLIC BLOOD PRESSURE: 125 MMHG | DIASTOLIC BLOOD PRESSURE: 80 MMHG | BODY MASS INDEX: 30.9 KG/M2 | HEIGHT: 64 IN

## 2021-05-25 DIAGNOSIS — M50.30 DDD (DEGENERATIVE DISC DISEASE), CERVICAL: ICD-10-CM

## 2021-05-25 DIAGNOSIS — M75.42 SHOULDER IMPINGEMENT SYNDROME, LEFT: ICD-10-CM

## 2021-05-25 DIAGNOSIS — M19.011 PRIMARY LOCALIZED OSTEOARTHROSIS OF RIGHT SHOULDER: Primary | ICD-10-CM

## 2021-05-25 PROCEDURE — 3008F BODY MASS INDEX DOCD: CPT | Performed by: ORTHOPAEDIC SURGERY

## 2021-05-25 PROCEDURE — 99213 OFFICE O/P EST LOW 20 MIN: CPT | Performed by: ORTHOPAEDIC SURGERY

## 2021-05-25 PROCEDURE — 4004F PT TOBACCO SCREEN RCVD TLK: CPT | Performed by: ORTHOPAEDIC SURGERY

## 2021-05-25 PROCEDURE — 20610 DRAIN/INJ JOINT/BURSA W/O US: CPT | Performed by: ORTHOPAEDIC SURGERY

## 2021-05-25 RX ORDER — BETAMETHASONE SODIUM PHOSPHATE AND BETAMETHASONE ACETATE 3; 3 MG/ML; MG/ML
6 INJECTION, SUSPENSION INTRA-ARTICULAR; INTRALESIONAL; INTRAMUSCULAR; SOFT TISSUE
Status: COMPLETED | OUTPATIENT
Start: 2021-05-25 | End: 2021-05-25

## 2021-05-25 RX ORDER — LIDOCAINE HYDROCHLORIDE 10 MG/ML
5 INJECTION, SOLUTION EPIDURAL; INFILTRATION; INTRACAUDAL; PERINEURAL
Status: COMPLETED | OUTPATIENT
Start: 2021-05-25 | End: 2021-05-25

## 2021-05-25 RX ADMIN — BETAMETHASONE SODIUM PHOSPHATE AND BETAMETHASONE ACETATE 6 MG: 3; 3 INJECTION, SUSPENSION INTRA-ARTICULAR; INTRALESIONAL; INTRAMUSCULAR; SOFT TISSUE at 12:28

## 2021-05-25 RX ADMIN — LIDOCAINE HYDROCHLORIDE 5 ML: 10 INJECTION, SOLUTION EPIDURAL; INFILTRATION; INTRACAUDAL; PERINEURAL at 12:28

## 2021-05-25 NOTE — PROGRESS NOTES
Assessment:     1  Primary localized osteoarthrosis of right shoulder    2  DDD (degenerative disc disease), cervical    3  Shoulder impingement syndrome, left        Plan:     Problem List Items Addressed This Visit        Musculoskeletal and Integument    Primary localized osteoarthrosis of right shoulder - Primary    Relevant Medications    lidocaine (PF) (XYLOCAINE-MPF) 1 % injection 5 mL (Completed)    betamethasone acetate-betamethasone sodium phosphate (CELESTONE) injection 6 mg (Completed)    Other Relevant Orders    Large joint arthrocentesis: R glenohumeral (Completed)    Ambulatory referral to Physical Therapy    DDD (degenerative disc disease), cervical    Relevant Orders    Ambulatory referral to Physical Therapy       Other    Shoulder impingement syndrome, left    Relevant Medications    lidocaine (PF) (XYLOCAINE-MPF) 1 % injection 5 mL (Completed)    betamethasone acetate-betamethasone sodium phosphate (CELESTONE) injection 6 mg (Completed)    Other Relevant Orders    Large joint arthrocentesis: L subacromial bursa (Completed)    Ambulatory referral to Physical Therapy          Findings consistent with right shoulder glenohumeral osteoarthritis, left shoulder impingement, cervical ddd  CSI was given glenohumeral in right, subacromial in left, ice shoulders over next few days  She can get glenohumeral injections every 3 months, subacromial advise against getting to much as this can lead to weakening of rotator cuff tendons causing a tear  Physical therapy is main treatment for left shoulder impingement  She will be given new script to address both shoulders and cervical ddd  She can continue with OTC anti inflammatories, OTC voltaren gel as needed for pain  All patient's questions were answered to her satisfaction  This note is created using dictation transcription  It may contain typographical errors, grammatical errors, improperly dictated words, background noise and other errors  Subjective:     Patient ID: Edi Ortega is a 64 y o  female  Chief Complaint:  65 yo female following up for osteoarthritis of her right shoulder, DDD of cervical spine, and shoulder impingement syndrome on left  January she was given glenohumeral injection which gave 3 months of solid relief before pain returned  The pain can be stabbing and sharp at times  She is also having increase in left shoulder pain recently, subacromial injection September 2020 with good relief until recently  Pain with overhead motions, reaching behind in both shoulders  She is doing HEP, but admit not doing the exercises regularly  Patient only attending therapy and learn HEP  OTC medications, aspercreme for pain  She also has cervical ddd which contributes to radiating pain down both arms with numbness and tingling       Allergy:  No Known Allergies  Medications:  all current active meds have been reviewed  Past Medical History:  Past Medical History:   Diagnosis Date    Addiction to drug ([facility] [address] )     Anxiety     Chest wall pain 6/11/2020    Depression     Drug dependence (HCC)     GERD (gastroesophageal reflux disease)     Opiate abuse, continuous ([facility] [address] ) 8/20/2018    Osteoarthritis     Rheumatoid arthritis ([facility] [address] )     Substance abuse (HCC)     Tubal pregnancy      Past Surgical History:  Past Surgical History:   Procedure Laterality Date    BREAST IMPLANT      BUNIONECTOMY Bilateral 1990    ECTOPIC PREGNANCY SURGERY      TUBAL LIGATION       Family History:  Family History   Problem Relation Age of Onset    Bipolar disorder Mother     Depression Mother     Hypertension Mother     Heart disease Father         cardiac    Prostate cancer Father     Bipolar disorder Brother     Depression Sister     Colon polyps Neg Hx     Colon cancer Neg Hx      Social History:  Social History     Substance and Sexual Activity   Alcohol Use No     Social History     Substance and Sexual Activity   Drug Use Not Currently    Frequency: 7 0 times per week    Types: Oxycodone, Prescription    Comment: daily abuse of percocet/oxy-recovering since August      Social History     Tobacco Use   Smoking Status Current Some Day Smoker    Packs/day: 0 25    Years: 30 00    Pack years: 7 50    Types: Cigarettes   Smokeless Tobacco Never Used   Tobacco Comment    1 pack every 3 days      Review of Systems   Constitutional: Negative for chills and fever  HENT: Negative for ear pain and sore throat  Eyes: Negative for pain and visual disturbance  Respiratory: Negative for cough and shortness of breath  Cardiovascular: Negative for chest pain and palpitations  Gastrointestinal: Negative for abdominal pain and vomiting  Genitourinary: Negative for dysuria and hematuria  Musculoskeletal: Positive for arthralgias (Bilateral shoulder) and neck pain  Negative for back pain  Skin: Negative for color change and rash  Neurological: Negative for seizures and syncope  Psychiatric/Behavioral: Negative  All other systems reviewed and are negative  Objective:  BP Readings from Last 1 Encounters:   05/25/21 125/80      Wt Readings from Last 1 Encounters:   05/25/21 82 1 kg (181 lb)      BMI:   Estimated body mass index is 31 07 kg/m² as calculated from the following:    Height as of this encounter: 5' 4" (1 626 m)  Weight as of this encounter: 82 1 kg (181 lb)  BSA:   Estimated body surface area is 1 87 meters squared as calculated from the following:    Height as of this encounter: 5' 4" (1 626 m)  Weight as of this encounter: 82 1 kg (181 lb)  Physical Exam  Vitals signs and nursing note reviewed  Constitutional:       Appearance: Normal appearance  She is well-developed  HENT:      Head: Normocephalic and atraumatic  Right Ear: External ear normal       Left Ear: External ear normal    Eyes:      Extraocular Movements: Extraocular movements intact        Conjunctiva/sclera: Conjunctivae normal    Neck: Musculoskeletal: Neck supple  Pulmonary:      Effort: Pulmonary effort is normal    Skin:     General: Skin is warm and dry  Neurological:      Mental Status: She is alert and oriented to person, place, and time  Deep Tendon Reflexes: Reflexes are normal and symmetric  Psychiatric:         Mood and Affect: Mood normal          Behavior: Behavior normal        Right Shoulder Exam     Tenderness   The patient is experiencing no tenderness  Range of Motion   Active abduction: 170 (pain)   Passive abduction: 170   External rotation: 90   Forward flexion: 170 (pain)   Internal rotation 0 degrees: Lumbar     Muscle Strength   Abduction: 5/5   Internal rotation: 5/5   External rotation: 5/5   Biceps: 5/5     Tests   Cross arm: negative  Drop arm: negative    Other   Erythema: absent  Scars: absent  Sensation: normal  Pulse: present      Left Shoulder Exam     Tenderness   The patient is experiencing tenderness in the biceps tendon and acromioclavicular joint (Anteriorly)  Range of Motion   Active abduction: normal Left shoulder active abduction: Pain  Passive abduction: normal   External rotation: 90 (pain)   Forward flexion: 170   Internal rotation 0 degrees: Lumbar     Muscle Strength   Abduction: 5/5   Internal rotation: 5/5   External rotation: 5/5   Biceps: 5/5     Tests   Apprehension: negative  Cross arm: negative  Impingement: positive  Drop arm: negative    Other   Erythema: absent  Scars: absent  Sensation: normal  Pulse: present             no new imaging to review      Large joint arthrocentesis: R glenohumeral  [place] Protocol:  Consent: Verbal consent obtained    Risks and benefits: risks, benefits and alternatives were discussed  Consent given by: patient  Patient understanding: patient states understanding of the procedure being performed  Site marked: the operative site was marked  Patient identity confirmed: verbally with patient    Supporting Documentation  Indications: pain Procedure Details  Location: shoulder - R glenohumeral  Preparation: Patient was prepped and draped in the usual sterile fashion  Needle size: 22 G  Ultrasound guidance: no  Approach: posterolateral  Medications administered: 5 mL lidocaine (PF) 1 %; 6 mg betamethasone acetate-betamethasone sodium phosphate 6 (3-3) mg/mL    Patient tolerance: patient tolerated the procedure well with no immediate complications  Dressing:  Sterile dressing applied    Large joint arthrocentesis: L subacromial bursa  Universal Protocol:  Consent: Verbal consent obtained    Risks and benefits: risks, benefits and alternatives were discussed  Consent given by: patient  Patient understanding: patient states understanding of the procedure being performed  Site marked: the operative site was marked  Patient identity confirmed: verbally with patient    Supporting Documentation  Indications: pain   Procedure Details  Location: shoulder - L subacromial bursa  Preparation: Patient was prepped and draped in the usual sterile fashion  Needle size: 22 G  Ultrasound guidance: no  Approach: posterolateral  Medications administered: 5 mL lidocaine (PF) 1 %; 6 mg betamethasone acetate-betamethasone sodium phosphate 6 (3-3) mg/mL    Patient tolerance: patient tolerated the procedure well with no immediate complications  Dressing:  Sterile dressing applied          Scribe Attestation    I,:  Abraham Painter am acting as a scribe while in the presence of the attending physician :       I,:  Lionel Barrera MD personally performed the services described in this documentation    as scribed in my presence :

## 2021-10-18 ENCOUNTER — TELEPHONE (OUTPATIENT)
Dept: FAMILY MEDICINE CLINIC | Facility: HOSPITAL | Age: 61
End: 2021-10-18

## 2021-10-18 DIAGNOSIS — K21.9 GASTROESOPHAGEAL REFLUX DISEASE, UNSPECIFIED WHETHER ESOPHAGITIS PRESENT: ICD-10-CM

## 2021-10-19 RX ORDER — PANTOPRAZOLE SODIUM 40 MG/1
40 TABLET, DELAYED RELEASE ORAL DAILY
Qty: 30 TABLET | Refills: 0 | Status: SHIPPED | OUTPATIENT
Start: 2021-10-19 | End: 2021-11-18

## 2021-11-11 ENCOUNTER — TELEPHONE (OUTPATIENT)
Dept: FAMILY MEDICINE CLINIC | Facility: HOSPITAL | Age: 61
End: 2021-11-11

## 2021-11-11 DIAGNOSIS — B34.9 VIRAL INFECTION, UNSPECIFIED: Primary | ICD-10-CM

## 2021-11-11 PROCEDURE — U0005 INFEC AGEN DETEC AMPLI PROBE: HCPCS | Performed by: NURSE PRACTITIONER

## 2021-11-11 PROCEDURE — U0003 INFECTIOUS AGENT DETECTION BY NUCLEIC ACID (DNA OR RNA); SEVERE ACUTE RESPIRATORY SYNDROME CORONAVIRUS 2 (SARS-COV-2) (CORONAVIRUS DISEASE [COVID-19]), AMPLIFIED PROBE TECHNIQUE, MAKING USE OF HIGH THROUGHPUT TECHNOLOGIES AS DESCRIBED BY CMS-2020-01-R: HCPCS | Performed by: NURSE PRACTITIONER

## 2021-11-12 ENCOUNTER — TELEPHONE (OUTPATIENT)
Dept: FAMILY MEDICINE CLINIC | Facility: HOSPITAL | Age: 61
End: 2021-11-12

## 2021-11-12 ENCOUNTER — TELEMEDICINE (OUTPATIENT)
Dept: FAMILY MEDICINE CLINIC | Facility: HOSPITAL | Age: 61
End: 2021-11-12
Payer: COMMERCIAL

## 2021-11-12 VITALS — WEIGHT: 170 LBS | TEMPERATURE: 99.4 F | HEIGHT: 64 IN | BODY MASS INDEX: 29.02 KG/M2

## 2021-11-12 DIAGNOSIS — U07.1 COVID-19: Primary | ICD-10-CM

## 2021-11-12 LAB — SARS-COV-2 RNA RESP QL NAA+PROBE: POSITIVE

## 2021-11-12 PROCEDURE — 99213 OFFICE O/P EST LOW 20 MIN: CPT | Performed by: NURSE PRACTITIONER

## 2021-11-17 DIAGNOSIS — K21.9 GASTROESOPHAGEAL REFLUX DISEASE, UNSPECIFIED WHETHER ESOPHAGITIS PRESENT: ICD-10-CM

## 2021-11-18 RX ORDER — PANTOPRAZOLE SODIUM 40 MG/1
TABLET, DELAYED RELEASE ORAL
Qty: 30 TABLET | Refills: 0 | Status: SHIPPED | OUTPATIENT
Start: 2021-11-18 | End: 2021-11-29 | Stop reason: SDUPTHER

## 2021-11-29 ENCOUNTER — OFFICE VISIT (OUTPATIENT)
Dept: FAMILY MEDICINE CLINIC | Facility: HOSPITAL | Age: 61
End: 2021-11-29
Payer: COMMERCIAL

## 2021-11-29 VITALS
OXYGEN SATURATION: 96 % | SYSTOLIC BLOOD PRESSURE: 124 MMHG | BODY MASS INDEX: 30.73 KG/M2 | WEIGHT: 180 LBS | HEIGHT: 64 IN | TEMPERATURE: 97.7 F | HEART RATE: 76 BPM | DIASTOLIC BLOOD PRESSURE: 86 MMHG

## 2021-11-29 DIAGNOSIS — K21.9 GASTROESOPHAGEAL REFLUX DISEASE, UNSPECIFIED WHETHER ESOPHAGITIS PRESENT: Primary | ICD-10-CM

## 2021-11-29 DIAGNOSIS — Z13.220 NEED FOR LIPID SCREENING: ICD-10-CM

## 2021-11-29 DIAGNOSIS — Z11.4 ENCOUNTER FOR SCREENING FOR HIV: ICD-10-CM

## 2021-11-29 DIAGNOSIS — Z12.31 ENCOUNTER FOR SCREENING MAMMOGRAM FOR MALIGNANT NEOPLASM OF BREAST: ICD-10-CM

## 2021-11-29 DIAGNOSIS — F31.4 BIPOLAR DISORDER, CURRENT EPISODE DEPRESSED, SEVERE, WITHOUT PSYCHOTIC FEATURES (HCC): ICD-10-CM

## 2021-11-29 DIAGNOSIS — Z11.59 NEED FOR HEPATITIS C SCREENING TEST: ICD-10-CM

## 2021-11-29 DIAGNOSIS — Z13.29 THYROID DISORDER SCREENING: ICD-10-CM

## 2021-11-29 DIAGNOSIS — Z12.11 SCREENING FOR COLON CANCER: ICD-10-CM

## 2021-11-29 DIAGNOSIS — F11.21 OPIOID DEPENDENCE IN REMISSION (HCC): ICD-10-CM

## 2021-11-29 DIAGNOSIS — Z23 ENCOUNTER FOR IMMUNIZATION: ICD-10-CM

## 2021-11-29 PROBLEM — Z79.891 ENCOUNTER FOR MONITORING SUBOXONE MAINTENANCE THERAPY: Status: RESOLVED | Noted: 2020-06-11 | Resolved: 2021-11-29

## 2021-11-29 PROBLEM — Z79.899 ENCOUNTER FOR MONITORING SUBOXONE MAINTENANCE THERAPY: Status: RESOLVED | Noted: 2020-06-11 | Resolved: 2021-11-29

## 2021-11-29 PROBLEM — Z51.81 ENCOUNTER FOR MONITORING SUBOXONE MAINTENANCE THERAPY: Status: RESOLVED | Noted: 2020-06-11 | Resolved: 2021-11-29

## 2021-11-29 PROBLEM — Z00.8 MEDICAL CLEARANCE FOR PSYCHIATRIC ADMISSION: Status: RESOLVED | Noted: 2020-12-10 | Resolved: 2021-11-29

## 2021-11-29 PROBLEM — M75.42 SHOULDER IMPINGEMENT SYNDROME, LEFT: Status: RESOLVED | Noted: 2020-09-24 | Resolved: 2021-11-29

## 2021-11-29 PROCEDURE — 3725F SCREEN DEPRESSION PERFORMED: CPT | Performed by: NURSE PRACTITIONER

## 2021-11-29 PROCEDURE — 90471 IMMUNIZATION ADMIN: CPT | Performed by: NURSE PRACTITIONER

## 2021-11-29 PROCEDURE — 3008F BODY MASS INDEX DOCD: CPT | Performed by: NURSE PRACTITIONER

## 2021-11-29 PROCEDURE — 1036F TOBACCO NON-USER: CPT | Performed by: NURSE PRACTITIONER

## 2021-11-29 PROCEDURE — 90682 RIV4 VACC RECOMBINANT DNA IM: CPT | Performed by: NURSE PRACTITIONER

## 2021-11-29 PROCEDURE — 99214 OFFICE O/P EST MOD 30 MIN: CPT | Performed by: NURSE PRACTITIONER

## 2021-11-29 RX ORDER — PANTOPRAZOLE SODIUM 40 MG/1
40 TABLET, DELAYED RELEASE ORAL DAILY
Qty: 30 TABLET | Refills: 5 | Status: SHIPPED | OUTPATIENT
Start: 2021-11-29 | End: 2022-02-07 | Stop reason: HOSPADM

## 2021-11-29 RX ORDER — BUPROPION HYDROCHLORIDE 75 MG/1
75 TABLET ORAL DAILY
COMMUNITY
Start: 2021-11-15 | End: 2022-02-07 | Stop reason: HOSPADM

## 2022-01-31 ENCOUNTER — HOSPITAL ENCOUNTER (INPATIENT)
Facility: HOSPITAL | Age: 62
LOS: 7 days | Discharge: HOME | DRG: 753 | End: 2022-02-07
Attending: STUDENT IN AN ORGANIZED HEALTH CARE EDUCATION/TRAINING PROGRAM | Admitting: STUDENT IN AN ORGANIZED HEALTH CARE EDUCATION/TRAINING PROGRAM
Payer: COMMERCIAL

## 2022-01-31 ENCOUNTER — HOSPITAL ENCOUNTER (EMERGENCY)
Facility: HOSPITAL | Age: 62
End: 2022-01-31
Attending: EMERGENCY MEDICINE | Admitting: EMERGENCY MEDICINE
Payer: COMMERCIAL

## 2022-01-31 VITALS
RESPIRATION RATE: 16 BRPM | WEIGHT: 170 LBS | HEART RATE: 76 BPM | BODY MASS INDEX: 29.02 KG/M2 | OXYGEN SATURATION: 98 % | DIASTOLIC BLOOD PRESSURE: 78 MMHG | HEIGHT: 64 IN | SYSTOLIC BLOOD PRESSURE: 124 MMHG | TEMPERATURE: 97.8 F

## 2022-01-31 DIAGNOSIS — F33.9 RECURRENT MAJOR DEPRESSIVE DISORDER (HCC): Primary | ICD-10-CM

## 2022-01-31 DIAGNOSIS — K21.9 GASTROESOPHAGEAL REFLUX DISEASE: ICD-10-CM

## 2022-01-31 DIAGNOSIS — F32.A DEPRESSION WITH SUICIDAL IDEATION: Primary | ICD-10-CM

## 2022-01-31 DIAGNOSIS — F11.21 OPIOID DEPENDENCE IN REMISSION (HCC): ICD-10-CM

## 2022-01-31 DIAGNOSIS — R79.89 ELEVATED TSH: ICD-10-CM

## 2022-01-31 DIAGNOSIS — Z00.8 MEDICAL CLEARANCE FOR PSYCHIATRIC ADMISSION: ICD-10-CM

## 2022-01-31 DIAGNOSIS — R45.851 DEPRESSION WITH SUICIDAL IDEATION: Primary | ICD-10-CM

## 2022-01-31 DIAGNOSIS — G62.9 NEUROPATHY: ICD-10-CM

## 2022-01-31 LAB
ALBUMIN SERPL BCP-MCNC: 4.4 G/DL (ref 3.5–5)
ALP SERPL-CCNC: 81 U/L (ref 46–116)
ALT SERPL W P-5'-P-CCNC: 20 U/L (ref 12–78)
AMPHETAMINES SERPL QL SCN: NEGATIVE
ANION GAP SERPL CALCULATED.3IONS-SCNC: 6 MMOL/L (ref 4–13)
AST SERPL W P-5'-P-CCNC: 19 U/L (ref 5–45)
ATRIAL RATE: 79 BPM
ATRIAL RATE: 80 BPM
BACTERIA UR QL AUTO: NORMAL /HPF
BARBITURATES UR QL: NEGATIVE
BASOPHILS # BLD AUTO: 0.04 THOUSANDS/ΜL (ref 0–0.1)
BASOPHILS NFR BLD AUTO: 1 % (ref 0–1)
BENZODIAZ UR QL: NEGATIVE
BILIRUB SERPL-MCNC: 0.6 MG/DL (ref 0.2–1)
BILIRUB UR QL STRIP: NEGATIVE
BUN SERPL-MCNC: 13 MG/DL (ref 5–25)
CALCIUM SERPL-MCNC: 9.3 MG/DL (ref 8.3–10.1)
CHLORIDE SERPL-SCNC: 103 MMOL/L (ref 100–108)
CLARITY UR: CLEAR
CO2 SERPL-SCNC: 29 MMOL/L (ref 21–32)
COCAINE UR QL: NEGATIVE
COLOR UR: YELLOW
CREAT SERPL-MCNC: 0.96 MG/DL (ref 0.6–1.3)
EOSINOPHIL # BLD AUTO: 0.12 THOUSAND/ΜL (ref 0–0.61)
EOSINOPHIL NFR BLD AUTO: 2 % (ref 0–6)
ERYTHROCYTE [DISTWIDTH] IN BLOOD BY AUTOMATED COUNT: 12.4 % (ref 11.6–15.1)
ETHANOL EXG-MCNC: 0 MG/DL
FLUAV RNA RESP QL NAA+PROBE: NEGATIVE
FLUBV RNA RESP QL NAA+PROBE: NEGATIVE
GFR SERPL CREATININE-BSD FRML MDRD: 64 ML/MIN/1.73SQ M
GLUCOSE SERPL-MCNC: 86 MG/DL (ref 65–140)
GLUCOSE UR STRIP-MCNC: NEGATIVE MG/DL
HCT VFR BLD AUTO: 41.8 % (ref 34.8–46.1)
HGB BLD-MCNC: 13.7 G/DL (ref 11.5–15.4)
HGB UR QL STRIP.AUTO: ABNORMAL
IMM GRANULOCYTES # BLD AUTO: 0.01 THOUSAND/UL (ref 0–0.2)
IMM GRANULOCYTES NFR BLD AUTO: 0 % (ref 0–2)
KETONES UR STRIP-MCNC: NEGATIVE MG/DL
LEUKOCYTE ESTERASE UR QL STRIP: NEGATIVE
LYMPHOCYTES # BLD AUTO: 1.54 THOUSANDS/ΜL (ref 0.6–4.47)
LYMPHOCYTES NFR BLD AUTO: 27 % (ref 14–44)
MCH RBC QN AUTO: 28.9 PG (ref 26.8–34.3)
MCHC RBC AUTO-ENTMCNC: 32.8 G/DL (ref 31.4–37.4)
MCV RBC AUTO: 88 FL (ref 82–98)
METHADONE UR QL: NEGATIVE
MONOCYTES # BLD AUTO: 0.49 THOUSAND/ΜL (ref 0.17–1.22)
MONOCYTES NFR BLD AUTO: 9 % (ref 4–12)
NEUTROPHILS # BLD AUTO: 3.47 THOUSANDS/ΜL (ref 1.85–7.62)
NEUTS SEG NFR BLD AUTO: 61 % (ref 43–75)
NITRITE UR QL STRIP: NEGATIVE
NON-SQ EPI CELLS URNS QL MICRO: NORMAL /HPF
NRBC BLD AUTO-RTO: 0 /100 WBCS
OPIATES UR QL SCN: NEGATIVE
OXYCODONE+OXYMORPHONE UR QL SCN: POSITIVE
P AXIS: 66 DEGREES
P AXIS: 67 DEGREES
PCP UR QL: NEGATIVE
PH UR STRIP.AUTO: 6 [PH]
PLATELET # BLD AUTO: 256 THOUSANDS/UL (ref 149–390)
PMV BLD AUTO: 9.6 FL (ref 8.9–12.7)
POTASSIUM SERPL-SCNC: 3.7 MMOL/L (ref 3.5–5.3)
PR INTERVAL: 124 MS
PR INTERVAL: 124 MS
PROT SERPL-MCNC: 8.1 G/DL (ref 6.4–8.2)
PROT UR STRIP-MCNC: NEGATIVE MG/DL
QRS AXIS: 86 DEGREES
QRS AXIS: 88 DEGREES
QRSD INTERVAL: 88 MS
QRSD INTERVAL: 90 MS
QT INTERVAL: 376 MS
QT INTERVAL: 380 MS
QTC INTERVAL: 433 MS
QTC INTERVAL: 435 MS
RBC # BLD AUTO: 4.74 MILLION/UL (ref 3.81–5.12)
RBC #/AREA URNS AUTO: NORMAL /HPF
RSV RNA RESP QL NAA+PROBE: NEGATIVE
SARS-COV-2 RNA RESP QL NAA+PROBE: NEGATIVE
SODIUM SERPL-SCNC: 138 MMOL/L (ref 136–145)
SP GR UR STRIP.AUTO: 1.01 (ref 1–1.03)
T WAVE AXIS: 55 DEGREES
T WAVE AXIS: 59 DEGREES
THC UR QL: NEGATIVE
UROBILINOGEN UR QL STRIP.AUTO: 0.2 E.U./DL
VENTRICULAR RATE: 79 BPM
VENTRICULAR RATE: 80 BPM
WBC # BLD AUTO: 5.67 THOUSAND/UL (ref 4.31–10.16)
WBC #/AREA URNS AUTO: NORMAL /HPF

## 2022-01-31 PROCEDURE — 36415 COLL VENOUS BLD VENIPUNCTURE: CPT | Performed by: PHYSICIAN ASSISTANT

## 2022-01-31 PROCEDURE — 93010 ELECTROCARDIOGRAM REPORT: CPT | Performed by: INTERNAL MEDICINE

## 2022-01-31 PROCEDURE — 93005 ELECTROCARDIOGRAM TRACING: CPT

## 2022-01-31 PROCEDURE — 0241U HB NFCT DS VIR RESP RNA 4 TRGT: CPT | Performed by: PHYSICIAN ASSISTANT

## 2022-01-31 PROCEDURE — 80307 DRUG TEST PRSMV CHEM ANLYZR: CPT | Performed by: PHYSICIAN ASSISTANT

## 2022-01-31 PROCEDURE — 99285 EMERGENCY DEPT VISIT HI MDM: CPT | Performed by: PHYSICIAN ASSISTANT

## 2022-01-31 PROCEDURE — 81001 URINALYSIS AUTO W/SCOPE: CPT | Performed by: PHYSICIAN ASSISTANT

## 2022-01-31 PROCEDURE — 80053 COMPREHEN METABOLIC PANEL: CPT | Performed by: PHYSICIAN ASSISTANT

## 2022-01-31 PROCEDURE — 99285 EMERGENCY DEPT VISIT HI MDM: CPT

## 2022-01-31 PROCEDURE — 85025 COMPLETE CBC W/AUTO DIFF WBC: CPT | Performed by: PHYSICIAN ASSISTANT

## 2022-01-31 PROCEDURE — 82075 ASSAY OF BREATH ETHANOL: CPT | Performed by: PHYSICIAN ASSISTANT

## 2022-01-31 RX ORDER — HYDROXYZINE 50 MG/1
100 TABLET, FILM COATED ORAL
Status: DISCONTINUED | OUTPATIENT
Start: 2022-01-31 | End: 2022-02-07 | Stop reason: HOSPADM

## 2022-01-31 RX ORDER — LORAZEPAM 2 MG/ML
1 INJECTION INTRAMUSCULAR
Status: DISCONTINUED | OUTPATIENT
Start: 2022-01-31 | End: 2022-02-07 | Stop reason: HOSPADM

## 2022-01-31 RX ORDER — HALOPERIDOL 5 MG/ML
5 INJECTION INTRAMUSCULAR
Status: DISCONTINUED | OUTPATIENT
Start: 2022-01-31 | End: 2022-02-07 | Stop reason: HOSPADM

## 2022-01-31 RX ORDER — ACETAMINOPHEN 325 MG/1
650 TABLET ORAL EVERY 6 HOURS PRN
Status: CANCELLED | OUTPATIENT
Start: 2022-01-31

## 2022-01-31 RX ORDER — HALOPERIDOL 5 MG/ML
2.5 INJECTION INTRAMUSCULAR
Status: CANCELLED | OUTPATIENT
Start: 2022-01-31

## 2022-01-31 RX ORDER — POLYETHYLENE GLYCOL 3350 17 G/17G
17 POWDER, FOR SOLUTION ORAL DAILY PRN
Status: CANCELLED | OUTPATIENT
Start: 2022-01-31

## 2022-01-31 RX ORDER — BISACODYL 10 MG
10 SUPPOSITORY, RECTAL RECTAL DAILY PRN
Status: DISCONTINUED | OUTPATIENT
Start: 2022-01-31 | End: 2022-02-07 | Stop reason: HOSPADM

## 2022-01-31 RX ORDER — AMOXICILLIN 250 MG
1 CAPSULE ORAL DAILY PRN
Status: CANCELLED | OUTPATIENT
Start: 2022-01-31

## 2022-01-31 RX ORDER — LORAZEPAM 2 MG/ML
1 INJECTION INTRAMUSCULAR
Status: CANCELLED | OUTPATIENT
Start: 2022-01-31

## 2022-01-31 RX ORDER — HYDROXYZINE HYDROCHLORIDE 25 MG/1
50 TABLET, FILM COATED ORAL
Status: CANCELLED | OUTPATIENT
Start: 2022-01-31

## 2022-01-31 RX ORDER — HALOPERIDOL 5 MG
5 TABLET ORAL
Status: DISCONTINUED | OUTPATIENT
Start: 2022-01-31 | End: 2022-02-07 | Stop reason: HOSPADM

## 2022-01-31 RX ORDER — HYDROXYZINE HYDROCHLORIDE 25 MG/1
25 TABLET, FILM COATED ORAL
Status: DISCONTINUED | OUTPATIENT
Start: 2022-01-31 | End: 2022-02-07 | Stop reason: HOSPADM

## 2022-01-31 RX ORDER — AMOXICILLIN 250 MG
1 CAPSULE ORAL DAILY PRN
Status: DISCONTINUED | OUTPATIENT
Start: 2022-01-31 | End: 2022-02-07 | Stop reason: HOSPADM

## 2022-01-31 RX ORDER — MINERAL OIL AND PETROLATUM 150; 830 MG/G; MG/G
1 OINTMENT OPHTHALMIC
Status: CANCELLED | OUTPATIENT
Start: 2022-01-31

## 2022-01-31 RX ORDER — LORAZEPAM 2 MG/ML
2 INJECTION INTRAMUSCULAR
Status: CANCELLED | OUTPATIENT
Start: 2022-01-31

## 2022-01-31 RX ORDER — ACETAMINOPHEN 325 MG/1
650 TABLET ORAL EVERY 4 HOURS PRN
Status: CANCELLED | OUTPATIENT
Start: 2022-01-31

## 2022-01-31 RX ORDER — DIPHENHYDRAMINE HYDROCHLORIDE 50 MG/ML
50 INJECTION INTRAMUSCULAR; INTRAVENOUS EVERY 6 HOURS PRN
Status: CANCELLED | OUTPATIENT
Start: 2022-01-31

## 2022-01-31 RX ORDER — ACETAMINOPHEN 325 MG/1
650 TABLET ORAL EVERY 4 HOURS PRN
Status: DISCONTINUED | OUTPATIENT
Start: 2022-01-31 | End: 2022-02-07 | Stop reason: HOSPADM

## 2022-01-31 RX ORDER — LORAZEPAM 2 MG/ML
2 INJECTION INTRAMUSCULAR
Status: DISCONTINUED | OUTPATIENT
Start: 2022-01-31 | End: 2022-02-07 | Stop reason: HOSPADM

## 2022-01-31 RX ORDER — LORAZEPAM 2 MG/ML
2 INJECTION INTRAMUSCULAR EVERY 6 HOURS PRN
Status: DISCONTINUED | OUTPATIENT
Start: 2022-01-31 | End: 2022-02-07 | Stop reason: HOSPADM

## 2022-01-31 RX ORDER — HYDROXYZINE 50 MG/1
50 TABLET, FILM COATED ORAL
Status: DISCONTINUED | OUTPATIENT
Start: 2022-01-31 | End: 2022-02-07 | Stop reason: HOSPADM

## 2022-01-31 RX ORDER — POLYETHYLENE GLYCOL 3350 17 G/17G
17 POWDER, FOR SOLUTION ORAL DAILY PRN
Status: DISCONTINUED | OUTPATIENT
Start: 2022-01-31 | End: 2022-02-07 | Stop reason: HOSPADM

## 2022-01-31 RX ORDER — BENZTROPINE MESYLATE 1 MG/ML
1 INJECTION INTRAMUSCULAR; INTRAVENOUS
Status: DISCONTINUED | OUTPATIENT
Start: 2022-01-31 | End: 2022-02-07 | Stop reason: HOSPADM

## 2022-01-31 RX ORDER — BENZTROPINE MESYLATE 1 MG/ML
1 INJECTION INTRAMUSCULAR; INTRAVENOUS
Status: CANCELLED | OUTPATIENT
Start: 2022-01-31

## 2022-01-31 RX ORDER — BENZTROPINE MESYLATE 1 MG/ML
0.5 INJECTION INTRAMUSCULAR; INTRAVENOUS
Status: CANCELLED | OUTPATIENT
Start: 2022-01-31

## 2022-01-31 RX ORDER — HYDROXYZINE HYDROCHLORIDE 25 MG/1
100 TABLET, FILM COATED ORAL
Status: CANCELLED | OUTPATIENT
Start: 2022-01-31

## 2022-01-31 RX ORDER — HALOPERIDOL 2 MG/1
2 TABLET ORAL
Status: DISCONTINUED | OUTPATIENT
Start: 2022-01-31 | End: 2022-02-07 | Stop reason: HOSPADM

## 2022-01-31 RX ORDER — MAGNESIUM HYDROXIDE/ALUMINUM HYDROXICE/SIMETHICONE 120; 1200; 1200 MG/30ML; MG/30ML; MG/30ML
30 SUSPENSION ORAL EVERY 4 HOURS PRN
Status: CANCELLED | OUTPATIENT
Start: 2022-01-31

## 2022-01-31 RX ORDER — ACETAMINOPHEN 325 MG/1
650 TABLET ORAL EVERY 6 HOURS PRN
Status: DISCONTINUED | OUTPATIENT
Start: 2022-01-31 | End: 2022-02-07 | Stop reason: HOSPADM

## 2022-01-31 RX ORDER — HYDROXYZINE HYDROCHLORIDE 25 MG/1
25 TABLET, FILM COATED ORAL
Status: CANCELLED | OUTPATIENT
Start: 2022-01-31

## 2022-01-31 RX ORDER — BENZTROPINE MESYLATE 1 MG/1
1 TABLET ORAL
Status: DISCONTINUED | OUTPATIENT
Start: 2022-01-31 | End: 2022-02-07 | Stop reason: HOSPADM

## 2022-01-31 RX ORDER — HALOPERIDOL 5 MG/ML
5 INJECTION INTRAMUSCULAR
Status: CANCELLED | OUTPATIENT
Start: 2022-01-31

## 2022-01-31 RX ORDER — BISACODYL 10 MG
10 SUPPOSITORY, RECTAL RECTAL DAILY PRN
Status: CANCELLED | OUTPATIENT
Start: 2022-01-31

## 2022-01-31 RX ORDER — DIPHENHYDRAMINE HYDROCHLORIDE 50 MG/ML
50 INJECTION INTRAMUSCULAR; INTRAVENOUS EVERY 6 HOURS PRN
Status: DISCONTINUED | OUTPATIENT
Start: 2022-01-31 | End: 2022-02-07 | Stop reason: HOSPADM

## 2022-01-31 RX ORDER — TRAZODONE HYDROCHLORIDE 50 MG/1
50 TABLET ORAL
Status: CANCELLED | OUTPATIENT
Start: 2022-01-31

## 2022-01-31 RX ORDER — HALOPERIDOL 5 MG/ML
2.5 INJECTION INTRAMUSCULAR
Status: DISCONTINUED | OUTPATIENT
Start: 2022-01-31 | End: 2022-02-07 | Stop reason: HOSPADM

## 2022-01-31 RX ORDER — ACETAMINOPHEN 325 MG/1
975 TABLET ORAL EVERY 6 HOURS PRN
Status: DISCONTINUED | OUTPATIENT
Start: 2022-01-31 | End: 2022-02-07 | Stop reason: HOSPADM

## 2022-01-31 RX ORDER — BENZTROPINE MESYLATE 1 MG/ML
0.5 INJECTION INTRAMUSCULAR; INTRAVENOUS
Status: DISCONTINUED | OUTPATIENT
Start: 2022-01-31 | End: 2022-02-07 | Stop reason: HOSPADM

## 2022-01-31 RX ORDER — MAGNESIUM HYDROXIDE/ALUMINUM HYDROXICE/SIMETHICONE 120; 1200; 1200 MG/30ML; MG/30ML; MG/30ML
30 SUSPENSION ORAL EVERY 4 HOURS PRN
Status: DISCONTINUED | OUTPATIENT
Start: 2022-01-31 | End: 2022-02-07 | Stop reason: HOSPADM

## 2022-01-31 RX ORDER — HALOPERIDOL 10 MG/1
5 TABLET ORAL
Status: CANCELLED | OUTPATIENT
Start: 2022-01-31

## 2022-01-31 RX ORDER — ACETAMINOPHEN 325 MG/1
975 TABLET ORAL EVERY 6 HOURS PRN
Status: CANCELLED | OUTPATIENT
Start: 2022-01-31

## 2022-01-31 RX ORDER — TRAZODONE HYDROCHLORIDE 50 MG/1
50 TABLET ORAL
Status: DISCONTINUED | OUTPATIENT
Start: 2022-01-31 | End: 2022-02-01

## 2022-01-31 RX ORDER — HALOPERIDOL 2 MG/1
2 TABLET ORAL
Status: CANCELLED | OUTPATIENT
Start: 2022-01-31

## 2022-01-31 RX ORDER — BENZTROPINE MESYLATE 0.5 MG/1
1 TABLET ORAL
Status: CANCELLED | OUTPATIENT
Start: 2022-01-31

## 2022-01-31 RX ORDER — MINERAL OIL AND PETROLATUM 150; 830 MG/G; MG/G
1 OINTMENT OPHTHALMIC
Status: DISCONTINUED | OUTPATIENT
Start: 2022-01-31 | End: 2022-02-07 | Stop reason: HOSPADM

## 2022-01-31 RX ORDER — LORAZEPAM 2 MG/ML
2 INJECTION INTRAMUSCULAR EVERY 6 HOURS PRN
Status: CANCELLED | OUTPATIENT
Start: 2022-01-31

## 2022-01-31 RX ADMIN — TRAZODONE HYDROCHLORIDE 50 MG: 50 TABLET ORAL at 21:14

## 2022-01-31 NOTE — EMTALA/ACUTE CARE TRANSFER
[facility] EMERGENCY DEPARTMENT  [address]  [facility] [address]  Dept: [phone]      CNVTOO TRANSFER CONSENT    NAME Jenniffer Granado                                         1960                              MRN [id number]    I have been informed of my rights regarding examination, treatment, and transfer   by Dr Vidhi Mcconnell DO    Benefits:      Risks:        Consent for Transfer:  I acknowledge that my medical condition has been evaluated and explained to me by the emergency department physician or other qualified medical person and/or my attending physician, who has recommended that I be transferred to the service of  Accepting Physician: Gautam Jules PA-C for Dr Larisa Roblero at [address], [address] : [place]  The above potential benefits of such transfer, the potential risks associated with such transfer, and the probable risks of not being transferred have been explained to me, and I fully understand them  The doctor has explained that, in my case, the benefits of transfer outweigh the risks  I agree to be transferred  I authorize the performance of emergency medical procedures and treatments upon me in both transit and upon arrival at the receiving facility  Additionally, I authorize the release of any and all medical records to the receiving facility and request they be transported with me, if possible  I understand that the safest mode of transportation during a medical emergency is an ambulance and that the Hospital advocates the use of this mode of transport  Risks of traveling to the receiving facility by car, including absence of medical control, life sustaining equipment, such as oxygen, and medical personnel has been explained to me and I fully understand them  ([place] CORRECT BOX BELOW)  [  ]  I consent to the stated transfer and to be transported by ambulance/helicopter    [  ]  I consent to the stated transfer, but refuse transportation by ambulance and accept full responsibility for my transportation by car  I understand the risks of non-ambulance transfers and I exonerate the Hospital and its staff from any deterioration in my condition that results from this refusal     X___________________________________________    DATE  22  TIME________  Signature of patient or legally responsible individual signing on patient behalf           RELATIONSHIP TO PATIENT_________________________          Provider Certification    NAME Evette David                                         1960                              MRN [id number]    A medical screening exam was performed on the above named patient  Based on the examination:    Condition Necessitating Transfer The primary encounter diagnosis was Depression with suicidal ideation  Diagnoses of Medical clearance for psychiatric admission, Opioid dependence in remission ([facility] [address] ), Gastroesophageal reflux disease, and Elevated TSH were also pertinent to this visit  Patient Condition:      Reason for Transfer:      Transfer Requirements: [address] [place] [address]   · Space available and qualified personnel available for treatment as acknowledged by [facility]  · Agreed to accept transfer and to provide appropriate medical treatment as acknowledged by       Sully Diaz PA-C for Dr Chrissy Sanchez  · Appropriate medical records of the examination and treatment of the patient are provided at the time of transfer   [address], [address] _______  · Transfer will be performed by qualified personnel from [facility]  and appropriate transfer equipment as required, including the use of necessary and appropriate life support measures      Provider Certification: I have examined the patient and explained the following risks and benefits of being transferred/refusing transfer to the patient/family:         Based on these reasonable risks and benefits to the patient and/or the unborn child(osvaldo), and based upon the information available at the time of the patients examination, I certify that the medical benefits reasonably to be expected from the provision of appropriate medical treatments at another medical facility outweigh the increasing risks, if any, to the individuals medical condition, and in the case of labor to the unborn child, from effecting the transfer      X____________________________________________ DATE 01/31/22        TIME_______      ORIGINAL - SEND TO MEDICAL RECORDS   COPY - SEND WITH PATIENT DURING TRANSFER

## 2022-01-31 NOTE — ED NOTES
Insurance Authorization for admission:   Phone call placed to [employer]  Phone number: [phone]  Spoke to Elkin Banuleos  3 days approved  Level of care: IP  Review on 2/2/22  Authorization # CALL UPON ARRIVAL        EVS (Eligibility Verification System) called - [phone]    Automated system indicates:Eligible

## 2022-01-31 NOTE — ED NOTES
Crisis met with pt in her room  Pt reported that she has been having an increase in depression since stopping her medications in July 2021  Pt reported that she stopped taking medications due to a fifty pound weight gain over the course of six months to a year  Pt reported that she wished that she could go to sleep and not wake up  Pt reported that her youngest brother has cancer and this is the anniversary of her parents passing away  Pt is currently linked with [facility] for medication management and therapy  Pt reported no legal issues and reported being in recovery from crack, coke and meth for two and a half years  Pt reported no tobacco use  Pt reported suicidal ideation with plan to overdose on pills and not wake up  Pt reported history of overdoses  Pt reported inpatient history as well  Pt denies homicidal, delusions, or hallucinations  Pt agreeable to sign a 201

## 2022-01-31 NOTE — ED PROVIDER NOTES
History  Chief Complaint   Patient presents with    Suicidal     Pt with +SI/-HI, last year pt overdosed on seroquel  Patient is a 62 y/o F with h/o depression that presents to the ED with worsening depression and SI over the past 3 days  She states she stopped taking her medication in July  She does see a therapist through [facility]  She has a plan to overdose on sleeping pills  Patient has had a prior overdose on Seroquel in the past  She denies recent attempt  History provided by:  Patient  Depression  Presenting symptoms: depression and suicidal thoughts    Degree of incapacity (severity): Moderate  Onset quality:  Gradual  Duration:  3 days  Timing:  Constant  Progression:  Worsening  Chronicity:  New  Context: noncompliance    Treatment compliance:  Untreated  Relieved by:  Nothing  Worsened by:  Nothing  Ineffective treatments:  None tried  Associated symptoms: anxiety    Associated symptoms: no abdominal pain and no chest pain    Risk factors: hx of mental illness        Prior to Admission Medications   Prescriptions Last Dose Informant Patient Reported? Taking?    Multiple Vitamins-Minerals (MULTIVITAMIN ADULT PO)  Self Yes No   Sig: Take by mouth   Multiple Vitamins-Minerals (ZINC PO)   Yes No   Sig: Take by mouth   QUEtiapine (SEROquel) 100 mg tablet  Self Yes No   Sig: Take 100 mg by mouth daily at bedtime   buPROPion (WELLBUTRIN) 75 mg tablet   Yes No   Sig: Take 75 mg by mouth daily   cyanocobalamin (VITAMIN B-12) 250 MCG tablet  Self Yes No   Sig: Take 250 mcg by mouth daily   gabapentin (NEURONTIN) 300 mg capsule  Self No No   Sig: Take 1 capsule (300 mg total) by mouth 3 (three) times a day At 9am, 4pm, 9pm   haloperidol (HALDOL) 5 mg tablet  Self No No   Sig: Take 1 tablet (5 mg total) by mouth daily at bedtime   hydrOXYzine HCL (ATARAX) 50 mg tablet  Self No No   Sig: Take 1 tablet (50 mg total) by mouth every 8 (eight) hours as needed (moderate anxiety)   pantoprazole (PROTONIX) 40 mg tablet   No No   Sig: Take 1 tablet (40 mg total) by mouth daily   sertraline (ZOLOFT) 50 mg tablet  Self No No   Sig: Take 1 tablet (50 mg total) by mouth daily At 9am      Facility-Administered Medications: None       Past Medical History:   Diagnosis Date    Addiction to drug ([place] [address] )     Anxiety     Chest wall pain 2020    Chronic suboxone use 2020    Depression     Drug dependence (HCC)     GERD (gastroesophageal reflux disease)     Medical clearance for psychiatric admission 12/10/2020    Opiate abuse, continuous ([place] [address] ) 2018    Osteoarthritis     Rheumatoid arthritis ([place] [address] )     Shoulder impingement syndrome, left 2020    Substance abuse ([place] [address] )     Tubal pregnancy        Past Surgical History:   Procedure Laterality Date    BREAST IMPLANT      BUNIONECTOMY Bilateral     ECTOPIC PREGNANCY SURGERY      TUBAL LIGATION         Family History   Problem Relation Age of Onset    Bipolar disorder Mother     Depression Mother     Hypertension Mother     Heart disease Father         cardiac    Prostate cancer Father     Bipolar disorder Brother     Depression Sister     Colon polyps Neg Hx     Colon cancer Neg Hx      I have reviewed and agree with the history as documented      E-Cigarette/Vaping    E-Cigarette Use Never User      E-Cigarette/Vaping Substances    Nicotine No     THC No     CBD No     Flavoring No     Other No     Unknown No      Social History     Tobacco Use    Smoking status: Former Smoker     Packs/day: 0 25     Years: 30 00     Pack years: 7 50     Types: Cigarettes     Quit date: 2021     Years since quittin 4    Smokeless tobacco: Never Used    Tobacco comment: 1 pack every 3 days    Vaping Use    Vaping Use: Never used   Substance Use Topics    Alcohol use: No    Drug use: Not Currently     Frequency: 7 0 times per week     Types: Oxycodone, Prescription     Comment: daily abuse of percocet/oxy-recovering since August        Review of Systems   Constitutional: Negative for chills and fever  Respiratory: Negative for cough and shortness of breath  Cardiovascular: Negative for chest pain, palpitations and leg swelling  Gastrointestinal: Negative for abdominal pain, diarrhea, nausea and vomiting  Skin: Negative for color change and rash  Neurological: Negative for dizziness, weakness and numbness  Psychiatric/Behavioral: Positive for depression, sleep disturbance and suicidal ideas  The patient is nervous/anxious  All other systems reviewed and are negative  Physical Exam  Physical Exam  Vitals and nursing note reviewed  Constitutional:       General: She is not in acute distress  Appearance: Normal appearance  She is well-developed, well-groomed and normal weight  She is not ill-appearing or diaphoretic  HENT:      Head: Normocephalic and atraumatic  Right Ear: External ear normal       Left Ear: External ear normal       Nose: Nose normal    Eyes:      Conjunctiva/sclera: Conjunctivae normal       Pupils: Pupils are equal    Cardiovascular:      Rate and Rhythm: Normal rate and regular rhythm  Heart sounds: Normal heart sounds  Pulmonary:      Effort: Pulmonary effort is normal       Breath sounds: Normal breath sounds  No wheezing, rhonchi or rales  Abdominal:      General: Abdomen is flat  Bowel sounds are normal       Tenderness: There is no abdominal tenderness  Musculoskeletal:         General: Normal range of motion  Cervical back: Normal range of motion  Right lower leg: No edema  Left lower leg: No edema  Skin:     General: Skin is warm and dry  Coloration: Skin is not jaundiced or pale  Findings: No rash  Neurological:      General: No focal deficit present  Mental Status: She is alert and oriented to person, place, and time  Cranial Nerves: No cranial nerve deficit  Motor: No weakness     Psychiatric:         Mood and Affect: Mood is anxious and depressed  Speech: Speech normal          Behavior: Behavior is cooperative  Thought Content: Thought content is not paranoid or delusional  Thought content includes suicidal ideation  Thought content does not include homicidal ideation  Thought content includes suicidal plan  Thought content does not include homicidal plan  Vital Signs  ED Triage Vitals [01/31/22 1119]   Temperature Pulse Respirations Blood Pressure SpO2   97 8 °F (36 6 °C) 76 16 124/78 98 %      Temp Source Heart Rate Source Patient Position - Orthostatic VS BP Location FiO2 (%)   Temporal Monitor Sitting Left arm --      Pain Score       No Pain           Vitals:    01/31/22 1119   BP: 124/78   Pulse: 76   Patient Position - Orthostatic VS: Sitting         Visual Acuity      ED Medications  Medications - No data to display    Diagnostic Studies  Results Reviewed     Procedure Component Value Units Date/Time    COVID/FLU/RSV - 2 hour TAT [[id number]]  (Normal) Collected: 01/31/22 1157    Lab Status: Final result Specimen: Nares from Nose Updated: 01/31/22 1344     SARS-CoV-2 Negative     INFLUENZA A PCR Negative     INFLUENZA B PCR Negative     RSV PCR Negative    Narrative:      FOR PEDIATRIC PATIENTS - copy/paste COVID Guidelines URL to browser: [email or web address]  [email or web address]    SARS-CoV-2 assay is a Nucleic Acid Amplification assay intended for the  qualitative detection of nucleic acid from SARS-CoV-2 in nasopharyngeal  swabs  Results are for the presumptive identification of SARS-CoV-2 RNA  Positive results are indicative of infection with SARS-CoV-2, the virus  causing COVID-19, but do not rule out bacterial infection or co-infection  with other viruses  Laboratories within the [place] and its  territories are required to report all positive results to the appropriate  public health authorities   Negative results do not preclude SARS-CoV-2  infection and should not be used as the sole basis for treatment or other  patient management decisions  Negative results must be combined with  clinical observations, patient history, and epidemiological information  This test has not been FDA cleared or approved  This test has been authorized by FDA under an Emergency Use Authorization  (EUA)  This test is only authorized for the duration of time the  declaration that circumstances exist justifying the authorization of the  emergency use of an in vitro diagnostic tests for detection of SARS-CoV-2  virus and/or diagnosis of COVID-19 infection under section 564(b)(1) of  the Act, 21 U  S C  771QTV-3(E)(1), unless the authorization is terminated  or revoked sooner  The test has been validated but independent review by FDA  and CLIA is pending  Test performed using [employer] GeneXpert: This RT-PCR assay targets N2,  a region unique to SARS-CoV-2  A conserved region in the E-gene was chosen  for pan-Sarbecovirus detection which includes SARS-CoV-2  Rapid drug screen, urine [[id number]]  (Abnormal) Collected: 01/31/22 1157    Lab Status: Final result Specimen: Urine, Clean Catch Updated: 01/31/22 1231     Amph/Meth UR Negative     Barbiturate Ur Negative     Benzodiazepine Urine Negative     Cocaine Urine Negative     Methadone Urine Negative     Opiate Urine Negative     PCP Ur Negative     THC Urine Negative     Oxycodone Urine Positive    Narrative:      Presumptive report  If requested, specimen will be sent to reference lab for confirmation  FOR MEDICAL PURPOSES ONLY  IF CONFIRMATION NEEDED PLEASE CONTACT THE LAB WITHIN 5 DAYS      Drug Screen Cutoff Levels:  AMPHETAMINE/METHAMPHETAMINES  1000 ng/mL  BARBITURATES     200 ng/mL  BENZODIAZEPINES     200 ng/mL  COCAINE      300 ng/mL  METHADONE      300 ng/mL  OPIATES      300 ng/mL  PHENCYCLIDINE     25 ng/mL  THC       50 ng/mL  OXYCODONE      100 ng/mL    Urine Microscopic [[id number]]  (Normal) Collected: 01/31/22 1157    Lab Status: Final result Specimen: Urine, Clean Catch Updated: 01/31/22 1225     RBC, UA 0-1 /hpf      WBC, UA None Seen /hpf      Epithelial Cells None Seen /hpf      Bacteria, UA None Seen /hpf     Comprehensive metabolic panel [[id number]] Collected: 01/31/22 1157    Lab Status: Final result Specimen: Blood from Arm, Right Updated: 01/31/22 1225     Sodium 138 mmol/L      Potassium 3 7 mmol/L      Chloride 103 mmol/L      CO2 29 mmol/L      ANION GAP 6 mmol/L      BUN 13 mg/dL      Creatinine 0 96 mg/dL      Glucose 86 mg/dL      Calcium 9 3 mg/dL      AST 19 U/L      ALT 20 U/L      Alkaline Phosphatase 81 U/L      Total Protein 8 1 g/dL      Albumin 4 4 g/dL      Total Bilirubin 0 60 mg/dL      eGFR 64 ml/min/1 73sq m     Narrative:      [place] guidelines for Chronic Kidney Disease (CKD):     Stage 1 with normal or high GFR (GFR > 90 mL/min/1 73 square meters)    Stage 2 Mild CKD (GFR = 60-89 mL/min/1 73 square meters)    Stage 3A Moderate CKD (GFR = 45-59 mL/min/1 73 square meters)    Stage 3B Moderate CKD (GFR = 30-44 mL/min/1 73 square meters)    Stage 4 Severe CKD (GFR = 15-29 mL/min/1 73 square meters)    Stage 5 End Stage CKD (GFR <15 mL/min/1 73 square meters)  Note: GFR calculation is accurate only with a steady state creatinine    UA w Reflex to Microscopic w Reflex to Culture [[id number]]  (Abnormal) Collected: 01/31/22 1157    Lab Status: Final result Specimen: Urine, Clean Catch Updated: 01/31/22 1212     Color, UA Yellow     Clarity, UA Clear     Specific Gravity, UA 1 010     pH, UA 6 0     Leukocytes, UA Negative     Nitrite, UA Negative     Protein, UA Negative mg/dl      Glucose, UA Negative mg/dl      Ketones, UA Negative mg/dl      Urobilinogen, UA 0 2 E U /dl      Bilirubin, UA Negative     Blood, UA Trace-lysed    POCT alcohol breath test [[id number]]  (Normal) Resulted: 01/31/22 1212    Lab Status: Final result Updated: 01/31/22 1212     EXTBreath Alcohol 0 000    CBC and differential [[id number]] Collected: 01/31/22 1157    Lab Status: Final result Specimen: Blood from Arm, Right Updated: 01/31/22 1210     WBC 5 67 Thousand/uL      RBC 4 74 Million/uL      Hemoglobin 13 7 g/dL      Hematocrit 41 8 %      MCV 88 fL      MCH 28 9 pg      MCHC 32 8 g/dL      RDW 12 4 %      MPV 9 6 fL      Platelets 225 Thousands/uL      nRBC 0 /100 WBCs      Neutrophils Relative 61 %      Immat GRANS % 0 %      Lymphocytes Relative 27 %      Monocytes Relative 9 %      Eosinophils Relative 2 %      Basophils Relative 1 %      Neutrophils Absolute 3 47 Thousands/µL      Immature Grans Absolute 0 01 Thousand/uL      Lymphocytes Absolute 1 54 Thousands/µL      Monocytes Absolute 0 49 Thousand/µL      Eosinophils Absolute 0 12 Thousand/µL      Basophils Absolute 0 04 Thousands/µL                  No orders to display              Procedures  ECG 12 Lead Documentation Only    Date/Time: 1/31/2022 1:26 PM  Performed by: Tova Lassiter PA-C  Authorized by: Tova Lassiter PA-C     Indications / Diagnosis:  BHU clearance  ECG reviewed by me, the ED Provider: yes    Patient location:  ED  Previous ECG:     Previous ECG:  Unavailable  Rate:     ECG rate:  79  Rhythm:     Rhythm: sinus rhythm    Conduction:     Conduction: normal    ST segments:     ST segments:  Normal  T waves:     T waves: normal               ED Course  ED Course as of 01/31/22 1556   Mon Jan 31, 2022   1327 Patient signed 201, bed search in progress  MDM  Number of Diagnoses or Management Options  Depression with suicidal ideation: established and worsening  Diagnosis management comments: Patient with depression and SI with a specific plan, will consult crisis for inpatient treatment          Amount and/or Complexity of Data Reviewed  Clinical lab tests: ordered and reviewed  Discuss the patient with other providers: yes    Patient Progress  Patient progress: stable      Disposition  Final diagnoses:   Depression with suicidal ideation     Time reflects when diagnosis was documented in both MDM as applicable and the Disposition within this note     Time User Action Codes Description Comment    1/31/2022 12:16 PM Adeline Rhoades Add [F09  A,  R45 851] Depression with suicidal ideation     1/31/2022  2:05 PM Loja Fair Add [Z00 8] Medical clearance for psychiatric admission     1/31/2022  2:05 PM Loja Fair Add [G37 67] Opioid dependence in remission [facility])     1/31/2022  2:05 PM Loja Fair Add [K21 9] Gastroesophageal reflux disease     1/31/2022  2:05 PM Loja Fair Add [R79 89] Elevated TSH       ED Disposition     ED Disposition Condition Date/Time Comment    No Disposition Selected  Mon Jan 31, 2022  3:55 PM Catarino Caban should be transferred out to  [facility] and has been medically cleared  MD Documentation      Most Recent Value   Accepting Physician Lali Brasher PA-C for Dr Briana Love, [facility]    (Name & Tel number) [facility]   Transported by [facility] and Unit #) [place]   Sending MD Dorinda Locke DO      RN Documentation      [address] Name, [facility]    (Name & Tel number) [facility]   Transported by [facility] and Unit #) [place] @[id number]      Follow-up Information    None         Patient's Medications   Discharge Prescriptions    No medications on file       No discharge procedures on file      PDMP Review       Value Time User    PDMP Reviewed  Yes 1/31/2022  2:03 PM India Gamboa PA-C          ED Provider  Electronically Signed by           Teddy Weems PA-C  01/31/22 1937

## 2022-01-31 NOTE — ED NOTES
Patient is accepted at [facility]  Patient is accepted Miladis Ma PA-C for Dr Daniel Fonseca per [facility]  Transportation is arranged with [facility] is scheduled for **  Patient may go to the floor at **  Nurse report is to be called to [phone] prior to patient transfer

## 2022-02-01 PROBLEM — G62.9 NEUROPATHY: Status: ACTIVE | Noted: 2022-02-01

## 2022-02-01 PROBLEM — F31.81 BIPOLAR 2 DISORDER (HCC): Status: ACTIVE | Noted: 2022-02-01

## 2022-02-01 PROBLEM — F33.9 RECURRENT MAJOR DEPRESSIVE DISORDER (HCC): Status: ACTIVE | Noted: 2022-02-01

## 2022-02-01 LAB
ALBUMIN SERPL BCP-MCNC: 3.8 G/DL (ref 3.5–5)
ALP SERPL-CCNC: 74 U/L (ref 46–116)
ALT SERPL W P-5'-P-CCNC: 21 U/L (ref 12–78)
ANION GAP SERPL CALCULATED.3IONS-SCNC: 7 MMOL/L (ref 4–13)
AST SERPL W P-5'-P-CCNC: 18 U/L (ref 5–45)
BASOPHILS # BLD AUTO: 0.04 THOUSANDS/ΜL (ref 0–0.1)
BASOPHILS NFR BLD AUTO: 1 % (ref 0–1)
BILIRUB SERPL-MCNC: 0.4 MG/DL (ref 0.2–1)
BUN SERPL-MCNC: 11 MG/DL (ref 5–25)
CALCIUM SERPL-MCNC: 9.2 MG/DL (ref 8.3–10.1)
CHLORIDE SERPL-SCNC: 105 MMOL/L (ref 100–108)
CHOLEST SERPL-MCNC: 183 MG/DL
CO2 SERPL-SCNC: 28 MMOL/L (ref 21–32)
CREAT SERPL-MCNC: 0.89 MG/DL (ref 0.6–1.3)
EOSINOPHIL # BLD AUTO: 0.14 THOUSAND/ΜL (ref 0–0.61)
EOSINOPHIL NFR BLD AUTO: 3 % (ref 0–6)
ERYTHROCYTE [DISTWIDTH] IN BLOOD BY AUTOMATED COUNT: 12.5 % (ref 11.6–15.1)
EST. AVERAGE GLUCOSE BLD GHB EST-MCNC: 114 MG/DL
GFR SERPL CREATININE-BSD FRML MDRD: 71 ML/MIN/1.73SQ M
GLUCOSE P FAST SERPL-MCNC: 86 MG/DL (ref 65–99)
GLUCOSE SERPL-MCNC: 86 MG/DL (ref 65–140)
HBA1C MFR BLD: 5.6 %
HCT VFR BLD AUTO: 39.1 % (ref 34.8–46.1)
HDLC SERPL-MCNC: 55 MG/DL
HGB BLD-MCNC: 12.5 G/DL (ref 11.5–15.4)
IMM GRANULOCYTES # BLD AUTO: 0.01 THOUSAND/UL (ref 0–0.2)
IMM GRANULOCYTES NFR BLD AUTO: 0 % (ref 0–2)
LDLC SERPL CALC-MCNC: 111 MG/DL (ref 0–100)
LYMPHOCYTES # BLD AUTO: 1.45 THOUSANDS/ΜL (ref 0.6–4.47)
LYMPHOCYTES NFR BLD AUTO: 30 % (ref 14–44)
MCH RBC QN AUTO: 28.3 PG (ref 26.8–34.3)
MCHC RBC AUTO-ENTMCNC: 32 G/DL (ref 31.4–37.4)
MCV RBC AUTO: 89 FL (ref 82–98)
MONOCYTES # BLD AUTO: 0.45 THOUSAND/ΜL (ref 0.17–1.22)
MONOCYTES NFR BLD AUTO: 9 % (ref 4–12)
NEUTROPHILS # BLD AUTO: 2.7 THOUSANDS/ΜL (ref 1.85–7.62)
NEUTS SEG NFR BLD AUTO: 57 % (ref 43–75)
NONHDLC SERPL-MCNC: 128 MG/DL
NRBC BLD AUTO-RTO: 0 /100 WBCS
PLATELET # BLD AUTO: 245 THOUSANDS/UL (ref 149–390)
PMV BLD AUTO: 10 FL (ref 8.9–12.7)
POTASSIUM SERPL-SCNC: 3.8 MMOL/L (ref 3.5–5.3)
PROT SERPL-MCNC: 7.2 G/DL (ref 6.4–8.2)
RBC # BLD AUTO: 4.41 MILLION/UL (ref 3.81–5.12)
RPR SER QL: NORMAL
SODIUM SERPL-SCNC: 140 MMOL/L (ref 136–145)
TRIGL SERPL-MCNC: 85 MG/DL
TSH SERPL DL<=0.05 MIU/L-ACNC: 5.42 UIU/ML (ref 0.36–3.74)
WBC # BLD AUTO: 4.79 THOUSAND/UL (ref 4.31–10.16)

## 2022-02-01 PROCEDURE — 80053 COMPREHEN METABOLIC PANEL: CPT | Performed by: PHYSICIAN ASSISTANT

## 2022-02-01 PROCEDURE — 84443 ASSAY THYROID STIM HORMONE: CPT | Performed by: PHYSICIAN ASSISTANT

## 2022-02-01 PROCEDURE — 80061 LIPID PANEL: CPT | Performed by: PHYSICIAN ASSISTANT

## 2022-02-01 PROCEDURE — 85025 COMPLETE CBC W/AUTO DIFF WBC: CPT | Performed by: PHYSICIAN ASSISTANT

## 2022-02-01 PROCEDURE — 84439 ASSAY OF FREE THYROXINE: CPT | Performed by: PHYSICIAN ASSISTANT

## 2022-02-01 PROCEDURE — 83036 HEMOGLOBIN GLYCOSYLATED A1C: CPT | Performed by: PHYSICIAN ASSISTANT

## 2022-02-01 PROCEDURE — 86592 SYPHILIS TEST NON-TREP QUAL: CPT | Performed by: PHYSICIAN ASSISTANT

## 2022-02-01 PROCEDURE — 99222 1ST HOSP IP/OBS MODERATE 55: CPT | Performed by: STUDENT IN AN ORGANIZED HEALTH CARE EDUCATION/TRAINING PROGRAM

## 2022-02-01 PROCEDURE — 99254 IP/OBS CNSLTJ NEW/EST MOD 60: CPT | Performed by: PHYSICIAN ASSISTANT

## 2022-02-01 PROCEDURE — 93005 ELECTROCARDIOGRAM TRACING: CPT

## 2022-02-01 RX ORDER — DULOXETIN HYDROCHLORIDE 20 MG/1
20 CAPSULE, DELAYED RELEASE ORAL DAILY
Status: DISCONTINUED | OUTPATIENT
Start: 2022-02-01 | End: 2022-02-03

## 2022-02-01 RX ORDER — TRAZODONE HYDROCHLORIDE 100 MG/1
100 TABLET ORAL
Status: DISCONTINUED | OUTPATIENT
Start: 2022-02-01 | End: 2022-02-03

## 2022-02-01 RX ORDER — GABAPENTIN 300 MG/1
300 CAPSULE ORAL 3 TIMES DAILY
Status: DISCONTINUED | OUTPATIENT
Start: 2022-02-01 | End: 2022-02-06

## 2022-02-01 RX ADMIN — ACETAMINOPHEN 975 MG: 325 TABLET, FILM COATED ORAL at 11:24

## 2022-02-01 RX ADMIN — GABAPENTIN 300 MG: 300 CAPSULE ORAL at 21:46

## 2022-02-01 RX ADMIN — TRAZODONE HYDROCHLORIDE 100 MG: 100 TABLET ORAL at 21:46

## 2022-02-01 RX ADMIN — HYDROXYZINE HYDROCHLORIDE 100 MG: 50 TABLET, FILM COATED ORAL at 09:01

## 2022-02-01 RX ADMIN — GABAPENTIN 300 MG: 300 CAPSULE ORAL at 15:32

## 2022-02-01 RX ADMIN — HYDROXYZINE HYDROCHLORIDE 50 MG: 50 TABLET, FILM COATED ORAL at 03:03

## 2022-02-01 RX ADMIN — DULOXETINE 20 MG: 20 CAPSULE, DELAYED RELEASE ORAL at 11:18

## 2022-02-01 RX ADMIN — DICLOFENAC SODIUM 2 G: 10 GEL TOPICAL at 21:47

## 2022-02-01 RX ADMIN — GABAPENTIN 300 MG: 300 CAPSULE ORAL at 11:18

## 2022-02-01 RX ADMIN — DICLOFENAC SODIUM 2 G: 10 GEL TOPICAL at 17:55

## 2022-02-01 NOTE — NURSING NOTE
Pt arrived to the unit with the following:    Bedside:  Glasses  1/4 zip pullover black  T-shirt purple "DÍAZ"  Jeggings navy  Sweatshirt yellow  Leggings leopard  Sweatpants black  Tank top red  Tank top black  Socks x2pr  Bra x2  Underwear x2  Flip Flops      Locker:  Coat  Gloves  Boots  Green duffel bag  Moccasins white  Hoodie charcoal/red  Sweatshirt black  Lined leggings black  Sweatpants pink  Hoodie grey "Pink"  Tank top tie dye  Tank top grey  L/s shirt multi stripes  PJ bottoms plaid  Bra x3  Underwear x2  Socks x2pr    iPhone  Lightning cord  Charging block    Gratitude journal  Bible  Assorted notes/prayer cards    Tote bag red/white striped  Hairbrush  Lip balm  Wooden cross  Saline nasal spray  Toothpaste  Toothbrush  Deodorant  Lotion  Razor  Qtips  flossers  Personal groomer  Priscila creme  Small stone  Baby wipes      Medications to Nursing:  Sertraline 100mg tablets x2 bottles  Gabapentin 300mg capsules  Hydroxyzine 50mg capsules    All items inspected for contraband/safety/unit appropriateness and distributed as noted above

## 2022-02-01 NOTE — TREATMENT PLAN
TREATMENT PLAN REVIEW - [address] [facility] R [place] 63 y o  1960 female MRN: [id number]    [address] [place] [address] Room / Bed: Alberto Bajwa [facility]/U 508-30 Encounter: [id number]          Admit Date/Time:  1/31/2022  6:45 PM    Treatment Team: Attending Provider: Frantz Yang MD; Consulting Physician: Sirisha Díaz MD; Care Manager: Joce Lou, RN; Security: Comstock Pop;  Patient Care Assistant: Levi King; Medications RN: Deuce Chester, RICK; Registered Nurse: Damien Noel, RN; Registered Nurse: Arvin Damon RN; : Leisa Doll; Patient Care Technician: Oscar Sales; Registered Nurse: Filipe Fairbanks, RN; Registered Nurse: Poncho Harkins RN    Diagnosis: Principal Problem:    Recurrent major depressive disorder [facility])  Active Problems:    Opioid dependence in remission ([facility] [address] )    Gastroesophageal reflux disease    Medical clearance for psychiatric admission    Neuropathy    Bipolar 2 disorder [facility])      Patient Strengths/Assets: cooperative, motivation for treatment/growth, patient is on a voluntary commitment    Patient Barriers/Limitations: family issues    Short Term Goals: decrease in depressive symptoms, decrease in anxiety symptoms, decrease in suicidal thoughts, improvement in self care, sleep improvement, improvement in appetite    Long Term Goals: improvement in depression, improvement in anxiety, resolution of depressive symptoms, stabilization of mood, free of suicidal thoughts, adequate self care, adequate sleep    Progress Towards Goals: starting psychiatric medications as prescribed    Recommended Treatment: medication management, patient medication education, group therapy, milieu therapy, continued Behavioral Health psychiatric evaluation/assessment process    Treatment Frequency: daily medication monitoring, group and milieu therapy daily, monitoring through interdisciplinary rounds, monitoring through weekly patient care conferences    Expected Discharge Date:  5-7 days    Discharge Plan: referral for outpatient medication management with a psychiatrist, referral for outpatient psychotherapy    Treatment Plan Created/Updated By: Clay Mendoza MD

## 2022-02-01 NOTE — CASE MANAGEMENT
Readmit score:  23(RED)   Confirmed Address:    [facility]: [facility], [facility], [address] [place] [address] [address] [place], [place] [address]   Resides in the home with:   Pt currently staying with her sister & brother-in-law, but said that she does go & spend a few days with her , Ruth Foreman, however, they are  at this time  Will Return Home at Discharge: Pt will return to live with her sister at discharge  Confirmed Phone Number: [phone]   Confirmed Email Address: [email or web address]    Marital Status:                 Children:  - Pt reported she & Ruth Foreman have been together for 34 years  She said that they are  for now, but working to get back together, but it will take time  Pt was previously  &   Pt has 2 adult children, & 4 grandchildren - her son is going to have twins  Family History:                           Parents:                                 Siblings: Pt reported a family history of mental illness on her mother's side, & that one of her brothers had been diagnosed with Bipolar disorder  Pt's mom  2 years ago & her father  3 years ago, about 9 months apart  Pt has 4 brothers & 3 sisters - she is the youngest    Commitment Status: 201   Admitted from:   [facility] on 2022 @ 1129   Presenting C/O:         Pt reported that she stopped taking her medication in July, as she had gained about 50 lbs  She said that her son was getting  & she was worried about fitting in her dress; she expressed that she should have talked to her psychiatrist & not just stopped it  Pt reported that she had become increasingly depressed  She reported some days she will wake up & not be able to get out of bed, & she doesn't want to live  Pt said that she tries to push herself, & knows that her family needs her & she helps with the children  Pt said that other days she wakes up & feels good    Pt reported that about 3 days ago, she had taken a bunch of old Seroquel pills  She said that she was staying with Juan Matters & he saw her do it  She said that she vomited them up & was fine, however, when she went back to her sister's house, Juan Matters had called & told her sister what she did, & they said she had to go to the hospital   Pt said that she agreed she needed to get help  Pt said that December was the anniversary of the death of her father & that her younger brother, had been diagnosed with throat cancer, but then was in remission for a year, but it has come back & he is really sick now  Past Inpatient Tx:   [facility]: January 2, 2021 ([facility])  STLQ: 12/10-14/2020  STLQ: 6/11-17/2020   Past Suicide Attempts: 3 days prior to this admission, Pt reported taking an overdose of Seroquel, but vomited it up  Current outpatient:    Psychiatrist:   [address], CRNP     Therapist:   [facility]      ACT/ICM/CPS/WRT/SC:   None  CM did [address] referrals on 12/14/2020 & 6/15/50161; Pt said that she never followed up with them  PCP:   [address], [place]     Hx:   Pt denied any medical concerns  Medications:   Pt reported she stopped her medications in July, due to weight gain  Pharmacy:   Pt said that she was getting medications filled from [facility], but said that she may switch to [address] [place] [address]    Spirituality/Orthodox: Pt reported that she is [place], but denied any request     Education:   Pt reported that she dropped out of school in 10th grade  She said that she had gotten pregnant & started to work in a [address]  with her mom  Work/Income:     Pt is not working & does not receive any food stamp benefits  Pt has applied & been denied & appealed with Social Security 3-4 times  She is working with an advocate, Melyssa Mccabe    Pt said that a few weeks ago they had another hearing & the  was very rude, & Camilo Pederson filed a complaint, so there is an investigation  Legal:     Probation/[place] Ofc: Pt denied      N/A   Access to Firearms: Pt denied having access to firearms  Transportation:   Pt has a license, but no car  Her sister &  provide transportation  Strength:   Pt identified her strength as being able to help out with her grandchildren  Coping Skills:   Pt said that recently she has been using music & reading as coping skills  Goal:   Pt identified her goal as wanting to get back on medications  Referrals Needed:     Pt said that she would like to be referred to another outpatient provider  Pt said that she feels that [facility] has done all they can, & her therapist, told her she was ready to move her on as well  Pt also expressed that she worries that social security keeps denying her because they think she is a drug addict, since [facility] is for substance abuse  CM reviewed that Pt attends the mental health clinic at [facility], not their recovery center  CM reviewed that Pt had wanted a new provider during her last admission, however, all other local providers had long waiting list   CM agreed to call & inquire if other providers were accepting new patients  Transport at Discharge: Pt's family will provide transportation  IMM: N/A Anita Text ASHLYN: Yes, 2/3/2022 - emailed   Emergency Contact:  Gabriel Betancourt(spouse / ) [phone]   ROIs obtained:   [facility](outpatient)  [facility](PCP)   Insurance:    [address]   Audit: 0       PAWSS:0  BAT: 0 UDS: +Oxycodone   Substance Abuse: Freq  Amount Last Use Notes:   Heroin       Amp/Meth Hx - clean since August 2019      Alcohol Denied      Cocaine Hx - clean since August 2019      Cannabis       Benzodiazepine       Barbituartes       Other Percocet - Occassional 15-30mg 1/30/2022 Pt reported she "fell off the wagon, but I'm back on"    Pt said that she has been off of Suboxone, but has been buying percocet occasionally      Tobacco None

## 2022-02-01 NOTE — ASSESSMENT & PLAN NOTE
 Admission labs reviewed, CBC, CMP, acceptable   Hemoglobin A1c, CBC, RPR, TSH, CMP, lipid panel labs pending   Vitals stable    UDS negative  o Oxycodone present in urine   COVID-19 negative   EKG reveals NSR, 79    Medically stable for continued inpatient psychiatric treatment based on available results   Please contact [facility] with any questions or concerns

## 2022-02-01 NOTE — PLAN OF CARE
Problem: Alteration in Thoughts and Perception  Goal: Verbalize thoughts and feelings  Description: Interventions:  - Promote a nonjudgmental and trusting relationship with the patient through active listening and therapeutic communication  - Assess patient's level of functioning, behavior and potential for risk  - Engage patient in 1 on 1 interactions  - Encourage patient to express fears, feelings, frustrations, and discuss symptoms    - [place] patient to reality, help patient recognize reality-based thinking   - Administer medications as ordered and assess for potential side effects  - Provide the patient education related to the signs and symptoms of the illness and desired effects of prescribed medications  Outcome: Progressing     Problem: Depression  Goal: Treatment Goal: Demonstrate behavioral control of depressive symptoms, verbalize feelings of improved mood/affect, and adopt new coping skills prior to discharge  Outcome: Progressing     Problem: Alteration in Thoughts and Perception  Goal: Recognize dysfunctional thoughts, communicate reality-based thoughts at the time of discharge  Description: Interventions:  - Provide medication and psycho-education to assist patient in compliance and developing insight into his/her illness   Outcome: Progressing

## 2022-02-01 NOTE — H&P
[facility] [place] 63 [facility]  female MRN: [id number]  Unit/Bed#: [facility] 253-01 Encounter: [id number]    Assessment/Plan   Principal Problem:    Severe episode of recurrent major depressive disorder, with psychotic features ([facility] 75 )  Active Problems:    Opioid dependence in remission ([facility] )    Gastroesophageal reflux disease    Medical clearance for psychiatric admission    Neuropathy    Plan:   Start cymbalta 20 mg PO Daily  Gabapentin 300 mg TID  Trazodone 100 mg PO HS  Check admission labs  Collaborate with family for baseline assessment and disposition planning  Case discussed with treatment team     Treatment options and alternatives were reviewed with the patient, who concurs with the above plan  Risks, benefits, and possible side effects of medications were explained to the patient, and she verbalizes understanding       -----------------------------------    Chief Complaint: "I wanted to die"    History of Present Illness     Per ED provider on 1/31: "Patient is a 62 y/o F with h/o depression that presents to the ED with worsening depression and SI over the past 3 days  She states she stopped taking her medication in July  She does see a therapist through [facility]  She has a plan to overdose on sleeping pills  Patient has had a prior overdose on Seroquel in the past  She denies recent attempt "    Per Crisis worker on 1/31: "Crisis met with pt in her room  Pt reported that she has been having an increase in depression since stopping her medications in July 2021  Pt reported that she stopped taking medications due to a fifty pound weight gain over the course of six months to a year  Pt reported that she wished that she could go to sleep and not wake up  Pt reported that her youngest brother has cancer and this is the anniversary of her parents passing away  Pt is currently linked with [facility] for medication management and therapy    Pt reported no legal issues and reported being in recovery from crack, coke and meth for two and a half years  Pt reported no tobacco use  Pt reported suicidal ideation with plan to overdose on pills and not wake up  Pt reported history of overdoses  Pt reported inpatient history as well  Pt denies homicidal, delusions, or hallucinations  Pt agreeable to sign a 201  "    This is 64 yo female with hx of depression/anxiety and substance use admitted to inpatient unit on voluntary status for worsening of mood and suicidal ideations with plan to OD on pills in the context of non compliance with treatment and psychosocial stressors  Patient reports multiple deaths and illnesses in family as the recent stressors  She stopped wellbutrin neurontin zoloft and seroquel in July due to weight gain  Patient endorses depressed mood, anhedonia,poor appetite, weigh loss, low energy, hopelessness and lack of concentration  She has difficulty falling and maintain asleep  She also endorses passive death wishes currently  She overdosed on seroquel few days ago, but did not get any help  She also endorses racing thoughts, mood swings, distractibility and difficult to control anger at times lasting few hours  Denies any symptoms of psychosis  Patient appears depressed, tearful and reliable historian  Psychiatric Review Of Systems:  Problems with sleep: yes, decreased  Appetite changes: yes, decreased  Weight changes: yes, decreased  Low energy/anergy: varies mostly down these days  Low interest/pleasure/anhedonia: yes  Somatic symptoms: no  Anxiety/panic: yes  Guilt/hopeless: yes  Self injurious behavior/risky behavior: yes    Medical Review Of Systems:  Complete review of systems is negative except as noted above      Historical Information     Psychiatric History:   Psychiatric medication trial: Zoloft, wellbutrin, seroquel, lexapro,   Inpatient hospitalizations: Yes  Suicide attempts: Yes OD on pills in the past 4-5  Times, last one was 3 days ago  Violent behavior: Denies any self harming behaviors  Outpatient treatment: [facility]    Substance Abuse History:  Social History     Tobacco Use    Smoking status: Former Smoker     Packs/day: 0 25     Years: 30 00     Pack years: 7 50     Types: Cigarettes     Quit date: 2021     Years since quittin 4    Smokeless tobacco: Never Used    Tobacco comment: 1 pack every 3 days    Vaping Use    Vaping Use: Never used   Substance Use Topics    Alcohol use: No    Drug use: Not Currently     Frequency: 7 0 times per week     Types: Oxycodone, Prescription     Comment: daily abuse of percocet/oxy-recovering since August       Patient reports hx of opiate use, clean for 2 and half years   I have assessed this patient for substance use within the past 12 months  I spent time with Tapan Virk in counseling and education on risk of substance abuse  I assessed motivation and encouraged her for treatment as appropriate  Family Psychiatric History: Mother/brother- MDD/Bipolar  Brothers- alcoholics    Social History:  Education: 10th grade  Learning Disabilities: denies  Marital history:   Living arrangement: Lives in a home with family  Occupational History: unemployed  Functioning Relationships: good support system    Other Pertinent History: None      Traumatic History:   Abuse: denies  Other Traumatic Events: denies    Past Medical History:   Past Medical History:   Diagnosis Date    Addiction to drug ([place] [address] )     Anxiety     Chest wall pain 2020    Chronic suboxone use 2020    Depression     Drug dependence (HCC)     GERD (gastroesophageal reflux disease)     Medical clearance for psychiatric admission 12/10/2020    Opiate abuse, continuous ([facility] ) 2018    Osteoarthritis     Rheumatoid arthritis ([facility] )     Shoulder impingement syndrome, left 2020    Substance abuse ([place] [address] )     Tubal pregnancy         -----------------------------------  Objective    Temp: [97 6 °F (36 4 °C)-98 7 °F (37 1 °C)] 98 7 °F (37 1 °C)  HR:  [76-96] 96  Resp:  [16-18] 18  BP: (119-125)/(61-78) 125/73    Mental Status Evaluation:  Appearance:  alert, good eye contact, appears stated age, casually dressed and appropriate grooming and hygiene   Behavior:  calm and cooperative   Speech:  spontaneous, normal rate, normal volume and coherent   Mood:  depressed and anxious   Affect:  constricted, dysphoric and tearful at times   Thought Process:  Organized, logical, goal-directed   Thought Content: no verbalized delusions or overt paranoia   Perceptual disturbances: no reported hallucinations and does not appear to be responding to internal stimuli at this time   Risk Potential: Passive death wishes   Sensorium: person, place, time/date, situation, day of week, month of year and year   Memory: recent and remote memory grossly intact   Consciousness: alert and awake   Attention: attention span appeared shorter than expected for age   Insight:  Fair   Judgment: Fair   Gait/Station: normal gait/station   Motor Activity: no abnormal movements     Meds/Allergies   No Known Allergies  all current active meds have been reviewed    Behavioral Health Medications: all current active meds have been reviewed  Changes as above  Laboratory results:  I have personally reviewed all pertinent laboratory/tests results    Recent Results (from the past 48 hour(s))   ECG 12 lead    Collection Time: 01/31/22 11:54 AM   Result Value Ref Range    Ventricular Rate 80 BPM    Atrial Rate 80 BPM    NV Interval 124 ms    QRSD Interval 90 ms    QT Interval 376 ms    QTC Interval 433 ms    P Axis 66 degrees    QRS Axis 88 degrees    T Wave Axis 59 degrees   ECG 12 lead    Collection Time: 01/31/22 11:54 AM   Result Value Ref Range    Ventricular Rate 79 BPM    Atrial Rate 79 BPM    NV Interval 124 ms    QRSD Interval 88 ms    QT Interval 380 ms    QTC Interval 435 ms    P Axis 67 degrees    QRS Axis 86 degrees    T Wave Axis 55 degrees   CBC and differential    Collection Time: 01/31/22 11:57 AM   Result Value Ref Range    WBC 5 67 4 31 - 10 16 Thousand/uL    RBC 4 74 3 81 - 5 12 Million/uL    Hemoglobin 13 7 11 5 - 15 4 g/dL    Hematocrit 41 8 34 8 - 46 1 %    MCV 88 82 - 98 fL    MCH 28 9 26 8 - 34 3 pg    MCHC 32 8 31 4 - 37 4 g/dL    RDW 12 4 11 6 - 15 1 %    MPV 9 6 8 9 - 12 7 fL    Platelets 577 631 - 227 Thousands/uL    nRBC 0 /100 WBCs    Neutrophils Relative 61 43 - 75 %    Immat GRANS % 0 0 - 2 %    Lymphocytes Relative 27 14 - 44 %    Monocytes Relative 9 4 - 12 %    Eosinophils Relative 2 0 - 6 %    Basophils Relative 1 0 - 1 %    Neutrophils Absolute 3 47 1 85 - 7 62 Thousands/µL    Immature Grans Absolute 0 01 0 00 - 0 20 Thousand/uL    Lymphocytes Absolute 1 54 0 60 - 4 47 Thousands/µL    Monocytes Absolute 0 49 0 17 - 1 22 Thousand/µL    Eosinophils Absolute 0 12 0 00 - 0 61 Thousand/µL    Basophils Absolute 0 04 0 00 - 0 10 Thousands/µL   Comprehensive metabolic panel    Collection Time: 01/31/22 11:57 AM   Result Value Ref Range    Sodium 138 136 - 145 mmol/L    Potassium 3 7 3 5 - 5 3 mmol/L    Chloride 103 100 - 108 mmol/L    CO2 29 21 - 32 mmol/L    ANION GAP 6 4 - 13 mmol/L    BUN 13 5 - 25 mg/dL    Creatinine 0 96 0 60 - 1 30 mg/dL    Glucose 86 65 - 140 mg/dL    Calcium 9 3 8 3 - 10 1 mg/dL    AST 19 5 - 45 U/L    ALT 20 12 - 78 U/L    Alkaline Phosphatase 81 46 - 116 U/L    Total Protein 8 1 6 4 - 8 2 g/dL    Albumin 4 4 3 5 - 5 0 g/dL    Total Bilirubin 0 60 0 20 - 1 00 mg/dL    eGFR 64 ml/min/1 73sq m   Rapid drug screen, urine    Collection Time: 01/31/22 11:57 AM   Result Value Ref Range    Amph/Meth UR Negative Negative    Barbiturate Ur Negative Negative    Benzodiazepine Urine Negative Negative    Cocaine Urine Negative Negative    Methadone Urine Negative Negative    Opiate Urine Negative Negative    PCP Ur Negative Negative    THC Urine Negative Negative    Oxycodone Urine Positive (A) Negative   UA w Reflex to Microscopic w Reflex to Culture    Collection Time: 01/31/22 11:57 AM    Specimen: Urine, Clean Catch   Result Value Ref Range    Color, UA Yellow     Clarity, UA Clear     Specific [place], UA 1 010 1 003 - 1 030    pH, UA 6 0 4 5, 5 0, 5 5, 6 0, 6 5, 7 0, 7 5, 8 0    Leukocytes, UA Negative Negative    Nitrite, UA Negative Negative    Protein, UA Negative Negative mg/dl    Glucose, UA Negative Negative mg/dl    Ketones, UA Negative Negative mg/dl    Urobilinogen, UA 0 2 0 2, 1 0 E U /dl E U /dl    Bilirubin, UA Negative Negative    Blood, UA Trace-lysed (A) Negative   COVID/FLU/RSV - 2 hour TAT    Collection Time: 01/31/22 11:57 AM    Specimen: Nose; Nares   Result Value Ref Range    SARS-CoV-2 Negative Negative    INFLUENZA A PCR Negative Negative    INFLUENZA B PCR Negative Negative    RSV PCR Negative Negative   Urine Microscopic    Collection Time: 01/31/22 11:57 AM   Result Value Ref Range    RBC, UA 0-1 None Seen, 0-1, 1-2, 2-4, 0-5 /hpf    WBC, UA None Seen None Seen, 0-1, 1-2, 0-5, 2-4 /hpf    Epithelial Cells None Seen None Seen, Occasional /hpf    Bacteria, UA None Seen None Seen, Occasional /hpf   POCT alcohol breath test    Collection Time: 01/31/22 12:12 PM   Result Value Ref Range    EXTBreath Alcohol 0 000    CBC and differential    Collection Time: 02/01/22  5:57 AM   Result Value Ref Range    WBC 4 79 4 31 - 10 16 Thousand/uL    RBC 4 41 3 81 - 5 12 Million/uL    Hemoglobin 12 5 11 5 - 15 4 g/dL    Hematocrit 39 1 34 8 - 46 1 %    MCV 89 82 - 98 fL    MCH 28 3 26 8 - 34 3 pg    MCHC 32 0 31 4 - 37 4 g/dL    RDW 12 5 11 6 - 15 1 %    MPV 10 0 8 9 - 12 7 fL    Platelets 589 734 - 395 Thousands/uL    nRBC 0 /100 WBCs    Neutrophils Relative 57 43 - 75 %    Immat GRANS % 0 0 - 2 %    Lymphocytes Relative 30 14 - 44 %    Monocytes Relative 9 4 - 12 %    Eosinophils Relative 3 0 - 6 %    Basophils Relative 1 0 - 1 %    Neutrophils Absolute 2 70 1 85 - 7 62 Thousands/µL    Immature Grans Absolute 0 01 0 00 - 0 20 Thousand/uL    Lymphocytes Absolute 1 45 0 60 - 4 47 Thousands/µL    Monocytes Absolute 0 45 0 17 - 1 22 Thousand/µL    Eosinophils Absolute 0 14 0 00 - 0 61 Thousand/µL    Basophils Absolute 0 04 0 00 - 0 10 Thousands/µL              -----------------------------------    Risks / Benefits of Treatment:     Risks, benefits, and possible side effects of medications explained to patient  The patient verbalizes understanding and agreement for treatment  Counseling / Coordination of Care:     Patient's presentation on admission and proposed treatment plan were discussed with the treatment team   Diagnosis, medication changes and treatment plan were reviewed with the patient  Recent stressors were discussed with the patient  Events leading to admission were reviewed with the patient  Importance of medication and treatment compliance was reviewed with the patient

## 2022-02-01 NOTE — NURSING NOTE
Patient requesting to speak with writer about her medications, after c/o increased shoulder pain unrelieved by PRN's, and was hopeful to use Tramadol PRN  Pt compliant with medications and reports disinterest in attending groups due to the social anxiety component  Pt denies any SI, but reports "I never really had a plan or intent, it was more so just not caring anymore if I wake up or not "  Pt was proud to report 2 5 yrs of sobriety however struggles with cravings and urges when mental health is not stable  Pt encouraged to use OP resources such as support groups  Pt thanked writer, stating "that's a good idea "  Pt reports poor sleep however was discussing meds with MD in hopes for a new PRN or an increase in Seroquel

## 2022-02-01 NOTE — NURSING NOTE
Patient received Atarax 50 mg at 0303 Am with positive results  Patient currently in bed appears asleep without distress

## 2022-02-01 NOTE — PROGRESS NOTES
Status: Pt is a 201 from [facility] who presented with increased depression & reported SA 3 days prior in which she took an overdose of Seroquel  Pt identified stressor of both her parents having  in the past two years & her brother has throat cancer & is not well  Pt reported decreased sleep & racing thoughts  She has been off psych meds since July, due to weight gain  Pt cooperative upon arrival to the unit  She received PRN Trazodone & then at 0303 Atarax; asleep afterwards  Reviewed readmit score: 23(RED), AUDIT: 0, PAWSS: 0, BAT: 0, UDS + oxycodone  Medication: to be reviewed / PRN - Trazodone & Atarax  D/C: TBD / new admission      22 0750   Team Meeting   Meeting Type Daily Rounds   Team Members Present   Team Members Present Physician;Nurse; Other (Discipline and Name);    Physician Team Member Dr Tito Novak / Eve Gaitan / Arnoldo Mendosa Member [facility] Management Team Member Jodi Squires / Paramjit Gilman   Other (Discipline and Name) Dina Reyes (art therapy)   Patient/Family Present   Patient Present No   Patient's Family Present No

## 2022-02-01 NOTE — CMS CERTIFICATION NOTE
Recertification: Based upon physical, mental and social evaluations, I certify that inpatient psychiatric services continue to be medically necessary for this patient for a duration of 7 midnights for the treatment of  Recurrent major depressive disorder ([facility] )   Available alternative community resources still do not meet the patient's mental health care needs  I further attest that an established written individualized plan of care has been updated and is outlined in the patient's medical records

## 2022-02-01 NOTE — CONSULTS
Lisa Betancourt 1960, 63 y o  female MRN: [id number]  Unit/Bed#: [facility] 253-01 Encounter: [id number]  Primary Care Provider: DMITRIY Michel   Date and time admitted to hospital: 1/31/2022  6:45 PM    Inpatient consult for Medical Clearance for [address] patient  Consult performed by: Verenice Medellin PA-C  Consult ordered by: Zulema Desouza PA-C          Medical clearance for psychiatric admission  Assessment & Plan   Admission labs reviewed, CBC, CMP, acceptable   Hemoglobin A1c, CBC, RPR, TSH, CMP, lipid panel labs pending   Vitals stable    UDS negative  o Oxycodone present in urine   COVID-19 negative   EKG reveals NSR, 79    Medically stable for continued inpatient psychiatric treatment based on available results   Please contact [facility] with any questions or concerns      Neuropathy  Assessment & Plan  · Continue home dose gabapentin  · Avoid narcotics given history of opioid abuse   · May benefit from outpatient pain referral     Gastroesophageal reflux disease  Assessment & Plan  ·  Continue Protonix  · Caution with ibuprofen    Opioid dependence in remission [facility])  Assessment & Plan  ·  Patient does have neuropathy and rotator cuff injury   · Avoid opiates for pain management  · Would recommend scheduled Tylenol, continue gabapentin, Voltaren    Severe episode of recurrent major depressive disorder, with psychotic features ([facility] [address] )  Assessment & Plan  ·   Management per Psychiatry      VTE Prophylaxis: VTE Score: 2 Low Risk (Score 0-2) - Encourage Ambulation  Recommendations for Discharge:  · Discharge timeline per primary team   Routine follow-up with primary care provider  Benefit from pain management specialist     Counseling / Coordination of Care Time: 30 minutes Greater than 50% of total time spent on patient counseling and coordination of care  Collaboration of Care:  Were Recommendations Directly Discussed with Primary Treatment Team? No    History of Present Illness:  Tej Gates is a 63 y o  female who is originally admitted to the behavioral health service due to suicidal ideation  We are consulted for management of chronic medical conditions  Patient   Reports a past history of opioid abuse in remission, neuropathy and bilateral legs in right rotator cuff injury for which she takes gabapentin  Per chart review patient also has GERD and is on pantoprazole, caution with ibuprofen  Patient should continue all prior to admission medications as prescribed by primary care provider/outpatient specialists  Patient denies recent travel, illness or sick contacts  Patient denies smoking, drinking or drug use  Available admission lab work and vitals are acceptable  Patient feels a baseline physical health  Patient appears medically stable for inpatient psychiatric treatment at this time  Please contact [facility]IM with any medical questions or concerns if issues should arise  Review of Systems:  Review of Systems   Psychiatric/Behavioral: Positive for suicidal ideas  All other systems reviewed and are negative  Past Medical and Surgical History:   Past Medical History:   Diagnosis Date    Addiction to drug [facility])     Anxiety     Chest wall pain 6/11/2020    Chronic suboxone use 6/11/2020    Depression     Drug dependence (HCC)     GERD (gastroesophageal reflux disease)     Medical clearance for psychiatric admission 12/10/2020    Opiate abuse, continuous ([facility] [address] ) 8/20/2018    Osteoarthritis     Rheumatoid arthritis ([facility] [address] )     Shoulder impingement syndrome, left 9/24/2020    Substance abuse ([facility] [address] )     Tubal pregnancy        Past Surgical History:   Procedure Laterality Date    BREAST IMPLANT      BUNIONECTOMY Bilateral 1990    ECTOPIC PREGNANCY SURGERY      TUBAL LIGATION         Meds/Allergies:  PTA meds:   Prior to Admission Medications   Prescriptions Last Dose Informant Patient Reported? Taking?    Multiple Vitamins-Minerals (MULTIVITAMIN ADULT PO)  Self Yes No   Sig: Take by mouth   Multiple Vitamins-Minerals (ZINC PO)   Yes No   Sig: Take by mouth   QUEtiapine (SEROquel) 100 mg tablet  Self Yes No   Sig: Take 100 mg by mouth daily at bedtime   buPROPion (WELLBUTRIN) 75 mg tablet   Yes No   Sig: Take 75 mg by mouth daily   cyanocobalamin (VITAMIN B-12) 250 MCG tablet  Self Yes No   Sig: Take 250 mcg by mouth daily   gabapentin (NEURONTIN) 300 mg capsule  Self No No   Sig: Take 1 capsule (300 mg total) by mouth 3 (three) times a day At 9am, 4pm, 9pm   haloperidol (HALDOL) 5 mg tablet  Self No No   Sig: Take 1 tablet (5 mg total) by mouth daily at bedtime   hydrOXYzine HCL (ATARAX) 50 mg tablet  Self No No   Sig: Take 1 tablet (50 mg total) by mouth every 8 (eight) hours as needed (moderate anxiety)   pantoprazole (PROTONIX) 40 mg tablet   No No   Sig: Take 1 tablet (40 mg total) by mouth daily   sertraline (ZOLOFT) 50 mg tablet  Self No No   Sig: Take 1 tablet (50 mg total) by mouth daily At 9am      Facility-Administered Medications: None       Allergies: No Known Allergies    Social History:  Marital Status: /Civil Union  Substance Use History:   Social History     Substance and Sexual Activity   Alcohol Use No     Social History     Tobacco Use   Smoking Status Former Smoker    Packs/day: 0 25    Years: 30 00    Pack years: 7 50    Types: Cigarettes    Quit date: 2021    Years since quittin 4   Smokeless Tobacco Never Used   Tobacco Comment    1 pack every 3 days      Social History     Substance and Sexual Activity   Drug Use Not Currently    Frequency: 7 0 times per week    Types: Oxycodone, Prescription    Comment: daily abuse of percocet/oxy-recovering since August        Family History:  Family History   Problem Relation Age of Onset    Bipolar disorder Mother     Depression Mother     Hypertension Mother     Heart disease Father         cardiac    Prostate cancer Father     Bipolar disorder Brother     Depression Sister     Colon polyps Neg Hx     Colon cancer Neg Hx        Physical Exam:   Vitals:   Blood Pressure: 125/73 (02/01/22 0743)  Pulse: 96 (02/01/22 0743)  Temperature: 98 7 °F (37 1 °C) (02/01/22 0743)  Temp Source: Tympanic (02/01/22 0743)  Respirations: 18 (02/01/22 0743)  Height: 5' 5" (165 1 cm) (01/31/22 1848)  Weight - Scale: 78 7 kg (173 lb 6 4 oz) (01/31/22 1848)  SpO2: 98 % (01/31/22 1848)    Physical Exam  Vitals and nursing note reviewed  Constitutional:       General: She is awake  She is not in acute distress  HENT:      Head: Normocephalic and atraumatic  Cardiovascular:      Rate and Rhythm: Normal rate and regular rhythm  Heart sounds: No murmur heard  Pulmonary:      Effort: Pulmonary effort is normal       Breath sounds: Normal breath sounds  Abdominal:      General: There is no distension  Palpations: Abdomen is soft  Musculoskeletal:      Right lower leg: No edema  Left lower leg: No edema  Skin:     General: Skin is warm and dry  Neurological:      Mental Status: She is alert  Comments: CN II-XII grossly intact          Additional Data:   Lab Results:    Results from last 7 days   Lab Units 01/31/22  1157   WBC Thousand/uL 5 67   HEMOGLOBIN g/dL 13 7   HEMATOCRIT % 41 8   PLATELETS Thousands/uL 256   NEUTROS PCT % 61   LYMPHS PCT % 27   MONOS PCT % 9   EOS PCT % 2     Results from last 7 days   Lab Units 01/31/22  1157   SODIUM mmol/L 138   POTASSIUM mmol/L 3 7   CHLORIDE mmol/L 103   CO2 mmol/L 29   BUN mg/dL 13   CREATININE mg/dL 0 96   ANION GAP mmol/L 6   CALCIUM mg/dL 9 3   ALBUMIN g/dL 4 4   TOTAL BILIRUBIN mg/dL 0 60   ALK PHOS U/L 81   ALT U/L 20   AST U/L 19   GLUCOSE RANDOM mg/dL 86             No results found for: HGBA1C            Imaging: No pertinent imaging reviewed  No orders to display       EKG, Pathology, and Other Studies Reviewed on Admission:   · EKG: NSR   HR 79bpm     ** Please Note: This note may have been constructed using a voice recognition system   **

## 2022-02-01 NOTE — ASSESSMENT & PLAN NOTE
· Continue home dose gabapentin  · Avoid narcotics given history of opioid abuse   · May benefit from outpatient pain referral

## 2022-02-01 NOTE — NURSING NOTE
Pt is a 89 from [facility] ED  Pt stated that she overdosed on Seroquel 3 days prior to admission to the ED  She stated that she has had multiple family losses  Mother and father both passing away within the last two years; brother diagnosed with throat cancer and not in good health  Pt struggles with depression and racing thoughts often feeling like she "wants to take sleeping medications and just not wake up"  Pt struggles to sleep related to these racing thoughts  Has been off most of prescribed medications since July 2021 because they caused her to gain 50 lbs within 6 months  Pt is also a recovering addict; clean for 2 5 years from cocaine and meth  Upon arrival to the unit patient verbally contracted for safety; denies SI/HI/AVH  No further questions or concerns at this time

## 2022-02-02 ENCOUNTER — TELEPHONE (OUTPATIENT)
Dept: FAMILY MEDICINE CLINIC | Facility: HOSPITAL | Age: 62
End: 2022-02-02

## 2022-02-02 LAB
ATRIAL RATE: 70 BPM
P AXIS: 111 DEGREES
PR INTERVAL: 128 MS
QRS AXIS: 97 DEGREES
QRSD INTERVAL: 84 MS
QT INTERVAL: 392 MS
QTC INTERVAL: 423 MS
T WAVE AXIS: 118 DEGREES
VENTRICULAR RATE: 70 BPM

## 2022-02-02 PROCEDURE — 99232 SBSQ HOSP IP/OBS MODERATE 35: CPT | Performed by: PHYSICIAN ASSISTANT

## 2022-02-02 PROCEDURE — 93010 ELECTROCARDIOGRAM REPORT: CPT | Performed by: INTERNAL MEDICINE

## 2022-02-02 RX ADMIN — GABAPENTIN 300 MG: 300 CAPSULE ORAL at 21:21

## 2022-02-02 RX ADMIN — DICLOFENAC SODIUM 2 G: 10 GEL TOPICAL at 17:08

## 2022-02-02 RX ADMIN — GABAPENTIN 300 MG: 300 CAPSULE ORAL at 09:29

## 2022-02-02 RX ADMIN — GABAPENTIN 300 MG: 300 CAPSULE ORAL at 15:58

## 2022-02-02 RX ADMIN — DICLOFENAC SODIUM 2 G: 10 GEL TOPICAL at 21:21

## 2022-02-02 RX ADMIN — TRAZODONE HYDROCHLORIDE 100 MG: 100 TABLET ORAL at 21:21

## 2022-02-02 RX ADMIN — DULOXETINE 20 MG: 20 CAPSULE, DELAYED RELEASE ORAL at 09:29

## 2022-02-02 RX ADMIN — HYDROXYZINE HYDROCHLORIDE 100 MG: 50 TABLET, FILM COATED ORAL at 09:44

## 2022-02-02 RX ADMIN — ALUMINUM HYDROXIDE, MAGNESIUM HYDROXIDE, AND SIMETHICONE 30 ML: 200; 200; 20 SUSPENSION ORAL at 18:47

## 2022-02-02 RX ADMIN — ACETAMINOPHEN 975 MG: 325 TABLET, FILM COATED ORAL at 14:48

## 2022-02-02 NOTE — PLAN OF CARE
Problem: Alteration in Thoughts and Perception  Goal: Treatment Goal: Gain control of psychotic behaviors/thinking, reduce/eliminate presenting symptoms and demonstrate improved reality functioning upon discharge  Outcome: Progressing  Goal: Agree to be compliant with medication regime, as prescribed and report medication side effects  Description: Interventions:  - Offer appropriate PRN medication and supervise ingestion; conduct AIMS, as needed   Outcome: Progressing  Goal: Attend and participate in unit activities, including therapeutic, recreational, and educational groups  Description: Interventions:  -Encourage Visitation and family involvement in care  Outcome: Progressing     Problem: Depression  Goal: Treatment Goal: Demonstrate behavioral control of depressive symptoms, verbalize feelings of improved mood/affect, and adopt new coping skills prior to discharge  Outcome: Progressing     Problem: Anxiety  Goal: Anxiety is at manageable level  Description: Interventions:  - Assess and monitor patient's anxiety level  - Monitor for signs and symptoms (heart palpitations, chest pain, shortness of breath, headaches, nausea, feeling jumpy, restlessness, irritable, apprehensive)  - Collaborate with interdisciplinary team and initiate plan and interventions as ordered    - [place] patient to unit/surroundings  - Explain treatment plan  - Encourage participation in care  - Encourage verbalization of concerns/fears  - Identify coping mechanisms  - Assist in developing anxiety-reducing skills  - Administer/offer alternative therapies  - Limit or eliminate stimulants  Outcome: Progressing

## 2022-02-02 NOTE — PROGRESS NOTES
Treatment Plan Meeting:  Diagnosis of Recurrent major depressive disorder, opioid dependence in remission, & Bipolar 2 disorder reviewed  Discussed short term goals for decrease in depression, anxiety, suicidal thoughts, improvement in self care, sleep improvement, & improvement in appetite  Pt reported that she slept well overnight, & felt whatever medication change was made was working for her  Pt would like to be referred to another outpatient provider, which CM agreed to see what options were available, however, Pt had requested this previously & was told that there were waiting list, so Pt continued with current provider  Pt said that she thought more about conversation with CM yesterday & that she wants to feel better, & not rush her discharge, just to get out for her granddaughter's birthday on Saturday  At this time, no discharge date is set  All parties in agreement & treatment plan was signed       02/02/22 0820   Team Meeting   Meeting Type Tx Team Meeting   Initial Conference Date 02/02/22   Next Conference Date 02/28/22   Team Members Present   Team Members Present Physician;Nurse;   Physician Team Member Dr Shira Turner Team Member [facility] Management Team Member Kaleigh   Patient/Family Present   Patient Present Yes   Patient's Family Present No

## 2022-02-02 NOTE — TELEPHONE ENCOUNTER
Spoke to [place], she will send discharge paperwork to PCP upon discharge, no upcoming appts noted with PCP

## 2022-02-02 NOTE — NURSING NOTE
Pt is pleasant and social with peers, playing cards in Day Room, observed laughing and smiling  Reports improved mood  Denies SI, HI, AVH  Reports trying to stay OOB and socialize, to not sleep all day and isolate

## 2022-02-02 NOTE — TREATMENT TEAM
02/02/22 1000   Activity/Group Checklist   Group Community meeting  (developing resilience, goals)   Attendance Attended   Attendance Duration (min) 16-30   Interactions Interacted appropriately   Affect/Mood Appropriate   Goals Achieved Able to listen to others; Able to engage in interactions     Patient engaged in community meeting  She worked on her worksheet, provided feedback on script that was read before the worksheet, but declined to share her work during discussion

## 2022-02-02 NOTE — CASE MANAGEMENT
CM contacted [facility] @ [phone] & left a message to notify practice of Pt's admission & inquire if she has any upcoming scheduled appointments  CM also said that she would like to leave a message for Pt's therapist     Emmie Waldrop contacted Pt's PCP, Nicolasa Jamesonnn Gissel @ [phone] & left a message to notify the practice of Pt's admission & request a call back regarding if Pt has any upcoming appointments  CM contacted [facility] @ [phone] & spoke with Vishnu Hassan, who reported that they are not accepting new patients at this time  CM contacted [facility] @ [phone] & left a message for Argelia Farias regarding referral for Pt

## 2022-02-02 NOTE — PROGRESS NOTES
Progress Note - [address] [address] [facility],[address] 63 y o  female MRN: [id number]   Unit/Bed#: [facility] 258-01 Encounter: [id number]    Behavior over the last 24 hours: unchanged  Bailey Novak is a 63year old female with history of MDD seen today for psychiatric evaluation  She notes that she hasn't felt much improvement of her anxiety and depression since admission and continues to complain of chronic shoulder pain  She says she is tolerating her medications well since she has been here and notes that she slept well last night  She says she has been having an internal struggle "in my head with one side telling me to go to groups while here and the other telling me to just lay in bed " Staff reported that she originally talked about wanting discharge by this Friday 2/4/22 because of her granddaughter's birthday this weekend, but was agreeable to possibly needing to stay longer to adjust medication doses and monitor her progress  She denies AH, VH, SI, and HI and any medication side effects      Sleep: normal  Appetite: normal  Medication side effects: No   ROS: no complaints, all other systems are negative    Mental Status Evaluation:    Appearance:  age appropriate, casually dressed, adequate grooming   Behavior:  pleasant, cooperative, calm   Speech:  normal rate and volume, clear, coherent   Mood:  anxious   Affect:  appropriate   Thought Process:  logical, coherent   Associations: intact associations   Thought Content:  no overt delusions, somatic preoccupation, ruminating thoughts   Perceptual Disturbances: none   Risk Potential: Suicidal ideation - None  Homicidal ideation - None  Potential for aggression - No   Sensorium:  oriented to person, place and time/date   Memory:  recent and remote memory grossly intact   Consciousness:  alert and awake   Attention/Concentration: attention span and concentration are age appropriate   Insight:  fair   Judgment: fair   Gait/Station: normal gait/station, normal balance Motor Activity: no abnormal movements     Vital signs in last 24 hours:    Temp:  [98 3 °F (36 8 °C)-98 5 °F (36 9 °C)] 98 5 °F (36 9 °C)  HR:  [73-78] 73  Resp:  [17-18] 17  BP: (120-124)/(69-82) 124/69    Laboratory results: I have personally reviewed all pertinent laboratory/tests results    Most Recent Labs:   Lab Results   Component Value Date    WBC 4 79 02/01/2022    RBC 4 41 02/01/2022    HGB 12 5 02/01/2022    HCT 39 1 02/01/2022     02/01/2022    RDW 12 5 02/01/2022    NEUTROABS 2 70 02/01/2022    SODIUM 140 02/01/2022    K 3 8 02/01/2022     02/01/2022    CO2 28 02/01/2022    BUN 11 02/01/2022    CREATININE 0 89 02/01/2022    GLUC 86 02/01/2022    CALCIUM 9 2 02/01/2022    AST 18 02/01/2022    ALT 21 02/01/2022    ALKPHOS 74 02/01/2022    TP 7 2 02/01/2022    ALB 3 8 02/01/2022    TBILI 0 40 02/01/2022    CHOLESTEROL 183 02/01/2022    HDL 55 02/01/2022    TRIG 85 02/01/2022    LDLCALC 111 (H) 02/01/2022    [place] 128 02/01/2022    GXV8MMSGKAXQ 5 419 (H) 02/01/2022    FREET4 0 75 (L) 06/15/2020    PREGSERUM Negative 02/02/2019    RPR Non-Reactive 02/01/2022    HGBA1C 5 6 02/01/2022     02/01/2022       Progress Toward Goals: progressing    Assessment/Plan   Principal Problem:    Recurrent major depressive disorder ([facility] )  Active Problems:    Opioid dependence in remission ([facility] )    Gastroesophageal reflux disease    Medical clearance for psychiatric admission    Neuropathy    Bipolar 2 disorder ([facility] )      Recommended Treatment:     Planned medication and treatment changes: All current active medications have been reviewed  Encourage group therapy, milieu therapy and occupational therapy  Behavioral Health checks every 7 minutes     Increase Cymbalta to 30 mg for depression and anxiety, as well as for chronic pain  Continue all other treatment regimens as prescribed      Current Facility-Administered Medications   Medication Dose Route Frequency Provider Last Rate    acetaminophen 650 mg Oral Q6H PRN Phelps Catching, PA-C      acetaminophen  650 mg Oral Q4H PRN Phelps Catching, PA-C      acetaminophen  975 mg Oral Q6H PRN Phelps Catching, PA-C      aluminum-magnesium hydroxide-simethicone  30 mL Oral Q4H PRN Phelps Catching, PA-C      artificial tear  1 application Both Eyes N4V PRN Catia Catching, PA-C      haloperidol lactate  2 5 mg Intramuscular Q6H PRN Max 4/day Phelps Catching, PA-C      And    LORazepam  1 mg Intramuscular Q6H PRN Max 4/day Phelps Catching, PA-C      And    benztropine  0 5 mg Intramuscular Q6H PRN Max 4/day Phelps Catching, PA-C      haloperidol lactate  5 mg Intramuscular Q4H PRN Max 4/day Catia Catching, PA-C      And    LORazepam  2 mg Intramuscular Q4H PRN Max 4/day Catia Catching, PA-C      And    benztropine  1 mg Intramuscular Q4H PRN Max 4/day Catia Catching, PA-C      benztropine  1 mg Intramuscular Q4H PRN Max 6/day Phelps Catching, PA-C      benztropine  1 mg Oral Q4H PRN Max 6/day Catia Catching, PA-C      bisacodyl  10 mg Rectal Daily PRN Catia Catching, PA-C      Diclofenac Sodium  2 g Topical 4x Daily Catherine Wolf V, PA-C      hydrOXYzine HCL  50 mg Oral Q6H PRN Max 4/day Catia Catching, PA-C      Or    diphenhydrAMINE  50 mg Intramuscular Q6H PRN Phelps Catching, PA-C      DULoxetine  20 mg Oral Daily Darek Briggs MD      gabapentin  300 mg Oral TID Darek Briggs MD      haloperidol  2 mg Oral Q4H PRN Max 6/day Catia Catching, PA-C      haloperidol  5 mg Oral Q6H PRN Max 4/day Phelps Catching, PA-C      haloperidol  5 mg Oral Q4H PRN Max 4/day Catia Catching, PA-C      hydrOXYzine HCL  100 mg Oral Q6H PRN Max 4/day Catia Catching, PA-C      Or    LORazepam  2 mg Intramuscular Q6H PRN Catia Catching, STARLA      hydrOXYzine HCL  25 mg Oral Q6H PRN Max 4/day Phelps Catching, STARLA      polyethylene glycol  17 g Oral Daily PRN Phelps Catching, STARLA      senna-docusate sodium  1 tablet Oral Daily PRN Brian Jurado PA-C      traZODone  100 mg Oral HS Leandro Corbin MD       Risks / Benefits of Treatment:    Risks, benefits, and possible side effects of medications explained to patient and patient verbalizes understanding and agreement for treatment  Counseling / Coordination of Care:    Patient's progress discussed with staff in treatment team meeting  Medications, treatment progress and treatment plan reviewed with patient      Brian Jurado PA-C 02/02/22

## 2022-02-02 NOTE — PLAN OF CARE
Problem: Alteration in Thoughts and Perception  Goal: Treatment Goal: Gain control of psychotic behaviors/thinking, reduce/eliminate presenting symptoms and demonstrate improved reality functioning upon discharge  Outcome: Progressing  Goal: Agree to be compliant with medication regime, as prescribed and report medication side effects  Description: Interventions:  - Offer appropriate PRN medication and supervise ingestion; conduct AIMS, as needed   Outcome: Progressing  Goal: Attend and participate in unit activities, including therapeutic, recreational, and educational groups  Description: Interventions:  -Encourage Visitation and family involvement in care  Outcome: Progressing  Goal: Complete daily ADLs, including personal hygiene independently, as able  Description: Interventions:  - Observe, teach, and assist patient with ADLS  - Monitor and promote a balance of rest/activity, with adequate nutrition and elimination   Outcome: Progressing     Problem: Depression  Goal: Treatment Goal: Demonstrate behavioral control of depressive symptoms, verbalize feelings of improved mood/affect, and adopt new coping skills prior to discharge  Outcome: Progressing  Goal: Verbalize thoughts and feelings  Description: Interventions:  - Assess and re-assess patient's level of risk   - Engage patient in 1:1 interactions, daily, for a minimum of 15 minutes   - Encourage patient to express feelings, fears, frustrations, hopes   Outcome: Progressing  Goal: Attend and participate in unit activities, including therapeutic, recreational, and educational groups  Description: Interventions:  - Provide therapeutic and educational activities daily, encourage attendance and participation, and document same in the medical record   Outcome: Progressing  Goal: Complete daily ADLs, including personal hygiene independently, as able  Description: Interventions:  - Observe, teach, and assist patient with ADLS  -  Monitor and promote a balance of rest/activity, with adequate nutrition and elimination   Outcome: Progressing     Problem: Anxiety  Goal: Anxiety is at manageable level  Description: Interventions:  - Assess and monitor patient's anxiety level  - Monitor for signs and symptoms (heart palpitations, chest pain, shortness of breath, headaches, nausea, feeling jumpy, restlessness, irritable, apprehensive)  - Collaborate with interdisciplinary team and initiate plan and interventions as ordered    - [place] patient to unit/surroundings  - Explain treatment plan  - Encourage participation in care  - Encourage verbalization of concerns/fears  - Identify coping mechanisms  - Assist in developing anxiety-reducing skills  - Administer/offer alternative therapies  - Limit or eliminate stimulants  Outcome: Progressing     Problem: Ineffective Coping  Goal: Participates in unit activities  Description: Interventions:  - Provide therapeutic environment   - Provide required programming   - Redirect inappropriate behaviors   Outcome: Progressing     Problem: DISCHARGE PLANNING  Goal: Discharge to home or other facility with appropriate resources  Description: INTERVENTIONS:  - Identify barriers to discharge w/patient and caregiver  - Arrange for needed discharge resources and transportation as appropriate  - Identify discharge learning needs (meds, wound care, etc )  - Arrange for interpretive services to assist at discharge as needed  - Refer to Case Management Department for coordinating discharge planning if the patient needs post-hospital services based on physician/advanced practitioner order or complex needs related to functional status, cognitive ability, or social support system  Outcome: Progressing

## 2022-02-02 NOTE — NURSING NOTE
Pt is cooperative and appropriate during interaction  Reports depression is about the same compared to yesterday  Denies thoughts of self harm  Pt states trying to push self to do things and did attend both morning groups  Resting in bed this afternoon  Reports some anxiety r/t discharge planning and whether to discharge by Friday vs next week  Pt states "I don't want to rush it  I want to get better"  RN provided encouragement and support

## 2022-02-02 NOTE — CASE MANAGEMENT
CM received a return call from Lou Mullen at [facility], who reported that they are accepting new patients, however, their waiting list is several months long  CM reviewed that Pt does have a current provider, but is looking to transfer services  Lou Mullen scheduled intake for tomorrow at 10 AM, requesting that CM call him at [phone] with Pt present

## 2022-02-02 NOTE — PROGRESS NOTES
Status: Pt is pleasant & cooperative, denies any SI/HI  Pt had reported not sleeping well her first night, but last night appeared to sleep without issue  Pt did not attend groups yesterday  Medication: Trazodone, Cymbalta, & Neurontin started / PRN - Tylenol & Atarax  D/C: Friday(?) / Pt yesterday stating she was hopeful for d/c by the end of the week, as her granddaughter's birthday is Saturday 02/02/22 0750   Team Meeting   Meeting Type Daily Rounds   Team Members Present   Team Members Present Physician;Nurse; Other (Discipline and Name);    Physician Team Member Dr Chay Mars / Coy Edgar / Shobha Harvey Team Member Tiffanie Sparks / Jorge A Freed / RICK Students   Care Management Team Member Shun Scott / Leidy Mckeon   Other (Discipline and Name) Cornelia Hernandez (art therapy)   Patient/Family Present   Patient Present No   Patient's Family Present No

## 2022-02-02 NOTE — NURSING NOTE
Prn atarax 100mg given at 0944 for anxiety acuna scale=25  Pt reports prn was effective in reducing anxiety to a manageable level

## 2022-02-03 LAB — T4 FREE SERPL-MCNC: 0.81 NG/DL (ref 0.76–1.46)

## 2022-02-03 PROCEDURE — 99232 SBSQ HOSP IP/OBS MODERATE 35: CPT | Performed by: PSYCHIATRY & NEUROLOGY

## 2022-02-03 RX ORDER — DULOXETIN HYDROCHLORIDE 30 MG/1
30 CAPSULE, DELAYED RELEASE ORAL DAILY
Status: COMPLETED | OUTPATIENT
Start: 2022-02-04 | End: 2022-02-05

## 2022-02-03 RX ORDER — TRAZODONE HYDROCHLORIDE 150 MG/1
150 TABLET ORAL
Status: DISCONTINUED | OUTPATIENT
Start: 2022-02-03 | End: 2022-02-06

## 2022-02-03 RX ADMIN — ALUMINUM HYDROXIDE, MAGNESIUM HYDROXIDE, AND SIMETHICONE 30 ML: 200; 200; 20 SUSPENSION ORAL at 09:37

## 2022-02-03 RX ADMIN — GABAPENTIN 300 MG: 300 CAPSULE ORAL at 21:00

## 2022-02-03 RX ADMIN — GABAPENTIN 300 MG: 300 CAPSULE ORAL at 16:19

## 2022-02-03 RX ADMIN — DULOXETINE 20 MG: 20 CAPSULE, DELAYED RELEASE ORAL at 08:48

## 2022-02-03 RX ADMIN — DICLOFENAC SODIUM 2 G: 10 GEL TOPICAL at 22:09

## 2022-02-03 RX ADMIN — TRAZODONE HYDROCHLORIDE 150 MG: 150 TABLET ORAL at 21:00

## 2022-02-03 RX ADMIN — GABAPENTIN 300 MG: 300 CAPSULE ORAL at 08:48

## 2022-02-03 RX ADMIN — ALUMINUM HYDROXIDE, MAGNESIUM HYDROXIDE, AND SIMETHICONE 30 ML: 200; 200; 20 SUSPENSION ORAL at 18:08

## 2022-02-03 RX ADMIN — HYDROXYZINE HYDROCHLORIDE 100 MG: 50 TABLET, FILM COATED ORAL at 08:52

## 2022-02-03 RX ADMIN — ACETAMINOPHEN 650 MG: 325 TABLET, FILM COATED ORAL at 19:55

## 2022-02-03 NOTE — NURSING NOTE
Pt requested prn for anxiety this am, given atarax 100mg at 0852 pt reports effective in reducing anxiety to a manageable level

## 2022-02-03 NOTE — PLAN OF CARE
Problem: Alteration in Thoughts and Perception  Goal: Agree to be compliant with medication regime, as prescribed and report medication side effects  Description: Interventions:  - Offer appropriate PRN medication and supervise ingestion; conduct AIMS, as needed   Outcome: Progressing     Problem: Depression  Goal: Treatment Goal: Demonstrate behavioral control of depressive symptoms, verbalize feelings of improved mood/affect, and adopt new coping skills prior to discharge  Outcome: Progressing

## 2022-02-03 NOTE — NURSING NOTE
Patient is pleasant, social with peers and staff, polite in conversation and visible in milieu  CFS on unit, attends most groups appropriately and is reporting readiness for discharge tomorrow, as she has plans with her grandchildren  Pt reminded of importance to not rush her d/c plans due to time with family, as her health is important and if she needs to stay through the weekend in order to ensure readiness for discharge, she should do so

## 2022-02-03 NOTE — CASE MANAGEMENT
CM met with Pt & assisted her with calling Gray at [facility] @ [phone] to complete referral   Pt scheduled for intake on 3/16 at 10:30 AM with [facility])  Dagoberto Garcia will email a link the day prior to the appointment  CM & Pt talked about her stressors & she said that she wants to go home by Saturday for her granddaughters birthday, but that she also knows, if she is being honest, that she doesn't feel any improvement from when she came in  Pt said that some days she wakes up & can get her day started, but most days she wakes up wishing she hadn't & it's a struggle to get out of bed   Pt said that she thinks she will stay through the weekend because she wants to feel better when she leaves the hospital

## 2022-02-03 NOTE — TREATMENT TEAM
02/03/22 1000   Activity/Group Checklist   Group [facility]  (The Importance of personal growth, goals)   Attendance Attended   Attendance Duration (min) 0-15   Interactions Interacted appropriately   Affect/Mood Appropriate   Goals Achieved Identified feelings; Able to listen to others; Able to engage in interactions     Patient came to group late, but immediately engaged herself in the worksheet  She declined to share her work during group discussion

## 2022-02-04 PROBLEM — Z00.8 MEDICAL CLEARANCE FOR PSYCHIATRIC ADMISSION: Status: RESOLVED | Noted: 2020-12-10 | Resolved: 2022-02-04

## 2022-02-04 PROCEDURE — 99232 SBSQ HOSP IP/OBS MODERATE 35: CPT | Performed by: PHYSICIAN ASSISTANT

## 2022-02-04 RX ORDER — TRAZODONE HYDROCHLORIDE 150 MG/1
150 TABLET ORAL
Qty: 30 TABLET | Refills: 1 | Status: CANCELLED | OUTPATIENT
Start: 2022-02-04 | End: 2022-04-05

## 2022-02-04 RX ORDER — DULOXETIN HYDROCHLORIDE 30 MG/1
60 CAPSULE, DELAYED RELEASE ORAL DAILY
Status: DISCONTINUED | OUTPATIENT
Start: 2022-02-06 | End: 2022-02-07 | Stop reason: HOSPADM

## 2022-02-04 RX ORDER — GABAPENTIN 300 MG/1
300 CAPSULE ORAL 3 TIMES DAILY
Qty: 90 CAPSULE | Refills: 1 | Status: CANCELLED | OUTPATIENT
Start: 2022-02-04 | End: 2022-04-05

## 2022-02-04 RX ORDER — PANTOPRAZOLE SODIUM 40 MG/1
40 TABLET, DELAYED RELEASE ORAL
Status: DISCONTINUED | OUTPATIENT
Start: 2022-02-04 | End: 2022-02-07 | Stop reason: HOSPADM

## 2022-02-04 RX ADMIN — ACETAMINOPHEN 975 MG: 325 TABLET, FILM COATED ORAL at 21:38

## 2022-02-04 RX ADMIN — GABAPENTIN 300 MG: 300 CAPSULE ORAL at 08:06

## 2022-02-04 RX ADMIN — GABAPENTIN 300 MG: 300 CAPSULE ORAL at 16:19

## 2022-02-04 RX ADMIN — TRAZODONE HYDROCHLORIDE 150 MG: 150 TABLET ORAL at 21:37

## 2022-02-04 RX ADMIN — HYDROXYZINE HYDROCHLORIDE 100 MG: 50 TABLET, FILM COATED ORAL at 09:05

## 2022-02-04 RX ADMIN — DICLOFENAC SODIUM 2 G: 10 GEL TOPICAL at 17:36

## 2022-02-04 RX ADMIN — GABAPENTIN 300 MG: 300 CAPSULE ORAL at 21:37

## 2022-02-04 RX ADMIN — PANTOPRAZOLE SODIUM 40 MG: 40 TABLET, DELAYED RELEASE ORAL at 09:05

## 2022-02-04 RX ADMIN — ALUMINUM HYDROXIDE, MAGNESIUM HYDROXIDE, AND SIMETHICONE 30 ML: 200; 200; 20 SUSPENSION ORAL at 14:00

## 2022-02-04 RX ADMIN — DICLOFENAC SODIUM 2 G: 10 GEL TOPICAL at 21:54

## 2022-02-04 RX ADMIN — DULOXETINE 30 MG: 30 CAPSULE, DELAYED RELEASE ORAL at 08:06

## 2022-02-04 NOTE — TREATMENT TEAM
02/04/22 1000   Activity/Group Checklist   Group Community meeting  (SMART goals)   Attendance Attended   Attendance Duration (min) 16-30   Interactions Interacted appropriately   Affect/Mood Appropriate;Calm   Goals Achieved Able to listen to others; Able to engage in interactions     Patient participated in community meeting  She assisted with reading a portion of the worksheet out loud to peers  She declined to share her work during group discussion, and when informed they needed to bring their worksheet to wrap up group, she expressed not going over goals during that time, and she was encouraged to advocate for herself about going over goals during wrap up group, she expressed understanding and agreement

## 2022-02-04 NOTE — NURSING NOTE
Pt has bright affect, pleasant and talkative  Pt reports that she was grumpy this morning but feels better after taking a nap, reports slightly improved sleep at night  Reports that she is trying to be optimistic, make the best of things, and be out in the milieu and she will feel better but then starts to feel depressed again  Denies SI, reports that she has grandchildren on the way and is looking forward to it  Teaching was provided for Cymbalta and pt mentioned that she has been experiencing headaches  Out in milieu, social with peers, cooperative

## 2022-02-04 NOTE — PLAN OF CARE
Problem: Alteration in Thoughts and Perception  Goal: Treatment Goal: Gain control of psychotic behaviors/thinking, reduce/eliminate presenting symptoms and demonstrate improved reality functioning upon discharge  Outcome: Progressing  Goal: Agree to be compliant with medication regime, as prescribed and report medication side effects  Description: Interventions:  - Offer appropriate PRN medication and supervise ingestion; conduct AIMS, as needed   Outcome: Progressing  Goal: Attend and participate in unit activities, including therapeutic, recreational, and educational groups  Description: Interventions:  -Encourage Visitation and family involvement in care  Outcome: Progressing  Goal: Complete daily ADLs, including personal hygiene independently, as able  Description: Interventions:  - Observe, teach, and assist patient with ADLS  - Monitor and promote a balance of rest/activity, with adequate nutrition and elimination   Outcome: Progressing     Problem: Depression  Goal: Treatment Goal: Demonstrate behavioral control of depressive symptoms, verbalize feelings of improved mood/affect, and adopt new coping skills prior to discharge  Outcome: Progressing  Goal: Verbalize thoughts and feelings  Description: Interventions:  - Assess and re-assess patient's level of risk   - Engage patient in 1:1 interactions, daily, for a minimum of 15 minutes   - Encourage patient to express feelings, fears, frustrations, hopes   Outcome: Progressing  Goal: Attend and participate in unit activities, including therapeutic, recreational, and educational groups  Description: Interventions:  - Provide therapeutic and educational activities daily, encourage attendance and participation, and document same in the medical record   Outcome: Progressing  Goal: Complete daily ADLs, including personal hygiene independently, as able  Description: Interventions:  - Observe, teach, and assist patient with ADLS  -  Monitor and promote a balance of rest/activity, with adequate nutrition and elimination   Outcome: Progressing     Problem: Anxiety  Goal: Anxiety is at manageable level  Description: Interventions:  - Assess and monitor patient's anxiety level  - Monitor for signs and symptoms (heart palpitations, chest pain, shortness of breath, headaches, nausea, feeling jumpy, restlessness, irritable, apprehensive)  - Collaborate with interdisciplinary team and initiate plan and interventions as ordered    - [place] patient to unit/surroundings  - Explain treatment plan  - Encourage participation in care  - Encourage verbalization of concerns/fears  - Identify coping mechanisms  - Assist in developing anxiety-reducing skills  - Administer/offer alternative therapies  - Limit or eliminate stimulants  Outcome: Progressing

## 2022-02-04 NOTE — DISCHARGE SUMMARY
Discharge Summary - Renetta Escobar R Asia 63 y o  female MRN: [id number]  Unit/Bed#: -01 Encounter: [id number]     Admission Date: 1/31/2022         Discharge Date: 2/7/22    Attending Psychiatrist: Juliane Reed*    Reason for Admission/HPI:     Per initial H&P from Dr Lolita Ferrara on 2/1/22:    "Per ED provider on 1/31: "Patient is a 62 y/o F with h/o depression that presents to the ED with worsening depression and SI over the past 3 days  Terri Marion states she stopped taking her medication in July   She does see a therapist through [facility]  Terri Marion has a plan to overdose on sleeping pills  Brittney Aguilar has had a prior overdose on Seroquel in the past  She denies recent attempt "     Per Crisis worker on 1/31: "Crisis met with pt in her room   Pt reported that she has been having an increase in depression since stopping her medications in July 2021   Pt reported that she stopped taking medications due to a fifty pound weight gain over the course of six months to a year   Pt reported that she wished that she could go to sleep and not wake up   Pt reported that her youngest brother has cancer and this is the anniversary of her parents passing away   Pt is currently linked with [facility] for medication management and therapy   Pt reported no legal issues and reported being in recovery from crack, coke and meth for two and a half years   Pt reported no tobacco use   Pt reported suicidal ideation with plan to overdose on pills and not wake up   Pt reported history of overdoses   Pt reported inpatient history as well   Pt denies homicidal, delusions, or hallucinations   Pt agreeable to sign a 201  "     This is 62 yo female with hx of depression/anxiety and substance use admitted to inpatient unit on voluntary status for worsening of mood and suicidal ideations with plan to OD on pills in the context of non compliance with treatment and psychosocial stressors   Patient reports multiple deaths and illnesses in family as the recent stressors  She stopped wellbutrin neurontin zoloft and seroquel in July due to weight gain  Patient endorses depressed mood, anhedonia,poor appetite, weigh loss, low energy, hopelessness and lack of concentration  She has difficulty falling and maintain asleep  She also endorses passive death wishes currently  She overdosed on seroquel few days ago, but did not get any help  She also endorses racing thoughts, mood swings, distractibility and difficult to control anger at times lasting few hours  Denies any symptoms of psychosis  Patient appears depressed, tearful and reliable historian "      Social History     Tobacco History     Smoking Status  Former Smoker Quit date  9/1/2021 Smoking Frequency  0 25 packs/day for 30 years (7 5 pk yrs) Smoking Tobacco Type  Cigarettes    Smokeless Tobacco Use  Never Used    Tobacco Comment  1 pack every 3 days           Alcohol History     Alcohol Use Status  No          Drug Use     Drug Use Status  Not Currently Types  Oxycodone, Prescription Frequency   7 times/week Comment  daily abuse of percocet/oxy-recovering since August           Sexual Activity     Sexually Active  Not Asked          Activities of Daily Living    Not Asked               Additional Substance Use Detail     Questions Responses    Problems Due to Past Use of Alcohol? No    Problems Due to Past Use of Substances?  Yes    Substance Use Assessment Substance use within the past 12 months    Alcohol Use Frequency Denies use in past 12 months    Cannabis frequency Never used    Comment: Never used on 2/1/2019     Heroin Frequency Denies use in past 12 months    Cocaine frequency Never used    Comment: Never used on 2/1/2019     Crack Cocaine Frequency Prior dependence    Methamphetamine Frequency Past abuse    Crack Cocaine Longest Abstinence rehab in 2007    Narcotic Frequency Denies use in past 12 months    Benzodiazepine Frequency Denies use in past 12 months Amphetamine frequency Denies use in past 12 months    Narcotic Method Pill    Narcotic 1st Use 2007    Narcotic Last Use & Amount 1/31/19    Barbituate Frequency Denies use use in past 12 months    Inhalant frequency Never used    Comment: Never used on 2/1/2019     Hallucinogen frequency Never used    Comment: Never used on 2/1/2019     Ecstasy frequency Never used    Comment: Never used on 2/1/2019     Other drug frequency Never used    Comment: Never used on 2/1/2019     Opiate frequency Denies use in past 12 months    Opiate method Pill    Comment: Pill on 2/1/2019     Opiate last use 1/31/19    Comment: 1/31/2019 on 2/1/2019     Last reviewed by Barb Villaseñor RN on 1/31/2022          Past Medical History:   Diagnosis Date    Addiction [facility] drug [facility])     Anxiety     Chest wall pain 6/11/2020    Chronic suboxone use 6/11/2020    Depression     Drug dependence [facility]     GERD (gastroesophageal reflux disease)     Medical clearance for psychiatric admission 12/10/2020    Opiate abuse, continuous ([facility] ) 8/20/2018    Osteoarthritis     Rheumatoid arthritis ([facility] )     Shoulder impingement syndrome, left 9/24/2020    Substance abuse ([facility] )     Tubal pregnancy      Past Surgical History:   Procedure Laterality Date    BREAST IMPLANT      BUNIONECTOMY Bilateral 1990    ECTOPIC PREGNANCY SURGERY      TUBAL LIGATION         Medications: All current active medications have been reviewed  Medications prior to admission:    Prior to Admission Medications   Prescriptions Last Dose Informant Patient Reported? Taking?    Multiple Vitamins-Minerals (MULTIVITAMIN ADULT PO)  Self Yes No   Sig: Take by mouth   Multiple Vitamins-Minerals (ZINC PO)   Yes No   Sig: Take by mouth   QUEtiapine (SEROquel) 100 mg tablet  Self Yes No   Sig: Take 100 mg by mouth daily at bedtime   buPROPion (WELLBUTRIN) 75 mg tablet   Yes No   Sig: Take 75 mg by mouth daily   cyanocobalamin (VITAMIN B-12) 250 MCG tablet  Self Yes No   Sig: Take 250 mcg by mouth daily   gabapentin (NEURONTIN) 300 mg capsule  Self No No   Sig: Take 1 capsule (300 mg total) by mouth 3 (three) times a day At 9am, 4pm, 9pm   haloperidol (HALDOL) 5 mg tablet  Self No No   Sig: Take 1 tablet (5 mg total) by mouth daily at bedtime   hydrOXYzine HCL (ATARAX) 50 mg tablet  Self No No   Sig: Take 1 tablet (50 mg total) by mouth every 8 (eight) hours as needed (moderate anxiety)   pantoprazole (PROTONIX) 40 mg tablet   No No   Sig: Take 1 tablet (40 mg total) by mouth daily   sertraline (ZOLOFT) 50 mg tablet  Self No No   Sig: Take 1 tablet (50 mg total) by mouth daily At 9am      Facility-Administered Medications: None       Allergies:     No Known Allergies    Objective     Vital signs in last 24 hours:    Temp:  [97 3 °F (36 3 °C)-98 5 °F (36 9 °C)] 97 3 °F (36 3 °C)  HR:  [72-87] 87  Resp:  [18] 18  BP: (108-131)/(74-76) 131/74    No intake or output data in the 24 hours ending 02/07/22 [facility]:     Nubia Frank was admitted to the inpatient psychiatric unit and started on Behavioral Health checks every 7 minutes  During the hospitalization she was encouraged to attend individual therapy, group therapy, milieu therapy and occupational therapy  Psychiatric medications were adjusted over the hospital stay  To address depressive symptoms, irritability, racing thoughts and anxiety symptoms, Nubia Frank was treated with antidepressant Cymbalta and Trazodone and mood stabilizer Neurontin  Medication doses were adjusted during the hospital course  Cymbalta was added and titrated to 60mg daily  Trazodone was added and titrated to 200mg qhs  Neurontin was added at the dose of 300mg TID  Prior to beginning of treatment medications risks and benefits and possible side effects including risk of suicidality and serotonin syndrome related to treatment with antidepressants were reviewed with Nubia Frank  She verbalized understanding and agreement for treatment   Upon admission Nubia Frank was seen by medical service for medical clearance for inpatient treatment and medical follow up  Kimberly's symptoms slowly improved over the hospital course  Initially after admission she was still feeling depressed  With adjustment of medications and therapeutic milieu her symptoms gradually improved  At the end of treatment Ivan Marcos was doing much better  Her mood was improved at the time of discharge  Ivan Marcos denied suicidal ideation, intent or plan at the time of discharge and denied homicidal ideation, intent or plan at the time of discharge  Ivan Marcos was participating appropriately in milieu at the time of discharge  Behavior was appropriate on the unit at the time of discharge  Sleep and appetite were improved  Ivan Marcos was tolerating medications and was not reporting any significant side effects at the time of discharge  Since Ivan Marcos was doing well at the end of the hospitalization, treatment team felt that she could be safely discharged to outpatient care  Ivan Marcos also felt stable and ready for discharge at the end of the hospital stay      Mental Status at Time of Discharge:     Appearance:  age appropriate, casually dressed, adequate grooming   Behavior:  pleasant, cooperative, calm   Speech:  normal rate and volume, fluent, clear   Mood:  improved, euthymic   Affect:  normal range and intensity   Thought Process:  organized, goal directed, linear   Associations: intact associations   Thought Content:  no overt delusions   Perceptual Disturbances: no auditory hallucinations, no visual hallucinations, does not appear responding to internal stimuli   Risk Potential: Suicidal ideation - None at present  Homicidal ideation - None at present  Potential for aggression - No   Sensorium:  oriented to person, place and time/date   Memory:  recent and remote memory grossly intact   Consciousness:  alert and awake   Attention/Concentration: attention span and concentration are age appropriate   Insight:  fair   Judgment: fair   Gait/Station: normal gait/station   Motor Activity: no abnormal movements       Admission Diagnosis:    Principal Problem:    Recurrent major depressive disorder ([facility])  Active Problems:    Opioid dependence in remission ([facility] )    Gastroesophageal reflux disease    Neuropathy    Bipolar 2 disorder ([facility])      Discharge Diagnosis:     Principal Problem:    Recurrent major depressive disorder ([facility] )  Active Problems:    Opioid dependence in remission ([facility] )    Gastroesophageal reflux disease    Neuropathy    Bipolar 2 disorder ([place] [id number] )  Resolved Problems:    Medical clearance for psychiatric admission      Lab Results:   I have personally reviewed all pertinent laboratory/tests results  Most Recent Labs:   Lab Results   Component Value Date    WBC 4 79 02/01/2022    RBC 4 41 02/01/2022    HGB 12 5 02/01/2022    HCT 39 1 02/01/2022     02/01/2022    RDW 12 5 02/01/2022    NEUTROABS 2 70 02/01/2022    SODIUM 140 02/01/2022    K 3 8 02/01/2022     02/01/2022    CO2 28 02/01/2022    BUN 11 02/01/2022    CREATININE 0 89 02/01/2022    GLUC 86 02/01/2022    GLUF 86 02/01/2022    CALCIUM 9 2 02/01/2022    AST 18 02/01/2022    ALT 21 02/01/2022    ALKPHOS 74 02/01/2022    TP 7 2 02/01/2022    ALB 3 8 02/01/2022    TBILI 0 40 02/01/2022    CHOLESTEROL 183 02/01/2022    HDL 55 02/01/2022    TRIG 85 02/01/2022    LDLCALC 111 (H) 02/01/2022    NONHDLC 128 02/01/2022    QQF4OTZSRYVS 5 419 (H) 02/01/2022    FREET4 0 81 02/01/2022    PREGSERUM Negative 02/02/2019    RPR Non-Reactive 02/01/2022    HGBA1C 5 6 02/01/2022     02/01/2022       Discharge Medications:    See after visit summary for all reconciled discharge medications provided to patient and family  Discharge instructions/Information to patient and family:     See after visit summary for information provided to patient and family        Provisions for Follow-Up Care:    See after visit summary for information related to follow-up care and any pertinent home health orders  Discharge Statement:    I spent 30 minutes discharging the patient  This time was spent on the day of discharge  I had direct contact with the patient on the day of discharge  Additional documentation is required if more than 30 minutes were spent on discharge:    I reviewed with Colonel Garza importance of compliance with medications and outpatient treatment after discharge  I discussed the medication regimen and possible side effects of the medications with Colonel Garza prior to discharge  At the time of discharge she was tolerating psychiatric medications  I discussed outpatient follow up with Colonel Garza  I reviewed with Colonel Garza crisis plan and safety plan upon discharge      Discharge on Two Antipsychotic Medications: No    Candis Peralta PA-C 02/07/22

## 2022-02-04 NOTE — BH TRANSITION RECORD
Contact Information: If you have any questions, concerns, pended studies, tests and/or procedures, or emergencies regarding your inpatient behavioral health visit  Please contact [place] [facility] [phone] and ask to speak to a , nurse or physician  A contact is available 24 hours/ 7 days a week at this number  Summary of Procedures Performed During your Stay:  Below is a list of major procedures performed during your hospital stay and a summary of results:  - Cardiac Procedures/Studies: ekg  Pending Studies (From admission, onward)    None        If studies are pending at discharge, follow up with your PCP and/or referring provider

## 2022-02-04 NOTE — CASE MANAGEMENT
CM contacted [employer] @ [phone] & left a message, seeking to schedule Pt follow-up appointments with DMITRIY Carbajal for medication management & Sugey Wood for therapy  CM contacted Pt's , Kaern Mchugh, @ [phone] & left a message requesting a call back

## 2022-02-04 NOTE — CASE MANAGEMENT
Pt approached CM & said that her , Eusebio Dorantes, would like to talk to CM  Pt reviewed & signed ASHLYN & CM said that she would call Wyatt tomorrow

## 2022-02-04 NOTE — NURSING NOTE
Pt is cooperative and pleasant during interaction  Reports feeling about the same r/t depression  Does report improved sleep overnight which is helpful  Reports pushing self to socialize and attend groups  Initially pt was interested in discharge today however pt states family prefers pt to stay until Monday  Expresses understanding of their concern

## 2022-02-04 NOTE — NURSING NOTE
Patient received Tylenol 650 mg for 8/10 headache  Med effective within the hour  Patient appeared asleep without discomfort throughout the night  Will continue to monitor for safety till shift change

## 2022-02-04 NOTE — PROGRESS NOTES
Progress Note - [id number] [facility],[address] 63 y o  female MRN: [id number]   Unit/Bed#: [facility] 258-01 Encounter: [id number]    Behavior over the last 24 hours: unchanged  Chandler Pack is a 65-year-old female with history of MDD who presents for psychiatric follow-up  Staff reports she continues to be anxious, and appears to be wavering back and forth regarding whether not she wants to return home or continue her treatment on the unit  Upon approach, she is calm and cooperative but does appear anxious, restless and fidgety  She indicates that she would like to stay the weekend for continued treatment, in hopes of improving her mood and anxiety  Continues to feel depressed and at times frustrated  Tolerating her medicines well  Less somatic preoccupation today  Denies SI and HI  No AH or VH      Sleep: normal  Appetite: normal  Medication side effects: No   ROS: all other systems are negative    Mental Status Evaluation:    Appearance:  age appropriate, casually dressed, adequate grooming   Behavior:  cooperative, calm, restless and fidgety   Speech:  normal rate and volume, fluent, clear   Mood:  depressed, anxious   Affect:  constricted   Thought Process:  organized, goal directed, linear   Associations: intact associations   Thought Content:  no overt delusions, somatic preoccupation   Perceptual Disturbances: no auditory hallucinations, no visual hallucinations, does not appear responding to internal stimuli   Risk Potential: Suicidal ideation - None at present, contracts for safety on the unit  Homicidal ideation - None at present  Potential for aggression - No   Sensorium:  oriented to person, place and time/date   Memory:  recent and remote memory grossly intact   Consciousness:  alert and awake   Attention/Concentration: attention span and concentration appear shorter than expected for age   Insight:  improving   Judgment: improving   Gait/Station: normal gait/station   Motor Activity: no abnormal movements     Vital signs in last 24 hours:    Temp:  [98 2 °F (36 8 °C)-98 9 °F (37 2 °C)] 98 2 °F (36 8 °C)  HR:  [] 102  Resp:  [17] 17  BP: (124-138)/(61-73) 138/73    Laboratory results: I have personally reviewed all pertinent laboratory/tests results    Most Recent Labs:   Lab Results   Component Value Date    WBC 4 79 02/01/2022    RBC 4 41 02/01/2022    HGB 12 5 02/01/2022    HCT 39 1 02/01/2022     02/01/2022    RDW 12 5 02/01/2022    NEUTROABS 2 70 02/01/2022    SODIUM 140 02/01/2022    K 3 8 02/01/2022     02/01/2022    CO2 28 02/01/2022    BUN 11 02/01/2022    CREATININE 0 89 02/01/2022    GLUC 86 02/01/2022    CALCIUM 9 2 02/01/2022    AST 18 02/01/2022    ALT 21 02/01/2022    ALKPHOS 74 02/01/2022    TP 7 2 02/01/2022    ALB 3 8 02/01/2022    TBILI 0 40 02/01/2022    CHOLESTEROL 183 02/01/2022    HDL 55 02/01/2022    TRIG 85 02/01/2022    LDLCALC 111 (H) 02/01/2022    [place] 128 02/01/2022    [id number] 5 419 (H) 02/01/2022    FREET4 0 81 02/01/2022    PREGSERUM Negative 02/02/2019    RPR Non-Reactive 02/01/2022    HGBA1C 5 6 02/01/2022     02/01/2022       Progress Toward Goals: progressing    Assessment/Plan   Principal Problem:    Recurrent major depressive disorder ([facility] )  Active Problems:    Opioid dependence in remission ([facility] )    Gastroesophageal reflux disease    Medical clearance for psychiatric admission    Neuropathy    Bipolar 2 disorder ([facility] 75 )      Recommended Treatment:     Planned medication and treatment changes: All current active medications have been reviewed  Encourage group therapy, milieu therapy and occupational therapy  Behavioral Health checks every 7 minutes    Patient demonstrates improving insight and judgment by recognizing the need to remain in treatment for continued medication management and observation  She is hopeful for discharge on Monday    As such, we will increase her Cymbalta to 60 mg on Sunday and monitor her for at least 24 hours before sending her home  Patient is in agreement with this plan  Continue all other medications      Current Facility-Administered Medications   Medication Dose Route Frequency Provider Last Rate    acetaminophen  650 mg Oral Q6H PRN Angelic Inocencio, PA-C      acetaminophen  650 mg Oral Q4H PRN Angelic Inocencio, PA-C      acetaminophen  975 mg Oral Q6H PRN Angelic Inocencio, PA-C      aluminum-magnesium hydroxide-simethicone  30 mL Oral Q4H PRN Angelic Inocencio, PA-C      artificial tear  1 application Both Eyes [id number] PRN Angelic Inocencio, PA-C      haloperidol lactate  2 5 mg Intramuscular Q6H PRN Max 4/day Angelic Inocencio, PA-C      And    LORazepam  1 mg Intramuscular Q6H PRN Max 4/day Angelic Inocencio, PA-C      And    benztropine  0 5 mg Intramuscular Q6H PRN Max 4/day Angelic Inocencio, PA-C      haloperidol lactate  5 mg Intramuscular Q4H PRN Max 4/day Angelic Inocencio, PA-C      And    LORazepam  2 mg Intramuscular Q4H PRN Max 4/day Angelic Inocencio, PA-C      And    benztropine  1 mg Intramuscular Q4H PRN Max 4/day Angelic Inocencio, PA-C      benztropine  1 mg Intramuscular Q4H PRN Max 6/day Angelic Inocencio, PA-C      benztropine  1 mg Oral Q4H PRN Max 6/day Angelic Inocencio, PA-C      bisacodyl  10 mg Rectal Daily PRN Angelic Inocencio, PA-C      Diclofenac Sodium  2 g Topical 4x Daily Catherine Wolf V, PA-C      hydrOXYzine HCL  50 mg Oral Q6H PRN Max 4/day Angelic Inocencio, PA-C      Or    diphenhydrAMINE  50 mg Intramuscular Q6H PRN Angelic Inocencio, PA-C      DULoxetine  30 mg Oral Daily Angelic Inocencio, PA-C      gabapentin  300 mg Oral TID Nydia Elias MD      haloperidol  2 mg Oral Q4H PRN Max 6/day Angelic Inocencio, PA-C      haloperidol  5 mg Oral Q6H PRN Max 4/day Angelic Inocencio, PA-C      haloperidol  5 mg Oral Q4H PRN Max 4/day Angelic Inocencio, PA-C      hydrOXYzine HCL  100 mg Oral Q6H PRN Max 4/day Angelic Inocencio, PA-C      Or    LORazepam  2 mg Intramuscular Q6H PRN Fanny Matthews PA-C      hydrOXYzine HCL  25 mg Oral Q6H PRN Max 4/day Fanny Matthews PA-C      pantoprazole  40 mg Oral Early Morning Fanny Matthews PA-C      polyethylene glycol  17 g Oral Daily PRN Fanny Matthews PA-C      senna-docusate sodium  1 tablet Oral Daily PRN Fanny Matthews PA-C      traZODone  150 mg Oral [place], [place]       Risks / Benefits of Treatment:    Risks, benefits, and possible side effects of medications explained to patient and patient verbalizes understanding and agreement for treatment  Counseling / Coordination of Care:    Patient's progress discussed with staff in treatment team meeting  Medications, treatment progress and treatment plan reviewed with patient      Fanny Matthews PA-C 02/04/22

## 2022-02-05 PROCEDURE — 99232 SBSQ HOSP IP/OBS MODERATE 35: CPT | Performed by: STUDENT IN AN ORGANIZED HEALTH CARE EDUCATION/TRAINING PROGRAM

## 2022-02-05 RX ADMIN — GABAPENTIN 300 MG: 300 CAPSULE ORAL at 21:29

## 2022-02-05 RX ADMIN — DULOXETINE 30 MG: 30 CAPSULE, DELAYED RELEASE ORAL at 08:04

## 2022-02-05 RX ADMIN — ACETAMINOPHEN 975 MG: 325 TABLET, FILM COATED ORAL at 14:38

## 2022-02-05 RX ADMIN — TRAZODONE HYDROCHLORIDE 150 MG: 150 TABLET ORAL at 21:29

## 2022-02-05 RX ADMIN — GABAPENTIN 300 MG: 300 CAPSULE ORAL at 16:09

## 2022-02-05 RX ADMIN — HYDROXYZINE HYDROCHLORIDE 100 MG: 50 TABLET, FILM COATED ORAL at 09:09

## 2022-02-05 RX ADMIN — DICLOFENAC SODIUM 2 G: 10 GEL TOPICAL at 19:00

## 2022-02-05 RX ADMIN — PANTOPRAZOLE SODIUM 40 MG: 40 TABLET, DELAYED RELEASE ORAL at 06:18

## 2022-02-05 RX ADMIN — GABAPENTIN 300 MG: 300 CAPSULE ORAL at 08:05

## 2022-02-05 NOTE — NURSING NOTE
Pt cooperative and pleasant during interaction  Reports feeling anxious and looking forward to going home  Napped in morning follow PRN Atarax  Denies SI  Reports difficulty falling asleep and also waking for long period overnight  RN encouraged pt to discuss sleep medication with provider today  Pt receptive

## 2022-02-05 NOTE — PROGRESS NOTES
Progress Note - Behavioral Health   Te Guzman 63 y o  female MRN: [id number]  Unit/Bed#: [facility] 258-01 Encounter: [id number]    Assessment/Plan   Principal Problem:    Recurrent major depressive disorder ([facility] 75 )  Active Problems:    Opioid dependence in remission ([facility] )    Gastroesophageal reflux disease    Neuropathy    Bipolar 2 disorder ([place] [address] )    Recommended Treatment:   Continue medications at current doses as below  Patient does endorse general improvement with medication adjustments  She has had some interruptions with sleep, though has found this to be improved from admission  May consider further titration of trazodone if continuing to have difficulties with sleep  Cymbalta scheduled to be increased to 60 mg daily starting tomorrow  Encourage group therapy, milieu therapy and occupational therapy  All current active medications have been reviewed  Encourage group therapy, milieu therapy and occupational therapy  Behavioral Health checks every 15 minutes    ----------------------------------------      Subjective:   Per nursing report, patient has been cooperative and pleasant while on the unit  She was denying SI to staff and has expressed to staff that she is pushing herself to go to groups  Per chart, was given Atarax 100 mg this morning due to severe anxiety  Patient reports that her mood has been up and down  She does remain with significant anxiety and states that she is sleeping better, though did still have some interruptions in her sleep last night  Trazodone was recently increased and patient is agreeable to continue at current doses for further monitoring to determine if dose needs to be increased further  Additionally, discussed scheduled increase in Cymbalta and patient is agreeable to continue to monitor with this dose increase  She reports otherwise doing well today and she denies any SI, HI, or AVH      Behavior over the last 24 hours:  unchanged  Sleep: normal  Appetite: normal  Medication side effects: No  ROS: no complaints and all other systems are negative    Mental Status Evaluation:  Appearance:  casually dressed, dressed appropriately, adequate grooming   Behavior:  pleasant, cooperative, calm   Speech:  normal rate and volume   Mood:  "up and down"   Affect:  normal range and intensity, appropriate   Thought Process:  goal directed, linear   Associations: intact associations   Thought Content:  no overt delusions   Perceptual Disturbances: denies auditory or visual hallucinations when asked, does not appear responding to internal stimuli   Risk Potential: Suicidal ideation - None  Homicidal ideation - None  Potential for aggression - Not at present   Sensorium:  oriented to person, place, and time/date   Memory:  recent and remote memory grossly intact   Consciousness:  alert and awake   Attention/Concentration: attention span and concentration are age appropriate   Insight:  fair   Judgment: fair   Gait/Station: normal gait/station   Motor Activity: no abnormal movements     Medications: all current active meds have been reviewed and continue current psychiatric medications    Current Facility-Administered Medications   Medication Dose Route Frequency Provider Last Rate    acetaminophen  650 mg Oral Q6H PRN Allyne Hand, PA-C      acetaminophen  650 mg Oral Q4H PRN Allyne Hand, PA-C      acetaminophen  975 mg Oral Q6H PRN Allyne Hand, PA-C      aluminum-magnesium hydroxide-simethicone  30 mL Oral Q4H PRN Allyne Hand, PA-C      artificial tear  1 application Both Eyes [id number] PRN Allyne Hand, PA-C      haloperidol lactate  2 5 mg Intramuscular Q6H PRN Max 4/day Allyne Hand, PA-C      And    LORazepam  1 mg Intramuscular Q6H PRN Max 4/day Allyne Hand, PA-C      And    benztropine  0 5 mg Intramuscular Q6H PRN Max 4/day Allyne Hand, PA-C      haloperidol lactate  5 mg Intramuscular Q4H PRN Max 4/day Allyne Hand, PA-C      And    LORazepam  2 mg Intramuscular Q4H PRN Max 4/day Caryl [place], PA-C      And    benztropine  1 mg Intramuscular Q4H PRN Max 4/day Caryl [place], PA-C      benztropine  1 mg Intramuscular Q4H PRN Max 6/day Caryl [place], PA-C      benztropine  1 mg Oral Q4H PRN Max 6/day Caryl [place], PA-C      bisacodyl  10 mg Rectal Daily PRN Caryl [place], PA-C      Diclofenac Sodium  2 g Topical 4x Daily Catherine Wolf V, PA-C      hydrOXYzine HCL  50 mg Oral Q6H PRN Max 4/day Caryl [place], PA-C      Or    diphenhydrAMINE  50 mg Intramuscular Q6H PRN [facility], PA-C      [START ON 2/6/2022] DULoxetine  60 mg Oral Daily [facility], PA-C      gabapentin  300 mg Oral TID Jerzy Sheriff MD      haloperidol  2 mg Oral Q4H PRN Max 6/day Caryl [place], PA-C      haloperidol  5 mg Oral Q6H PRN Max 4/day Caryl [place], PA-C      haloperidol  5 mg Oral Q4H PRN Max 4/[facility]      hydrOXYzine HCL  100 mg Oral Q6H PRN Max 4/day [facility], [facility]-C      Or    LORazepam  2 mg Intramuscular Q6H PRN Caryl [place], PA-C      hydrOXYzine HCL  25 mg Oral Q6H PRN Max 4/day [facility], [facility]-C      pantoprazole  40 mg Oral Early Morning [facility], PA-C      polyethylene glycol  17 g Oral Daily [facility], PA-C      senna-docusate sodium  1 tablet Oral Daily [facility], [facility]-C      traZODone  150 mg Oral HS Marisol Ca, STARLA         Labs:  I have personally reviewed all pertinent laboratory/tests results  Most Recent Labs:   Lab Results   Component Value Date    WBC 4 79 02/01/2022    RBC 4 41 02/01/2022    HGB 12 5 02/01/2022    HCT 39 1 02/01/2022     02/01/2022    RDW 12 5 02/01/2022    NEUTROABS 2 70 02/01/2022    SODIUM 140 02/01/2022    K 3 8 02/01/2022     02/01/2022    CO2 28 02/01/2022    BUN 11 02/01/2022    CREATININE 0 89 02/01/2022    GLUC 86 02/01/2022    CALCIUM 9 2 02/01/2022    AST 18 02/01/2022    ALT 21 02/01/2022    ALKPHOS 74 02/01/2022 TP 7 2 02/01/2022    ALB 3 8 02/01/2022    TBILI 0 40 02/01/2022    CHOLESTEROL 183 02/01/2022    HDL 55 02/01/2022    TRIG 85 02/01/2022    LDLCALC 111 (H) 02/01/2022    NONHDLC 128 02/01/2022    [id number] 5 419 (H) 02/01/2022    FREET4 0 81 02/01/2022    PREGSERUM Negative 02/02/2019    RPR Non-Reactive 02/01/2022    HGBA1C 5 6 02/01/2022     02/01/2022       Progress Toward Goals: progressing    Risks / Benefits of Treatment:    Risks, benefits, and possible side effects of medications explained to patient and patient verbalizes understanding and agreement for treatment  Counseling / Coordination of Care:    Patient's progress discussed with staff in treatment team meeting  Medications, treatment progress and treatment plan reviewed with patient      Vladimir Bocanegra MD 02/05/22

## 2022-02-05 NOTE — NURSING NOTE
Pt given prn atarax 100mg at 0909 for severe anxiety, pt very restless, explored with pt coping skills to utilize in conjunction with prn med, pt verbalized understanding  States atarax decreases anxiety to manageable level, verbalizes understanding/acceptance of teaching done

## 2022-02-05 NOTE — PLAN OF CARE
Problem: Alteration in Thoughts and Perception  Goal: Treatment Goal: Gain control of psychotic behaviors/thinking, reduce/eliminate presenting symptoms and demonstrate improved reality functioning upon discharge  Outcome: Progressing  Goal: Agree to be compliant with medication regime, as prescribed and report medication side effects  Description: Interventions:  - Offer appropriate PRN medication and supervise ingestion; conduct AIMS, as needed   Outcome: Progressing  Goal: Attend and participate in unit activities, including therapeutic, recreational, and educational groups  Description: Interventions:  -Encourage Visitation and family involvement in care  Outcome: Progressing  Goal: Complete daily ADLs, including personal hygiene independently, as able  Description: Interventions:  - Observe, teach, and assist patient with ADLS  - Monitor and promote a balance of rest/activity, with adequate nutrition and elimination   Outcome: Progressing     Problem: Depression  Goal: Treatment Goal: Demonstrate behavioral control of depressive symptoms, verbalize feelings of improved mood/affect, and adopt new coping skills prior to discharge  Outcome: Progressing  Goal: Verbalize thoughts and feelings  Description: Interventions:  - Assess and re-assess patient's level of risk   - Engage patient in 1:1 interactions, daily, for a minimum of 15 minutes   - Encourage patient to express feelings, fears, frustrations, hopes   Outcome: Progressing  Goal: Attend and participate in unit activities, including therapeutic, recreational, and educational groups  Description: Interventions:  - Provide therapeutic and educational activities daily, encourage attendance and participation, and document same in the medical record   Outcome: Progressing  Goal: Complete daily ADLs, including personal hygiene independently, as able  Description: Interventions:  - Observe, teach, and assist patient with ADLS  -  Monitor and promote a balance of rest/activity, with adequate nutrition and elimination   Outcome: Progressing     Problem: Anxiety  Goal: Anxiety is at manageable level  Description: Interventions:  - Assess and monitor patient's anxiety level  - Monitor for signs and symptoms (heart palpitations, chest pain, shortness of breath, headaches, nausea, feeling jumpy, restlessness, irritable, apprehensive)  - Collaborate with interdisciplinary team and initiate plan and interventions as ordered    - [place] patient to unit/surroundings  - Explain treatment plan  - Encourage participation in care  - Encourage verbalization of concerns/fears  - Identify coping mechanisms  - Assist in developing anxiety-reducing skills  - Administer/offer alternative therapies  - Limit or eliminate stimulants  Outcome: Progressing     Problem: Ineffective Coping  Goal: Participates in unit activities  Description: Interventions:  - Provide therapeutic environment   - Provide required programming   - Redirect inappropriate behaviors   Outcome: Progressing     Problem: DISCHARGE PLANNING  Goal: Discharge to home or other facility with appropriate resources  Description: INTERVENTIONS:  - Identify barriers to discharge w/patient and caregiver  - Arrange for needed discharge resources and transportation as appropriate  - Identify discharge learning needs (meds, wound care, etc )  - Arrange for interpretive services to assist at discharge as needed  - Refer to Case Management Department for coordinating discharge planning if the patient needs post-hospital services based on physician/advanced practitioner order or complex needs related to functional status, cognitive ability, or social support system  Outcome: Progressing     Problem: SELF HARM/SUICIDALITY  Goal: Will have no self-injury during hospital stay  Description: INTERVENTIONS:  - Q 15 MINUTES: Routine safety checks  - Q WAKING SHIFT & PRN: Assess risk to determine if routine checks are adequate to maintain patient safety  - Encourage patient to participate actively in care by formulating a plan to combat response to suicidal ideation, identify supports and resources  Outcome: Progressing

## 2022-02-05 NOTE — TREATMENT TEAM
02/05/22 0700   Team Meeting   Meeting Type Daily Rounds   Team Members Present   Team Members Present Physician;Nurse   Physician Team Member Dr Blaine Shin Team Member 31 Joelle Ngo   Patient/Family Present   Patient Present No   Patient's Family Present No     Daily Rounds: Pt cooperative and pleasant  Decided to stay for weekend d/t family being concerned  Denies SI  Depression about the same  Yesterday pt states pushing self to attend groups and socialize

## 2022-02-06 PROCEDURE — 99232 SBSQ HOSP IP/OBS MODERATE 35: CPT | Performed by: STUDENT IN AN ORGANIZED HEALTH CARE EDUCATION/TRAINING PROGRAM

## 2022-02-06 RX ORDER — TRAZODONE HYDROCHLORIDE 100 MG/1
200 TABLET ORAL
Status: DISCONTINUED | OUTPATIENT
Start: 2022-02-06 | End: 2022-02-07 | Stop reason: HOSPADM

## 2022-02-06 RX ORDER — GABAPENTIN 400 MG/1
400 CAPSULE ORAL 3 TIMES DAILY
Status: DISCONTINUED | OUTPATIENT
Start: 2022-02-06 | End: 2022-02-06

## 2022-02-06 RX ORDER — GABAPENTIN 300 MG/1
300 CAPSULE ORAL 3 TIMES DAILY
Status: DISCONTINUED | OUTPATIENT
Start: 2022-02-06 | End: 2022-02-07 | Stop reason: HOSPADM

## 2022-02-06 RX ADMIN — DULOXETINE 60 MG: 30 CAPSULE, DELAYED RELEASE ORAL at 08:48

## 2022-02-06 RX ADMIN — DICLOFENAC SODIUM 2 G: 10 GEL TOPICAL at 08:49

## 2022-02-06 RX ADMIN — DICLOFENAC SODIUM 2 G: 10 GEL TOPICAL at 13:24

## 2022-02-06 RX ADMIN — PANTOPRAZOLE SODIUM 40 MG: 40 TABLET, DELAYED RELEASE ORAL at 06:41

## 2022-02-06 RX ADMIN — HALOPERIDOL 5 MG: 5 TABLET ORAL at 08:51

## 2022-02-06 RX ADMIN — GABAPENTIN 300 MG: 300 CAPSULE ORAL at 08:48

## 2022-02-06 RX ADMIN — HYDROXYZINE HYDROCHLORIDE 100 MG: 50 TABLET, FILM COATED ORAL at 09:46

## 2022-02-06 RX ADMIN — GABAPENTIN 300 MG: 300 CAPSULE ORAL at 21:19

## 2022-02-06 RX ADMIN — GABAPENTIN 300 MG: 300 CAPSULE ORAL at 15:56

## 2022-02-06 RX ADMIN — ACETAMINOPHEN 975 MG: 325 TABLET, FILM COATED ORAL at 17:15

## 2022-02-06 RX ADMIN — TRAZODONE HYDROCHLORIDE 200 MG: 100 TABLET ORAL at 21:19

## 2022-02-06 NOTE — NURSING NOTE
Pt is pleasant and cooperative with staff and social with peers  Pt did rest this evening, c/o headache  Encouraged fluids, possibly due to increase in medication doses  Denies SI, HI, AVH  Brief irritability this afternoon due to phones being off, quickly caught self and apologized to staff  Still reports fluctuation in mood  Denies needs at this time

## 2022-02-06 NOTE — NURSING NOTE
Calm, cooperative, pleasant and bright affect in conversation  C/O mild agitation with anxiety  It was decided to try Haldol 5mg; however, approximately 1 hour later patient reports that haldol "barely worked "  In the past patient has received Atarax 100mg for anxiety  Patient reports Atarax as effective  Ambulating hallways  Participating in groups and meal times  Naps intermittently throughout the day  Patient reports that she does feel some improvement  Thankful  Denies SI/HI/KAYLAH

## 2022-02-06 NOTE — PROGRESS NOTES
Progress Note - Behavioral Health   Lyle Hooks 63 y o  female MRN: [id number]  Unit/Bed#: -01 Encounter: [id number]    Assessment/Plan   Principal Problem:    Recurrent major depressive disorder ([facility] )  Active Problems:    Opioid dependence in remission ([facility] )    Gastroesophageal reflux disease    Neuropathy    Bipolar 2 disorder ([facility])    Recommended Treatment:   Increase trazodone to 200 mg q h s  to assist with sleep initiation and maintenance  Continue all other medications at current doses  Cymbalta increased to 60 mg daily today  Patient tolerating this fairly well  Continues to have fluctuations in mood with periods of significant depression  Will continue monitor for efficacy  Encourage group therapy, milieu therapy and occupational therapy  All current active medications have been reviewed  Encourage group therapy, milieu therapy and occupational therapy  Behavioral Health checks every 15 minutes    ----------------------------------------      Subjective:   Per nursing report, patient was fairly anxious yesterday and attributed this to missing her granddaughter  No acute behavioral issues overnight  Patient reports that she is doing better today  She reports that she is still having some difficulties with sleep initiation and maintenance, though does report that this is still improving somewhat  She notes that her mood currently is good, though fluctuates throughout the day  She denies any SI, HI, or AVH and denies experiencing these at all even during her lower points during the day, though is concerned about the persistent fluctuations and depression  Patient reports that she is tolerating increased dose of Cymbalta received today  Discussed further titration of trazodone to assist with sleep and patient agreeable to this  She denies any questions or concerns at this time      Behavior over the last 24 hours:  unchanged  Sleep: normal  Appetite: normal  Medication side effects: No  ROS: all other systems are negative    Mental Status Evaluation:  Appearance:  casually dressed, dressed appropriately, adequate grooming   Behavior:  pleasant, cooperative, calm   Speech:  normal rate and volume   Mood:  "pretty good"   Affect:  normal range and intensity, appropriate   Thought Process:  logical, goal directed, linear   Associations: intact associations   Thought Content:  no overt delusions   Perceptual Disturbances: denies auditory or visual hallucinations when asked, does not appear responding to internal stimuli   Risk Potential: Suicidal ideation - None  Homicidal ideation - None  Potential for aggression - Not at present   Sensorium:  oriented to person, place, and time/date   Memory:  recent and remote memory grossly intact   Consciousness:  alert and awake   Attention/Concentration: attention span and concentration are age appropriate   Insight:  improving   Judgment: improving   Gait/Station: normal gait/station   Motor Activity: no abnormal movements     Medications: all current active meds have been reviewed and planned medication changes: increase Trazodone to 200 mg QHS     Current Facility-Administered Medications   Medication Dose Route Frequency Provider Last Rate    acetaminophen  650 mg Oral Q6H PRN Loyde Favor, PA-C      acetaminophen  650 mg Oral Q4H PRN Loyde Favor, PA-C      acetaminophen  975 mg Oral Q6H PRN Loyde Favor, PA-C      aluminum-magnesium hydroxide-simethicone  30 mL Oral Q4H PRN Loyde Favor, PA-C      artificial tear  1 application Both Eyes S1I PRN Loyde Favor, PA-C      haloperidol lactate  2 5 mg Intramuscular Q6H PRN Max 4/day Loyde Favor, PA-C      And    LORazepam  1 mg Intramuscular Q6H PRN Max 4/day Loyde Favor, PA-C      And    benztropine  0 5 mg Intramuscular Q6H PRN Max 4/day Loyde Favor, PA-C      haloperidol lactate  5 mg Intramuscular Q4H PRN Max 4/day Loyde Favor, PA-C      And    LORazepam  2 mg Intramuscular Q4H PRN Max 4/day Radha Moya PA-C      And    benztropine  1 mg Intramuscular Q4H PRN Max 4/day Radha Moya PA-C      benztropine  1 mg Intramuscular Q4H PRN Max 6/day Radha Moya PA-C      benztropine  1 mg Oral Q4H PRN Max 6/day Radha Moya PA-C      bisacodyl  10 mg Rectal Daily PRN Radha Moya PA-C      Diclofenac Sodium  2 g Topical 4x Daily Catherine ROPER PA-C      hydrOXYzine HCL  50 mg Oral Q6H PRN Max 4/day Radha Moya PA-C      Or    diphenhydrAMINE  50 mg Intramuscular Q6H PRN Radha Moya PA-C      DULoxetine  60 mg Oral Daily Radha Moya PA-C      gabapentin  300 mg Oral TID Pedro Carr MD      haloperidol  2 mg Oral Q4H PRN Max 6/day Radha Moya PA-C      haloperidol  5 mg Oral Q6H PRN Max 4/day Radha Moya PA-C      haloperidol  5 mg Oral Q4H PRN Max 4/day Radha Moya PA-C      hydrOXYzine HCL  100 mg Oral Q6H PRN Max 4/day Radha Moya PA-C      Or    LORazepam  2 mg Intramuscular Q6H PRN Radha Moya PA-C      hydrOXYzine HCL  25 mg Oral Q6H PRN Max 4/day Radha Moya PA-C      pantoprazole  40 mg Oral Early Morning Radha Moya PA-C      polyethylene glycol  17 g Oral Daily PRN Radha Moya PA-C      senna-docusate sodium  1 tablet Oral Daily PRN Radha Moya PA-C      traZODone  150 mg Oral HS Zac aC PA-C         Labs:  I have personally reviewed all pertinent laboratory/tests results  Most Recent Labs:   Lab Results   Component Value Date    WBC 4 79 02/01/2022    RBC 4 41 02/01/2022    HGB 12 5 02/01/2022    HCT 39 1 02/01/2022     02/01/2022    RDW 12 5 02/01/2022    NEUTROABS 2 70 02/01/2022    SODIUM 140 02/01/2022    K 3 8 02/01/2022     02/01/2022    CO2 28 02/01/2022    BUN 11 02/01/2022    CREATININE 0 89 02/01/2022    GLUC 86 02/01/2022    CALCIUM 9 2 02/01/2022    AST 18 02/01/2022    ALT 21 02/01/2022    ALKPHOS 74 02/01/2022    TP 7 2 02/01/2022    ALB 3 8 02/01/2022    TBILI 0 40 02/01/2022    CHOLESTEROL 183 02/01/2022    HDL 55 02/01/2022    TRIG 85 02/01/2022    LDLCALC 111 (H) 02/01/2022    NONHDLC 128 02/01/2022    ANY6CKVVXLYP 5 419 (H) 02/01/2022    FREET4 0 81 02/01/2022    PREGSERUM Negative 02/02/2019    RPR Non-Reactive 02/01/2022    HGBA1C 5 6 02/01/2022     02/01/2022       Progress Toward Goals: improving    Risks / Benefits of Treatment:    Risks, benefits, and possible side effects of medications explained to patient and patient verbalizes understanding and agreement for treatment  Counseling / Coordination of Care:    Patient's progress discussed with staff in treatment team meeting  Medications, treatment progress and treatment plan reviewed with patient      Sarah Jordan MD 02/06/22

## 2022-02-06 NOTE — TREATMENT TEAM
02/06/22 0700   Team Meeting   Meeting Type Daily Rounds   Team Members Present   Team Members Present Physician;Nurse   Physician Team Member Dr Tasia Chavez Team Member 31 Joelle Ngo   Patient/Family Present   Patient Present No   Patient's Family Present No     Daily Rounds: Pt with fluctuating anxiety, missing home  Denies SI  Reports some difficulty with sleep the night before  Staff report pt appeared to sleep last night

## 2022-02-07 VITALS
BODY MASS INDEX: 28.5 KG/M2 | DIASTOLIC BLOOD PRESSURE: 74 MMHG | RESPIRATION RATE: 18 BRPM | HEART RATE: 87 BPM | SYSTOLIC BLOOD PRESSURE: 131 MMHG | TEMPERATURE: 97.3 F | HEIGHT: 65 IN | OXYGEN SATURATION: 98 % | WEIGHT: 171.08 LBS

## 2022-02-07 PROCEDURE — 99238 HOSP IP/OBS DSCHRG MGMT 30/<: CPT | Performed by: PHYSICIAN ASSISTANT

## 2022-02-07 RX ORDER — PANTOPRAZOLE SODIUM 40 MG/1
40 TABLET, DELAYED RELEASE ORAL
Qty: 30 TABLET | Refills: 0 | Status: SHIPPED | OUTPATIENT
Start: 2022-02-07 | End: 2022-07-14

## 2022-02-07 RX ORDER — DULOXETIN HYDROCHLORIDE 60 MG/1
60 CAPSULE, DELAYED RELEASE ORAL DAILY
Qty: 30 CAPSULE | Refills: 1 | Status: SHIPPED | OUTPATIENT
Start: 2022-02-07 | End: 2022-07-26 | Stop reason: ALTCHOICE

## 2022-02-07 RX ORDER — HYDROXYZINE 50 MG/1
50 TABLET, FILM COATED ORAL EVERY 6 HOURS PRN
Qty: 30 TABLET | Refills: 0 | Status: SHIPPED | OUTPATIENT
Start: 2022-02-07

## 2022-02-07 RX ORDER — GABAPENTIN 300 MG/1
300 CAPSULE ORAL 3 TIMES DAILY
Qty: 90 CAPSULE | Refills: 1 | Status: SHIPPED | OUTPATIENT
Start: 2022-02-07 | End: 2022-07-26

## 2022-02-07 RX ORDER — TRAZODONE HYDROCHLORIDE 100 MG/1
200 TABLET ORAL
Qty: 60 TABLET | Refills: 1 | Status: SHIPPED | OUTPATIENT
Start: 2022-02-07 | End: 2022-07-26

## 2022-02-07 RX ADMIN — DULOXETINE 60 MG: 30 CAPSULE, DELAYED RELEASE ORAL at 08:53

## 2022-02-07 RX ADMIN — PANTOPRAZOLE SODIUM 40 MG: 40 TABLET, DELAYED RELEASE ORAL at 06:44

## 2022-02-07 RX ADMIN — GABAPENTIN 300 MG: 300 CAPSULE ORAL at 08:53

## 2022-02-07 NOTE — PLAN OF CARE
Problem: Alteration in Thoughts and Perception  Goal: Treatment Goal: Gain control of psychotic behaviors/thinking, reduce/eliminate presenting symptoms and demonstrate improved reality functioning upon discharge  Outcome: Progressing  Goal: Agree to be compliant with medication regime, as prescribed and report medication side effects  Description: Interventions:  - Offer appropriate PRN medication and supervise ingestion; conduct AIMS, as needed   Outcome: Progressing  Goal: Attend and participate in unit activities, including therapeutic, recreational, and educational groups  Description: Interventions:  -Encourage Visitation and family involvement in care  Outcome: Progressing  Goal: Complete daily ADLs, including personal hygiene independently, as able  Description: Interventions:  - Observe, teach, and assist patient with ADLS  - Monitor and promote a balance of rest/activity, with adequate nutrition and elimination   Outcome: Progressing     Problem: Depression  Goal: Treatment Goal: Demonstrate behavioral control of depressive symptoms, verbalize feelings of improved mood/affect, and adopt new coping skills prior to discharge  Outcome: Progressing  Goal: Verbalize thoughts and feelings  Description: Interventions:  - Assess and re-assess patient's level of risk   - Engage patient in 1:1 interactions, daily, for a minimum of 15 minutes   - Encourage patient to express feelings, fears, frustrations, hopes   Outcome: Progressing  Goal: Attend and participate in unit activities, including therapeutic, recreational, and educational groups  Description: Interventions:  - Provide therapeutic and educational activities daily, encourage attendance and participation, and document same in the medical record   Outcome: Progressing  Goal: Complete daily ADLs, including personal hygiene independently, as able  Description: Interventions:  - Observe, teach, and assist patient with ADLS  -  Monitor and promote a balance of rest/activity, with adequate nutrition and elimination   Outcome: Progressing     Problem: Anxiety  Goal: Anxiety is at manageable level  Description: Interventions:  - Assess and monitor patient's anxiety level  - Monitor for signs and symptoms (heart palpitations, chest pain, shortness of breath, headaches, nausea, feeling jumpy, restlessness, irritable, apprehensive)  - Collaborate with interdisciplinary team and initiate plan and interventions as ordered    - [place] patient to unit/surroundings  - Explain treatment plan  - Encourage participation in care  - Encourage verbalization of concerns/fears  - Identify coping mechanisms  - Assist in developing anxiety-reducing skills  - Administer/offer alternative therapies  - Limit or eliminate stimulants  Outcome: Progressing     Problem: Ineffective Coping  Goal: Participates in unit activities  Description: Interventions:  - Provide therapeutic environment   - Provide required programming   - Redirect inappropriate behaviors   Outcome: Progressing     Problem: DISCHARGE PLANNING  Goal: Discharge to home or other facility with appropriate resources  Description: INTERVENTIONS:  - Identify barriers to discharge w/patient and caregiver  - Arrange for needed discharge resources and transportation as appropriate  - Identify discharge learning needs (meds, wound care, etc )  - Arrange for interpretive services to assist at discharge as needed  - Refer to Case Management Department for coordinating discharge planning if the patient needs post-hospital services based on physician/advanced practitioner order or complex needs related to functional status, cognitive ability, or social support system  Outcome: Progressing     Problem: SELF HARM/SUICIDALITY  Goal: Will have no self-injury during hospital stay  Description: INTERVENTIONS:  - Q 15 MINUTES: Routine safety checks  - Q WAKING SHIFT & PRN: Assess risk to determine if routine checks are adequate to maintain patient safety  - Encourage patient to participate actively in care by formulating a plan to combat response to suicidal ideation, identify supports and resources  Outcome: Progressing

## 2022-02-07 NOTE — CASE MANAGEMENT
CM returned a phone call to Pt's therapist, Kenneth Villa, through [employer]  CM provided an update & reviewed discharge plans  CM reviewed that Pt had been instructed to call & complete a new assessment  Kenneth Villa said that if Pt hasn't called by tomorrow, she will call her herself

## 2022-02-07 NOTE — CASE MANAGEMENT
CM contacted [facility] @ [phone] & left another message seeking a returned call to schedule Pt's follow-up appointments  CM met with Pt who verbalized readiness for discharge  She reported that she felt less depressed & less anxious & she slept well Sat & Sun nights  CM reviewed that she was still waiting to hear back from [address] [place] [address] Avenue would call Pt with appointment information when available  ODALYS received a voicemail from Pt's therapist, Desirae Cameron, who reported that Pt does not have any scheduled appointments with her or Nani Ganser, & that Pt needs an updated assessment  She reported that Pt will need to call intake at [phone]  CM met with Pt to review this & she said she would call when she gets home

## 2022-02-07 NOTE — DISCHARGE INSTR - OTHER ORDERS
[facility]  24/7: [phone]  [place]  [address]    [facility] has 3 crisis centers:   [employer] at [facility] at [facility] at [facility] services are accessible by phone and through three [facility] hospital Emergency Rooms to individuals, families, and law enforcement  Psychiatric and treatment planning is available, providing for evaluation and disposition and the immediate start of medication and treatment  Substance abuse crisis issues are also addressed  Access to psychiatric consultation is limited to a few hours each weekday  These services are available 24/7 at [facility] AT [facility] at [facility] ([place] and [address]; [phone]) and [employer] at [facility], [address]; [phone]  The most active crisis unit is [facility] AT [facility] at [address], [address] [place] [address]; [phone])  Suicide Prevention Lifeline  (686) 684-TALK or (756) [place] 5525 (977) 934-EJGU    Crisis Text Line is free, 24/7 support for those in crisis  Text HOME to 481429 from anywhere in the [place] to text with a trained Crisis Counselor  Peer Recovery Warmline through [facility] AT [facility], hours of operation are 1:00 PM to 5:00 PM, Monday through Friday, except major holidays  [address] [phone] ·   [address] [place], [place], [facility]  Provides free, anonymous and confidential telephone helpline services to help with severe mental health issues to financial concerns, family issues or simply a desire to be heard

## 2022-02-07 NOTE — PROGRESS NOTES
AUDIT: 0, PAWSS: 0, BAT: 0, UDS: + oxycodone    Pt reported that she is not consuming any alcohol  She said that she did "fall off the wagon, but I'm back on"  Pt reported that she had been using Percocet, 15mg or 30mg which she buys  She has been clean from coke/meth since August 2019 & off of suboxone for a year  Pt has a therapist & medication management through [facility], however, her therapist Andressa Simms said that she needs to complete a new assessment  Pt was also referred to [facility] for outpatient mental health  Pt denied any tobacco use

## 2022-02-07 NOTE — PROGRESS NOTES
Status: Pt reported fluctuating anxiety but denied any SI or hallucinations  She was anxious on Sunday & given PRN haldol which was not effective  PRN atarax given which was effective  Pt attends groups & slept overnight  Medication: Cymbalta increased to 60mg daily & Trazodone increased to 200mg HS / PRN - Tylenol, Haldol, & Atarax  D/C: Today / Pt has an intake at [facility] on 3/16 & will follow-up at [facility] until then  CM left 2 msg & is waiting for a return call from [facility]  02/07/22 0750   Team Meeting   Meeting Type Daily Rounds   Team Members Present   Team Members Present Physician;Nurse; Other (Discipline and Name);    Physician Team Member Dr Colleen Chang / Suresh Jaimes / Gia Joseph Team Member Zoran Flynn / Renzo Wells / RN Student   Care Management Team Member Tye Darling / Miguel Angel Díaz   Other (Discipline and Name) Armando Robert (art therapy)   Patient/Family Present   Patient Present No   Patient's Family Present No

## 2022-02-07 NOTE — DISCHARGE INSTR - APPOINTMENTS
Susi Arrington RN, our [employer], will be calling you after your discharge, on the phone number that you provided  She will be available as an additional support, if needed  If you wish to speak with her, you may contact Isabela Ornelas at [phone]

## 2022-02-07 NOTE — NURSING NOTE
Pt remains pleasant and calm  Social with peers  Denies SI/HI, verbally contracts for safety  Reports her mood has been improved and she feels readiness for discharge  Denies any concerns or questions

## 2022-02-07 NOTE — NURSING NOTE
Pt expressed to writer readiness to discharge home on Monday  Glad they stayed over the weekend and had some increases with scheduled medications  Feeling much improved  Still reports intermittent headache, encouraged increased water intake and reviewed that headache can be a side effect of medications, pt acknowledges  Denies SI, HI, AVH  Pleasant, calm, cooperative, social with peers, attends groups

## 2022-02-07 NOTE — PLAN OF CARE
Problem: Alteration in Thoughts and Perception  Goal: Treatment Goal: Gain control of psychotic behaviors/thinking, reduce/eliminate presenting symptoms and demonstrate improved reality functioning upon discharge  Outcome: Adequate for Discharge  Goal: Agree to be compliant with medication regime, as prescribed and report medication side effects  Description: Interventions:  - Offer appropriate PRN medication and supervise ingestion; conduct AIMS, as needed   Outcome: Adequate for Discharge  Goal: Attend and participate in unit activities, including therapeutic, recreational, and educational groups  Description: Interventions:  -Encourage Visitation and family involvement in care  Outcome: Adequate for Discharge  Goal: Complete daily ADLs, including personal hygiene independently, as able  Description: Interventions:  - Observe, teach, and assist patient with ADLS  - Monitor and promote a balance of rest/activity, with adequate nutrition and elimination   Outcome: Adequate for Discharge     Problem: Anxiety  Goal: Anxiety is at manageable level  Description: Interventions:  - Assess and monitor patient's anxiety level  - Monitor for signs and symptoms (heart palpitations, chest pain, shortness of breath, headaches, nausea, feeling jumpy, restlessness, irritable, apprehensive)  - Collaborate with interdisciplinary team and initiate plan and interventions as ordered    - [place] patient to unit/surroundings  - Explain treatment plan  - Encourage participation in care  - Encourage verbalization of concerns/fears  - Identify coping mechanisms  - Assist in developing anxiety-reducing skills  - Administer/offer alternative therapies  - Limit or eliminate stimulants  Outcome: Adequate for Discharge     Problem: Ineffective Coping  Goal: Participates in unit activities  Description: Interventions:  - Provide therapeutic environment   - Provide required programming   - Redirect inappropriate behaviors   Outcome: Adequate for Discharge     Problem: DISCHARGE PLANNING  Goal: Discharge to home or other facility with appropriate resources  Description: INTERVENTIONS:  - Identify barriers to discharge w/patient and caregiver  - Arrange for needed discharge resources and transportation as appropriate  - Identify discharge learning needs (meds, wound care, etc )  - Arrange for interpretive services to assist at discharge as needed  - Refer to Case Management Department for coordinating discharge planning if the patient needs post-hospital services based on physician/advanced practitioner order or complex needs related to functional status, cognitive ability, or social support system  Outcome: Adequate for Discharge     Problem: SELF HARM/SUICIDALITY  Goal: Will have no self-injury during hospital stay  Description: INTERVENTIONS:  - Q 15 MINUTES: Routine safety checks  - Q WAKING SHIFT & PRN: Assess risk to determine if routine checks are adequate to maintain patient safety  - Encourage patient to participate actively in care by formulating a plan to combat response to suicidal ideation, identify supports and resources  Outcome: Adequate for Discharge

## 2022-07-14 DIAGNOSIS — K21.9 GASTROESOPHAGEAL REFLUX DISEASE: ICD-10-CM

## 2022-07-14 RX ORDER — PANTOPRAZOLE SODIUM 40 MG/1
TABLET, DELAYED RELEASE ORAL
Qty: 30 TABLET | Refills: 5 | Status: SHIPPED | OUTPATIENT
Start: 2022-07-14

## 2022-07-26 ENCOUNTER — OFFICE VISIT (OUTPATIENT)
Dept: OBGYN CLINIC | Facility: CLINIC | Age: 62
End: 2022-07-26
Payer: COMMERCIAL

## 2022-07-26 ENCOUNTER — OFFICE VISIT (OUTPATIENT)
Dept: FAMILY MEDICINE CLINIC | Facility: HOSPITAL | Age: 62
End: 2022-07-26
Payer: COMMERCIAL

## 2022-07-26 VITALS
TEMPERATURE: 97.8 F | DIASTOLIC BLOOD PRESSURE: 64 MMHG | HEART RATE: 88 BPM | WEIGHT: 175 LBS | HEIGHT: 64 IN | SYSTOLIC BLOOD PRESSURE: 132 MMHG | BODY MASS INDEX: 29.88 KG/M2

## 2022-07-26 VITALS
DIASTOLIC BLOOD PRESSURE: 78 MMHG | SYSTOLIC BLOOD PRESSURE: 118 MMHG | WEIGHT: 176 LBS | HEIGHT: 64 IN | BODY MASS INDEX: 30.05 KG/M2

## 2022-07-26 DIAGNOSIS — Z12.11 ENCOUNTER FOR SCREENING COLONOSCOPY: ICD-10-CM

## 2022-07-26 DIAGNOSIS — Z12.31 ENCOUNTER FOR SCREENING MAMMOGRAM FOR MALIGNANT NEOPLASM OF BREAST: ICD-10-CM

## 2022-07-26 DIAGNOSIS — M19.011 PRIMARY LOCALIZED OSTEOARTHROSIS OF RIGHT SHOULDER: Primary | ICD-10-CM

## 2022-07-26 DIAGNOSIS — F33.2 SEVERE EPISODE OF RECURRENT MAJOR DEPRESSIVE DISORDER, WITHOUT PSYCHOTIC FEATURES (HCC): ICD-10-CM

## 2022-07-26 DIAGNOSIS — H60.02 FURUNCLE OF LEFT EAR: Primary | ICD-10-CM

## 2022-07-26 DIAGNOSIS — F11.21 OPIOID DEPENDENCE IN REMISSION (HCC): ICD-10-CM

## 2022-07-26 PROBLEM — F33.3 SEVERE EPISODE OF RECURRENT MAJOR DEPRESSIVE DISORDER, WITH PSYCHOTIC FEATURES (HCC): Status: RESOLVED | Noted: 2018-08-20 | Resolved: 2022-07-26

## 2022-07-26 PROCEDURE — 99214 OFFICE O/P EST MOD 30 MIN: CPT | Performed by: NURSE PRACTITIONER

## 2022-07-26 PROCEDURE — 3725F SCREEN DEPRESSION PERFORMED: CPT | Performed by: NURSE PRACTITIONER

## 2022-07-26 PROCEDURE — 20610 DRAIN/INJ JOINT/BURSA W/O US: CPT | Performed by: ORTHOPAEDIC SURGERY

## 2022-07-26 PROCEDURE — 99213 OFFICE O/P EST LOW 20 MIN: CPT | Performed by: ORTHOPAEDIC SURGERY

## 2022-07-26 RX ORDER — BETAMETHASONE SODIUM PHOSPHATE AND BETAMETHASONE ACETATE 3; 3 MG/ML; MG/ML
6 INJECTION, SUSPENSION INTRA-ARTICULAR; INTRALESIONAL; INTRAMUSCULAR; SOFT TISSUE
Status: COMPLETED | OUTPATIENT
Start: 2022-07-26 | End: 2022-07-26

## 2022-07-26 RX ORDER — BUPROPION HYDROCHLORIDE 75 MG/1
75 TABLET ORAL DAILY
COMMUNITY
Start: 2022-07-15

## 2022-07-26 RX ORDER — BUPRENORPHINE HYDROCHLORIDE AND NALOXONE HYDROCHLORIDE DIHYDRATE 8; 2 MG/1; MG/1
TABLET SUBLINGUAL
COMMUNITY
Start: 2022-07-22

## 2022-07-26 RX ORDER — BUPIVACAINE HYDROCHLORIDE 2.5 MG/ML
4 INJECTION, SOLUTION INFILTRATION; PERINEURAL
Status: COMPLETED | OUTPATIENT
Start: 2022-07-26 | End: 2022-07-26

## 2022-07-26 RX ORDER — SERTRALINE HYDROCHLORIDE 100 MG/1
200 TABLET, FILM COATED ORAL DAILY
COMMUNITY
Start: 2022-07-14

## 2022-07-26 RX ORDER — QUETIAPINE FUMARATE 100 MG/1
100 TABLET, FILM COATED ORAL 2 TIMES DAILY PRN
COMMUNITY
Start: 2022-07-14

## 2022-07-26 RX ADMIN — BUPIVACAINE HYDROCHLORIDE 4 ML: 2.5 INJECTION, SOLUTION INFILTRATION; PERINEURAL at 13:28

## 2022-07-26 RX ADMIN — BETAMETHASONE SODIUM PHOSPHATE AND BETAMETHASONE ACETATE 6 MG: 3; 3 INJECTION, SUSPENSION INTRA-ARTICULAR; INTRALESIONAL; INTRAMUSCULAR; SOFT TISSUE at 13:28

## 2022-07-26 NOTE — ASSESSMENT & PLAN NOTE
Findings consistent with right glenohumeral osteoarthritis  Findings and treatment options were discussed with the patient  Repeat cortisone injection was given to the right glenohumeral joint today  She tolerated the procedure well  Advised to apply cold compress today  Follow-up as needed if symptoms return  Advised patient soonest she can have another cortisone injection is in 3-4 months  If conservative treatment fails, she will be a candidate for a total shoulder arthroplasty  All patient's questions were answered to her satisfaction  This note is created using dictation transcription  It may contain typographical errors, grammatical errors, improperly dictated words, background noise and other errors

## 2022-07-26 NOTE — PROGRESS NOTES
Assessment:     1  Primary localized osteoarthrosis of right shoulder        Plan:     Problem List Items Addressed This Visit        Musculoskeletal and Integument    Primary localized osteoarthrosis of right shoulder - Primary     Findings consistent with right glenohumeral osteoarthritis  Findings and treatment options were discussed with the patient  Repeat cortisone injection was given to the right glenohumeral joint today  She tolerated the procedure well  Advised to apply cold compress today  Follow-up as needed if symptoms return  Advised patient soonest she can have another cortisone injection is in 3-4 months  If conservative treatment fails, she will be a candidate for a total shoulder arthroplasty  All patient's questions were answered to her satisfaction  This note is created using dictation transcription  It may contain typographical errors, grammatical errors, improperly dictated words, background noise and other errors  Relevant Medications    betamethasone acetate-betamethasone sodium phosphate (CELESTONE) injection 6 mg (Completed)    bupivacaine (MARCAINE) 0 25 % injection 4 mL (Completed)    Other Relevant Orders    Large joint arthrocentesis: R glenohumeral (Completed)         Subjective:     Patient ID: Dejon Viera is a 63 y o  female  Chief Complaint: This is a 61-year-old white female following up for right shoulder glenohumeral osteoarthritis  She was last seen in May 2021 and received a glenohumeral cortisone injection  She had significant relief  It lasted several months and pain started to return several weeks ago  She denies any injury or change in activities  He aching pain is worsening despite use of over-the-counter pain medication  She would like a cortisone injection today      Allergy:  No Known Allergies  Medications:  all current active meds have been reviewed  Past Medical History:  Past Medical History:   Diagnosis Date    Addiction to drug ([facility] )  Anxiety     Chest wall pain 2020    Chronic suboxone use 2020    Depression     Drug dependence (HCC)     GERD (gastroesophageal reflux disease)     Medical clearance for psychiatric admission 12/10/2020    Opiate abuse, continuous ([facility] ) 2018    Osteoarthritis     Rheumatoid arthritis ([facility] )     Severe episode of recurrent major depressive disorder, with psychotic features ([facility] ) 2018    Shoulder impingement syndrome, left 2020    Substance abuse ([facility] )     Tubal pregnancy      Past Surgical History:  Past Surgical History:   Procedure Laterality Date    BREAST IMPLANT      BUNIONECTOMY Bilateral     ECTOPIC PREGNANCY SURGERY      TUBAL LIGATION       Family History:  Family History   Problem Relation Age of Onset    Bipolar disorder Mother     Depression Mother     Hypertension Mother     Heart disease Father         cardiac    Prostate cancer Father     Bipolar disorder Brother     Depression Sister     Colon polyps Neg Hx     Colon cancer Neg Hx      Social History:  Social History     Substance and Sexual Activity   Alcohol Use No     Social History     Substance and Sexual Activity   Drug Use Not Currently    Frequency: 7 0 times per week    Types: Oxycodone, Prescription    Comment: daily abuse of percocet/oxy-recovering since August      Social History     Tobacco Use   Smoking Status Former Smoker    Packs/day: 0 25    Years: 30 00    Pack years: 7 50    Types: Cigarettes    Quit date: 2021    Years since quittin 8   Smokeless Tobacco Never Used   Tobacco Comment    1 pack every 3 days      Review of Systems   Constitutional: Negative  HENT: Negative  Eyes: Negative  Respiratory: Negative  Cardiovascular: Negative  Gastrointestinal: Negative  Endocrine: Negative  Genitourinary: Negative  Musculoskeletal: Positive for arthralgias (Right shoulder)  Negative for joint swelling and neck pain  Skin: Negative  Allergic/Immunologic: Negative  Neurological: Negative  Hematological: Negative  Psychiatric/Behavioral: Negative  Objective:  BP Readings from Last 1 Encounters:   07/26/22 118/78      Wt Readings from Last 1 Encounters:   07/26/22 79 8 kg (176 lb)      BMI:   Estimated body mass index is 30 21 kg/m² as calculated from the following:    Height as of this encounter: 5' 4" (1 626 m)  Weight as of this encounter: 79 8 kg (176 lb)  BSA:   Estimated body surface area is 1 85 meters squared as calculated from the following:    Height as of this encounter: 5' 4" (1 626 m)  Weight as of this encounter: 79 8 kg (176 lb)  Physical Exam  Vitals and nursing note reviewed  Constitutional:       General: She is not in acute distress  Appearance: Normal appearance  She is well-developed  HENT:      Head: Normocephalic and atraumatic  Right Ear: External ear normal       Left Ear: External ear normal    Eyes:      Extraocular Movements: Extraocular movements intact  Conjunctiva/sclera: Conjunctivae normal    Pulmonary:      Effort: Pulmonary effort is normal  No respiratory distress  Musculoskeletal:      Cervical back: Neck supple  Skin:     General: Skin is warm and dry  Neurological:      Mental Status: She is alert and oriented to person, place, and time  Deep Tendon Reflexes: Reflexes are normal and symmetric  Psychiatric:         Mood and Affect: Mood normal          Behavior: Behavior normal        Right Shoulder Exam     Tenderness   Right shoulder tenderness location: posterior joint      Range of Motion   Active abduction: 90 (Pain)   Passive abduction: 150 (Pain)   Forward flexion: 90 (Pain)   Internal rotation 0 degrees: Lumbar     Muscle Strength   Abduction: 5/5   Internal rotation: 5/5   External rotation: 5/5   Biceps: 5/5     Tests   Cross arm: negative  Drop arm: negative    Other   Erythema: absent  Scars: absent  Sensation: normal  Pulse: present            No new imaging  Large joint arthrocentesis: R glenohumeral  [place] Protocol:  Consent: Verbal consent obtained    Risks and benefits: risks, benefits and alternatives were discussed  Consent given by: patient  Patient understanding: patient states understanding of the procedure being performed    Supporting Documentation  Indications: pain   Procedure Details  Location: shoulder - R glenohumeral  Preparation: Patient was prepped and draped in the usual sterile fashion  Needle size: 22 G  Ultrasound guidance: no  Approach: posterior  Medications administered: 6 mg betamethasone acetate-betamethasone sodium phosphate 6 (3-3) mg/mL; 4 mL bupivacaine 0 25 %    Patient tolerance: patient tolerated the procedure well with no immediate complications  Dressing:  Sterile dressing applied        Scribe Attestation    I,:  Hilda Ruth PA-C am acting as a scribe while in the presence of the attending physician :       I,:  Cristobal Castillo MD personally performed the services described in this documentation    as scribed in my presence :

## 2022-07-26 NOTE — PROGRESS NOTES
Assessment/Plan:    Recurrent major depressive disorder [facility])  Maintain medical management and therapy w/[facility] as planned    Opioid dependence in remission ([facility] )  Maintain suboxone therapy as managed by [facility]       Diagnoses and all orders for this visit:    Furuncle of left ear  Comments:  use mupiricon as rx'd, will need to see dermatology if lesion does not resolve  Orders:  -     mupirocin (BACTROBAN) 2 % ointment; Apply topically 2 (two) times a day    Severe episode of recurrent major depressive disorder, without psychotic features ([facility] )    Opioid dependence in remission [facility])    Encounter for screening colonoscopy  -     Ambulatory referral for colonoscopy; Future    Encounter for screening mammogram for malignant neoplasm of breast  -     Mammo screening bilateral w 3d & cad; Future    Other orders  -     buprenorphine-naloxone (SUBOXONE) 8-2 mg per SL tablet; Place 2 Tablet By Sublingual Route 1 time per day allow to dissolve slowly in mouth without chewing or swallowing  -     buPROPion (WELLBUTRIN) 75 mg tablet; Take 75 mg by mouth daily  -     QUEtiapine (SEROquel) 100 mg tablet; Take 100 mg by mouth 2 (two) times a day as needed  -     sertraline (ZOLOFT) 100 mg tablet; Take 200 mg by mouth daily      schedule mammogram and colonoscopy as ordered     Subjective:      Patient ID: Beau Neil is a 63 y o  female  States depression has been the same  "Have to fight for good days"  Follows [facility] for medications and therapy  Maintaining suboxone as rx'd by [facility]  Interested in updating mammogram and colonoscopy         The following portions of the patient's history were reviewed and updated as appropriate: allergies, current medications, past family history, past medical history, past social history, past surgical history and problem list     Review of Systems   Skin: Positive for wound (sore on left ear x6 months, won't heal, removed ear piercing w/o relief)  Psychiatric/Behavioral: Positive for dysphoric mood  Negative for suicidal ideas  The patient is nervous/anxious  Objective:      /64   Pulse 88   Temp 97 8 °F (36 6 °C) (Tympanic)   Ht 5' 4" (1 626 m)   Wt 79 4 kg (175 lb)   BMI 30 04 kg/m²          Physical Exam  HENT:      Ears:             PHQ-2/9 Depression Screening    Little interest or pleasure in doing things: 3 - nearly every day  Feeling down, depressed, or hopeless: 3 - nearly every day  Trouble falling or staying asleep, or sleeping too much: 2 - more than half the days  Feeling tired or having little energy: 3 - nearly every day  Poor appetite or overeating: 3 - nearly every day  Feeling bad about yourself - or that you are a failure or have let yourself or your family down: 3 - nearly every day  Trouble concentrating on things, such as reading the newspaper or watching television: 3 - nearly every day  Moving or speaking so slowly that other people could have noticed   Or the opposite - being so fidgety or restless that you have been moving around a lot more than usual: 3 - nearly every day  Thoughts that you would be better off dead, or of hurting yourself in some way: 0 - not at all  PHQ-9 Score: 23   PHQ-9 Interpretation: Severe depression

## 2022-08-31 NOTE — ED NOTES
Security paged for transfer      Tara Pascal RN  02/01/19 3801 MEDICATIONS  (STANDING):    MEDICATIONS  (PRN):  acetaminophen     Tablet .. 975 milliGRAM(s) Oral every 6 hours PRN Mild Pain (1 - 3), Moderate Pain (4 - 6)

## 2022-11-22 ENCOUNTER — OFFICE VISIT (OUTPATIENT)
Dept: OBGYN CLINIC | Facility: CLINIC | Age: 62
End: 2022-11-22

## 2022-11-22 VITALS
BODY MASS INDEX: 31.58 KG/M2 | DIASTOLIC BLOOD PRESSURE: 78 MMHG | HEIGHT: 64 IN | SYSTOLIC BLOOD PRESSURE: 116 MMHG | WEIGHT: 185 LBS

## 2022-11-22 DIAGNOSIS — M19.011 PRIMARY LOCALIZED OSTEOARTHROSIS OF RIGHT SHOULDER: Primary | ICD-10-CM

## 2022-11-22 RX ORDER — BUPIVACAINE HYDROCHLORIDE 2.5 MG/ML
4 INJECTION, SOLUTION INFILTRATION; PERINEURAL
Status: COMPLETED | OUTPATIENT
Start: 2022-11-22 | End: 2022-11-22

## 2022-11-22 RX ORDER — BETAMETHASONE SODIUM PHOSPHATE AND BETAMETHASONE ACETATE 3; 3 MG/ML; MG/ML
6 INJECTION, SUSPENSION INTRA-ARTICULAR; INTRALESIONAL; INTRAMUSCULAR; SOFT TISSUE
Status: COMPLETED | OUTPATIENT
Start: 2022-11-22 | End: 2022-11-22

## 2022-11-22 RX ADMIN — BUPIVACAINE HYDROCHLORIDE 4 ML: 2.5 INJECTION, SOLUTION INFILTRATION; PERINEURAL at 09:29

## 2022-11-22 RX ADMIN — BETAMETHASONE SODIUM PHOSPHATE AND BETAMETHASONE ACETATE 6 MG: 3; 3 INJECTION, SUSPENSION INTRA-ARTICULAR; INTRALESIONAL; INTRAMUSCULAR; SOFT TISSUE at 09:29

## 2022-11-22 NOTE — PROGRESS NOTES
Assessment:     1  Primary localized osteoarthrosis of right shoulder        Plan:     Problem List Items Addressed This Visit        Musculoskeletal and Integument    Primary localized osteoarthrosis of right shoulder - Primary     Findings consistent with right glenohumeral osteoarthritis  Findings and treatment options were discussed with the patient  Repeat cortisone injection was given to the right glenohumeral joint today  She tolerated the procedure well  Advised to apply cold compress today  Follow-up as needed if symptoms return  Advised patient soonest she can have another cortisone injection is in 3-4 months  If conservative treatment fails, she will be a candidate for a total shoulder arthroplasty  All patient's questions were answered to her satisfaction  This note is created using dictation transcription  It may contain typographical errors, grammatical errors, improperly dictated words, background noise and other errors  Relevant Medications    betamethasone acetate-betamethasone sodium phosphate (CELESTONE) injection 6 mg (Completed)    bupivacaine (MARCAINE) 0 25 % injection 4 mL (Completed)    Other Relevant Orders    Large joint arthrocentesis: R glenohumeral (Completed)      Subjective:     Patient ID: Di Mejia is a 63 y o  female  Chief Complaint: This is a 61-year-old white female following up for right shoulder glenohumeral osteoarthritis  She was last seen on July 26, 2022 and received a right glenohumeral cortisone injection  The injection was effective until about a week ago  She denies any new injury  The pain is aching in nature  She would like a repeat cortisone injection today  She was treated for left shoulder impingement in the past and states she is having some mild pain in the left shoulder most likely due to over compensation      Allergy:  No Known Allergies  Medications:  all current active meds have been reviewed  Past Medical History:  Past Medical History:   Diagnosis Date   • Addiction to drug [facility])    • Anxiety    • Chest wall pain 2020   • Chronic suboxone use 2020   • Depression    • Drug dependence ([place] [address] )    • GERD (gastroesophageal reflux disease)    • Medical clearance for psychiatric admission 12/10/2020   • Opiate abuse, continuous ([facility] ) 2018   • Osteoarthritis    • Rheumatoid arthritis ([place] [address] )    • Severe episode of recurrent major depressive disorder, with psychotic features ([place] [address] ) 2018   • Shoulder impingement syndrome, left 2020   • Substance abuse ([place] [address] )    • Tubal pregnancy      Past Surgical History:  Past Surgical History:   Procedure Laterality Date   • BREAST IMPLANT     • BUNIONECTOMY Bilateral    • ECTOPIC PREGNANCY SURGERY     • TUBAL LIGATION       Family History:  Family History   Problem Relation Age of Onset   • Bipolar disorder Mother    • Depression Mother    • Hypertension Mother    • Heart disease Father         cardiac   • Prostate cancer Father    • Bipolar disorder Brother    • Depression Sister    • Colon polyps Neg Hx    • Colon cancer Neg Hx      Social History:  Social History     Substance and Sexual Activity   Alcohol Use No     Social History     Substance and Sexual Activity   Drug Use Not Currently   • Frequency: 7 0 times per week   • Types: Oxycodone, Prescription    Comment: daily abuse of percocet/oxy-recovering since August      Social History     Tobacco Use   Smoking Status Former   • Packs/day: 0 25   • Years: 30 00   • Pack years: 7 50   • Types: Cigarettes   • Quit date: 2021   • Years since quittin 2   Smokeless Tobacco Never   Tobacco Comments    1 pack every 3 days      Review of Systems   Constitutional: Negative  HENT: Negative  Eyes: Negative  Respiratory: Negative  Cardiovascular: Negative  Gastrointestinal: Negative  Endocrine: Negative  Genitourinary: Negative  Musculoskeletal: Positive for arthralgias (Right shoulder)   Negative for joint swelling and neck pain  Skin: Negative  Allergic/Immunologic: Negative  Neurological: Negative  Hematological: Negative  Psychiatric/Behavioral: Negative  Objective:  BP Readings from Last 1 Encounters:   11/22/22 116/78      Wt Readings from Last 1 Encounters:   11/22/22 83 9 kg (185 lb)      BMI:   Estimated body mass index is 31 76 kg/m² as calculated from the following:    Height as of this encounter: 5' 4" (1 626 m)  Weight as of this encounter: 83 9 kg (185 lb)  BSA:   Estimated body surface area is 1 89 meters squared as calculated from the following:    Height as of this encounter: 5' 4" (1 626 m)  Weight as of this encounter: 83 9 kg (185 lb)  Physical Exam  Vitals and nursing note reviewed  Constitutional:       General: She is not in acute distress  Appearance: Normal appearance  She is well-developed  HENT:      Head: Normocephalic and atraumatic  Right Ear: External ear normal       Left Ear: External ear normal    Eyes:      Extraocular Movements: Extraocular movements intact  Conjunctiva/sclera: Conjunctivae normal    Pulmonary:      Effort: Pulmonary effort is normal  No respiratory distress  Musculoskeletal:      Cervical back: Neck supple  Skin:     General: Skin is warm and dry  Neurological:      Mental Status: She is alert and oriented to person, place, and time  Deep Tendon Reflexes: Reflexes are normal and symmetric  Psychiatric:         Mood and Affect: Mood normal          Behavior: Behavior normal        Right Shoulder Exam     Tenderness   Right shoulder tenderness location: posterior joint      Range of Motion   Active abduction: 90 (Pain)   Passive abduction: 150 (Pain)   Forward flexion: 90 (Pain)   Internal rotation 0 degrees: Lumbar     Muscle Strength   Abduction: 5/5   Internal rotation: 5/5   External rotation: 5/5   Biceps: 5/5     Tests   Cross arm: negative  Drop arm: negative    Other   Erythema: absent  Scars: absent  Sensation: normal  Pulse: present            No new imaging  Large joint arthrocentesis: R glenohumeral  [place] Protocol:  Consent: Verbal consent obtained    Risks and benefits: risks, benefits and alternatives were discussed  Consent given by: patient  Patient understanding: patient states understanding of the procedure being performed    Supporting Documentation  Indications: pain   Procedure Details  Location: shoulder - R glenohumeral  Preparation: Patient was prepped and draped in the usual sterile fashion  Needle size: 22 G  Ultrasound guidance: no  Approach: posterior  Medications administered: 6 mg betamethasone acetate-betamethasone sodium phosphate 6 (3-3) mg/mL; 4 mL bupivacaine 0 25 %    Patient tolerance: patient tolerated the procedure well with no immediate complications  Dressing:  Sterile dressing applied

## 2023-01-04 DIAGNOSIS — K21.9 GASTROESOPHAGEAL REFLUX DISEASE: ICD-10-CM

## 2023-01-04 RX ORDER — PANTOPRAZOLE SODIUM 40 MG/1
TABLET, DELAYED RELEASE ORAL
Qty: 30 TABLET | Refills: 5 | Status: SHIPPED | OUTPATIENT
Start: 2023-01-04

## 2023-02-26 NOTE — NURSING NOTE
Discharge instructions reviewed with pt  Pt verbalized understanding and expressed readiness for discharge 
4 = No assist / stand by assistance

## 2023-05-01 ENCOUNTER — OFFICE VISIT (OUTPATIENT)
Dept: FAMILY MEDICINE CLINIC | Facility: HOSPITAL | Age: 63
End: 2023-05-01

## 2023-05-01 VITALS
BODY MASS INDEX: 29.67 KG/M2 | HEART RATE: 74 BPM | TEMPERATURE: 98.4 F | WEIGHT: 173.8 LBS | SYSTOLIC BLOOD PRESSURE: 114 MMHG | HEIGHT: 64 IN | DIASTOLIC BLOOD PRESSURE: 80 MMHG

## 2023-05-01 DIAGNOSIS — F31.81 BIPOLAR 2 DISORDER (HCC): ICD-10-CM

## 2023-05-01 DIAGNOSIS — F33.42 RECURRENT MAJOR DEPRESSIVE DISORDER, IN FULL REMISSION (HCC): ICD-10-CM

## 2023-05-01 DIAGNOSIS — E28.39 OVARIAN FAILURE DUE TO MENOPAUSE: ICD-10-CM

## 2023-05-01 DIAGNOSIS — F11.21 OPIOID DEPENDENCE IN REMISSION (HCC): ICD-10-CM

## 2023-05-01 DIAGNOSIS — F41.1 GENERALIZED ANXIETY DISORDER: ICD-10-CM

## 2023-05-01 DIAGNOSIS — Z12.4 SCREENING FOR CERVICAL CANCER: ICD-10-CM

## 2023-05-01 DIAGNOSIS — Z13.220 NEED FOR LIPID SCREENING: ICD-10-CM

## 2023-05-01 DIAGNOSIS — Z00.00 ANNUAL PHYSICAL EXAM: Primary | ICD-10-CM

## 2023-05-01 DIAGNOSIS — Z12.11 SCREENING FOR COLON CANCER: ICD-10-CM

## 2023-05-01 DIAGNOSIS — Z11.4 ENCOUNTER FOR SCREENING FOR HIV: ICD-10-CM

## 2023-05-01 DIAGNOSIS — Z11.59 NEED FOR HEPATITIS C SCREENING TEST: ICD-10-CM

## 2023-05-01 PROBLEM — S86.911A STRAIN OF RIGHT KNEE: Status: RESOLVED | Noted: 2018-05-17 | Resolved: 2023-05-01

## 2023-05-01 PROBLEM — F31.30 BIPOLAR DISORDER CURRENT EPISODE DEPRESSED (HCC): Status: RESOLVED | Noted: 2020-12-10 | Resolved: 2023-05-01

## 2023-05-01 RX ORDER — HYDROXYZINE 50 MG/1
50 TABLET, FILM COATED ORAL EVERY 6 HOURS PRN
Qty: 30 TABLET | Refills: 0 | Status: SHIPPED | OUTPATIENT
Start: 2023-05-01

## 2023-05-01 RX ORDER — BUPROPION HYDROCHLORIDE 75 MG/1
75 TABLET ORAL DAILY
Qty: 30 TABLET | Refills: 2 | Status: SHIPPED | OUTPATIENT
Start: 2023-05-01

## 2023-05-01 RX ORDER — TRAZODONE HYDROCHLORIDE 100 MG/1
200 TABLET ORAL
Qty: 60 TABLET | Refills: 2 | Status: SHIPPED | OUTPATIENT
Start: 2023-05-01 | End: 2023-06-30

## 2023-05-01 RX ORDER — QUETIAPINE FUMARATE 100 MG/1
100 TABLET, FILM COATED ORAL
Qty: 30 TABLET | Refills: 2 | Status: SHIPPED | OUTPATIENT
Start: 2023-05-01

## 2023-05-01 NOTE — PROGRESS NOTES
ADULT ANNUAL [address] [facility] [address]     NAME: Hilda Munguia  AGE: 63 y o   SEX: female  : 1960     DATE: 2023     Assessment and Plan:     Problem List Items Addressed This Visit        Other    Generalized anxiety disorder     Mood stable on meds previously rx'd by [address]  issued as she requests  Return in 3 months for next med check         Relevant Medications    traZODone (DESYREL) 100 mg tablet    QUEtiapine (SEROquel) 100 mg tablet    hydrOXYzine HCL (ATARAX) 50 mg tablet    buPROPion (WELLBUTRIN) 75 mg tablet    Other Relevant Orders    CBC and differential    Comprehensive metabolic panel    Opioid dependence in remission ([facility] [address] )     Continue suboxone wean as managed by [facility]         Recurrent major depressive disorder ([facility] [address] )     Mood stable on meds as previously rx'd by [address]  issued as she requests  Return in 3 months for next med check - call sooner w/any concerns         Relevant Medications    traZODone (DESYREL) 100 mg tablet    QUEtiapine (SEROquel) 100 mg tablet    hydrOXYzine HCL (ATARAX) 50 mg tablet    buPROPion (WELLBUTRIN) 75 mg tablet    Other Relevant Orders    CBC and differential    Comprehensive metabolic panel    TSH, 3rd generation with Free T4 reflex    Bipolar 2 disorder ([facility] [address] )     Mood stable on meds as rx'd by [address] states she has been dc'd from OP program and requests refills from me at this time - agreeable to issuing given stable mood  Refills issued as she requests  Return in 3 months for next follow up/med check         Relevant Medications    traZODone (DESYREL) 100 mg tablet    QUEtiapine (SEROquel) 100 mg tablet    hydrOXYzine HCL (ATARAX) 50 mg tablet    buPROPion (WELLBUTRIN) 75 mg tablet    Other Relevant Orders    CBC and differential    Comprehensive metabolic panel    TSH, 3rd generation with Free T4 reflex    BMI 29 0-29 9,adult Weight loss encouraged w/healthy diet and regular exercise        Other Visit Diagnoses     Annual physical exam    -  Primary    PE updated, next due in 1 year; update colonoscopy, mammo & dexa as ordered; consult gyn for exam & pap smear    Screening for colon cancer        Relevant Orders    Ambulatory referral for colonoscopy    Screening for cervical cancer        Relevant Orders    Ambulatory Referral to Gynecology    Need for lipid screening        Relevant Orders    Lipid panel    Encounter for screening for HIV        agreeable to screening    Relevant Orders    HIV 1/2 AG/AB W REFLEX [facility] only    Need for hepatitis C screening test        agreeable to screening    Relevant Orders    Hepatitis C antibody    Ovarian failure due to menopause        Relevant Orders    DXA bone density spine hip and pelvis          Immunizations and preventive care screenings were discussed with patient today  Appropriate education was printed on patient's after visit summary  Counseling:  Alcohol/drug use: discussed moderation in alcohol intake, the recommendations for healthy alcohol use, and avoidance of illicit drug use  Dental Health: discussed importance of regular tooth brushing, flossing, and dental visits  · Exercise: the importance of regular exercise/physical activity was discussed  Recommend exercise 3-5 times per week for at least 30 minutes  BMI Counseling: Body mass index is 29 83 kg/m²  The BMI is above normal  Nutrition recommendations include encouraging healthy choices of fruits and vegetables, decreasing fast food intake, consuming healthier snacks, limiting drinks that contain sugar, moderation in carbohydrate intake and reducing intake of cholesterol  Exercise recommendations include exercising 3-5 times per week  No pharmacotherapy was ordered  Rationale for BMI follow-up plan is due to patient being overweight or obese           Return in about 3 months (around 8/1/2023) for Next scheduled follow up  Chief Complaint:     Chief Complaint   Patient presents with    Physical Exam      History of Present Illness:     Adult Annual Physical   Patient here for a comprehensive physical exam  The patient reports problems - recently completed program at [facility] and was discharged  has no time to do therapy with taking care of grandkids then she will resume  She requests I rx meds since dc from [facility]  Diet and Physical Activity  · Diet/Nutrition: frequent junk food  · Exercise: 5-7 times a week on average  Depression Screening  PHQ-2/9 Depression Screening          General Health  · Sleep: sleeps well  · Hearing: normal - bilateral   · Vision: goes for regular eye exams  · Dental: no dental visits for >1 year  /GYN Health  · Patient is: postmenopausal     Review of Systems:       Review of Systems   Constitutional: Negative  HENT: Negative  Respiratory: Negative  Cardiovascular: Negative  Gastrointestinal: Negative  Genitourinary: Negative  Musculoskeletal: Negative  Skin: Negative  Neurological: Negative  Psychiatric/Behavioral: Negative for dysphoric mood and sleep disturbance (sleeps well with trazodone)  The patient is nervous/anxious (worries alot)            Past Medical History:     Past Medical History:   Diagnosis Date    Addiction to drug ([facility] [address] )     Anxiety     Bipolar disorder current episode depressed ([facility] [address] ) 12/10/2020    Chest wall pain 6/11/2020    Chronic suboxone use 6/11/2020    Depression     Drug dependence (HCC)     GERD (gastroesophageal reflux disease)     Medical clearance for psychiatric admission 12/10/2020    Opiate abuse, continuous ([address] ) 8/20/2018    Osteoarthritis     Rheumatoid arthritis ([facility] [address] )     Severe episode of recurrent major depressive disorder, with psychotic features ([facility] [address] ) 8/20/2018    Shoulder impingement syndrome, left 9/24/2020    Strain of right knee 5/17/2018    Substance abuse ([facility] [address] )     Tubal pregnancy       Past Surgical History:     Past Surgical History:   Procedure Laterality Date    BREAST IMPLANT      BUNIONECTOMY Bilateral     ECTOPIC PREGNANCY SURGERY      TUBAL LIGATION        Social History:     Social History     Socioeconomic History    Marital status: /Civil Union     Spouse name: None    Number of children: None    Years of education: None    Highest education level: None   Occupational History    None   Tobacco Use    Smoking status: Former     Packs/day: 0 25     Years: 30 00     Pack years: 7 50     Types: Cigarettes     Quit date: 2021     Years since quittin 6    Smokeless tobacco: Never    Tobacco comments:     1 pack every 3 days    Vaping Use    Vaping Use: Never used   Substance and Sexual Activity    Alcohol use: No    Drug use: Not Currently     Frequency: 7 0 times per week     Types: Oxycodone, Prescription     Comment: daily abuse of percocet/oxy-recovering since August     Sexual activity: None   Other Topics Concern    None   Social History Narrative    None     Social Determinants of Health     Financial Resource Strain: Not on file   Food Insecurity: Not on file   Transportation Needs: Not on file   Physical Activity: Not on file   Stress: Not on file   Social Connections: Not on file   Intimate Partner Violence: Not on file   Housing Stability: Not on file      Family History:     Family History   Problem Relation Age of Onset    Bipolar disorder Mother     Depression Mother     Hypertension Mother     Heart disease Father         cardiac    Prostate cancer Father     Bipolar disorder Brother     Depression Sister     Colon polyps Neg Hx     Colon cancer Neg Hx       Current Medications:     Current Outpatient Medications   Medication Sig Dispense Refill    buprenorphine-naloxone (SUBOXONE) 8-2 mg per SL tablet       buPROPion (WELLBUTRIN) 75 mg tablet Take 1 tablet (75 mg total) by mouth daily 30 "tablet 2    Diclofenac Sodium (VOLTAREN) 1 % Apply 2 g topically 4 (four) times a day 100 g 0    gabapentin (NEURONTIN) 300 mg capsule Take 1 capsule (300 mg total) by mouth 3 (three) times a day 90 capsule 1    hydrOXYzine HCL (ATARAX) 50 mg tablet Take 1 tablet (50 mg total) by mouth every 6 (six) hours as needed for anxiety 30 tablet 0    pantoprazole (PROTONIX) 40 mg tablet TAKE ONE TABLET BY MOUTH DAILY 30 tablet 5    QUEtiapine (SEROquel) 100 mg tablet Take 1 tablet (100 mg total) by mouth daily at bedtime 30 tablet 2    sertraline (ZOLOFT) 100 mg tablet Take 200 mg by mouth daily      traZODone (DESYREL) 100 mg tablet Take 2 tablets (200 mg total) by mouth daily at bedtime 60 tablet 2    mupirocin (BACTROBAN) 2 % ointment Apply topically 2 (two) times a day (Patient not taking: Reported on 5/1/2023) 22 g 0     No current facility-administered medications for this visit  Allergies:     No Known Allergies   Physical Exam:     /80   Pulse 74   Temp 98 4 °F (36 9 °C)   Ht 5' 4\" (1 626 m)   Wt 78 8 kg (173 lb 12 8 oz)   BMI 29 83 kg/m²     Physical Exam  Vitals reviewed  Constitutional:       General: She is not in acute distress  Appearance: Normal appearance  HENT:      Head: Normocephalic  Right Ear: Tympanic membrane normal       Left Ear: Tympanic membrane normal       Nose: Nose normal       Mouth/Throat:      Mouth: Mucous membranes are moist       Pharynx: Oropharynx is clear  Eyes:      General: No scleral icterus  Neck:      Thyroid: No thyromegaly  Cardiovascular:      Rate and Rhythm: Normal rate and regular rhythm  Heart sounds: No murmur heard  Pulmonary:      Effort: Pulmonary effort is normal  No respiratory distress  Breath sounds: Normal breath sounds  Abdominal:      General: Bowel sounds are normal       Palpations: Abdomen is soft  Tenderness: There is no abdominal tenderness  There is no guarding or rebound     Musculoskeletal:        " General: Normal range of motion  Cervical back: Normal range of motion  Right lower leg: No edema  Left lower leg: No edema  Lymphadenopathy:      Cervical: No cervical adenopathy  Skin:     General: Skin is warm and dry  Neurological:      General: No focal deficit present  Mental Status: She is alert and oriented to person, place, and time  Psychiatric:         Mood and Affect: Mood normal          Behavior: Behavior normal          Thought Content: Thought content normal          Judgment: Judgment normal       Comments: Freely conversive w/good eye contact          DMITRIY Quarles  [address]      BMI Counseling: Body mass index is 29 83 kg/m²  The BMI is above normal  Nutrition recommendations include 3-5 servings of fruits/vegetables daily, reducing fast food intake, consuming healthier snacks, decreasing soda and/or juice intake, moderation in carbohydrate intake and reducing intake of cholesterol  Exercise recommendations include exercising 3-5 times per week

## 2023-05-01 NOTE — ASSESSMENT & PLAN NOTE
Mood stable on meds as rx'd by [address] states she has been dc'd from OP program and requests refills from me at this time - agreeable to issuing given stable mood  Refills issued as she requests  Return in 3 months for next follow up/med check

## 2023-05-01 NOTE — PROGRESS NOTES
Name: Rob Polk      : 1960      MRN: [id number]  Encounter Provider: DMITRIY De Los Santos  Encounter Date: 2023   Encounter department: [facility]  Annual physical exam  Comments:  PE updated, next due in 1 year; update colonoscopy, mammo & dexa as ordered; consult gyn for exam & pap smear    2  Screening for colon cancer  -     Ambulatory referral for colonoscopy; Future    3  Screening for cervical cancer  -     Ambulatory Referral to Gynecology; Future    4  BMI 29 0-29 9,adult  Assessment & Plan:  Weight loss encouraged w/healthy diet and regular exercise      5  Recurrent major depressive disorder, in full remission [facility])  Assessment & Plan:  Mood stable on meds as previously rx'd by [address]  issued as she requests  Return in 3 months for next med check - call sooner w/any concerns    Orders:  -     traZODone (DESYREL) 100 mg tablet; Take 2 tablets (200 mg total) by mouth daily at bedtime  -     hydrOXYzine HCL (ATARAX) 50 mg tablet; Take 1 tablet (50 mg total) by mouth every 6 (six) hours as needed for anxiety  -     buPROPion (WELLBUTRIN) 75 mg tablet; Take 1 tablet (75 mg total) by mouth daily  -     CBC and differential; Future  -     Comprehensive metabolic panel; Future  -     TSH, 3rd generation with Free T4 reflex; Future  -     CBC and differential  -     Comprehensive metabolic panel  -     TSH, 3rd generation with Free T4 reflex    6  Bipolar 2 disorder ([facility] [address] )  Assessment & Plan:  Mood stable on meds as rx'd by [address] states she has been dc'd from OP program and requests refills from me at this time - agreeable to issuing given stable mood  Refills issued as she requests  Return in 3 months for next follow up/med check    Orders:  -     QUEtiapine (SEROquel) 100 mg tablet;  Take 1 tablet (100 mg total) by mouth daily at bedtime  -     CBC and differential; Future  -     Comprehensive metabolic panel; Future  -     TSH, 3rd generation with Free T4 reflex; Future  -     CBC and differential  -     Comprehensive metabolic panel  -     TSH, 3rd generation with Free T4 reflex    7  Generalized anxiety disorder  Assessment & Plan:  Mood stable on meds previously rx'd by [address]  issued as she requests  Return in 3 months for next med check    Orders:  -     CBC and differential; Future  -     Comprehensive metabolic panel; Future  -     CBC and differential  -     Comprehensive metabolic panel    8  Opioid dependence in remission [facility])  Assessment & Plan:  Continue suboxone wean as managed by [facility]      9  Need for lipid screening  -     Lipid panel; Future  -     Lipid panel    10  Encounter for screening for HIV  Comments:  agreeable to screening  Orders:  -     HIV 1/2 AG/AB W RE[facility]LEX [facility] only; Future  -     HIV 1/2 AG/AB W REFLEX [facility] and [facility] only    11  Need for hepatitis C screening test  Comments:  agreeable to screening  Orders:  -     Hepatitis C antibody; Future  -     Hepatitis C antibody    12  Ovarian failure due to menopause  -     DXA bone density spine hip and pelvis; Future; Expected date: 05/01/2023      Annual PE updated today       Subjective      Pt here to update annual PE and obtain psych med refills  States she was recently dc'd from [facility] after completing outpatient program   States mood has been stable despite situational stress and always has something to worry about         Review of Systems   See documentation in PE note    Current Outpatient Medications on File Prior to Visit   Medication Sig    buprenorphine-naloxone (SUBOXONE) 8-2 mg per SL tablet     Diclofenac Sodium (VOLTAREN) 1 % Apply 2 g topically 4 (four) times a day    gabapentin (NEURONTIN) 300 mg capsule Take 1 capsule (300 mg total) by mouth 3 (three) times a day    pantoprazole (PROTONIX) 40 mg tablet TAKE ONE TABLET BY MOUTH DAILY    sertraline "(ZOLOFT) 100 mg tablet Take 200 mg by mouth daily    [DISCONTINUED] buPROPion (WELLBUTRIN) 75 mg tablet Take 75 mg by mouth daily    [DISCONTINUED] hydrOXYzine HCL (ATARAX) 50 mg tablet Take 1 tablet (50 mg total) by mouth every 6 (six) hours as needed for anxiety    [DISCONTINUED] QUEtiapine (SEROquel) 100 mg tablet Take 100 mg by mouth 2 (two) times a day as needed    [DISCONTINUED] traZODone (DESYREL) 100 mg tablet Take 2 tablets (200 mg total) by mouth daily at bedtime    mupirocin (BACTROBAN) 2 % ointment Apply topically 2 (two) times a day (Patient not taking: Reported on 5/1/2023)       Objective     /80   Pulse 74   Temp 98 4 °F (36 9 °C)   Ht 5' 4\" (1 626 m)   Wt 78 8 kg (173 lb 12 8 oz)   BMI 29 83 kg/m²       Physical Exam   See documentation in PE note      Te DMITRIY Aguilar  "

## 2023-05-01 NOTE — ASSESSMENT & PLAN NOTE
Mood stable on meds previously rx'd by [address]  issued as she requests  Return in 3 months for next med check

## 2023-05-01 NOTE — PATIENT INSTRUCTIONS
Wellness Visit for Adults   AMBULATORY CARE:   A wellness visit  is when you see your healthcare provider to get screened for health problems  Your healthcare provider will also give you advice on how to stay healthy  Write down your questions so you remember to ask them  Ask your healthcare provider how often you should have a wellness visit  What happens at a wellness visit:  Your healthcare provider will ask about your health, and your family history of health problems  This includes high blood pressure, heart disease, and cancer  He or she will ask if you have symptoms that concern you, if you smoke, and about your mood  You may also be asked about your intake of medicines, supplements, food, and alcohol  Any of the following may be done:   Your weight  will be checked  Your height may also be checked so your body mass index (BMI) can be calculated  Your BMI shows if you are at a healthy weight   Your blood pressure  and heart rate will be checked  Your temperature may also be checked   Blood and urine tests  may be done  Blood tests may be done to check your cholesterol levels  Abnormal cholesterol levels increase your risk for heart disease and stroke  You may also need a blood or urine test to check for diabetes if you are at increased risk  Urine tests may be done to look for signs of an infection or kidney disease   A physical exam  includes checking your heartbeat and lungs with a stethoscope  Your healthcare provider may also check your skin to look for sun damage   Screening tests  may be recommended  A screening test is done to check for diseases that may not cause symptoms  The screening tests you may need depend on your age, gender, family history, and lifestyle habits  For example, colorectal screening may be recommended if you are 48years old or older  Screening tests you need if you are a woman:    A Pap smear  is used to screen for cervical cancer   Pap smears are usually done every 3 to 5 years depending on your age  You may need them more often if you have had abnormal Pap smear test results in the past  Ask your healthcare provider how often you should have a Pap smear   A mammogram  is an x-ray of your breasts to screen for breast cancer  Experts recommend mammograms every 2 years starting at age 48 years  You may need a mammogram at age 52 years or younger if you have an increased risk for breast cancer  Talk to your healthcare provider about when you should start having mammograms and how often you need them  Vaccines you may need:    Get an influenza vaccine  every year  The influenza vaccine protects you from the flu  Several types of viruses cause the flu  The viruses change over time, so new vaccines are made each year   Get a tetanus-diphtheria (Td) booster vaccine  every 10 years  This vaccine protects you against tetanus and diphtheria  Tetanus is a severe infection that may cause painful muscle spasms and lockjaw  Diphtheria is a severe bacterial infection that causes a thick covering in the back of your mouth and throat   Get a human papillomavirus (HPV) vaccine  if you are female and aged 23 to 32 or male 23 to 24 and never received it  This vaccine protects you from HPV infection  HPV is the most common infection spread by sexual contact  HPV may also cause vaginal, penile, and anal cancers   Get a pneumococcal vaccine  if you are aged 72 years or older  The pneumococcal vaccine is an injection given to protect you from pneumococcal disease  Pneumococcal disease is an infection caused by pneumococcal bacteria  The infection may cause pneumonia, meningitis, or an ear infection   Get a shingles vaccine  if you are 61 or older, even if you have had shingles before  The shingles vaccine is an injection to protect you from the varicella-zoster virus  This is the same virus that causes chickenpox   Shingles is a painful rash that develops in people who had chickenpox or have been exposed to the virus  How to eat healthy:  My Plate is a model for planning healthy meals  It shows the types and amounts of foods that should go on your plate  Fruits and vegetables make up about half of your plate, and grains and protein make up the other half  A serving of dairy is included on the side of your plate  The amount of calories and serving sizes you need depends on your age, gender, weight, and height  Examples of healthy foods are listed below:   Eat a variety of vegetables  such as dark green, red, and orange vegetables  You can also include canned vegetables low in sodium (salt) and frozen vegetables without added butter or sauces   Eat a variety of fresh fruits , canned fruit in 100% juice, frozen fruit, and dried fruit   Include whole grains  At least half of the grains you eat should be whole grains  Examples include whole-wheat bread, wheat pasta, brown rice, and whole-grain cereals such as oatmeal      Eat a variety of protein foods such as seafood (fish and shellfish), lean meat, and poultry without skin (turkey and chicken)  Examples of lean meats include pork leg, shoulder, or tenderloin, and beef round, sirloin, tenderloin, and extra lean ground beef  Other protein foods include eggs and egg substitutes, beans, peas, soy products, nuts, and seeds   Choose low-fat dairy products such as skim or 1% milk or low-fat yogurt, cheese, and cottage cheese   Limit unhealthy fats  such as butter, hard margarine, and shortening  Exercise:  Exercise at least 30 minutes per day on most days of the week  Some examples of exercise include walking, biking, dancing, and swimming  You can also fit in more physical activity by taking the stairs instead of the elevator or parking farther away from stores  Include muscle strengthening activities 2 days each week  Regular exercise provides many health benefits   It helps you manage your weight, and decreases your risk for type 2 diabetes, heart disease, stroke, and high blood pressure  Exercise can also help improve your mood  Ask your healthcare provider about the best exercise plan for you  General health and safety guidelines:    Do not smoke  Nicotine and other chemicals in cigarettes and cigars can cause lung damage  Ask your healthcare provider for information if you currently smoke and need help to quit  E-cigarettes or smokeless tobacco still contain nicotine  Talk to your healthcare provider before you use these products   Limit alcohol  A drink of alcohol is 12 ounces of beer, 5 ounces of wine, or 1½ ounces of liquor   Lose weight, if needed  Being overweight increases your risk of certain health conditions  These include heart disease, high blood pressure, type 2 diabetes, and certain types of cancer   Protect your skin  Do not sunbathe or use tanning beds  Use sunscreen with a SPF 15 or higher  Apply sunscreen at least 15 minutes before you go outside  Reapply sunscreen every 2 hours  Wear protective clothing, hats, and sunglasses when you are outside   Drive safely  Always wear your seatbelt  Make sure everyone in your car wears a seatbelt  A seatbelt can save your life if you are in an accident  Do not use your cell phone when you are driving  This could distract you and cause an accident  Pull over if you need to make a call or send a text message   Practice safe sex  Use latex condoms if are sexually active and have more than one partner  Your healthcare provider may recommend screening tests for sexually transmitted infections (STIs)   Wear helmets, lifejackets, and protective gear  Always wear a helmet when you ride a bike or motorcycle, go skiing, or play sports that could cause a head injury  Wear protective equipment when you play sports  Wear a lifejacket when you are on a boat or doing water sports      © Copyright [employer] 2022 Information is for End User's use only and may not be sold, redistributed or otherwise used for commercial purposes  The above information is an  only  It is not intended as medical advice for individual conditions or treatments  Talk to your doctor, nurse or pharmacist before following any medical regimen to see if it is safe and effective for you  Weight Management   AMBULATORY CARE:   Why it is important to manage your weight:  Being overweight increases your risk of health conditions such as heart disease, high blood pressure, type 2 diabetes, and certain types of cancer  It can also increase your risk for osteoarthritis, sleep apnea, and other respiratory problems  Aim for a slow, steady weight loss  Even a small amount of weight loss can lower your risk of health problems  Risks of being overweight:  Extra weight can cause many health problems, including the following:   Diabetes (high blood sugar level)     High blood pressure or high cholesterol     Heart disease     Stroke     Gallbladder or liver disease     Cancer of the colon, breast, prostate, liver, or kidney     Sleep apnea     Arthritis or gout    Screening  is done to check for health conditions before you have signs or symptoms  If you are 28to 79years old, your blood sugar level may be checked every 3 years for signs of prediabetes or diabetes  Your healthcare provider will check your blood pressure at each visit  High blood pressure can lead to a stroke or other problems  Your provider may check for signs of heart disease, cancer, or other health problems  How to lose weight safely:  A safe and healthy way to lose weight is to eat fewer calories and get regular exercise   You can lose up about 1 pound a week by decreasing the number of calories you eat by 500 calories each day  You can decrease calories by eating smaller portion sizes or by cutting out high-calorie foods   Read labels to find out how many calories are in the foods you eat          [place] You can also burn calories with exercise such as walking, swimming, or biking  You will be more likely to keep weight off if you make these changes part of your lifestyle  Exercise at least 30 minutes per day on most days of the week  You can also fit in more physical activity by taking the stairs instead of the elevator or parking farther away from stores  Ask your healthcare provider about the best exercise plan for you  Healthy meal plan for weight management:  A healthy meal plan includes a variety of foods, contains fewer calories, and helps you stay healthy  A healthy meal plan includes the following:      Eat whole-grain foods more often  A healthy meal plan should contain fiber  Fiber is the part of grains, fruits, and vegetables that is not broken down by your body  Whole-grain foods are healthy and provide extra fiber in your diet  Some examples of whole-grain foods are whole-wheat breads and pastas, oatmeal, brown rice, and bulgur   Eat a variety of vegetables every day  Include dark, leafy greens such as spinach, kale, alvaro greens, and mustard greens  Eat yellow and orange vegetables such as carrots, sweet potatoes, and winter squash   Eat a variety of fruits every day  Choose fresh or canned fruit (canned in its own juice or light syrup) instead of juice  Fruit juice has very little or no fiber   Eat low-fat dairy foods  Drink fat-free (skim) milk or 1% milk  Eat fat-free yogurt and low-fat cottage cheese  Try low-fat cheeses such as mozzarella and other reduced-fat cheeses   Choose meat and other protein foods that are low in fat  Choose beans or other legumes such as split peas or lentils  Choose fish, skinless poultry (chicken or turkey), or lean cuts of red meat (beef or pork)  Before you cook meat or poultry, cut off any visible fat   Use less fat and oil  Try baking foods instead of frying them   Add less fat, such as margarine, sour cream, regular salad dressing and mayonnaise to foods  Eat fewer high-fat foods  Some examples of high-fat foods include french fries, doughnuts, ice cream, and cakes   Eat fewer sweets  Limit foods and drinks that are high in sugar  This includes candy, cookies, regular soda, and sweetened drinks  Ways to decrease calories:    Eat smaller portions  ? Use a small plate with smaller servings  ? Do not eat second helpings  ? When you eat at a restaurant, ask for a box and place half of your meal in the box before you eat  ? Share an entrée with someone else   Replace high-calorie snacks with healthy, low-calorie snacks  ? Choose fresh fruit, vegetables, fat-free rice cakes, or air-popped popcorn instead of potato chips, nuts, or chocolate  ? Choose water or calorie-free drinks instead of soda or sweetened drinks   Do not shop for groceries when you are hungry  You may be more likely to make unhealthy food choices  Take a grocery list of healthy foods and shop after you have eaten   Eat regular meals  Do not skip meals  Skipping meals can lead to overeating later in the day  This can make it harder for you to lose weight  Eat a healthy snack in place of a meal if you do not have time to eat a regular meal  Talk with a dietitian to help you create a meal plan and schedule that is right for you  Other things to consider as you try to lose weight:    Be aware of situations that may give you the urge to overeat, such as eating while watching television  Find ways to avoid these situations  For example, read a book, go for a walk, or do crafts   Meet with a weight loss support group or friends who are also trying to lose weight  This may help you stay motivated to continue working on your weight loss goals  © Copyright [facility] 2022 Information is for End User's use only and may not be sold, redistributed or otherwise used for commercial purposes    The above information is an  only  It is not intended as medical advice for individual conditions or treatments  Talk to your doctor, nurse or pharmacist before following any medical regimen to see if it is safe and effective for you

## 2023-05-01 NOTE — ASSESSMENT & PLAN NOTE
Mood stable on meds as previously rx'd by Charleen Franks [address]  issued as she requests  Return in 3 months for next med check - call sooner w/any concerns

## 2023-05-02 ENCOUNTER — APPOINTMENT (OUTPATIENT)
Dept: RADIOLOGY | Facility: CLINIC | Age: 63
End: 2023-05-02

## 2023-05-02 ENCOUNTER — OFFICE VISIT (OUTPATIENT)
Dept: OBGYN CLINIC | Facility: CLINIC | Age: 63
End: 2023-05-02

## 2023-05-02 VITALS
RESPIRATION RATE: 20 BRPM | DIASTOLIC BLOOD PRESSURE: 79 MMHG | HEIGHT: 64 IN | HEART RATE: 69 BPM | WEIGHT: 173.12 LBS | BODY MASS INDEX: 29.56 KG/M2 | SYSTOLIC BLOOD PRESSURE: 118 MMHG

## 2023-05-02 DIAGNOSIS — G89.29 CHRONIC RIGHT SHOULDER PAIN: ICD-10-CM

## 2023-05-02 DIAGNOSIS — M25.511 CHRONIC RIGHT SHOULDER PAIN: ICD-10-CM

## 2023-05-02 DIAGNOSIS — M19.011 PRIMARY LOCALIZED OSTEOARTHROSIS OF RIGHT SHOULDER: ICD-10-CM

## 2023-05-02 DIAGNOSIS — M75.42 SHOULDER IMPINGEMENT SYNDROME, LEFT: Primary | ICD-10-CM

## 2023-05-02 NOTE — PROGRESS NOTES
Ortho Sports Medicine Shoulder New Patient Visit     Assesment:   63 y o  female right shoulder glenohumeral osteoarthritis, suspected rotator cuff tear  Plan:  The patient's diagnosis and treatment were discussed at length today  We discussed no treatment, non-operative treatment, and operative treatment  Vonn Mohs presents today for evaluation of her right shoulder pain  I explained she has significant right shoulder osteoarthritis and I have concern that she may have injury to her rotator cuff as well  These reasons, we discussed a variety of treatment options including no treatment, continued nonoperative treatment with corticosteroid injections and physical therapy, and surgical intervention  I explained that a total shoulder arthroplasty or reverse total shoulder arthroplasty may be indicated since she has significant pain, weakness with the arm, and failure of nonoperative treatment including several prior corticosteroid injections and physical therapy  At this time I recommend a CT blueprint for preoperative planning as well as an MRI for evaluation of her rotator cuff which will help determine total versus reverse shoulder arthroplasty  I advised her to follow-up with our office after these for additional treatment recommendations moving forward  In the meantime she can continue with over-the-counter anti-inflammatory medications as tolerated  Conservative treatment:    Ice to shoulder 1-2 times daily, for 20 minutes at a time  Imaging: All imaging from today was reviewed by myself and explained to the patient  Injection:    No Injection planned at this time  Surgery:     No surgery is recommended at this point, continue with conservative treatment plan as noted  Follow up:    Return for results following MRI  Chief Complaint   Patient presents with    Right Shoulder - Pain       History of Present Illness:     The patient is a 63 y o , right hand dominant 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, referred to me by Dr Cyndee Johns, seen in clinic for consultation of right shoulder pain  The patient denies a history of diabetes  The patient denies a history of thyroid disorder  Pain is located anterior, posterior, deep  The patient rates the pain as a 9/10  The pain has been present for a few years  Patient states she noticed an onset of pain a few years ago 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  She has tried home exercise but no formal physical therapy  She has tried corticosteroid injections which used to provide her longer relief when first receiving them, but now is only noticing a few weeks of relief  Pain is aggravated by overhead activity, reaching back and lifting  Symptoms include cracking  The patient has weakness  The patient denies numbness and tingling  The patient has tried rest, ice, NSAIDS and injection            Shoulder Surgical History:  None    Past Medical, Social and Family History:  Past Medical History:   Diagnosis Date    Addiction to drug ([facility] )     Anxiety     Bipolar disorder current episode depressed ([facility] [address] [facility] ) 12/10/2020    Chest wall pain 6/11/2020    Chronic suboxone use 6/11/2020    Depression     Drug dependence (HCC)     GERD (gastroesophageal reflux disease)     Medical clearance for psychiatric admission 12/10/2020    Opiate abuse, continuous ([facility] [address] ) 8/20/2018    Osteoarthritis     Rheumatoid arthritis ([facility] [address] [facility] )     Severe episode of recurrent major depressive disorder, with psychotic features ([facility] [address] ) 8/20/2018    Shoulder impingement syndrome, left 9/24/2020    Strain of right knee 5/17/2018    Substance abuse ([facility] [address] )     Tubal pregnancy      Past Surgical History:   Procedure Laterality Date    BREAST IMPLANT      BUNIONECTOMY Bilateral 1990    ECTOPIC PREGNANCY SURGERY      TUBAL LIGATION       No Known Allergies  Current Outpatient Medications on File Prior to Visit   Medication Sig Dispense Refill    buprenorphine-naloxone (SUBOXONE) 8-2 mg per SL tablet       buPROPion (WELLBUTRIN) 75 mg tablet Take 1 tablet (75 mg total) by mouth daily 30 tablet 2    Diclofenac Sodium (VOLTAREN) 1 % Apply 2 g topically 4 (four) times a day 100 g 0    hydrOXYzine HCL (ATARAX) 50 mg tablet Take 1 tablet (50 mg total) by mouth every 6 (six) hours as needed for anxiety 30 tablet 0    pantoprazole (PROTONIX) 40 mg tablet TAKE ONE TABLET BY MOUTH DAILY 30 tablet 5    QUEtiapine (SEROquel) 100 mg tablet Take 1 tablet (100 mg total) by mouth daily at bedtime 30 tablet 2    sertraline (ZOLOFT) 100 mg tablet Take 200 mg by mouth daily      traZODone (DESYREL) 100 mg tablet Take 2 tablets (200 mg total) by mouth daily at bedtime 60 tablet 2    gabapentin (NEURONTIN) 300 mg capsule Take 1 capsule (300 mg total) by mouth 3 (three) times a day 90 capsule 1    mupirocin (BACTROBAN) 2 % ointment Apply topically 2 (two) times a day (Patient not taking: Reported on 2023) 22 g 0     No current facility-administered medications on file prior to visit       Social History     Socioeconomic History    Marital status: /Civil Union     Spouse name: Not on file    Number of children: Not on file    Years of education: Not on file    Highest education level: Not on file   Occupational History    Not on file   Tobacco Use    Smoking status: Former     Packs/day: 0 25     Years: 30 00     Pack years: 7 50     Types: Cigarettes     Quit date: 2021     Years since quittin 6    Smokeless tobacco: Never    Tobacco comments:     1 pack every 3 days    Vaping Use    Vaping Use: Never used   Substance and Sexual Activity    Alcohol use: No    Drug use: Not Currently     Frequency: 7 0 times per week     Types: Oxycodone, Prescription     Comment: daily abuse of percocet/oxy-recovering since August     Sexual activity: Not on file   Other Topics Concern    Not on file   Social History Narrative    Not on file     Social Determinants of Health "    Financial Resource Strain: Not on file   Food Insecurity: Not on file   Transportation Needs: Not on file   Physical Activity: Not on file   Stress: Not on file   Social Connections: Not on file   Intimate Partner Violence: Not on file   Housing Stability: Not on file         I have reviewed the past medical, surgical, social and family history, medications and allergies as documented in the EMR  Review of systems: ROS is negative other than that noted in the HPI  Constitutional: Negative for fatigue and fever  HENT: Negative for sore throat  Respiratory: Negative for shortness of breath  Cardiovascular: Negative for chest pain  Gastrointestinal: Negative for abdominal pain  Endocrine: Negative for cold intolerance and heat intolerance  Genitourinary: Negative for flank pain  Musculoskeletal: Negative for back pain  Skin: Negative for rash  Allergic/Immunologic: Negative for immunocompromised state  Neurological: Negative for dizziness  Psychiatric/Behavioral: Negative for agitation  Physical Exam:    Blood pressure 118/79, pulse 69, resp  rate 20, height 5' 4\" (1 626 m), weight 78 5 kg (173 lb 1 9 oz)  General/Constitutional: NAD, well developed, well nourished  HENT: Normocephalic, atraumatic  CV: Intact distal pulses, regular rate  Resp: No respiratory distress or labored breathing  Lymphatic: No lymphadenopathy palpated  Neuro: Alert and Oriented x 3, no focal deficits  Psych: Normal mood, normal affect, normal judgement, normal behavior  Skin: Warm, dry, no rashes, no erythema      Shoulder focused exam:     Visual inspection of the shoulder demonstrates normal contour without atrophy  No evidence of scapular dyskinesia or atrophy    No scapular winging  Active and passive range of motion demonstrates forward flexion to 100, 160 passively, abduction to 90,  external rotation is 40 with the arm the side, internal rotation to sacrum  Rotator cuff strength is 4/5 to resisted " forward flexion, 4/5 resisted abduction, 4/5 resisted external rotation, 4/5 resisted internal rotation  No tenderness to palpation at the distal clavicle, AC joint, acromion, or scapular spine  No pain with cross-body adduction  positive Neer's test, positive modified Armando impingement test  Negative external rotation lag sign  Negative belly press, negative lift-off  positive speed's and Yergason's test  no tenderness to palpation at the bicipital groove  negative [place]'s test        UE NV Exam: +2 Radial pulses bilaterally  Sensation intact to light touch C5-T1 bilaterally, Radial/median/ulnar nerve motor intact      Bilateral elbow, wrist, and and forearm ROM full, painless with passive ROM, no ttp or crepitance throughout extremities below shoulder joint    Cervical ROM is full without pain, numbness or tingling      Shoulder Imaging    X-rays of the right shoulder were reviewed, which demonstrate joint space narrowing, inferior humeral neck osteophyte formation, significant cyst formation within the humeral metadiaphysis  No obvious loose bodies  No obvious superior migration of the humeral head seen on radiographs  I have reviewed the radiology report and do not currently have a radiology reading from  [facility], but will check the result once the reading is performed            Scribe Attestation    I,:  Osvaldo Verma am acting as a scribe while in the presence of the attending physician :       I,:  Joesph Fay, DO personally performed the services described in this documentation    as scribed in my presence :

## 2023-05-03 LAB
ALBUMIN SERPL-MCNC: 4.4 G/DL (ref 3.8–4.9)
ALBUMIN/GLOB SERPL: 1.6 {RATIO} (ref 1.2–2.2)
ALP SERPL-CCNC: 70 IU/L (ref 44–121)
ALT SERPL-CCNC: 14 IU/L (ref 0–32)
AST SERPL-CCNC: 25 IU/L (ref 0–40)
BASOPHILS # BLD AUTO: 0 X10E3/UL (ref 0–0.2)
BASOPHILS NFR BLD AUTO: 0 %
BILIRUB SERPL-MCNC: 0.3 MG/DL (ref 0–1.2)
BUN SERPL-MCNC: 10 MG/DL (ref 8–27)
BUN/CREAT SERPL: 9 (ref 12–28)
CALCIUM SERPL-MCNC: 9.6 MG/DL (ref 8.7–10.3)
CHLORIDE SERPL-SCNC: 104 MMOL/L (ref 96–106)
CHOLEST SERPL-MCNC: 211 MG/DL (ref 100–199)
CHOLEST/HDLC SERPL: 4.3 RATIO (ref 0–4.4)
CO2 SERPL-SCNC: 26 MMOL/L (ref 20–29)
CREAT SERPL-MCNC: 1.08 MG/DL (ref 0.57–1)
EGFR: 59 ML/MIN/1.73
EOSINOPHIL # BLD AUTO: 0.2 X10E3/UL (ref 0–0.4)
EOSINOPHIL NFR BLD AUTO: 3 %
ERYTHROCYTE [DISTWIDTH] IN BLOOD BY AUTOMATED COUNT: 12.8 % (ref 11.7–15.4)
GLOBULIN SER-MCNC: 2.7 G/DL (ref 1.5–4.5)
GLUCOSE SERPL-MCNC: 89 MG/DL (ref 70–99)
HCT VFR BLD AUTO: 39 % (ref 34–46.6)
HCV AB S/CO SERPL IA: NON REACTIVE
HDLC SERPL-MCNC: 49 MG/DL
HGB BLD-MCNC: 13.2 G/DL (ref 11.1–15.9)
HIV 1+2 AB+HIV1 P24 AG SERPL QL IA: NON REACTIVE
IMM GRANULOCYTES # BLD: 0 X10E3/UL (ref 0–0.1)
IMM GRANULOCYTES NFR BLD: 1 %
LDLC SERPL CALC-MCNC: 141 MG/DL (ref 0–99)
LYMPHOCYTES # BLD AUTO: 1.5 X10E3/UL (ref 0.7–3.1)
LYMPHOCYTES NFR BLD AUTO: 22 %
MCH RBC QN AUTO: 29.6 PG (ref 26.6–33)
MCHC RBC AUTO-ENTMCNC: 33.8 G/DL (ref 31.5–35.7)
MCV RBC AUTO: 87 FL (ref 79–97)
MONOCYTES # BLD AUTO: 0.4 X10E3/UL (ref 0.1–0.9)
MONOCYTES NFR BLD AUTO: 6 %
NEUTROPHILS # BLD AUTO: 4.7 X10E3/UL (ref 1.4–7)
NEUTROPHILS NFR BLD AUTO: 68 %
PLATELET # BLD AUTO: 230 X10E3/UL (ref 150–450)
POTASSIUM SERPL-SCNC: 4 MMOL/L (ref 3.5–5.2)
PROT SERPL-MCNC: 7.1 G/DL (ref 6–8.5)
RBC # BLD AUTO: 4.46 X10E6/UL (ref 3.77–5.28)
SL AMB T4, FREE (DIRECT): 0.69 NG/DL (ref 0.82–1.77)
SL AMB VLDL CHOLESTEROL CALC: 21 MG/DL (ref 5–40)
SODIUM SERPL-SCNC: 143 MMOL/L (ref 134–144)
TRIGL SERPL-MCNC: 117 MG/DL (ref 0–149)
TSH SERPL DL<=0.005 MIU/L-ACNC: 6.55 UIU/ML (ref 0.45–4.5)
WBC # BLD AUTO: 6.8 X10E3/UL (ref 3.4–10.8)

## 2023-05-04 ENCOUNTER — TELEPHONE (OUTPATIENT)
Dept: OBGYN CLINIC | Facility: CLINIC | Age: 63
End: 2023-05-04

## 2023-05-04 NOTE — TELEPHONE ENCOUNTER
patient has been doing physical therapy since 9/2020 when pain initially started  She was seen by Dr Lopez Vital at that time and referred to physical therapy  She had a PT evaluation and was given home exercises to complete   She has been doing the home exercises 3 times a week to present with progressing symptoms and no pain relief

## 2023-05-09 DIAGNOSIS — E03.9 HYPOTHYROIDISM, UNSPECIFIED TYPE: Primary | ICD-10-CM

## 2023-05-09 RX ORDER — LEVOTHYROXINE SODIUM 0.03 MG/1
25 TABLET ORAL
Qty: 90 TABLET | Refills: 0 | Status: SHIPPED | OUTPATIENT
Start: 2023-05-09 | End: 2023-07-24

## 2023-05-21 ENCOUNTER — HOSPITAL ENCOUNTER (OUTPATIENT)
Dept: MRI IMAGING | Facility: HOSPITAL | Age: 63
Discharge: HOME | End: 2023-05-21
Attending: STUDENT IN AN ORGANIZED HEALTH CARE EDUCATION/TRAINING PROGRAM

## 2023-05-21 ENCOUNTER — HOSPITAL ENCOUNTER (OUTPATIENT)
Dept: CT IMAGING | Facility: HOSPITAL | Age: 63
Discharge: HOME | End: 2023-05-21
Attending: STUDENT IN AN ORGANIZED HEALTH CARE EDUCATION/TRAINING PROGRAM

## 2023-05-21 DIAGNOSIS — G89.29 CHRONIC RIGHT SHOULDER PAIN: ICD-10-CM

## 2023-05-21 DIAGNOSIS — M25.511 CHRONIC RIGHT SHOULDER PAIN: ICD-10-CM

## 2023-05-25 ENCOUNTER — OFFICE VISIT (OUTPATIENT)
Dept: OBGYN CLINIC | Facility: CLINIC | Age: 63
End: 2023-05-25

## 2023-05-25 VITALS
BODY MASS INDEX: 29.55 KG/M2 | HEART RATE: 74 BPM | HEIGHT: 64 IN | WEIGHT: 173.1 LBS | DIASTOLIC BLOOD PRESSURE: 78 MMHG | SYSTOLIC BLOOD PRESSURE: 115 MMHG | RESPIRATION RATE: 16 BRPM

## 2023-05-25 DIAGNOSIS — M19.011 PRIMARY OSTEOARTHRITIS OF RIGHT SHOULDER: Primary | ICD-10-CM

## 2023-05-25 DIAGNOSIS — K21.9 GASTROESOPHAGEAL REFLUX DISEASE: ICD-10-CM

## 2023-05-25 RX ORDER — ACETAMINOPHEN 325 MG/1
975 TABLET ORAL ONCE
OUTPATIENT
Start: 2023-05-25 | End: 2023-05-25

## 2023-05-25 RX ORDER — GABAPENTIN 100 MG/1
300 CAPSULE ORAL ONCE
OUTPATIENT
Start: 2023-05-25 | End: 2023-05-25

## 2023-05-25 RX ORDER — CHLORHEXIDINE GLUCONATE 4 G/100ML
SOLUTION TOPICAL DAILY PRN
OUTPATIENT
Start: 2023-05-25

## 2023-05-25 RX ORDER — CHLORHEXIDINE GLUCONATE 0.12 MG/ML
15 RINSE ORAL ONCE
OUTPATIENT
Start: 2023-05-25 | End: 2023-05-25

## 2023-05-25 RX ORDER — PANTOPRAZOLE SODIUM 40 MG/1
TABLET, DELAYED RELEASE ORAL
Qty: 30 TABLET | Refills: 5 | Status: SHIPPED | OUTPATIENT
Start: 2023-05-25

## 2023-05-25 NOTE — PROGRESS NOTES
[facility] Sports Medicine Shoulder Follow Up Visit     Assesment:   63 y o  female right shoulder severe glenohumeral joint osteoarthritis    Plan:  The patient's diagnosis and treatment were discussed at length today  We discussed no treatment, non-operative treatment, and operative treatment  The patient's finding and exam are consistent with right shoulder severe glenohumeral joint osteoarthritis and severe rotator cuff tendinosis with low-grade tearing  Given the extent of her arthritis and lack of improvement with nonsurgical treatment including cortisone injections and physical therapy for 8+ weeks, I did recommend surgical intervention at this time  I discussed with her that this type of prosthesis does come with some limitations and would have a lifelong lifting restriction of 25 pounds  She did understand these restrictions and would like to proceed forward with surgery  All risks and benefits were reviewed and surgical consent for right reverse total shoulder arthroplasty was signed at today's visit  I discussed with her that presurgical planning would include lab work and clearance from her primary care physician as she has already obtained a CT blueprint and MRI of the operative shoulder  I also provided her with a referral to outpatient physical therapy, which she should schedule to have her first evaluation a few days after surgery  She can continue to use activity as tolerated  She can continue to use ice, heat, and over-the-counter medication as needed for pain relief  She is to follow-up approximately 5 to 7 days postoperatively  Given that the patient has failed conservative treatment and continues to have severe pain and/or dysfunction that limits their activities of daily living, they would like to proceed with operative intervention  We discussed with the patient the risks of no treatment, non-operative treatment, and operative treatment   The risks of operative intervention were discussed and include but are not limited to: Infection, bleeding, stiffness, loss of range of motion, blood clot, failure of surgery, fracture, delayed union, nonunion, malunion, risk of potential future arthritis, continued problems with swelling, injury to surrounding structures/nerve/artery/vein, recurrence of cysts, failure of hardware, retained hardware and/or foreign body, symptomatic hardware, and continued instability, pain, dysfunction, or disability despite repair  Conservative treatment:    Ice to shoulder 1-2 times daily, for 20 minutes at a time  Home exercise program for shoulder, including ROM and strenthening  Instructions given to patient of what exercises to perform  Let pain guide return to activities  Imaging: All imaging from today was reviewed by myself and explained to the patient  Injection:    No Injection planned at this time  Surgery: All of the risks and benefits of operative treatment were explained to the patient, as well as the risks and benefits of any alternative treatment options, including nonoperative care  This risks of surgery include, but are not limited to, infection, bleeding, blood clot, damage to nerves/arteries, need for further surgyer, cardiovascular risk, anesthesia risk, and continued pain  The patient understood this and elects to proceed forward with surgical intervention  We will proceed forward with right reverse total shoulder replacement  Follow up:    Return for follow up 5-7 days post-op  Chief Complaint   Patient presents with   • Right Shoulder - Follow-up     MRI Review         History of Present Illness: The patient is returns for follow up of right shoulder pain  Since the prior visit, She reports minimal to no improvement  She states that she continues to experience significant pain diffusely throughout her shoulder    She states that she has attempted to continue outpatient physical therapy and home excise program, which provides her very minimal relief of her symptoms  She states that the pain has made activity of daily living significantly difficult such as reaching overhead and behind her back  She states that she is attempted to manage her pain with ice, heat, and over-the-counter medication which provides very minimal relief of her symptoms  She would like to proceed forward with surgical intervention as her ability to perform normal activities of daily living and the pain is becoming unbearable      Shoulder Surgical History:  None    Past Medical, Social and Family History:  Past Medical History:   Diagnosis Date   • Addiction to drug [facility])    • Anxiety    • Bipolar disorder current episode depressed ([facility] ) 12/10/2020   • Chest wall pain 6/11/2020   • Chronic suboxone use 6/11/2020   • Depression    • Drug dependence ([place] [address] )    • GERD (gastroesophageal reflux disease)    • Medical clearance for psychiatric admission 12/10/2020   • Opiate abuse, continuous ([facility] ) 8/20/2018   • Osteoarthritis    • Rheumatoid arthritis ([place] [address] )    • Severe episode of recurrent major depressive disorder, with psychotic features ([place] [address] ) 8/20/2018   • Shoulder impingement syndrome, left 9/24/2020   • Strain of right knee 5/17/2018   • Substance abuse ([facility]  )    • Tubal pregnancy      Past Surgical History:   Procedure Laterality Date   • BREAST IMPLANT     • BUNIONECTOMY Bilateral 1990   • ECTOPIC PREGNANCY SURGERY     • TUBAL LIGATION       No Known Allergies  Current Outpatient Medications on File Prior to Visit   Medication Sig Dispense Refill   • buprenorphine-naloxone (SUBOXONE) 8-2 mg per SL tablet      • buPROPion (WELLBUTRIN) 75 mg tablet Take 1 tablet (75 mg total) by mouth daily 30 tablet 2   • Diclofenac Sodium (VOLTAREN) 1 % Apply 2 g topically 4 (four) times a day 100 g 0   • hydrOXYzine HCL (ATARAX) 50 mg tablet Take 1 tablet (50 mg total) by mouth every 6 (six) hours as needed for anxiety 30 tablet 0   • levothyroxine (Euthyrox) 25 mcg tablet Take 1 tablet (25 mcg total) by mouth daily in the early morning 90 tablet 0   • QUEtiapine (SEROquel) 100 mg tablet Take 1 tablet (100 mg total) by mouth daily at bedtime 30 tablet 2   • sertraline (ZOLOFT) 100 mg tablet Take 200 mg by mouth daily     • traZODone (DESYREL) 100 mg tablet Take 2 tablets (200 mg total) by mouth daily at bedtime 60 tablet 2   • gabapentin (NEURONTIN) 300 mg capsule Take 1 capsule (300 mg total) by mouth 3 (three) times a day 90 capsule 1   • mupirocin (BACTROBAN) 2 % ointment Apply topically 2 (two) times a day (Patient not taking: Reported on 2023) 22 g 0   • [DISCONTINUED] pantoprazole (PROTONIX) 40 mg tablet TAKE ONE TABLET BY MOUTH DAILY 30 tablet 5     No current facility-administered medications on file prior to visit       Social History     Socioeconomic History   • Marital status: /Civil Union     Spouse name: Not on file   • Number of children: Not on file   • Years of education: Not on file   • Highest education level: Not on file   Occupational History   • Not on file   Tobacco Use   • Smoking status: Former     Packs/day: 0 25     Years: 30 00     Total pack years: 7 50     Types: Cigarettes     Quit date: 2021     Years since quittin 7   • Smokeless tobacco: Never   • Tobacco comments:     1 pack every 3 days    Vaping Use   • Vaping Use: Never used   Substance and Sexual Activity   • Alcohol use: No   • Drug use: Not Currently     Frequency: 7 0 times per week     Types: Oxycodone, Prescription     Comment: daily abuse of percocet/oxy-recovering since August    • Sexual activity: Not on file   Other Topics Concern   • Not on file   Social History Narrative   • Not on file     Social Determinants of Health     Financial Resource Strain: Not on file   Food Insecurity: Not on file   Transportation Needs: Not on file   Physical Activity: Not on file   Stress: Not on file   Social Connections: Not on file   Intimate Partner Violence: "Not on file   Housing Stability: Not on file       I have reviewed the past medical, surgical, social and family history, medications and allergies as documented in the EMR  Review of systems: ROS is negative other than that noted in the HPI  Constitutional: Negative for fatigue and fever  Physical Exam:    Blood pressure 115/78, pulse 74, resp  rate 16, height 5' 4\" (1 626 m), weight 78 5 kg (173 lb 1 6 oz)  General/Constitutional: NAD, well developed, well nourished  HENT: Normocephalic, atraumatic  CV: Intact distal pulses, regular rate  Resp: No respiratory distress or labored breathing  Lymphatic: No lymphadenopathy palpated  Neuro: Alert and Oriented x 3, no focal deficits  Psych: Normal mood, normal affect, normal judgement, normal behavior  Skin: Warm, dry, no rashes, no erythema      Shoulder focused exam:        Visual inspection of the shoulder demonstrates normal contour without atrophy  No evidence of scapular dyskinesia or atrophy    No scapular winging  Active and passive range of motion demonstrates forward flexion to 90, abduction to 60, external rotation is 20 with the arm the side, internal rotation to  hip   Rotator cuff strength is 4/5 to resisted forward flexion, 4/5 resisted abduction, 4/5 resisted external rotation, 5/5 resisted internal rotation  No tenderness to palpation at the distal clavicle, AC joint, acromion, or scapular spine  No pain with cross-body adduction  + Neer's test, + modified Armando impingement test  Negative external rotation lag sign  Negative belly press, negative lift-off  + speed's and Yergason's test  Mild tenderness to palpation at the bicipital groove  - [place]'s test    UE NV Exam: +2 Radial pulses bilaterally  Sensation intact to light touch C5-T1 bilaterally, Radial/median/ulnar nerve motor intact    Cervical ROM is full without pain, numbness or tingling      Shoulder Imaging    MRI of right shoulder was reviewed and demonstrates severe " supraspinatus tendinosis with significant interstitial tearing without a full-thickness component  Severe glenohumeral joint osteoarthritis and moderate subacromial/subdeltoid bursitis is noted  I have reviewed and agree with the radiologist interpretation        Scribe Attestation    I,:  Claudette Rand am acting as a scribe while in the presence of the attending physician :       I,:  Rosalva Bal, DO personally performed the services described in this documentation    as scribed in my presence :

## 2023-06-01 DIAGNOSIS — F33.42 RECURRENT MAJOR DEPRESSIVE DISORDER, IN FULL REMISSION (HCC): ICD-10-CM

## 2023-06-01 RX ORDER — HYDROXYZINE 50 MG/1
50 TABLET, FILM COATED ORAL EVERY 6 HOURS PRN
Qty: 30 TABLET | Refills: 0 | Status: SHIPPED | OUTPATIENT
Start: 2023-06-01

## 2023-06-19 RX ORDER — BIOTIN 10000 MCG
CAPSULE ORAL
COMMUNITY

## 2023-06-19 RX ORDER — DIPHENOXYLATE HYDROCHLORIDE AND ATROPINE SULFATE 2.5; .025 MG/1; MG/1
1 TABLET ORAL DAILY
COMMUNITY

## 2023-06-19 NOTE — PRE-PROCEDURE INSTRUCTIONS
Pre-Surgery Instructions:   Medication Instructions   • Biotin 10 MG CAPS Stop taking 7 days prior to surgery. • buPROPion (WELLBUTRIN) 75 mg tablet Take night before surgery   • Cyanocobalamin (VITAMIN B 12 PO) Stop taking 7 days prior to surgery. • Diclofenac Sodium (VOLTAREN) 1 % Hold day of surgery. • gabapentin (NEURONTIN) 300 mg capsule Take night before surgery   • hydrOXYzine HCL (ATARAX) 50 mg tablet Uses PRN- OK to take day of surgery   • levothyroxine (Euthyrox) 25 mcg tablet Take day of surgery. • multivitamin (THERAGRAN) TABS Stop taking 7 days prior to surgery. • pantoprazole (PROTONIX) 40 mg tablet Take day of surgery. • QUEtiapine (SEROquel) 100 mg tablet Take night before surgery   • sertraline (ZOLOFT) 100 mg tablet Take day of surgery. • traZODone (DESYREL) 100 mg tablet Take night before surgery   Medication instructions for day surgery reviewed. Please use only a sip of water to take your instructed medications. Avoid all over the counter vitamins, supplements and NSAIDS for one week prior to surgery per anesthesia guidelines. Tylenol is ok to take as needed. You will receive a call one business day prior to surgery with an arrival time and hospital directions. If your surgery is scheduled on a Monday, the hospital will be calling you on the Friday prior to your surgery. If you have not heard from anyone by 8pm, please call the hospital supervisor through the hospital  at [phone]. [facility] [phone]). Do not eat or drink anything after midnight the night before your surgery, including candy, mints, lifesavers, or chewing gum. Do not drink alcohol 24hrs before your surgery. Try not to smoke at least 24hrs before your surgery. Follow the pre surgery showering instructions as listed in the [facility] Surgical Experience Booklet” or otherwise provided by your surgeon's office. Do not shave the surgical area 24 hours before surgery.  Do not apply any lotions, creams, including makeup, cologne, deodorant, or perfumes after showering on the day of your surgery. No contact lenses, eye make-up, or artificial eyelashes. Remove nail polish, including gel polish, and any artificial, gel, or acrylic nails if possible. Remove all jewelry including rings and body piercing jewelry. Wear causal clothing that is easy to take on and off. Consider your type of surgery. Keep any valuables, jewelry, piercings at home. Please bring any specially ordered equipment (sling, braces) if indicated. Arrange for a responsible person to drive you to and from the hospital on the day of your surgery. Visitor Guidelines discussed. Call the surgeon's office with any new illnesses, exposures, or additional questions prior to surgery. Please reference your [facility] Surgical Experience Booklet” for additional information to prepare for your upcoming surgery.

## 2023-06-20 ENCOUNTER — APPOINTMENT (OUTPATIENT)
Dept: LAB | Facility: HOSPITAL | Age: 63
End: 2023-06-20
Payer: COMMERCIAL

## 2023-06-20 ENCOUNTER — HOSPITAL ENCOUNTER (OUTPATIENT)
Dept: RADIOLOGY | Facility: HOSPITAL | Age: 63
Discharge: HOME | End: 2023-06-20
Payer: COMMERCIAL

## 2023-06-20 ENCOUNTER — LAB (OUTPATIENT)
Dept: LAB | Facility: HOSPITAL | Age: 63
End: 2023-06-20
Payer: COMMERCIAL

## 2023-06-20 DIAGNOSIS — M19.011 ARTHRITIS OF RIGHT SHOULDER REGION: ICD-10-CM

## 2023-06-20 DIAGNOSIS — M19.011 PRIMARY OSTEOARTHRITIS OF RIGHT SHOULDER: ICD-10-CM

## 2023-06-20 LAB
ALBUMIN SERPL BCP-MCNC: 3.9 G/DL (ref 3.5–5)
ALP SERPL-CCNC: 71 U/L (ref 46–116)
ALT SERPL W P-5'-P-CCNC: 21 U/L (ref 12–78)
ANION GAP SERPL CALCULATED.3IONS-SCNC: 0 MMOL/L
AST SERPL W P-5'-P-CCNC: 27 U/L (ref 5–45)
BASOPHILS # BLD AUTO: 0.02 THOUSANDS/ÂΜL (ref 0–0.1)
BASOPHILS NFR BLD AUTO: 1 % (ref 0–1)
BILIRUB SERPL-MCNC: 0.36 MG/DL (ref 0.2–1)
BUN SERPL-MCNC: 14 MG/DL (ref 5–25)
CALCIUM SERPL-MCNC: 9.7 MG/DL (ref 8.3–10.1)
CHLORIDE SERPL-SCNC: 110 MMOL/L (ref 96–108)
CO2 SERPL-SCNC: 30 MMOL/L (ref 21–32)
CREAT SERPL-MCNC: 0.99 MG/DL (ref 0.6–1.3)
EOSINOPHIL # BLD AUTO: 0.11 THOUSAND/ÂΜL (ref 0–0.61)
EOSINOPHIL NFR BLD AUTO: 3 % (ref 0–6)
ERYTHROCYTE [DISTWIDTH] IN BLOOD BY AUTOMATED COUNT: 12.6 % (ref 11.6–15.1)
GFR SERPL CREATININE-BSD FRML MDRD: 62 ML/MIN/1.73SQ M
GLUCOSE P FAST SERPL-MCNC: 84 MG/DL (ref 65–99)
HCT VFR BLD AUTO: 39.2 % (ref 34.8–46.1)
HGB BLD-MCNC: 12.5 G/DL (ref 11.5–15.4)
IMM GRANULOCYTES # BLD AUTO: 0.01 THOUSAND/UL (ref 0–0.2)
IMM GRANULOCYTES NFR BLD AUTO: 0 % (ref 0–2)
INR PPP: 1 (ref 0.84–1.19)
LYMPHOCYTES # BLD AUTO: 0.93 THOUSANDS/ÂΜL (ref 0.6–4.47)
LYMPHOCYTES NFR BLD AUTO: 25 % (ref 14–44)
MCH RBC QN AUTO: 29.1 PG (ref 26.8–34.3)
MCHC RBC AUTO-ENTMCNC: 31.9 G/DL (ref 31.4–37.4)
MCV RBC AUTO: 91 FL (ref 82–98)
MONOCYTES # BLD AUTO: 0.29 THOUSAND/ÂΜL (ref 0.17–1.22)
MONOCYTES NFR BLD AUTO: 8 % (ref 4–12)
NEUTROPHILS # BLD AUTO: 2.41 THOUSANDS/ÂΜL (ref 1.85–7.62)
NEUTS SEG NFR BLD AUTO: 63 % (ref 43–75)
NRBC BLD AUTO-RTO: 0 /100 WBCS
PLATELET # BLD AUTO: 215 THOUSANDS/UL (ref 149–390)
PMV BLD AUTO: 10 FL (ref 8.9–12.7)
POTASSIUM SERPL-SCNC: 4.5 MMOL/L (ref 3.5–5.3)
PROT SERPL-MCNC: 7.9 G/DL (ref 6.4–8.4)
PROTHROMBIN TIME: 13.4 SECONDS (ref 11.6–14.5)
RBC # BLD AUTO: 4.3 MILLION/UL (ref 3.81–5.12)
SODIUM SERPL-SCNC: 140 MMOL/L (ref 135–147)
WBC # BLD AUTO: 3.77 THOUSAND/UL (ref 4.31–10.16)

## 2023-06-20 PROCEDURE — 80053 COMPREHEN METABOLIC PANEL: CPT

## 2023-06-20 PROCEDURE — 85025 COMPLETE CBC W/AUTO DIFF WBC: CPT

## 2023-06-20 PROCEDURE — 83550 IRON BINDING TEST: CPT

## 2023-06-20 PROCEDURE — 86850 RBC ANTIBODY SCREEN: CPT | Performed by: STUDENT IN AN ORGANIZED HEALTH CARE EDUCATION/TRAINING PROGRAM

## 2023-06-20 PROCEDURE — 86901 BLOOD TYPING SEROLOGIC RH(D): CPT | Performed by: STUDENT IN AN ORGANIZED HEALTH CARE EDUCATION/TRAINING PROGRAM

## 2023-06-20 PROCEDURE — 83540 ASSAY OF IRON: CPT

## 2023-06-20 PROCEDURE — 36415 COLL VENOUS BLD VENIPUNCTURE: CPT

## 2023-06-20 PROCEDURE — 86900 BLOOD TYPING SEROLOGIC ABO: CPT | Performed by: STUDENT IN AN ORGANIZED HEALTH CARE EDUCATION/TRAINING PROGRAM

## 2023-06-20 PROCEDURE — 93005 ELECTROCARDIOGRAM TRACING: CPT

## 2023-06-20 PROCEDURE — 85610 PROTHROMBIN TIME: CPT

## 2023-06-20 PROCEDURE — 82728 ASSAY OF FERRITIN: CPT

## 2023-06-20 PROCEDURE — 83036 HEMOGLOBIN GLYCOSYLATED A1C: CPT

## 2023-06-20 PROCEDURE — 85045 AUTOMATED RETICULOCYTE COUNT: CPT

## 2023-06-20 PROCEDURE — 71046 X-RAY EXAM CHEST 2 VIEWS: CPT

## 2023-06-21 ENCOUNTER — LAB REQUISITION (OUTPATIENT)
Dept: LAB | Facility: HOSPITAL | Age: 63
End: 2023-06-21
Payer: COMMERCIAL

## 2023-06-21 DIAGNOSIS — M19.011 PRIMARY OSTEOARTHRITIS, RIGHT SHOULDER: ICD-10-CM

## 2023-06-21 LAB
ABO GROUP BLD: NORMAL
ATRIAL RATE: 73 BPM
BLD GP AB SCN SERPL QL: NEGATIVE
EST. AVERAGE GLUCOSE BLD GHB EST-MCNC: 111 MG/DL
FERRITIN SERPL-MCNC: 52 NG/ML (ref 11–307)
HBA1C MFR BLD: 5.5 %
IRON SATN MFR SERPL: 20 % (ref 15–50)
IRON SERPL-MCNC: 59 UG/DL (ref 50–170)
P AXIS: 71 DEGREES
PR INTERVAL: 124 MS
QRS AXIS: 66 DEGREES
QRSD INTERVAL: 78 MS
QT INTERVAL: 398 MS
QTC INTERVAL: 438 MS
RETICS # AUTO: NORMAL 10*3/UL (ref 14097–95744)
RETICS # CALC: 1.4 % (ref 0.37–1.87)
RH BLD: NEGATIVE
SPECIMEN EXPIRATION DATE: NORMAL
T WAVE AXIS: 37 DEGREES
TIBC SERPL-MCNC: 292 UG/DL (ref 250–450)
VENTRICULAR RATE: 73 BPM

## 2023-06-21 PROCEDURE — 93010 ELECTROCARDIOGRAM REPORT: CPT | Performed by: INTERNAL MEDICINE

## 2023-06-28 DIAGNOSIS — F33.42 RECURRENT MAJOR DEPRESSIVE DISORDER, IN FULL REMISSION (HCC): ICD-10-CM

## 2023-06-28 RX ORDER — TRAZODONE HYDROCHLORIDE 100 MG/1
TABLET ORAL
Qty: 60 TABLET | Refills: 2 | Status: SHIPPED | OUTPATIENT
Start: 2023-06-28

## 2023-06-29 ENCOUNTER — TELEPHONE (OUTPATIENT)
Dept: OBGYN CLINIC | Facility: CLINIC | Age: 63
End: 2023-06-29

## 2023-06-29 ENCOUNTER — OFFICE VISIT (OUTPATIENT)
Dept: FAMILY MEDICINE CLINIC | Facility: HOSPITAL | Age: 63
End: 2023-06-29
Payer: COMMERCIAL

## 2023-06-29 VITALS
SYSTOLIC BLOOD PRESSURE: 120 MMHG | DIASTOLIC BLOOD PRESSURE: 72 MMHG | HEART RATE: 68 BPM | BODY MASS INDEX: 29.12 KG/M2 | HEIGHT: 64 IN | WEIGHT: 170.6 LBS | TEMPERATURE: 97.6 F

## 2023-06-29 DIAGNOSIS — Z01.818 PREOPERATIVE GENERAL PHYSICAL EXAMINATION: Primary | ICD-10-CM

## 2023-06-29 PROCEDURE — 99213 OFFICE O/P EST LOW 20 MIN: CPT | Performed by: NURSE PRACTITIONER

## 2023-06-29 NOTE — PROGRESS NOTES
NAME: Yaakov Palencia  AGE: 63 y o  SEX: female  : 1960     DATE: 2023    [facility] Pre-Operative Evaluation      Chief Complaint: Pre-operative Evaluation     Surgery: Reverse shoulder arthroplasty-right  Anticipated Date of Surgery: 2023  Referring Provider: Dr Lissette Sweet      History of Present Illness:     Yaakov Palencia is a 63 y o  female who presents to the office today for a preoperative consultation at the request of surgeon, Dr Lissette Sweet, who plans on performing right shoulder reverse arthroplasty on 23  Planned anesthesia is regional and general  Patient has a bleeding risk of: no recent abnormal bleeding  Patient does not have objections to receiving blood products if needed  Current anti-platelet/anti-coagulation medications that the patient is prescribed includes: none  Assessment of Chronic Conditions:   - none     Assessment of Cardiac Risk:  · Denies unstable or severe angina or MI in the last 6 weeks or history of stent placement in the last year   · Denies decompensated heart failure (e g  New onset heart failure, NYHA functional class IV heart failure, or worsening existing heart failure)  · Denies significant arrhythmias such as high grade AV block, symptomatic ventricular arrhythmia, newly recognized ventricular tachycardia, supraventricular tachycardia with resting heart rate >100, or symptomatic bradycardia  · Denies severe heart valve disease including aortic stenosis or symptomatic mitral stenosis     Exercise Capacity:  · Able to walk 4 blocks without symptoms?: Yes  · Able to walk 2 flights without symptoms?: Yes    Prior Anesthesia Reactions: No     Personal history of venous thromboembolic disease? No    History of steroid use for >2 weeks within last year? No         Review of Systems:     Review of Systems   Constitutional: Negative  HENT: Negative    Negative for congestion, dental problem, ear pain, hearing loss, postnasal drip, rhinorrhea, sinus pressure, sinus pain, sore throat, tinnitus and trouble swallowing  Eyes: Negative  Respiratory: Negative  Cardiovascular: Negative  Gastrointestinal: Negative  Endocrine: Negative  Genitourinary: Negative  Musculoskeletal: Negative  Skin: Negative  Allergic/Immunologic: Negative  Neurological: Negative  Hematological: Negative  Psychiatric/Behavioral: Negative          Current Problem List:     Patient Active Problem List   Diagnosis   • Generalized anxiety disorder   • Opioid dependence in remission ([facility] )   • Elevated TSH   • Gastroesophageal reflux disease   • Primary localized osteoarthrosis of right shoulder   • DDD (degenerative disc disease), cervical   • Neuropathy   • Recurrent major depressive disorder (HCC)   • Bipolar 2 disorder (HCC)   • BMI 29 0-29 9,adult       Allergies:     No Known Allergies    Current Medications:       Current Outpatient Medications:   •  Biotin 10 MG CAPS, Take by mouth, Disp: , Rfl:   •  buPROPion (WELLBUTRIN) 75 mg tablet, Take 1 tablet (75 mg total) by mouth daily, Disp: 30 tablet, Rfl: 2  •  Cyanocobalamin (VITAMIN B 12 PO), Take by mouth, Disp: , Rfl:   •  Diclofenac Sodium (VOLTAREN) 1 %, Apply 2 g topically 4 (four) times a day, Disp: 100 g, Rfl: 0  •  gabapentin (NEURONTIN) 300 mg capsule, Take 1 capsule (300 mg total) by mouth 3 (three) times a day, Disp: 90 capsule, Rfl: 1  •  hydrOXYzine HCL (ATARAX) 50 mg tablet, Take 1 tablet (50 mg total) by mouth every 6 (six) hours as needed for anxiety, Disp: 30 tablet, Rfl: 0  •  levothyroxine (Euthyrox) 25 mcg tablet, Take 1 tablet (25 mcg total) by mouth daily in the early morning, Disp: 90 tablet, Rfl: 0  •  multivitamin (THERAGRAN) TABS, Take 1 tablet by mouth daily, Disp: , Rfl:   •  pantoprazole (PROTONIX) 40 mg tablet, TAKE 1 TABLET BY MOUTH DAILY, Disp: 30 tablet, Rfl: 5  •  QUEtiapine (SEROquel) 100 mg tablet, Take 1 tablet (100 mg total) by mouth daily at bedtime, Disp: 30 tablet, Rfl: 2  •  sertraline (ZOLOFT) 100 mg tablet, Take 200 mg by mouth daily, Disp: , Rfl:   •  traZODone (DESYREL) 100 mg tablet, TAKE TWO TABLETS BY MOUTH AT BEDTIME, Disp: 60 tablet, Rfl: 2    Past Medical History:       Past Medical History:   Diagnosis Date   • Addiction to drug [facility])    • Anxiety    • Bipolar disorder current episode depressed ([facility] ) 12/10/2020   • Chest wall pain 2020   • Chronic suboxone use 2020   • Depression    • Disease of thyroid gland    • Drug dependence ([place] [address] )    • GERD (gastroesophageal reflux disease)    • Medical clearance for psychiatric admission 12/10/2020   • Opiate abuse, continuous ([place] [address] ) 2018   • Osteoarthritis    • Rheumatoid arthritis ([place] [address] )    • Severe episode of recurrent major depressive disorder, with psychotic features ([place] [address] ) 2018   • Shoulder impingement syndrome, left 2020   • Strain of right knee 2018   • Substance abuse ([place] [address] )    • Tubal pregnancy         Past Surgical History:   Procedure Laterality Date   • BREAST IMPLANT     • BUNIONECTOMY Bilateral    • ECTOPIC PREGNANCY SURGERY     • TUBAL LIGATION          Family History   Problem Relation Age of Onset   • Bipolar disorder Mother    • Depression Mother    • Hypertension Mother    • Heart disease Father         cardiac   • Prostate cancer Father    • Bipolar disorder Brother    • Depression Sister    • Colon polyps Neg Hx    • Colon cancer Neg Hx         Social History     Socioeconomic History   • Marital status: /Civil Union     Spouse name: Not on file   • Number of children: Not on file   • Years of education: Not on file   • Highest education level: Not on file   Occupational History   • Not on file   Tobacco Use   • Smoking status: Some Days     Packs/day: 0 25     Years: 30 00     Total pack years: 7 50     Types: Cigarettes     Last attempt to quit: 2021     Years since quittin 8   • Smokeless tobacco: Never   • Tobacco comments:     Occasional cigarette "  Vaping Use   • Vaping Use: Never used   Substance and Sexual Activity   • Alcohol use: No   • Drug use: Not Currently     Frequency: 7 0 times per week     Types: Oxycodone, Prescription     Comment: daily abuse of percocet/oxy-recovering since August    • Sexual activity: Not on file   Other Topics Concern   • Not on file   Social History Narrative   • Not on file     Social Determinants of Health     Financial Resource Strain: Not on file   Food Insecurity: Not on file   Transportation Needs: Not on file   Physical Activity: Not on file   Stress: Not on file   Social Connections: Not on file   Intimate Partner Violence: Not on file   Housing Stability: Not on file        Physical Exam:     /72 (BP Location: Left arm, Patient Position: Sitting, Cuff Size: Standard)   Pulse 68   Temp 97 6 °F (36 4 °C) (Tympanic)   Ht 5' 4\" (1 626 m)   Wt 77 4 kg (170 lb 9 6 oz)   BMI 29 28 kg/m²     Physical Exam  Vitals reviewed  Constitutional:       Appearance: Normal appearance  She is well-developed  HENT:      Head: Normocephalic and atraumatic  Right Ear: Tympanic membrane, ear canal and external ear normal       Left Ear: Tympanic membrane, ear canal and external ear normal       Nose: Nose normal       Mouth/Throat:      Mouth: Mucous membranes are moist       Pharynx: Oropharynx is clear  Eyes:      Conjunctiva/sclera: Conjunctivae normal       Pupils: Pupils are equal, round, and reactive to light  Neck:      Thyroid: No thyromegaly  Vascular: No carotid bruit  Cardiovascular:      Rate and Rhythm: Normal rate and regular rhythm  Heart sounds: Normal heart sounds  No murmur heard  Pulmonary:      Effort: Pulmonary effort is normal       Breath sounds: Normal breath sounds  Abdominal:      General: Abdomen is flat  Bowel sounds are normal       Palpations: Abdomen is soft  There is no hepatomegaly or splenomegaly  Tenderness: There is no abdominal tenderness     Musculoskeletal: " General: Normal range of motion  Cervical back: Normal range of motion and neck supple  Lymphadenopathy:      Cervical: No cervical adenopathy  Skin:     General: Skin is warm and dry  Neurological:      Mental Status: She is alert and oriented to person, place, and time  Psychiatric:         Mood and Affect: Mood normal          Behavior: Behavior normal          Thought Content: Thought content normal          Judgment: Judgment normal           Data:     Pre-operative work-up    Laboratory Results: I have personally reviewed the pertinent laboratory results/reports      EKG: I have personally reviewed pertinent reports  Chest x-ray: I have personally reviewed pertinent reports  Previous cardiopulmonary studies within the past year:  · Echocardiogram: N/A  · Cardiac Catheterization: N/A  · Stress Test: N/A  · Pulmonary Function Testing: N/A      Assessment & Recommendations:     1  Preoperative general physical examination            Pre-Op Evaluation Assessment  63 y o  female with planned surgery: right reverse shoulder arthroplasty  Known risk factors for perioperative complications: None  Current medications which may produce withdrawal symptoms if withheld perioperatively: none  Pre-Op Evaluation Plan  1  Further preoperative workup as follows:   - None; no further preoperative work-up is required    2  Medication Management/Recommendations:   - None, continue medication regimen including morning of surgery, with sip of water    3  Prophylaxis for cardiac events with perioperative beta-blockers: not indicated  4  Patient requires further consultation with: None    Clearance  Patient is CLEARED for surgery without any additional cardiac testing       DMITRIY Bartholomew  [facility] [address] [facility] [address]  Phone#  [phone]  Fax#  [phone]

## 2023-07-05 ENCOUNTER — ANESTHESIA EVENT (OUTPATIENT)
Dept: PERIOP | Facility: HOSPITAL | Age: 63
End: 2023-07-05
Payer: COMMERCIAL

## 2023-07-05 PROCEDURE — NC001 PR NO CHARGE: Performed by: STUDENT IN AN ORGANIZED HEALTH CARE EDUCATION/TRAINING PROGRAM

## 2023-07-05 NOTE — ANESTHESIA PREPROCEDURE EVALUATION
Procedure:  REVERSE SHOULDER ARTHROPLASTY - SAME DAY (Right: Shoulder)    Relevant Problems   GI/HEPATIC   (+) Gastroesophageal reflux disease      MUSCULOSKELETAL   (+) DDD (degenerative disc disease), cervical   (+) Primary localized osteoarthrosis of right shoulder      NEURO/PSYCH   (+) Anxiety   (+) Generalized anxiety disorder   (+) Recurrent major depressive disorder (HCC)      Other   (+) Bipolar 2 disorder (HCC)   (+) Opioid dependence in remission ([address])        Physical Exam    Airway    Mallampati score: II  TM Distance: >3 FB  Neck ROM: full     Dental   Comment: Multiple chipped teeth,     Cardiovascular      Pulmonary      Other Findings        Anesthesia Plan  ASA Score- 3     Anesthesia Type- general with ASA Monitors. Additional Monitors:   Airway Plan: ETT. Comment: Scalene block with exparel for post op pain control. Plan Factors-    Chart reviewed. Existing labs reviewed. Patient summary reviewed. Patient is a current smoker. Induction- intravenous. Postoperative Plan- Plan for postoperative opioid use. Informed Consent- Anesthetic plan and risks discussed with patient. I personally reviewed this patient with the CRNA. Discussed and agreed on the Anesthesia Plan with the CRNA. Alvaro Anders

## 2023-07-05 NOTE — DISCHARGE INSTR - AVS FIRST PAGE
Dr. Khadar Mercado Reverse Shoulder Replacement    What to Expect/Activity  Always wear the sling unless performing exercises (described below) or for hygiene. Once your nerve block is fully resolved, you may use your operative arm to lift up to the weight of a cup of coffee directly in front of your body (i.e., it is ok to use this arm to eat and drink if you wish). You may sleep however you would like but you must sleep in the sling. Some people find it more comfortable for a few days to sleep in an upright position or reclining chair. While in the sling or when the sling is off, specifically avoid having your arm rotate away from your body or behind your body. Focus on keeping your elbow in front of your body  The worst positions to place yourself in are:  Reaching up and away from your body to grab something  Placing your arm behind you to push up out of a chair/bed (especially avoid this one). Regular finger, wrist and elbow range of motion to prevent stiffness. Please use ice packs for 20-30 minutes every 1-2 hours for 48-72 hours on the operative hip. Please make sure there is a barrier directly between your skin and your ice/ice pack. On post operative day #3, you can start pendulum exercises (see diagram). Again, be careful to avoid the positions described above. Dressing/Wound Care/Bathing  There is a surgical dressing over your incision that stays in place for 7 days after surgery. You may start showering 48 hours after surgery, the surgical dressing will remain in place. Please pat the dressing dry. If you notice the dressing appears saturated or is starting to come off, please contact the office. On the 7th day, remove dressing carefully. Your sutures will be under the skin. If you notice light drainage after removing the dressing please cover with a dry dressing. If this drainage continues please contact the office. There may be dried blood around the incision.  It is ok to continue showering after removing the dressing but do not scrub the incision. Pat incision dry. Do not place any creams, ointments or gels on or around the incision. No baths, swimming or submerging until cleared by Dr. Beulah Benson may resume your usual medications. Please take the following medications:  Anti-coagulation (blood clot prevention) - 81mg aspirin twice daily  Pain medication:  Narcotic Medication: Take as prescribed  NSAID/Anti-inflammatory: Take as prescribed  Tylenol 1000mg every 8 hours  Zofran (ondasetron) - 4mg every 8 hours as needed for nausea  While taking your narcotic medication, an over-the-counter stool softener may be helpful to prevent constipation. Please consider taking Jaqueline-Colace twice daily while taking this medication. If you have questions or pain concerns, please contact the office. Pain medication cannot remove all post-operative pain. Follow up/Call if:  The findings of your surgery will be explained to you and your family immediately after surgery. However, in the post-operative period, during recovery from anesthesia you may not fully remember or fully understand what was said. This will be again when you return for your post-op appointment. Please contact Dr. Mily Olivarez office if you experience the following:  Excessive bleeding (bleeding through your dressing)  Fever greater than 101 degrees F  Persistent nausea or vomiting  Decreased sensation or discoloration of the operative limb  Pain or swelling that is getting worse and not better with medication                                          Passive Shoulder Motion:  Can be performed either lay down (as pictured) or in a seated or standing position. Grab the operative wrist with your non-operative hand  Raise your operative wrist only with the effort of your non-operative arm. By not using your operative arm to lift, this makes it a passive exercise.   The goal is to reach at least 90 degrees (parallel to the floor when seated/standing or perpendicular if laying down, as in the picture) but you continue beyond 90 degrees if it feels comfortable. Perform 10-15 reps, 3 times per day in addition to the pendulum exercises. It is again important to avoid the positions described in the what to expect/activity section.

## 2023-07-05 NOTE — H&P
[facility] Sports Medicine Shoulder Follow Up Visit     Assesment:   63 y.o. female right shoulder severe glenohumeral joint osteoarthritis     Plan:  The patient's diagnosis and treatment were discussed at length today. We discussed no treatment, non-operative treatment, and operative treatment.     The patient's finding and exam are consistent with right shoulder severe glenohumeral joint osteoarthritis and severe rotator cuff tendinosis with low-grade tearing. Given the extent of her arthritis and lack of improvement with nonsurgical treatment including cortisone injections and physical therapy for 8+ weeks, I did recommend surgical intervention at this time. I discussed with her that this type of prosthesis does come with some limitations and would have a lifelong lifting restriction of 25 pounds. She did understand these restrictions and would like to proceed forward with surgery. All risks and benefits were reviewed and surgical consent for right reverse total shoulder arthroplasty was signed at today's visit. I discussed with her that presurgical planning would include lab work and clearance from her primary care physician as she has already obtained a CT blueprint and MRI of the operative shoulder. I also provided her with a referral to outpatient physical therapy, which she should schedule to have her first evaluation a few days after surgery. She can continue to use activity as tolerated. She can continue to use ice, heat, and over-the-counter medication as needed for pain relief. She is to follow-up approximately 5 to 7 days postoperatively.     Given that the patient has failed conservative treatment and continues to have severe pain and/or dysfunction that limits their activities of daily living, they would like to proceed with operative intervention. We discussed with the patient the risks of no treatment, non-operative treatment, and operative treatment.  The risks of operative intervention were discussed and include but are not limited to: Infection, bleeding, stiffness, loss of range of motion, blood clot, failure of surgery, fracture, delayed union, nonunion, malunion, risk of potential future arthritis, continued problems with swelling, injury to surrounding structures/nerve/artery/vein, recurrence of cysts, failure of hardware, retained hardware and/or foreign body, symptomatic hardware, and continued instability, pain, dysfunction, or disability despite repair.         Conservative treatment:     Ice to shoulder 1-2 times daily, for 20 minutes at a time. Home exercise program for shoulder, including ROM and strenthening. Instructions given to patient of what exercises to perform. Let pain guide return to activities.        Imaging:     All imaging from today was reviewed by myself and explained to the patient.         Injection:     No Injection planned at this time.       Surgery: All of the risks and benefits of operative treatment were explained to the patient, as well as the risks and benefits of any alternative treatment options, including nonoperative care. This risks of surgery include, but are not limited to, infection, bleeding, blood clot, damage to nerves/arteries, need for further surgyer, cardiovascular risk, anesthesia risk, and continued pain. The patient understood this and elects to proceed forward with surgical intervention. We will proceed forward with right reverse total shoulder replacement.        Follow up:     Return for follow up 5-7 days post-op.            Chief Complaint   Patient presents with   • Right Shoulder - Follow-up       MRI Review            History of Present Illness:     The patient is returns for follow up of right shoulder pain. Since the prior visit, She reports minimal to no improvement. She states that she continues to experience significant pain diffusely throughout her shoulder.   She states that she has attempted to continue outpatient physical therapy and home excise program, which provides her very minimal relief of her symptoms. She states that the pain has made activity of daily living significantly difficult such as reaching overhead and behind her back. She states that she is attempted to manage her pain with ice, heat, and over-the-counter medication which provides very minimal relief of her symptoms.   She would like to proceed forward with surgical intervention as her ability to perform normal activities of daily living and the pain is becoming unbearable.     Shoulder Surgical History:  None     Past Medical, Social and Family History:  Medical History        Past Medical History:   Diagnosis Date   • Addiction to drug [facility])     • Anxiety     • Bipolar disorder current episode depressed ([address]) 12/10/2020   • Chest wall pain 6/11/2020   • Chronic suboxone use 6/11/2020   • Depression     • Drug dependence ([address])     • GERD (gastroesophageal reflux disease)     • Medical clearance for psychiatric admission 12/10/2020   • Opiate abuse, continuous ([address]) 8/20/2018   • Osteoarthritis     • Rheumatoid arthritis ([address])     • Severe episode of recurrent major depressive disorder, with psychotic features ([address]) 8/20/2018   • Shoulder impingement syndrome, left 9/24/2020   • Strain of right knee 5/17/2018   • Substance abuse ([address])     • Tubal pregnancy           Surgical History         Past Surgical History:   Procedure Laterality Date   • BREAST IMPLANT       • BUNIONECTOMY Bilateral 1990   • ECTOPIC PREGNANCY SURGERY       • TUBAL LIGATION             No Known Allergies         Current Outpatient Medications on File Prior to Visit   Medication Sig Dispense Refill   • buprenorphine-naloxone (SUBOXONE) 8-2 mg per SL tablet         • buPROPion (WELLBUTRIN) 75 mg tablet Take 1 tablet (75 mg total) by mouth daily 30 tablet 2   • Diclofenac Sodium (VOLTAREN) 1 % Apply 2 g topically 4 (four) times a day 100 g 0   • hydrOXYzine HCL (ATARAX) 50 mg tablet Take 1 tablet (50 mg total) by mouth every 6 (six) hours as needed for anxiety 30 tablet 0   • levothyroxine (Euthyrox) 25 mcg tablet Take 1 tablet (25 mcg total) by mouth daily in the early morning 90 tablet 0   • QUEtiapine (SEROquel) 100 mg tablet Take 1 tablet (100 mg total) by mouth daily at bedtime 30 tablet 2   • sertraline (ZOLOFT) 100 mg tablet Take 200 mg by mouth daily       • traZODone (DESYREL) 100 mg tablet Take 2 tablets (200 mg total) by mouth daily at bedtime 60 tablet 2   • gabapentin (NEURONTIN) 300 mg capsule Take 1 capsule (300 mg total) by mouth 3 (three) times a day 90 capsule 1   • mupirocin (BACTROBAN) 2 % ointment Apply topically 2 (two) times a day (Patient not taking: Reported on 2023) 22 g 0   • [DISCONTINUED] pantoprazole (PROTONIX) 40 mg tablet TAKE ONE TABLET BY MOUTH DAILY 30 tablet 5      No current facility-administered medications on file prior to visit.      Social History               Socioeconomic History   • Marital status: /Civil Union       Spouse name: Not on file   • Number of children: Not on file   • Years of education: Not on file   • Highest education level: Not on file   Occupational History   • Not on file   Tobacco Use   • Smoking status: Former       Packs/day: 0.25       Years: 30.00       Total pack years: 7.50       Types: Cigarettes       Quit date: 2021       Years since quittin.7   • Smokeless tobacco: Never   • Tobacco comments:       1 pack every 3 days    Vaping Use   • Vaping Use: Never used   Substance and Sexual Activity   • Alcohol use: No   • Drug use: Not Currently       Frequency: 7.0 times per week       Types: Oxycodone, Prescription       Comment: daily abuse of percocet/oxy-recovering since August    • Sexual activity: Not on file   Other Topics Concern   • Not on file   Social History Narrative   • Not on file      Social Determinants of Health      Financial Resource Strain: Not on file   Food Insecurity: Not on file Transportation Needs: Not on file   Physical Activity: Not on file   Stress: Not on file   Social Connections: Not on file   Intimate Partner Violence: Not on file   Housing Stability: Not on file            I have reviewed the past medical, surgical, social and family history, medications and allergies as documented in the EMR. Review of systems: ROS is negative other than that noted in the HPI. Constitutional: Negative for fatigue and fever.       Physical Exam:     Blood pressure 115/78, pulse 74, resp.  rate 16, height 5' 4" (1.626 m), weight 78.5 kg (173 lb 1.6 oz).     General/Constitutional: NAD, well developed, well nourished  HENT: Normocephalic, atraumatic  CV: Intact distal pulses, regular rate  Resp: No respiratory distress or labored breathing  Lymphatic: No lymphadenopathy palpated  Neuro: Alert and Oriented x 3, no focal deficits  Psych: Normal mood, normal affect, normal judgement, normal behavior  Skin: Warm, dry, no rashes, no erythema        Shoulder focused exam:        Visual inspection of the shoulder demonstrates normal contour without atrophy  No evidence of scapular dyskinesia or atrophy.  No scapular winging  Active and passive range of motion demonstrates forward flexion to 90, abduction to 60, external rotation is 20 with the arm the side, internal rotation to  hip   Rotator cuff strength is 4/5 to resisted forward flexion, 4/5 resisted abduction, 4/5 resisted external rotation, 5/5 resisted internal rotation  No tenderness to palpation at the distal clavicle, AC joint, acromion, or scapular spine  No pain with cross-body adduction  + Neer's test, + modified Aramndo impingement test  Negative external rotation lag sign  Negative belly press, negative lift-off  + speed's and Yergason's test  Mild tenderness to palpation at the bicipital groove  - [place]'s test     UE NV Exam: +2 Radial pulses bilaterally  Sensation intact to light touch C5-T1 bilaterally, Radial/median/ulnar nerve motor intact     Cervical ROM is full without pain, numbness or tingling        Shoulder Imaging     MRI of right shoulder was reviewed and demonstrates severe supraspinatus tendinosis with significant interstitial tearing without a full-thickness component. Severe glenohumeral joint osteoarthritis and moderate subacromial/subdeltoid bursitis is noted. I have reviewed and agree with the radiologist interpretation.             Scribe Attestation    I,:  Meche Ewing am acting as a scribe while in the presence of the attending physician.:       I,:  Cathie Harrell DO personally performed the services described in this documentation    as scribed in my presence. :                       Revision History

## 2023-07-06 ENCOUNTER — APPOINTMENT (OUTPATIENT)
Dept: RADIOLOGY | Facility: HOSPITAL | Age: 63
End: 2023-07-06
Payer: COMMERCIAL

## 2023-07-06 ENCOUNTER — ANESTHESIA (OUTPATIENT)
Dept: PERIOP | Facility: HOSPITAL | Age: 63
End: 2023-07-06
Payer: COMMERCIAL

## 2023-07-06 ENCOUNTER — HOSPITAL ENCOUNTER (OUTPATIENT)
Facility: HOSPITAL | Age: 63
Setting detail: OUTPATIENT SURGERY
Discharge: HOME | End: 2023-07-06
Attending: STUDENT IN AN ORGANIZED HEALTH CARE EDUCATION/TRAINING PROGRAM | Admitting: STUDENT IN AN ORGANIZED HEALTH CARE EDUCATION/TRAINING PROGRAM
Payer: COMMERCIAL

## 2023-07-06 VITALS
RESPIRATION RATE: 17 BRPM | OXYGEN SATURATION: 93 % | TEMPERATURE: 97.8 F | DIASTOLIC BLOOD PRESSURE: 61 MMHG | HEART RATE: 68 BPM | SYSTOLIC BLOOD PRESSURE: 97 MMHG | WEIGHT: 165.34 LBS | HEIGHT: 64 IN | BODY MASS INDEX: 28.23 KG/M2

## 2023-07-06 DIAGNOSIS — M19.011 PRIMARY OSTEOARTHRITIS OF RIGHT SHOULDER: Primary | ICD-10-CM

## 2023-07-06 PROCEDURE — C1713 ANCHOR/SCREW BN/BN,TIS/BN: HCPCS | Performed by: STUDENT IN AN ORGANIZED HEALTH CARE EDUCATION/TRAINING PROGRAM

## 2023-07-06 PROCEDURE — C1776 JOINT DEVICE (IMPLANTABLE): HCPCS | Performed by: STUDENT IN AN ORGANIZED HEALTH CARE EDUCATION/TRAINING PROGRAM

## 2023-07-06 PROCEDURE — 73020 X-RAY EXAM OF SHOULDER: CPT

## 2023-07-06 PROCEDURE — C9290 INJ, BUPIVACAINE LIPOSOME: HCPCS | Performed by: ANESTHESIOLOGY

## 2023-07-06 PROCEDURE — 23472 RECONSTRUCT SHOULDER JOINT: CPT | Performed by: STUDENT IN AN ORGANIZED HEALTH CARE EDUCATION/TRAINING PROGRAM

## 2023-07-06 PROCEDURE — 23472 RECONSTRUCT SHOULDER JOINT: CPT | Performed by: PHYSICIAN ASSISTANT

## 2023-07-06 DEVICE — UNIVERS REVERS SUTURE CUP, 33 (+2 RIGHT)
Type: IMPLANTABLE DEVICE | Site: SHOULDER | Status: FUNCTIONAL
Brand: ARTHREX®

## 2023-07-06 DEVICE — HUMERAL INSERT XS 33+3MM
Type: IMPLANTABLE DEVICE | Site: SHOULDER | Status: FUNCTIONAL
Brand: ARTHREX®

## 2023-07-06 DEVICE — 25MM CENTRAL SCREW, MODULAR
Type: IMPLANTABLE DEVICE | Site: SHOULDER | Status: FUNCTIONAL
Brand: ARTHREX®

## 2023-07-06 DEVICE — UNIVERS REVERS HUMERAL STEM, SIZE 7
Type: IMPLANTABLE DEVICE | Site: SHOULDER | Status: FUNCTIONAL
Brand: ARTHREX®

## 2023-07-06 DEVICE — FIBERTAK BICEPS IMPLANT SET
Type: IMPLANTABLE DEVICE | Site: SHOULDER | Status: FUNCTIONAL
Brand: ARTHREX®

## 2023-07-06 DEVICE — 24MM BASEPLATE, MODULAR
Type: IMPLANTABLE DEVICE | Site: SHOULDER | Status: FUNCTIONAL
Brand: ARTHREX®

## 2023-07-06 DEVICE — 5.5X28MM PERIPHERAL SCREW, LOCKING
Type: IMPLANTABLE DEVICE | Site: SHOULDER | Status: FUNCTIONAL
Brand: ARTHREX®

## 2023-07-06 DEVICE — 33 +4 LAT/24 GLENOSPHERE
Type: IMPLANTABLE DEVICE | Site: SHOULDER | Status: FUNCTIONAL
Brand: ARTHREX®

## 2023-07-06 DEVICE — 5.5X16MM PERIPHERAL SCREW, LOCKING
Type: IMPLANTABLE DEVICE | Site: SHOULDER | Status: FUNCTIONAL
Brand: ARTHREX®

## 2023-07-06 DEVICE — 4.5X20MM PERIPHERAL SCREW, NON-LOCKING
Type: IMPLANTABLE DEVICE | Site: SHOULDER | Status: FUNCTIONAL
Brand: ARTHREX®

## 2023-07-06 RX ORDER — MIDAZOLAM HYDROCHLORIDE 2 MG/2ML
INJECTION, SOLUTION INTRAMUSCULAR; INTRAVENOUS
Status: COMPLETED | OUTPATIENT
Start: 2023-07-06 | End: 2023-07-06

## 2023-07-06 RX ORDER — ACETAMINOPHEN 325 MG/1
975 TABLET ORAL EVERY 6 HOURS PRN
Status: DISCONTINUED | OUTPATIENT
Start: 2023-07-06 | End: 2023-07-06 | Stop reason: HOSPADM

## 2023-07-06 RX ORDER — ROCURONIUM BROMIDE 10 MG/ML
INJECTION, SOLUTION INTRAVENOUS AS NEEDED
Status: DISCONTINUED | OUTPATIENT
Start: 2023-07-06 | End: 2023-07-06

## 2023-07-06 RX ORDER — PHENYLEPHRINE HCL IN 0.9% NACL 1 MG/10 ML
SYRINGE (ML) INTRAVENOUS AS NEEDED
Status: DISCONTINUED | OUTPATIENT
Start: 2023-07-06 | End: 2023-07-06

## 2023-07-06 RX ORDER — GABAPENTIN 300 MG/1
300 CAPSULE ORAL ONCE
Status: COMPLETED | OUTPATIENT
Start: 2023-07-06 | End: 2023-07-06

## 2023-07-06 RX ORDER — KETOROLAC TROMETHAMINE 30 MG/ML
15 INJECTION, SOLUTION INTRAMUSCULAR; INTRAVENOUS ONCE
Status: COMPLETED | OUTPATIENT
Start: 2023-07-06 | End: 2023-07-06

## 2023-07-06 RX ORDER — ACETAMINOPHEN 500 MG
1000 TABLET ORAL EVERY 8 HOURS PRN
Qty: 60 TABLET | Refills: 2 | Status: SHIPPED | OUTPATIENT
Start: 2023-07-06

## 2023-07-06 RX ORDER — LIDOCAINE HYDROCHLORIDE 10 MG/ML
INJECTION, SOLUTION EPIDURAL; INFILTRATION; INTRACAUDAL; PERINEURAL
Status: COMPLETED | OUTPATIENT
Start: 2023-07-06 | End: 2023-07-06

## 2023-07-06 RX ORDER — CELECOXIB 100 MG/1
100 CAPSULE ORAL 2 TIMES DAILY
Qty: 60 CAPSULE | Refills: 0 | Status: SHIPPED | OUTPATIENT
Start: 2023-07-06

## 2023-07-06 RX ORDER — CHLORHEXIDINE GLUCONATE 4 G/100ML
SOLUTION TOPICAL DAILY PRN
Status: DISCONTINUED | OUTPATIENT
Start: 2023-07-06 | End: 2023-07-06 | Stop reason: HOSPADM

## 2023-07-06 RX ORDER — LIDOCAINE HYDROCHLORIDE 10 MG/ML
INJECTION, SOLUTION EPIDURAL; INFILTRATION; INTRACAUDAL; PERINEURAL AS NEEDED
Status: DISCONTINUED | OUTPATIENT
Start: 2023-07-06 | End: 2023-07-06

## 2023-07-06 RX ORDER — OXYCODONE HYDROCHLORIDE 5 MG/1
5 TABLET ORAL EVERY 4 HOURS PRN
Status: DISCONTINUED | OUTPATIENT
Start: 2023-07-06 | End: 2023-07-06 | Stop reason: HOSPADM

## 2023-07-06 RX ORDER — ACETAMINOPHEN 325 MG/1
975 TABLET ORAL ONCE
Status: COMPLETED | OUTPATIENT
Start: 2023-07-06 | End: 2023-07-06

## 2023-07-06 RX ORDER — HYDROMORPHONE HCL/PF 1 MG/ML
0.5 SYRINGE (ML) INJECTION
Status: DISCONTINUED | OUTPATIENT
Start: 2023-07-06 | End: 2023-07-06 | Stop reason: HOSPADM

## 2023-07-06 RX ORDER — CHLORHEXIDINE GLUCONATE 0.12 MG/ML
15 RINSE ORAL ONCE
Status: DISCONTINUED | OUTPATIENT
Start: 2023-07-06 | End: 2023-07-06 | Stop reason: HOSPADM

## 2023-07-06 RX ORDER — FENTANYL CITRATE/PF 50 MCG/ML
25 SYRINGE (ML) INJECTION
Status: DISCONTINUED | OUTPATIENT
Start: 2023-07-06 | End: 2023-07-06 | Stop reason: HOSPADM

## 2023-07-06 RX ORDER — SODIUM CHLORIDE, SODIUM LACTATE, POTASSIUM CHLORIDE, CALCIUM CHLORIDE 600; 310; 30; 20 MG/100ML; MG/100ML; MG/100ML; MG/100ML
INJECTION, SOLUTION INTRAVENOUS CONTINUOUS PRN
Status: DISCONTINUED | OUTPATIENT
Start: 2023-07-06 | End: 2023-07-06

## 2023-07-06 RX ORDER — TRANEXAMIC ACID 10 MG/ML
INJECTION, SOLUTION INTRAVENOUS AS NEEDED
Status: DISCONTINUED | OUTPATIENT
Start: 2023-07-06 | End: 2023-07-06

## 2023-07-06 RX ORDER — ONDANSETRON 2 MG/ML
4 INJECTION INTRAMUSCULAR; INTRAVENOUS ONCE
Status: DISCONTINUED | OUTPATIENT
Start: 2023-07-06 | End: 2023-07-06 | Stop reason: HOSPADM

## 2023-07-06 RX ORDER — OXYCODONE HYDROCHLORIDE 5 MG/1
5 TABLET ORAL EVERY 4 HOURS PRN
Qty: 30 TABLET | Refills: 0 | Status: SHIPPED | OUTPATIENT
Start: 2023-07-06 | End: 2023-07-11

## 2023-07-06 RX ORDER — DEXAMETHASONE SODIUM PHOSPHATE 10 MG/ML
INJECTION, SOLUTION INTRAMUSCULAR; INTRAVENOUS AS NEEDED
Status: DISCONTINUED | OUTPATIENT
Start: 2023-07-06 | End: 2023-07-06

## 2023-07-06 RX ORDER — CEFADROXIL 500 MG/1
500 CAPSULE ORAL EVERY 12 HOURS SCHEDULED
Qty: 10 CAPSULE | Refills: 0 | Status: SHIPPED | OUTPATIENT
Start: 2023-07-06 | End: 2023-07-11

## 2023-07-06 RX ORDER — CEFAZOLIN SODIUM 2 G/50ML
2000 SOLUTION INTRAVENOUS ONCE
Status: COMPLETED | OUTPATIENT
Start: 2023-07-06 | End: 2023-07-06

## 2023-07-06 RX ORDER — BUPIVACAINE HYDROCHLORIDE 5 MG/ML
INJECTION, SOLUTION EPIDURAL; INTRACAUDAL
Status: COMPLETED | OUTPATIENT
Start: 2023-07-06 | End: 2023-07-06

## 2023-07-06 RX ORDER — FENTANYL CITRATE 50 UG/ML
INJECTION, SOLUTION INTRAMUSCULAR; INTRAVENOUS
Status: COMPLETED | OUTPATIENT
Start: 2023-07-06 | End: 2023-07-06

## 2023-07-06 RX ORDER — ONDANSETRON 2 MG/ML
4 INJECTION INTRAMUSCULAR; INTRAVENOUS EVERY 6 HOURS PRN
Status: DISCONTINUED | OUTPATIENT
Start: 2023-07-06 | End: 2023-07-06 | Stop reason: HOSPADM

## 2023-07-06 RX ORDER — PROPOFOL 10 MG/ML
INJECTION, EMULSION INTRAVENOUS AS NEEDED
Status: DISCONTINUED | OUTPATIENT
Start: 2023-07-06 | End: 2023-07-06

## 2023-07-06 RX ORDER — ONDANSETRON 2 MG/ML
INJECTION INTRAMUSCULAR; INTRAVENOUS AS NEEDED
Status: DISCONTINUED | OUTPATIENT
Start: 2023-07-06 | End: 2023-07-06

## 2023-07-06 RX ORDER — TRANEXAMIC ACID 10 MG/ML
1000 INJECTION, SOLUTION INTRAVENOUS ONCE
Status: COMPLETED | OUTPATIENT
Start: 2023-07-06 | End: 2023-07-06

## 2023-07-06 RX ORDER — VANCOMYCIN HYDROCHLORIDE 1 G/200ML
1000 INJECTION, SOLUTION INTRAVENOUS ONCE
Status: DISCONTINUED | OUTPATIENT
Start: 2023-07-06 | End: 2023-07-06

## 2023-07-06 RX ADMIN — BUPIVACAINE HYDROCHLORIDE 7 ML: 5 INJECTION, SOLUTION EPIDURAL; INTRACAUDAL; PERINEURAL at 09:15

## 2023-07-06 RX ADMIN — SUGAMMADEX 200 MG: 100 INJECTION, SOLUTION INTRAVENOUS at 12:43

## 2023-07-06 RX ADMIN — Medication 200 MCG: at 10:13

## 2023-07-06 RX ADMIN — GABAPENTIN 300 MG: 300 CAPSULE ORAL at 08:37

## 2023-07-06 RX ADMIN — SODIUM CHLORIDE, SODIUM LACTATE, POTASSIUM CHLORIDE, AND CALCIUM CHLORIDE: .6; .31; .03; .02 INJECTION, SOLUTION INTRAVENOUS at 09:30

## 2023-07-06 RX ADMIN — MIDAZOLAM 2 MG: 1 INJECTION INTRAMUSCULAR; INTRAVENOUS at 09:15

## 2023-07-06 RX ADMIN — FENTANYL CITRATE 100 MCG: 50 INJECTION, SOLUTION INTRAMUSCULAR; INTRAVENOUS at 09:15

## 2023-07-06 RX ADMIN — DEXAMETHASONE SODIUM PHOSPHATE 10 MG: 10 INJECTION, SOLUTION INTRAMUSCULAR; INTRAVENOUS at 10:02

## 2023-07-06 RX ADMIN — TRANEXAMIC ACID 1000 MG: 10 INJECTION, SOLUTION INTRAVENOUS at 09:55

## 2023-07-06 RX ADMIN — KETOROLAC TROMETHAMINE 15 MG: 30 INJECTION, SOLUTION INTRAMUSCULAR; INTRAVENOUS at 13:46

## 2023-07-06 RX ADMIN — PHENYLEPHRINE HYDROCHLORIDE 50 MCG/MIN: 10 INJECTION, SOLUTION INTRAVENOUS at 10:18

## 2023-07-06 RX ADMIN — FENTANYL CITRATE 25 MCG: 50 INJECTION, SOLUTION INTRAMUSCULAR; INTRAVENOUS at 13:28

## 2023-07-06 RX ADMIN — PROPOFOL 150 MG: 10 INJECTION, EMULSION INTRAVENOUS at 10:02

## 2023-07-06 RX ADMIN — FENTANYL CITRATE 25 MCG: 50 INJECTION, SOLUTION INTRAMUSCULAR; INTRAVENOUS at 13:40

## 2023-07-06 RX ADMIN — ONDANSETRON 4 MG: 2 INJECTION INTRAMUSCULAR; INTRAVENOUS at 10:02

## 2023-07-06 RX ADMIN — LIDOCAINE HYDROCHLORIDE 3 ML: 10 INJECTION, SOLUTION EPIDURAL; INFILTRATION; INTRACAUDAL; PERINEURAL at 09:15

## 2023-07-06 RX ADMIN — TRANEXAMIC ACID 1000 MG: 10 INJECTION, SOLUTION INTRAVENOUS at 09:29

## 2023-07-06 RX ADMIN — CEFAZOLIN SODIUM 2000 MG: 2 SOLUTION INTRAVENOUS at 10:15

## 2023-07-06 RX ADMIN — LIDOCAINE HYDROCHLORIDE 20 MG: 10 INJECTION, SOLUTION EPIDURAL; INFILTRATION; INTRACAUDAL; PERINEURAL at 10:02

## 2023-07-06 RX ADMIN — ROCURONIUM BROMIDE 50 MG: 10 INJECTION INTRAVENOUS at 10:02

## 2023-07-06 RX ADMIN — BUPIVACAINE 20 ML: 13.3 INJECTION, SUSPENSION, LIPOSOMAL INFILTRATION at 09:14

## 2023-07-06 RX ADMIN — ACETAMINOPHEN 975 MG: 325 TABLET ORAL at 08:37

## 2023-07-06 RX ADMIN — Medication 100 MCG: at 10:09

## 2023-07-06 NOTE — ANESTHESIA POSTPROCEDURE EVALUATION
Post-Op Assessment Note    CV Status:  Stable  Pain Score: 0    Pain management: adequate     Mental Status:  Alert and awake   Hydration Status:  Euvolemic   PONV Controlled:  Controlled   Airway Patency:  Patent      Post Op Vitals Reviewed: Yes      Staff: Anesthesiologist, CRNA         No notable events documented.     BP   109/64   Temp   97.8   Pulse  69   Resp   18   SpO2   94

## 2023-07-06 NOTE — OP NOTE
OPERATIVE REPORT  PATIENT NAME: Sapna Cespedes    :  1960  MRN: [id number]  Pt Location:  OR ROOM 02    SURGERY DATE: 2023    Surgeon(s) and Role:     * Maya Galindo DO - Primary     * Rosalinda Yung PA-C - [address] [place] [address]. MS, ATC - Assisting    Preop Diagnosis:  Primary osteoarthritis of right shoulder M19.011    Post-Op Diagnosis Codes:     * Primary osteoarthritis of right shoulder [M19.011]    Procedure(s):  Right - REVERSE SHOULDER ARTHROPLASTY - SAME DAY  Right - OPEN SUBPECTORAL BICEPS TENODESIS    Specimen(s):  * No specimens in log *    Estimated Blood Loss:   Minimal    Drains:  * No LDAs found *    Anesthesia Type:   General w/ Regional    Operative Indications:  Primary osteoarthritis of right shoulder M19.011  The patient is a very active 63year-old  who comes in to me with significant problems associated with right shoulder. After failure of conservative nonoperative care, eventually came and saw me, on physical examination with x-ray and radiographic findings, found to have a right shoulder osteoarthritis with high-grade supraspinatus tearing. The risks and benefits of surgical intervention were explained including, but not exclusive to, infection, DVT, loss of range of motion, stiffness, continuance of pain, failure, fracture, dislocation, delayed union, malunion, nonunion, wound complications, and neurovascular compromise. Operative Findings:  Right shoulder osteoarthritis  High-grade partial tear supraspinatus  Intact subscapularis    Complications:   None    Implants:  Arthrex Univers Reverse  24mm Modular Glenoid Baseplate, non-lateralized  25mm Modular Central screw  33+4 Lateralized glenosphere  Univers Humeral Stem Size 7  33+2 Suture Cup, posterior offset  33+3 polyethylene insert    Procedure and Technique:Patient was seen examined in the preoperative holding area.   Patient's identity was confirmed with the wrist band and hospital chart. The operative extremity was marked in the laterality was confirmed and signed by both the patient and myself matching the consent. Patient was brought to the operating room where there placed supine in a beach chair position. Anesthesia was induced. All bony prominences were padded. A wedge was placed below the legs. Patient was positioned upright in a beach chair position using the foam head positioner in a neutral cervical alignment. The well arm was placed on a padded arm harris. Shoulder was sterilely prepped and draped. A formal time-out was performed. Longitudinal incision was made over the deltopectoral interval.  Subcutaneous tissues were coagulated with the Bovie electrocautery device. The retractors were placed to place the tissues on tension and the cephalic vein was dissected and taken laterally along with the tributaries. The deep deltopectoral interval was then bluntly opened with digital palpation. A Simran retractor was then placed deep to the pec and deltoid. The clavipectoral fascia was dissected off of the anterior subscapularis and the lateral border of the conjoined tendon was identified and bluntly released with digital palpation. The Simran retractor was advanced deep to the conjoined tendon the arm was placed in slight external rotation. Additional clavipectoral adhesions were then released with a lap pad and blunt dissection in addition to a Metzenbaums. The upper border of the pec was then released proximally 1 cm for age and visualization. The biceps tendon was identified and the sheath was opened. The biceps tendon was followed proximally through its groove to the rotator interval and amputated proximally leaving a large stump. Distally the biceps tendon was placed on tension and tenodesed and open subpectoral tenodesis fashion to the upper border of the pec tendon. This was performed with a #5 Ethibond suture in figure-of-eight fashion.   The subacromial subdeltoid and subcoracoid spaces were then opened bluntly with a Schnidt followed by Grecia Hill. A brown deltoid retractor was placed under the deltoid with the arm in abduction and the arm was again externally rotated to identify the subscapularis. The vessels were cauterized with a Schnidt and Bovie electrocautery identifying the 3 sisters. Inferior border of the subscapularis was then identified. A subscapularis peel was performed with a Bovie electrocautery device moving from superior lateral to inferomedial.  An Ethibond suture was placed in Jayden-Abhilash fashion to apply tension to the subscapularis during PL to assist in release. Salas scissor was then used to complete the release of the subscapularis through the rotator interval towards the base of the coracoid, with special precaution to avoid injuring the conjoined tendon. The coracoacromial ligament was released with Bovie electrocautery device. The arm was then placed into forward flexion and external rotation, further releasing the inferior capsule off of the anteroinferior humerus using a Hohmann to protect the axillary nerve. The glenohumeral joint was then dislocated and the humerus was externally rotated to identify significant osteophytes along the inferior humeral neck. A rongeur was used to remove osteophytes down to native bone. Extramedullary Arthrex humeral cutting guide was placed along the top of the lesser tuberosity along the anatomic neck of the humerus. This was provisionally marked with a saw and then completed in freehand fashion followed by use of an osteotome. .  Follow the patient as needed version, approximate 20 degrees of retroversion. We then removed additional osteophytes down to the patient's native shell of cortical bone. We placed a small On the humerus for protection and moved onto the glenoid. The subscapularis was then again identified with this tag sutures and retracted medially.   A Caio retractor was placed along the posterior inferior glenoid, and the arm was then abducted and externally rotated to complete glenoid exposure. Schnidt and Sam retractor were used to make a rent along the capsulolabral junction along the anterior glenoid. A Batman retractor was placed here to further assist in anterior glenoid exposure. A double prong Hohmann retractor was placed superiorly. We had excellent exposure of the glenoid. Roya Edgar was then used to identify the stump of the biceps tendon which was released with a deep 15 blade posteriorly down to approximately 7:00. The anterior labrum was then removed and just barely down to approximately 5:00. A 15 blade was then used to slightly release the inferior labrum at the plantar labral junction and a Sam retractor was used to perform the remainder released bluntly without danger to the axillary nerve. The Arthrex reverse shoulder arthroplasty small baseplate guide was then used along the glenoid and placed in the center center position a pin was advanced bicortically and palpated anteriorly at Austen's point demonstrating bicortical fixation. A Arthrex 33 mm diameter reamer was then used to remove approximately 2 mm of cortical bone until we had bleeding cancellous bone. We then used the punch reamer followed by a peripheral reamer for a 33 mm glenosphere. We briefly evaluate the possibility of a 36 mm glenosphere and was felt to significantly overhanging the inferior rim of the glenoid. We placed a 24 mm standard nonlateralized baseplate with a 25 mm modular screw. After engaging the [facility] FOR [facility] CARE MARANDA taper between the screw and the baseplate, the baseplate was then inserted until had bicortical fixation. Due to patient overall bone quality being poor we decided to add for peripheral screws.   A compression screw was initially added inferiorly a depth of 20 mm.  3 locking screws were then placed anteriorly posteriorly and superiorly, 16 mm, 28 mm, and 16 mm respectively. After fixation of peripheral screws the baseplate had excellent fixation. We then placed a +4 lateralized 33 mm glenosphere and malleted this into place engaging the [facility] CARE MARANDA taper. The Lozano taper screw was then inserted until the torque limiting device was achieved. We then focused our attention back on the humerus. Difficulty was removed carefully taking care not to damage the glenosphere. The humerus was then again dislocated anteriorly and brown deltoid was again reinserted. This was removed and a T-handle was used as a canal finder. Sequential reaming was then performed. Sequential broaching was then performed up to a 7 mm Arthrex broach. This was felt to have excellent fixation. Due to slight anterior position relative to the proximal metaphysis, and offset reamer was used proximally with posterior offset. This was then reamed to a positive stop and trials were then placed. After trialing demonstrated excellent stability fixation of range of motion we irrigated extensively with normal saline followed by Irrisept    We then used an Arthrex biceps fiber tack 1.9 mm suture anchor along the lesser tuberosity for later subscapularis repair. This was drilled and set and demonstrated good fixation. After extensive irrigation. An Arthrex size 7 universe reverse humeral stem was implanted with a 33 humeral cup. This was implanted into place using a mallet until demonstrated excellent fixation approximately 20 degrees of retroversion. This was then again trialed with a 33+3 standard polyethylene liner which demonstrated excellent stability, tension, slight lateral shuck, and no impingement between external Tatian internal rotation abduction or forward flexion. Implant was again copiously irrigated and a Arthrex polyethylene 33+3 humeral insert was opened and malleted into place. This was again reduced and trialed demonstrating excellent stability and tension.   Irrisept solution was again allowed to soak over the final implants. The Arthrex biceps fiber tack sutures were then passed in running [place] fashion for suture shuttle technique through the subscapularis. Unfortunately, the implant pulled out of the humerus while tensioning, however the sutures were utilized through the medial aspect of the shell of the humeral component, reapproximating the subscapularis over the lesser tuberosity. These were then tied to the Jayden-Abhilash sutures that were provisionally placed to the subscapularis. This demonstrated excellent restoration of the subscapularis over the lesser tuberosity with appropriate tension and approximately neutral rotation of the arm. Then again extensively irrigated the shoulder with saline and Irrisept. The deltopectoral interval was closed with #5 Ethibond in interrupted fashion. Subcutaneous tissues were closed with 2-0 Vicryl. Skin was closed with running 3-0 Monocryl and skin glue. . A sterile dressing was placed on the shoulder. The patient had good capillary refill. The patient was awoken in the operating room and brought to recovery room in stable condition in an abduction splint. There transported to the PACU in stable condition. I was present for the entire procedure., A qualified resident physician was not available. and A physician assistant was required during the procedure for retraction, tissue handling, dissection and suturing.     Patient Disposition:  PACU     SIGNATURE: Keshia Ny DO  DATE: July 6, 2023  TIME: 12:50 PM

## 2023-07-06 NOTE — INTERVAL H&P NOTE
H&P reviewed. After examining the patient I find no changes in the patients condition since the H&P had been written.     Vitals:    07/06/23 0835   BP: 112/67   Pulse: 64   Resp: 16   Temp: 98 °F (36.7 °C)   SpO2: 94%

## 2023-07-06 NOTE — PROGRESS NOTES
PT referral s/p Right RTSA      Precautions: Should be implemented for the first 12 weeks following surgery- unless the surgeon specifically advises the   patient differently. • No shoulder motion behind back ( back pocket motion)   • No excessive shoulder horizontal abduction   • No active external rotation behind head or neck   • No shoulder extension beyond the body       I. PHASE ONE - IMMEDIATE PROTECTED MOTION PHASE (Week 0-6)    Goals:   • Allow early healing of capsule   • Restore passive range of motion   •  Decrease shoulder pain   •  Retard muscular atrophy   •  Patient education     Weeks 0-2  Exercises:     ? Sling during day and at night (worn for 4 weeks)   ? Continuous Passive Motion (CPM)     ? Passive Range of Motion      a. Flexion (0-60 degrees)      b. ER (at 30 degrees Abduction) 0 degrees      c. IR (at 30 degrees Abduction) 30 degrees    ? Pendulum Exercises     ? No active shoulder motion     ? Elbow/Wrist AROM     ? Gripping Exercises     ? Isometrics      a. Abductors      b. ER/IR     ? Cryotherapy for pain      * When laying supine use pillow under arm to support glenohumeral joint  Weeks 3-4    ? Continue sling as needed    ? Continue PROM   a. Progress flexion to 90 degrees   b. ER/IR at 30 degrees abd scapular plane   ? May initiate AAROM IR/ER   ? Pendulum exercise   ? Rope and pulley week 3 to 4    ? Continue isometric   a. Initiate rhythmic stabilization drills   ? Continue use of ice     II. PHASE TWO - ACTIVE MOTION PHASE (Week 6-12)    Goals:   • Improve Shoulder Strength   •  Gradually progress Active/Passive Range of Motion   •  Decrease Pain/Inflammation   •  Increase Functional Activities   •  Do not over stress healing tissue     Weeks 6-8   Exercises:     ? Progress PROM      a. Flexion to  degrees      b. ER/IR at 45 degrees abduction scapular plane      c. IR     ? Progress AAROM ER/IR at 45 degrees abd     ? Do not aggressively push ROM into ER     ?  Continue rope and pulley to tolerance   ? Pendulum exercises   ? Continue isometrics   a. ER/IR   b. Rhythmic stabilization   c. Initiate deltoid flexion/ext   ? Ice as needed     Weeks 9-12    ? Progress PROM to tolerance      a. Flexion to 120-125 degrees      b. ER/IR at 90 degrees abduction (goal is 45-50 degrees of ER motion)   c. ER/IR at 45 degrees abduction   ? Progress AAROM to tolerance   a. ER/IR at 45 degrees abd   b. Initiate flexion supine L-bar   ? Initiate AROM exercises   a. Sidelying flexion   b. Supine flexion   c. Sidelying ER   ? Continue strengthening and stabilization   a. Tubing ER/IR   b. Supine ER   c. Standing full can   d. Prone exercise   e. Biceps   ? May perform pool exercises   ? Continue rhythmic stabilization   a. Supine flex/ext   b. Supine ER/IR     III. PHASE THREE - MODERATE STRENGTHENING/ACTIVITY PHASE (WEEKS 12-16)    Goals:  •  Gradually increase PROM   •  Initiate active light strengthening exercises   •  Gradually initiate functional activities   •  Continue precautions with excessive GH joint motion    Exercises:   • continue all exercises listed above   • Initiate light active ROM exercises   • Initiate fundamental shoulder program     IV. PHASE IV - RETURN TO ACTIVITY PHASE (WEEKS 16- 26)    ? Initiation of this phase begins when patient exhibits:     1) PROM: Flexion 0-145 degrees ER (at 90 degrees Abduction) 33-55 degrees IR (at 90 degrees Abduction) 45-55 degrees     2) Strength level 4/5 for ER/IR/abd    Goals: Improve strength of shoulder musculature    Improve and gradually increase functional activities    Gradual restoration of functional activities    Independent home exercise program    Exercises:     ? fundamental shoulder exercise program     ? May continue pool exercises     ? Should exercise daily     ?  May initiate interval sport program (golf, swim) Physician must approve

## 2023-07-06 NOTE — ANESTHESIA PROCEDURE NOTES
Peripheral Block    Patient location during procedure: holding area  Start time: 7/6/2023 9:15 AM  Reason for block: at surgeon's request and post-op pain management  Staffing  Performed by: Annita Che DO  Authorized by: Annita Che DO    Preanesthetic Checklist  Completed: patient identified, IV checked, site marked, risks and benefits discussed, surgical consent, monitors and equipment checked, pre-op evaluation and timeout performed  Peripheral Block  Patient position: sitting  Prep: ChloraPrep  Patient monitoring: heart rate, continuous pulse ox and frequent blood pressure checks  Block type: interscalene  Laterality: right  Injection technique: single-shot  Procedures: ultrasound guided, Ultrasound guidance required for the procedure to increase accuracy and safety of medication placement and decrease risk of complications.   Ultrasound permanent image savedbupivacaine (PF) (MARCAINE) injection 0.5 % - Perineural   7 mL - 7/6/2023 9:15:00 AM  midazolam (VERSED) 2 mg/2 mL - Intravenous   2 mg - 7/6/2023 9:15:00 AM  fentaNYL 50 mcg/mL - Intravenous   100 mcg - 7/6/2023 9:15:00 AM  lidocaine (PF) (XYLOCAINE-MPF) 1 % - Infiltration   3 mL - 7/6/2023 9:15:00 AM  Needle  Needle type: Stimuplex   Needle gauge: 21 G  Needle length: 4 in  Needle localization: ultrasound guidance  Assessment  Injection assessment: incremental injection, local visualized surrounding nerve on ultrasound, negative aspiration for heme and no paresthesia on injection  Paresthesia pain: none  Heart rate change: no  Slow fractionated injection: yes  Post-procedure:  site cleaned  patient tolerated the procedure well with no immediate complications

## 2023-07-10 ENCOUNTER — TELEPHONE (OUTPATIENT)
Dept: OBGYN CLINIC | Facility: HOSPITAL | Age: 63
End: 2023-07-10

## 2023-07-10 NOTE — TELEPHONE ENCOUNTER
Caller:     Doctor: Dr. Vishnu Velazquez    Reason for call:  calling stating that wife is having a real tough time with her pain.  reports that he was 5MG every 4 hours and on Saturday afternoon  starting giving 10MG every 4 hours which seems to be helping. He is wondering if it would be possible to get 10MG tabs. He is asking for call back if prescription can changed to 10MG and sent to pharmacy.      Call back#: [phone]

## 2023-07-10 NOTE — TELEPHONE ENCOUNTER
Patient is having pain 1-2 days post surgery:        Caller: Leena Liu     Doctor/Office: Andre Walker // reverse R shoulder arthroplasty on 7/6     Patient's location of pain is:  R shoulder / axilla / down arm / upper back    Patient's pain scale (from 1-10): 12 +     Patient denies or confirms  swelling around location:     Patient is taking pain medication(s):  Yes / tylenol / oxycodone    Patient is icing:     Patient denies  have calf pain:     Patient denies have chest pain:     Patient  confirms have shortness of breath:     Pt.  Preferred Callback Phone Number: [phone]

## 2023-07-11 DIAGNOSIS — M19.011 PRIMARY OSTEOARTHRITIS OF RIGHT SHOULDER: Primary | ICD-10-CM

## 2023-07-11 RX ORDER — OXYCODONE HYDROCHLORIDE 5 MG/1
5 TABLET ORAL EVERY 4 HOURS PRN
Qty: 25 TABLET | Refills: 0 | Status: SHIPPED | OUTPATIENT
Start: 2023-07-11

## 2023-07-11 RX ORDER — GABAPENTIN 300 MG/1
300 CAPSULE ORAL DAILY
Qty: 30 CAPSULE | Refills: 0 | Status: SHIPPED | OUTPATIENT
Start: 2023-07-11

## 2023-07-11 NOTE — PROGRESS NOTES
Called patient's  August Burgess to discuss pain control regimen. Discussed due to baseline psychiatric medications, I have some concerns with ongoing high doses of oxycodone. However, he states that her pain is currently controlled with 10 mg tablets at a time. I recommend adding gabapentin since this was listed on her prior medication list however he reports she is not currently taking. We will add 300 mg daily initially, and can titrate up as needed. I will also provide a refill for oxycodone 5 mg every 4 hours as needed for severe pain. He reports compliance with Tylenol, Celebrex, aspirin, and antibiotic. I explained that it is normal to have discomfort after this procedure, however her goal is to keep her from being in severe uncontrollable pain. I did advise Bill to monitor for signs of medication interactions between pain medications and her psychiatric medications including sedation. He is aware of these risks and will continue monitoring. I look forward to seeing her in the office at her upcoming office visit on 7/18/2023.

## 2023-07-11 NOTE — TELEPHONE ENCOUNTER
Caller: Rosemary Stoddard ()    Doctor: Myra Chapman     Reason for call: Patient relayed message that Dr. Myra Chapman will be calling in between cases today and how to medications and Dr. Myra Chapman noted. Relayed to please have phone near by .   He verbalized understanding and looks forward to speaking with Doctor       Call back#: [phone]

## 2023-07-13 ENCOUNTER — EVALUATION (OUTPATIENT)
Dept: PHYSICAL THERAPY | Facility: CLINIC | Age: 63
End: 2023-07-13
Payer: COMMERCIAL

## 2023-07-13 DIAGNOSIS — Z96.611 STATUS POST REVERSE TOTAL REPLACEMENT OF RIGHT SHOULDER: Primary | ICD-10-CM

## 2023-07-13 DIAGNOSIS — M19.011 PRIMARY OSTEOARTHRITIS OF RIGHT SHOULDER: ICD-10-CM

## 2023-07-13 PROCEDURE — 97110 THERAPEUTIC EXERCISES: CPT | Performed by: PHYSICAL THERAPIST

## 2023-07-13 PROCEDURE — 97161 PT EVAL LOW COMPLEX 20 MIN: CPT | Performed by: PHYSICAL THERAPIST

## 2023-07-13 NOTE — PROGRESS NOTES
PT Evaluation     Today's date: 2023  Patient name: Peg Block  : 1960  MRN: [id number]  Referring provider: Parker Oliva DO  Dx:   Encounter Diagnosis     ICD-10-CM    1. Status post reverse total replacement of right shoulder  Z96.611       2. Primary osteoarthritis of right shoulder  M19.011 Ambulatory referral to Physical Therapy          Start Time: 1451  Stop Time: 1530  Total time in clinic (min): 39 minutes    Assessment/Plan  This patient is 1 week s/p R reverse total shoulder replacement. Current problems include decreased ROM, decreased strength, impaired function and pain. Patient is a good candidate for skilled physical therapy to restore previous level of function. Progress per post-op protocol. Planned Interventions:  manual therapy, ROM, stretching, strengthening, therapeutic activities, neuromuscular re-ed and instruction in HEP. Duration/ Frequency:  2 x/week for 6-8 weeks    STG’s: 6 weeks  1. Increase PROM R shoulder flexion to  degres  2. Decrease pain 2-3 levels     LTG’s: 8-12 weeks  1. Independent HEP  2. Increase strength R shoulder to 4/5  3. Increase ROM R shoulder flexion to 125 degrees, ER to 45 degrees  4. Able to perform usual functional activities using RUE    Subjective  This is a 63 y.o. female who began to have R shoulder pain about 6 years ago, hx of arthriits. Underwent surgery for reverse total shoulder replacement on 23. Current symptoms:  Generalized pain around the R shoulder girdle, into the R upper arm and anterior chest region.   States she has had a HA (back of the head) since the block wore off  Occupation: not employed, on disability  Leisure: enjoyed horse riding, walking  Patient’s goal:  To be able to use the R arm again    Objective  Pain scale: 5-8/10  Posture/observation:  Presents to therapy w/ R arm in sling  Cervical screen:     ROM WFL, some pain in cervical region but not into shoulder  Integumentary: Anterior R shoulder incision is well approximated with dry sanguinous drainage noted under bio-occlusive dressing  Palpation:    Tender in area surrounding incision    right  Shoulder  AROM  PROM  Strength  flexion  60    Abduction  40    ER  -5    IR  30    Elbow   AROM  PROM  Strength  Flexion  WNL    Extension  -32    Wrist   AROM  PROM  Strength  Flexion  WNL    Extension  WNL    Pronation  WNL    Supination  WNL                   Precautions: per RTSA protocol   POC expires: 8/13    DAILY TREATMENT RECORD    Manuals 7/13            PROM R shld JR            PROM R elbow/ wrist JR                                      Neuro Re-Ed                                                                                                        Ther Ex             pendulums instruc            Cervical retraction 5x5"            C-AROM 5xea                                                                             Ther Activity                                       Gait Training                                       Modalities

## 2023-07-18 ENCOUNTER — APPOINTMENT (OUTPATIENT)
Dept: RADIOLOGY | Facility: CLINIC | Age: 63
End: 2023-07-18
Payer: COMMERCIAL

## 2023-07-18 ENCOUNTER — OFFICE VISIT (OUTPATIENT)
Dept: OBGYN CLINIC | Facility: CLINIC | Age: 63
End: 2023-07-18

## 2023-07-18 ENCOUNTER — OFFICE VISIT (OUTPATIENT)
Dept: PHYSICAL THERAPY | Facility: CLINIC | Age: 63
End: 2023-07-18
Payer: COMMERCIAL

## 2023-07-18 VITALS
SYSTOLIC BLOOD PRESSURE: 112 MMHG | DIASTOLIC BLOOD PRESSURE: 80 MMHG | HEIGHT: 64 IN | WEIGHT: 163 LBS | BODY MASS INDEX: 27.83 KG/M2

## 2023-07-18 DIAGNOSIS — Z96.611 STATUS POST REVERSE ARTHROPLASTY OF RIGHT SHOULDER: Primary | ICD-10-CM

## 2023-07-18 DIAGNOSIS — Z96.611 STATUS POST REVERSE TOTAL REPLACEMENT OF RIGHT SHOULDER: ICD-10-CM

## 2023-07-18 DIAGNOSIS — Z96.611 STATUS POST REVERSE ARTHROPLASTY OF RIGHT SHOULDER: ICD-10-CM

## 2023-07-18 DIAGNOSIS — M19.011 PRIMARY OSTEOARTHRITIS OF RIGHT SHOULDER: Primary | ICD-10-CM

## 2023-07-18 PROCEDURE — 73030 X-RAY EXAM OF SHOULDER: CPT

## 2023-07-18 PROCEDURE — 97140 MANUAL THERAPY 1/> REGIONS: CPT | Performed by: PHYSICAL THERAPIST

## 2023-07-18 PROCEDURE — 99024 POSTOP FOLLOW-UP VISIT: CPT | Performed by: STUDENT IN AN ORGANIZED HEALTH CARE EDUCATION/TRAINING PROGRAM

## 2023-07-18 PROCEDURE — 97110 THERAPEUTIC EXERCISES: CPT | Performed by: PHYSICAL THERAPIST

## 2023-07-18 NOTE — PROGRESS NOTES
Daily Note     Today's date: 2023  Patient name: Edwin Darling  : 1960  MRN: [id number]  Referring provider: Lucy Maki DO  Dx:   Encounter Diagnosis     ICD-10-CM    1. Primary osteoarthritis of right shoulder  M19.011       2. Status post reverse total replacement of right shoulder  Z96.611           Start Time: 1535          Subjective: Felt really good ever since leaving from her IE. Has been compliant with HEP daily. 0/10 pain today. Objective: See treatment diary below      Assessment: Tolerated all wrist, elbow and cervical ROM without issue though she did note some twinges in the shoulder with gripper squeezes. Shoulder PROM moving well within protocol limits. She does have some involuntary shoulder and biceps twitching during PROM. Tolerated treatment well. Patient demonstrated fatigue post treatment and would benefit from continued PT      Plan: Continue per plan of care.       Precautions: per RTSA protocol   POC expires:     DAILY TREATMENT RECORD    Manuals            PROM R shld JR [place]           PROM R elbow/ wrist JR [place]                                     Neuro Re-Ed                                                                                                        Ther Ex             pendulums instruc 1'           Cervical retraction 5x5" 5"x10           C-AROM 5xea x10 ea           Wrist circles  x20 ea           Elbow flex/ext  x20           Gripper squeeze  Red x20                                     Ther Activity                                       Gait Training                                       Modalities

## 2023-07-18 NOTE — PROGRESS NOTES
Shoulder Post Operative Visit     Assesment:     63 y.o. female 2 weeks status post right reverse shoulder arthroplasty    Plan:    Presents today for first postoperative visit. She is overall doing very well at this time. She is weaned off all narcotic medications and is only taking over-the-counter medications at this time. She has been compliant with her sling use and has started physical therapy. I provided a physical therapy protocol for her today and she should remain in the sling for an additional 2 weeks time before weaning out. We will treat her according to subscapularis repair protocol. I will see her back in 4 weeks for reevaluation. Post-Operative treatment:    Ice to shoulder 1-2 times daily, for 20 minutes at a time. PT for ROM and strengthening to shoulder, rotator cuff, scapular stabilizers. Imaging: All imaging from today was reviewed by myself and explained to the patient. Sling:  at all times other than showering    DVT Prophylaxis:  Aspirin 81 mg oral twice daily x 2 weeks and Ambulation    Follow up:   4 weeks     Patient was advised that if they have any fevers, chills, chest pain, shortness of breath, redness or drainage from the incision, please let our office know immediately. Chief Complaint   Patient presents with   • Right Shoulder - Post-op     07/06       History of Present Illness: The patient is a 63 y.o. female who is being evaluated post operatively 2 weeks  status post right reverse shoulder arthroplasty. Since the prior visit, She reports significant improvement. Pain is well controlled. The patient is using ice to control swelling. They have started physical therapy. The patient is ambulating for DVT ppx. The patient is using their sling at all times other than showering    The patient denies any fevers, chills, calf pain, chest pain/shortness of breath, redness or drainage from the incision.          I have reviewed the past medical, surgical, social and family history, medications and allergies as documented in the EMR. Review of systems: ROS is negative other than that noted in the HPI. Constitutional: Negative for fatigue and fever. Physical Exam:    Blood pressure 112/80, height 5' 4" (1.626 m), weight 73.9 kg (163 lb). General/Constitutional: NAD, well developed, well nourished  HENT: Normocephalic, atraumatic  CV: Intact distal pulses, regular rate  Resp: No respiratory distress or labored breathing  Lymphatic: No lymphadenopathy palpated  Neuro: Alert and Oriented x 3, no focal deficits  Psych: Normal mood, normal affect, normal judgement, normal behavior  Skin: Warm, dry, no rashes, no erythema    Shoulder focused exam:        Patient is healing well, no erythema or drainage  Passive range of motion demonstrates forward flexion to 90, abduction 80, external Tatian with the arm the side is 20, internal rotation not tested  Motor and sensory function are full. Compartment soft compressible. Cap refills brisk. Hand is warm and well-perfused. No active motion tested. No strength tested.     Incisions show no erythema, no drainage      UE NV Exam: +2 Radial pulses bilaterally  Sensation intact to light touch C5-T1 bilaterally, Radial/median/ulnar nerve motor intact      Scribe Attestation    I,:  Carmita Vizcarra am acting as a scribe while in the presence of the attending physician.:       I,:  Grace Salazar DO personally performed the services described in this documentation    as scribed in my presence.:

## 2023-07-20 ENCOUNTER — OFFICE VISIT (OUTPATIENT)
Dept: PHYSICAL THERAPY | Facility: CLINIC | Age: 63
End: 2023-07-20
Payer: COMMERCIAL

## 2023-07-20 DIAGNOSIS — M19.011 PRIMARY OSTEOARTHRITIS OF RIGHT SHOULDER: Primary | ICD-10-CM

## 2023-07-20 DIAGNOSIS — Z96.611 STATUS POST REVERSE TOTAL REPLACEMENT OF RIGHT SHOULDER: ICD-10-CM

## 2023-07-20 PROCEDURE — 97140 MANUAL THERAPY 1/> REGIONS: CPT | Performed by: PHYSICAL THERAPIST

## 2023-07-20 PROCEDURE — 97110 THERAPEUTIC EXERCISES: CPT | Performed by: PHYSICAL THERAPIST

## 2023-07-20 NOTE — PROGRESS NOTES
Daily Note     Today's date: 2023  Patient name: Kash Quiñonez  : 1960  MRN: [id number]  Referring provider: Jean-Claude Santana DO  Dx:   Encounter Diagnosis     ICD-10-CM    1. Primary osteoarthritis of right shoulder  M19.011       2. Status post reverse total replacement of right shoulder  Z96.611           Start Time: 1529  Stop Time: 1608  Total time in clinic (min): 39 minutes    Subjective: reports shoulder feels much better      Objective: See treatment diary below      Assessment:  Shoulder and elbow PROM is increasing. Tolerated treatment well. Patient would benefit from continued PT      Plan: Continue per plan of care. Progress per protocol.       Precautions: per RTSA protocol   POC expires:   DOS: 23    DAILY TREATMENT RECORD    Manuals           PROM R shld JR MIKI ALEXIS          PROM R elbow/ wrist JR MIKI ALEXIS                                    Neuro Re-Ed                                                                                                        Ther Ex             pendulums instruc 1' 2-3'          scap retraction   10x          Cervical retraction 5x5" 5"x10 10x5"          C-AROM 5xea x10 ea 10ea          Wrist circles  x20 ea 20x          Elbow flex/ext  x20 20x          Gripper squeeze  Red x20 20x          iso abd/ER/IR                          Ther Activity                                       Gait Training                                       Modalities

## 2023-07-24 DIAGNOSIS — E03.9 HYPOTHYROIDISM, UNSPECIFIED TYPE: ICD-10-CM

## 2023-07-24 DIAGNOSIS — F33.42 RECURRENT MAJOR DEPRESSIVE DISORDER, IN FULL REMISSION (HCC): ICD-10-CM

## 2023-07-24 RX ORDER — HYDROXYZINE 50 MG/1
50 TABLET, FILM COATED ORAL EVERY 6 HOURS PRN
Qty: 30 TABLET | Refills: 0 | Status: SHIPPED | OUTPATIENT
Start: 2023-07-24

## 2023-07-24 RX ORDER — LEVOTHYROXINE SODIUM 0.03 MG/1
25 TABLET ORAL DAILY
Qty: 90 TABLET | Refills: 0 | Status: SHIPPED | OUTPATIENT
Start: 2023-07-24

## 2023-07-24 RX ORDER — BUPROPION HYDROCHLORIDE 75 MG/1
75 TABLET ORAL DAILY
Qty: 30 TABLET | Refills: 2 | Status: SHIPPED | OUTPATIENT
Start: 2023-07-24

## 2023-07-25 ENCOUNTER — APPOINTMENT (OUTPATIENT)
Dept: PHYSICAL THERAPY | Facility: CLINIC | Age: 63
End: 2023-07-25
Payer: COMMERCIAL

## 2023-07-26 LAB
T3FREE SERPL-MCNC: 2.2 PG/ML (ref 2–4.4)
T4 FREE SERPL-MCNC: 1.02 NG/DL (ref 0.82–1.77)
TSH SERPL DL<=0.005 MIU/L-ACNC: 3.43 UIU/ML (ref 0.45–4.5)

## 2023-07-27 ENCOUNTER — OFFICE VISIT (OUTPATIENT)
Dept: PHYSICAL THERAPY | Facility: CLINIC | Age: 63
End: 2023-07-27
Payer: COMMERCIAL

## 2023-07-27 DIAGNOSIS — Z96.611 STATUS POST REVERSE TOTAL REPLACEMENT OF RIGHT SHOULDER: ICD-10-CM

## 2023-07-27 DIAGNOSIS — M19.011 PRIMARY OSTEOARTHRITIS OF RIGHT SHOULDER: Primary | ICD-10-CM

## 2023-07-27 PROCEDURE — 97140 MANUAL THERAPY 1/> REGIONS: CPT

## 2023-07-27 PROCEDURE — 97110 THERAPEUTIC EXERCISES: CPT

## 2023-07-27 NOTE — PROGRESS NOTES
Daily Note     Today's date: 2023  Patient name: Grecia Wright  : 1960  MRN: [id number]  Referring provider: Mariela Sosa DO  Dx:   Encounter Diagnosis     ICD-10-CM    1. Primary osteoarthritis of right shoulder  M19.011       2. Status post reverse total replacement of right shoulder  Z96.611                      Subjective: Presents in sling today. States shoulder is doing well and she is having minimal pain. Objective: See treatment diary below      Assessment: Tolerated treatment well. Is now 3 weeks post-op. Initiated pulleys and shoulder isometrics (ER/IR/abd only per protocol) w/ good tolerance. Occasional cueing required t/o isometrics for proper completion. Educated that sling wear is "as needed" now per protocol, pt exciting to not have to be in sling all the time as she feels pretty good. PROM progressing well. Patient demonstrated fatigue post treatment, exhibited good technique with therapeutic exercises and would benefit from continued PT      Plan: Continue per plan of care.       Precautions: per RTSA protocol   POC expires:   DOS: 23    DAILY TREATMENT RECORD    Manuals          PROM R shld JR MIKI JR VR         PROM R elbow/ wrist Andrew Chanel JR                                    Neuro Re-Ed                                                                                                        Ther Ex             Pullies    3' scaption         pendulums instruc 1' 2-3' x30 ea         scap retraction   10x 2x10 5"         Cervical retraction 5x5" 5"x10 10x5"          C-AROM 5xea x10 ea 10ea          Wrist circles  x20 ea 20x          Elbow flex/ext  x20 20x          Gripper squeeze  Red x20 20x          iso abd/ER/IR    5"x30 ea         AAROM ER/IR                          Ther Activity                                       Gait Training                                       Modalities

## 2023-08-01 ENCOUNTER — OFFICE VISIT (OUTPATIENT)
Dept: PHYSICAL THERAPY | Facility: CLINIC | Age: 63
End: 2023-08-01
Payer: COMMERCIAL

## 2023-08-01 DIAGNOSIS — Z96.611 STATUS POST REVERSE TOTAL REPLACEMENT OF RIGHT SHOULDER: ICD-10-CM

## 2023-08-01 DIAGNOSIS — M19.011 PRIMARY OSTEOARTHRITIS OF RIGHT SHOULDER: Primary | ICD-10-CM

## 2023-08-01 PROCEDURE — 97110 THERAPEUTIC EXERCISES: CPT

## 2023-08-01 NOTE — PROGRESS NOTES
Daily Note     Today's date: 2023  Patient name: Keely Parham  : 1960  MRN: [id number]  Referring provider: Grace Salazar DO  Dx:   Encounter Diagnosis     ICD-10-CM    1. Primary osteoarthritis of right shoulder  M19.011       2. Status post reverse total replacement of right shoulder  Z96.611                      Subjective: Pt offers no new c/o's stating her shld feels great. Objective: See treatment diary below      Assessment: Tolerated treatment well w/ progression of ex's. Patient exhibited good technique with therapeutic exercises and would benefit from continued PT to work on protocol goals of increasing ROM and strength. Plan: Continue per plan of care. Progress treatment as tolerated.        Precautions: per RTSA protocol   POC expires:   DOS: 23    DAILY TREATMENT RECORD    Manuals         PROM R shld JR MIKI JR VR JK        PROM R elbow/ wrist Adeniketeadelaide Sr  JK                          12'        Neuro Re-Ed                                                                                                        Ther Ex             Pullies    3' scaption 3' scaption        pendulums instruc 1' 2-3' x30 ea x30 ea        scap retraction   10x 2x10 5" 2x10 5"        Cervical retraction 5x5" 5"x10 10x5"  10x5"        C-AROM 5xea x10 ea 10ea          Wrist circles  x20 ea 20x  20        Elbow flex/ext  x20 20x  20        Gripper squeeze  Red x20 20x          iso abd/ER/IR    5"x30 ea 5"x10        AAROM HH shld flexion     10x5"        AAROM cane shld ER     10x5"                                               AAROM ER/IR     AROM 20x        iso flex/ext/abd     10x5"        Ther Activity                                       Gait Training                                       Modalities

## 2023-08-03 ENCOUNTER — OFFICE VISIT (OUTPATIENT)
Dept: PHYSICAL THERAPY | Facility: CLINIC | Age: 63
End: 2023-08-03
Payer: COMMERCIAL

## 2023-08-03 DIAGNOSIS — Z96.611 STATUS POST REVERSE TOTAL REPLACEMENT OF RIGHT SHOULDER: ICD-10-CM

## 2023-08-03 DIAGNOSIS — M19.011 PRIMARY OSTEOARTHRITIS OF RIGHT SHOULDER: Primary | ICD-10-CM

## 2023-08-03 PROCEDURE — 97110 THERAPEUTIC EXERCISES: CPT

## 2023-08-03 PROCEDURE — 97140 MANUAL THERAPY 1/> REGIONS: CPT

## 2023-08-03 NOTE — PROGRESS NOTES
Daily Note     Today's date: 8/3/2023  Patient name: Sarah Owen  : 1960  MRN: [id number]  Referring provider: Peg Evans DO  Dx:   Encounter Diagnosis     ICD-10-CM    1. Primary osteoarthritis of right shoulder  M19.011       2. Status post reverse total replacement of right shoulder  Z96.611                      Subjective: Pt reports no issues post LV. States the shld feels good today. Objective: See treatment diary below      Assessment: Tolerated treatment well. Patient exhibited good technique with therapeutic exercises and would benefit from continued PT to work towards protocol goals. Pt instructed to use pendulums for pain relief. Plan: Continue per plan of care. Progress treament per protocol.       Precautions: per RTSA protocol   POC expires:   DOS: 23    DAILY TREATMENT RECORD    Manuals 7/13 7/18 7/20 7/27 8/1 8/3       PROM R shld JR MIKI JR VR JK JK       PROM R elbow/ wrist Safia Soles JK                         12' 12'       Neuro Re-Ed                                                                                                        Ther Ex             Pullies    3' scaption 3' scaption 3' scaption       pendulums instruc 1' 2-3' x30 ea x30 ea        scap retraction   10x 2x10 5" 2x10 5" 2x10 5"       Cervical retraction 5x5" 5"x10 10x5"  10x5" 10x5"       C-AROM 5xea x10 ea 10ea          Wrist circles  x20 ea 20x  20        Elbow flex/ext  x20 20x  20 20       Gripper squeeze  Red x20 20x          iso abd/ER/IR    5"x30 ea 5"x10 5"x10       AAROM HH shld flexion     10x5" 20x5"       AAROM cane shld ER     10x5" 20x5"                                              AAROM ER/IR     AROM 20x AROM 20x       iso flex/ext/abd     10x5" 10x5"       Ther Activity                                       Gait Training                                       Modalities

## 2023-08-10 ENCOUNTER — OFFICE VISIT (OUTPATIENT)
Dept: PHYSICAL THERAPY | Facility: CLINIC | Age: 63
End: 2023-08-10
Payer: COMMERCIAL

## 2023-08-10 DIAGNOSIS — Z96.611 STATUS POST REVERSE TOTAL REPLACEMENT OF RIGHT SHOULDER: ICD-10-CM

## 2023-08-10 DIAGNOSIS — M19.011 PRIMARY OSTEOARTHRITIS OF RIGHT SHOULDER: Primary | ICD-10-CM

## 2023-08-10 PROCEDURE — 97140 MANUAL THERAPY 1/> REGIONS: CPT | Performed by: PHYSICAL THERAPIST

## 2023-08-10 PROCEDURE — 97110 THERAPEUTIC EXERCISES: CPT | Performed by: PHYSICAL THERAPIST

## 2023-08-10 NOTE — PROGRESS NOTES
Daily Note     Today's date: 8/10/2023  Patient name: Claribel Carballo  : 1960  MRN: [id number]  Referring provider: Ophelia Marino DO  Dx:   Encounter Diagnosis     ICD-10-CM    1. Primary osteoarthritis of right shoulder  M19.011       2. Status post reverse total replacement of right shoulder  Z96.611           Start Time: 1530  Stop Time: 1605  Total time in clinic (min): 35 minutes    Subjective: states that she has fallen twice this week, landing on her R shoulder - reports that she tripped over a child gate both times      Objective: See treatment diary below      Assessment: Tolerated treatment well. Mild pain w/ exercises relieved w/ pendulums. Advised to apply ice at home. Patient would benefit from continued PT to work towards protocol goals. .        Plan: Continue per plan of care. Progress treament per protocol.       Precautions: per RTSA protocol   POC expires:   DOS: 23    DAILY TREATMENT RECORD    Manuals 7/13 7/18 7/20 7/27 8/1 8/3 8/10      PROM R shld JR MIKI JR VR JK JK JR      PROM R elbow/ wrist JR MIKI JR  JK JK JR      Rhythmic stab       JR           12' 12'       Neuro Re-Ed                                                                                                        Ther Ex             Pullies    3' scaption 3' scaption 3' scaption 3min      pendulums instruc 1' 2-3' x30 ea x30 ea        scap retraction   10x 2x10 5" 2x10 5" 2x10 5" 10x10"      Cervical retraction 5x5" 5"x10 10x5"  10x5" 10x5"       C-AROM 5xea x10 ea 10ea          Wrist circles  x20 ea 20x  20        Elbow flex/ext  x20 20x  20 20       Gripper squeeze  Red x20 20x          iso abd/ER/IR    5"x30 ea 5"x10 5"x10 10x5"      AAROM HH shld flexion     10x5" 20x5" 20x      AAROM cane shld ER     10x5" 20x5" 20x      Table slides flexion             Supine scap protraction             Supine shld centering             AAROM ER/IR     AROM 20x AROM 20x arom 20      iso flex/ext/abd     10x5" 10x5" 10x5" ea      Ther Activity                                       Gait Training                                       Modalities

## 2023-08-12 ENCOUNTER — APPOINTMENT (OUTPATIENT)
Dept: RADIOLOGY | Facility: HOSPITAL | Age: 63
End: 2023-08-12
Payer: COMMERCIAL

## 2023-08-12 ENCOUNTER — HOSPITAL ENCOUNTER (EMERGENCY)
Facility: HOSPITAL | Age: 63
Discharge: HOME | End: 2023-08-12
Attending: EMERGENCY MEDICINE
Payer: COMMERCIAL

## 2023-08-12 VITALS
DIASTOLIC BLOOD PRESSURE: 78 MMHG | HEART RATE: 88 BPM | TEMPERATURE: 98.2 F | OXYGEN SATURATION: 98 % | RESPIRATION RATE: 18 BRPM | SYSTOLIC BLOOD PRESSURE: 95 MMHG

## 2023-08-12 DIAGNOSIS — S82.831A CLOSED FRACTURE OF RIGHT DISTAL FIBULA: Primary | ICD-10-CM

## 2023-08-12 PROCEDURE — 99284 EMERGENCY DEPT VISIT MOD MDM: CPT | Performed by: PHYSICIAN ASSISTANT

## 2023-08-12 PROCEDURE — 99283 EMERGENCY DEPT VISIT LOW MDM: CPT

## 2023-08-12 PROCEDURE — NC001 PR NO CHARGE: Performed by: PHYSICIAN ASSISTANT

## 2023-08-12 PROCEDURE — 29515 APPLICATION SHORT LEG SPLINT: CPT | Performed by: PHYSICIAN ASSISTANT

## 2023-08-12 PROCEDURE — 73610 X-RAY EXAM OF ANKLE: CPT

## 2023-08-12 NOTE — ED PROVIDER NOTES
History  Chief Complaint   Patient presents with   • Ankle Pain     Pt rolled her right ankle last night. Pt complaining of pain and swelling. Pt says its hurts with movement     Patient is a 62 y/o F with h/o thyroid disease, substance abuse that presents to the ED with right ankle pain. She states last night she was standing at the kitchen sink and felt a pain shoot up through her ankle and fell backward against the cabinet and twisted her ankle. She denies any other injuries. No prior injury to right ankle. Pain is worse with bearing weight, better with rest.       History provided by:  Patient  Ankle Pain  Location:  Ankle  Time since incident:  1 day  Injury: yes    Mechanism of injury: fall    Ankle location:  R ankle  Associated symptoms: no fever        Prior to Admission Medications   Prescriptions Last Dose Informant Patient Reported? Taking?    Biotin 10 MG CAPS   Yes No   Sig: Take by mouth   Cyanocobalamin (VITAMIN B 12 PO)   Yes No   Sig: Take by mouth   Diclofenac Sodium (VOLTAREN) 1 %  Self No No   Sig: Apply 2 g topically 4 (four) times a day   QUEtiapine (SEROquel) 100 mg tablet  Self No No   Sig: Take 1 tablet (100 mg total) by mouth daily at bedtime   acetaminophen (TYLENOL) 500 mg tablet   No No   Sig: Take 2 tablets (1,000 mg total) by mouth every 8 (eight) hours as needed for mild pain   buPROPion (WELLBUTRIN) 75 mg tablet   No No   Sig: TAKE ONE TABLET BY MOUTH DAILY   celecoxib (CeleBREX) 100 mg capsule   No No   Sig: Take 1 capsule (100 mg total) by mouth 2 (two) times a day   gabapentin (Neurontin) 300 mg capsule   No No   Sig: Take 1 capsule (300 mg total) by mouth daily   hydrOXYzine HCL (ATARAX) 50 mg tablet   No No   Sig: TAKE ONE TABLET BY MOUTH EVERY 6 HOURS AS NEEDED FOR ANXIETY   levothyroxine 25 mcg tablet   No No   Sig: TAKE ONE TABLET BY MOUTH EVERY MORNING   multivitamin (THERAGRAN) TABS   Yes No   Sig: Take 1 tablet by mouth daily   oxyCODONE (Roxicodone) 5 immediate release tablet   No No   Sig: Take 1 tablet (5 mg total) by mouth every 4 (four) hours as needed for moderate pain Max Daily Amount: 30 mg   pantoprazole (PROTONIX) 40 mg tablet  Self No No   Sig: TAKE 1 TABLET BY MOUTH DAILY   sertraline (ZOLOFT) 100 mg tablet  Self Yes No   Sig: Take 200 mg by mouth daily   traZODone (DESYREL) 100 mg tablet   No No   Sig: TAKE TWO TABLETS BY MOUTH AT BEDTIME      Facility-Administered Medications: None       Past Medical History:   Diagnosis Date   • Addiction to drug [facility])    • Anxiety    • Bipolar disorder current episode depressed ([address]) 12/10/2020   • Chest wall pain 06/11/2020   • Chronic suboxone use 06/11/2020   • Depression    • Disease of thyroid gland    • Drug dependence ([address])    • GERD (gastroesophageal reflux disease)    • Medical clearance for psychiatric admission 12/10/2020   • Opiate abuse, continuous ([address]) 08/20/2018   • Osteoarthritis    • Rheumatoid arthritis ([address])    • Severe episode of recurrent major depressive disorder, with psychotic features ([address]) 08/20/2018   • Shoulder impingement syndrome, left 09/24/2020   • Strain of right knee 05/17/2018   • Substance abuse ([address])    • Tubal pregnancy        Past Surgical History:   Procedure Laterality Date   • BREAST IMPLANT     • BUNIONECTOMY Bilateral 1990   • ECTOPIC PREGNANCY SURGERY     • ID ARTHROPLASTY GLENOHUMRL JT HEMIARTHROPLASTY Right 7/6/2023    Procedure: REVERSE SHOULDER ARTHROPLASTY - SAME DAY; Surgeon: Timur Hernandez DO;  Location:  MAIN OR;  Service: Orthopedics   • TUBAL LIGATION         Family History   Problem Relation Age of Onset   • Bipolar disorder Mother    • Depression Mother    • Hypertension Mother    • Heart disease Father         cardiac   • Prostate cancer Father    • Bipolar disorder Brother    • Depression Sister    • Colon polyps Neg Hx    • Colon cancer Neg Hx      I have reviewed and agree with the history as documented.     E-Cigarette/Vaping   • E-Cigarette Use Current Every Day User      E-Cigarette/Vaping Substances   • Nicotine Yes    • THC No    • CBD No    • Flavoring Yes    • Other No    • Unknown No      Social History     Tobacco Use   • Smoking status: Former     Packs/day: 0.25     Years: 30.00     Total pack years: 7.50     Types: Cigarettes     Quit date: 2020     Years since quitting: 3.6   • Smokeless tobacco: Never   • Tobacco comments:     Occasional cigarette   Vaping Use   • Vaping Use: Every day   • Substances: Nicotine, Flavoring   Substance Use Topics   • Alcohol use: No   • Drug use: Not Currently     Frequency: 7.0 times per week     Types: Oxycodone, Prescription     Comment: daily abuse of percocet/oxy-recovering since August        Review of Systems   Constitutional: Negative for chills and fever. Musculoskeletal:        Right ankle pain   Skin: Negative for color change, rash and wound. Neurological: Negative for dizziness, weakness, light-headedness and numbness. All other systems reviewed and are negative. Physical Exam  Physical Exam  Vitals and nursing note reviewed. Constitutional:       General: She is not in acute distress. Appearance: Normal appearance. She is well-developed, well-groomed and normal weight. She is not ill-appearing or diaphoretic. HENT:      Head: Normocephalic and atraumatic. Right Ear: External ear normal.      Left Ear: External ear normal.      Nose: Nose normal.   Eyes:      Conjunctiva/sclera: Conjunctivae normal.      Pupils: Pupils are equal.   Cardiovascular:      Rate and Rhythm: Normal rate. Pulses:           Dorsalis pedis pulses are 2+ on the right side and 2+ on the left side. Pulmonary:      Effort: Pulmonary effort is normal.   Musculoskeletal:      Cervical back: Normal range of motion. Right hip: Normal.      Right upper leg: Normal.      Right knee: Normal.      Right lower leg: Normal.      Right ankle: Swelling and ecchymosis (medial ankle) present. No deformity.  Tenderness present over the lateral malleolus. Normal range of motion. Normal pulse. Right foot: Normal.   Skin:     General: Skin is warm and dry. Coloration: Skin is not pale. Findings: Bruising (right ankle) present. No wound. Neurological:      Mental Status: She is alert and oriented to person, place, and time. Sensory: Sensation is intact. Motor: Motor function is intact. Psychiatric:         Behavior: Behavior is cooperative. Vital Signs  ED Triage Vitals [08/12/23 1219]   Temperature Pulse Respirations Blood Pressure SpO2   98.2 °F (36.8 °C) 88 18 95/78 98 %      Temp src Heart Rate Source Patient Position - Orthostatic VS BP Location FiO2 (%)   -- -- -- -- --      Pain Score       --           Vitals:    08/12/23 1219   BP: 95/78   Pulse: 88         Visual Acuity      ED Medications  Medications - No data to display    Diagnostic Studies  Results Reviewed     None                 XR ankle 3+ views RIGHT   ED Interpretation by Patricia Hermosillo PA-C (08/12 1239)   Fracture distal fibula. Procedures  Splint application    Date/Time: 8/12/2023 12:50 PM    Performed by: Patricia Hermosillo PA-C  Authorized by: Patricia Hermosillo PA-C  Universal Protocol:  Consent: Verbal consent obtained. Consent given by: patient      Pre-procedure details:     Sensation:  Normal  Procedure details:     Laterality:  Right    Location:  Ankle    Ankle:  R ankleCast type:  Short leg      Splint type:  Sugar tong (and posterior short leg. )    Supplies:  Cotton padding, elastic bandage and Ortho-Glass  Post-procedure details:     Pain:  Unchanged    Sensation:  Normal    Patient tolerance of procedure: Tolerated well, no immediate complications  Comments:      Patient given crutches in the ER. ED Course                                             Medical Decision Making  Patient with right ankle pain after a fall, no other injuries.     Patient with fracture right ankle, will splint and refer to ortho. Closed fracture of right distal fibula: acute illness or injury  Amount and/or Complexity of Data Reviewed  Radiology: ordered and independent interpretation performed. Disposition  Final diagnoses:   Closed fracture of right distal fibula     Time reflects when diagnosis was documented in both MDM as applicable and the Disposition within this note     Time User Action Codes Description Comment    8/12/2023 12:40 PM Jesenia Aceves [V01.170R] Closed fracture of right distal fibula       ED Disposition     ED Disposition   Discharge    Condition   Stable    Date/Time   Sat Aug 12, 2023 12:40 PM    Comment   [place] discharge to home/self care. Follow-up Information     Follow up With Specialties Details Why Contact Info Additional [address] [facility] Schedule an appointment as soon as possible for a visit in 2 days For recheck [facility] [place] [phone]  [facility] [address], [address] [place] [address], [address]          Patient's Medications   Discharge Prescriptions    No medications on file       No discharge procedures on file.     PDMP Review       Value Time User    PDMP Reviewed  Yes 6/1/2023  1:11 PM Towana Pallas, DO          ED Provider  Electronically Signed by           Shanta Aguilar PA-C  08/12/23 2387

## 2023-08-12 NOTE — DISCHARGE INSTRUCTIONS
Rest, ice, elevate leg. Tylenol/motrin for discomfort. Keep splint dry and intact until seen by ortho. Call ortho on Monday for a follow up appointment.

## 2023-08-13 DIAGNOSIS — F33.42 RECURRENT MAJOR DEPRESSIVE DISORDER, IN FULL REMISSION (HCC): ICD-10-CM

## 2023-08-13 RX ORDER — HYDROXYZINE 50 MG/1
50 TABLET, FILM COATED ORAL EVERY 6 HOURS PRN
Qty: 30 TABLET | Refills: 0 | Status: SHIPPED | OUTPATIENT
Start: 2023-08-13

## 2023-08-14 ENCOUNTER — OFFICE VISIT (OUTPATIENT)
Dept: OBGYN CLINIC | Facility: CLINIC | Age: 63
End: 2023-08-14
Payer: COMMERCIAL

## 2023-08-14 VITALS
SYSTOLIC BLOOD PRESSURE: 108 MMHG | WEIGHT: 163 LBS | HEIGHT: 64 IN | BODY MASS INDEX: 27.83 KG/M2 | DIASTOLIC BLOOD PRESSURE: 68 MMHG

## 2023-08-14 DIAGNOSIS — S82.831A CLOSED TRAUMATIC NONDISPLACED FRACTURE OF DISTAL END OF RIGHT FIBULA, INITIAL ENCOUNTER: Primary | ICD-10-CM

## 2023-08-14 PROCEDURE — 99214 OFFICE O/P EST MOD 30 MIN: CPT | Performed by: FAMILY MEDICINE

## 2023-08-14 RX ORDER — CALCIUM CARBONATE 160(400)MG
TABLET,CHEWABLE ORAL DAILY
Qty: 1 EACH | Refills: 0 | Status: SHIPPED | OUTPATIENT
Start: 2023-08-14

## 2023-08-14 NOTE — PROGRESS NOTES
1. Closed traumatic nondisplaced fracture of distal end of right fibula, initial encounter  Misc. Devices (Rollator Ultra-Light) MISC    Cam Boot        Orders Placed This Encounter   Procedures   • Cam Boot        IMAGING STUDIES: (I personally reviewed images in PACS and report):  Xray right ankle  8/12/23:  Nondisplaced distal fibula little B    PAST REPORTS:       ASSESSMENT/PLAN:  Right Ankle Little B Fracture nondisplaced distal fibula  DOI: 8/12/23  FUI: 2 days  PMN: opioid addiction; currently filling RX suboxone [facility]- last filled 6/2023      Repeat X-ray next visit: Right Ankle with STRESS MORTISE VIEW manually performed by physician      Return in about 2 weeks (around 8/28/2023). Patient instructions below verbally summarized in person during encounter:  Patient Instructions   Continue or start non-weight bearing non-weight bearing to avoid potential displacement (movmenet) of your ankle fracture fragmented bone which could require surgery to fix if occurs. May transition from splint to cam boot. At follow-up appointment in 1-2 weeks, we will repeat ankle xrays to evaluate for other fractures or injuries and will change treatment plan accordingly. If you have no liver problems, you may take Tylenol (also known as acetaminophen) at a  maximum of 1,000  Mg per dose every 6 hours as needed for pain but no more 3 doses or 3,000 mg per day. If you are taking other medications which include tylenol (acetaminophen) such as hydrocodone-acetaminophen then you must factor in the amount of tylenol (acetamionphen) into your 3,000mg maximum dose. Patient expressed understanding and agreed to plan.                 __________________________________________________________________________    HISTORY OF PRESENT ILLNESS:  Evaluation of right ankle injury which occurred approximate 4 days ago when patient was standing in her kitchen and accidentally twisted her ankle causing immediate acute severe pain. She describes her pain as achy throbbing sore pressure and stabbing at times now currently mild. She presents in a short leg splint with 1 crutch after being seen at the emergency department and diagnosed with distal fibular fracture. Review of Systems      Following history reviewed and update:    Past Medical History:   Diagnosis Date   • Addiction to drug [facility])    • Anxiety    • Bipolar disorder current episode depressed ([address]) 12/10/2020   • Chest wall pain 06/11/2020   • Chronic suboxone use 06/11/2020   • Depression    • Disease of thyroid gland    • Drug dependence ([address])    • GERD (gastroesophageal reflux disease)    • Medical clearance for psychiatric admission 12/10/2020   • Opiate abuse, continuous ([address]) 08/20/2018   • Osteoarthritis    • Rheumatoid arthritis ([address])    • Severe episode of recurrent major depressive disorder, with psychotic features ([address]) 08/20/2018   • Shoulder impingement syndrome, left 09/24/2020   • Strain of right knee 05/17/2018   • Substance abuse ([address])    • Tubal pregnancy      Past Surgical History:   Procedure Laterality Date   • BREAST IMPLANT     • BUNIONECTOMY Bilateral 1990   • ECTOPIC PREGNANCY SURGERY     • KS ARTHROPLASTY GLENOHUMRL JT HEMIARTHROPLASTY Right 7/6/2023    Procedure: REVERSE SHOULDER ARTHROPLASTY - SAME DAY;   Surgeon: Tomi Rosario DO;  Location:  MAIN OR;  Service: Orthopedics   • TUBAL LIGATION       Social History   Social History     Substance and Sexual Activity   Alcohol Use No     Social History     Substance and Sexual Activity   Drug Use Not Currently   • Frequency: 7.0 times per week   • Types: Oxycodone, Prescription    Comment: daily abuse of percocet/oxy-recovering since August      Social History     Tobacco Use   Smoking Status Former   • Packs/day: 0.25   • Years: 30.00   • Total pack years: 7.50   • Types: Cigarettes   • Quit date: 2020   • Years since quitting: 3.6   Smokeless Tobacco Never   Tobacco Comments    Occasional cigarette     Family History   Problem Relation Age of Onset   • Bipolar disorder Mother    • Depression Mother    • Hypertension Mother    • Heart disease Father         cardiac   • Prostate cancer Father    • Bipolar disorder Brother    • Depression Sister    • Colon polyps Neg Hx    • Colon cancer Neg Hx      No Known Allergies       Physical Exam  /68   Ht 5' 4" (1.626 m)   Wt 73.9 kg (163 lb)   BMI 27.98 kg/m²     Constitutional:  see vital signs  Gen: well-developed, normocephalic/atraumatic, well-groomed  Eyes: No inflammation or discharge of conjunctiva or lids; sclera clear   Pharynx: no inflammation, lesion, or mass of lips  Neck: supple, no masses, non-distended  MSK: no inflammation, lesion, mass, or clubbing of nails and digits except for other than mentioned below  SKIN: no visible rashes or skin lesions  Pulmonary/Chest: Effort normal. No respiratory distress.    NEURO: cranial nerves grossly intact  PSYCH:  Alert and oriented to person, place, and time; recent and remote memory intact; mood normal, no depression, anxiety, or agitation, judgment and insight good and intact     Ortho Exam    RIGHT ANKLE EXAM  Observation    Inspection  Erythema: no  Ecchymosis: no  Edema: +lateral malleolus moderate    Tenderness  Proximal Fibula: no  (Maisonneuve frx)  AiTFL: no  (2cm proximal-medial to tip lateral malleolus 92% sens, 29% spec)  ATFL: +  CFL: no  PTFL: no  Achilles:  no  Deltoid: No  Peroneal: no  Tibialis Anterior: no  Tibialis Posterior: no    Bony Tenderpoints:  Lateral Malleolus: +  Base of 5th MT: no  Medial Malleolus: no  Navicular: no  Talar dome: No    ROM  Dorsiflexion: intact  Plantarflexion: intact    Muscle Strength  Pronation: intact   Supination: intact    Calcaneal Squeeze: negative    __________________________________________________________________________  Procedures

## 2023-08-14 NOTE — PATIENT INSTRUCTIONS
Continue or start non-weight bearing non-weight bearing to avoid potential displacement (movmenet) of your ankle fracture fragmented bone which could require surgery to fix if occurs. May transition from splint to cam boot. At follow-up appointment in 1-2 weeks, we will repeat ankle xrays to evaluate for other fractures or injuries and will change treatment plan accordingly. If you have no liver problems, you may take Tylenol (also known as acetaminophen) at a  maximum of 1,000  Mg per dose every 6 hours as needed for pain but no more 3 doses or 3,000 mg per day. If you are taking other medications which include tylenol (acetaminophen) such as hydrocodone-acetaminophen then you must factor in the amount of tylenol (acetamionphen) into your 3,000mg maximum dose. Patient expressed understanding and agreed to plan.

## 2023-08-15 ENCOUNTER — OFFICE VISIT (OUTPATIENT)
Dept: OBGYN CLINIC | Facility: CLINIC | Age: 63
End: 2023-08-15

## 2023-08-15 ENCOUNTER — APPOINTMENT (OUTPATIENT)
Dept: PHYSICAL THERAPY | Facility: CLINIC | Age: 63
End: 2023-08-15
Payer: COMMERCIAL

## 2023-08-15 ENCOUNTER — APPOINTMENT (OUTPATIENT)
Dept: RADIOLOGY | Facility: CLINIC | Age: 63
End: 2023-08-15
Payer: COMMERCIAL

## 2023-08-15 VITALS
WEIGHT: 163 LBS | BODY MASS INDEX: 27.83 KG/M2 | DIASTOLIC BLOOD PRESSURE: 70 MMHG | HEIGHT: 64 IN | SYSTOLIC BLOOD PRESSURE: 112 MMHG

## 2023-08-15 DIAGNOSIS — Z47.89 AFTERCARE FOLLOWING SURGERY OF THE MUSCULOSKELETAL SYSTEM: ICD-10-CM

## 2023-08-15 DIAGNOSIS — Z47.89 AFTERCARE FOLLOWING SURGERY OF THE MUSCULOSKELETAL SYSTEM: Primary | ICD-10-CM

## 2023-08-15 PROCEDURE — 73030 X-RAY EXAM OF SHOULDER: CPT

## 2023-08-15 PROCEDURE — 99024 POSTOP FOLLOW-UP VISIT: CPT | Performed by: STUDENT IN AN ORGANIZED HEALTH CARE EDUCATION/TRAINING PROGRAM

## 2023-08-15 NOTE — PROGRESS NOTES
Shoulder Post Operative Visit     Assesment:     63 y.o. female 6 weeks s/p right reverse shoulder arthroplasty, DOS: 7/6/2023    Plan:    Elida Phipps returns today for second post op visit. She has phenomenal progress in terms of ROM at this point. She unfortunately suffered several falls onto both her R shoulder and suffered a nondisplaced R ankle distal fibula fracture. I explained to Elida Phipps her ankle can be treated non-operatively as outlined by Dr. Evelina Becker yesterday. She should continue with RUE strengthening and emphasis on improving her IR with PT. She should remain WBAT in the boot and discontinue her crutch use. She can follow up with me regarding both the R ankle and R shoulder in four weeks with repeat radiographs of both. If her ankle remains stable we will DC her boot at that time. Post-Operative treatment:    PT for ROM and strengthening to shoulder, rotator cuff, scapular stabilizers. Home exercise program for shoulder, including ROM and strenthening. Instructions given to patient of what exercises to perform. Let pain guide return to activities. Imaging: All imaging from today was reviewed by myself and explained to the patient. Sling:  never, as they have completed this portion of the post op period. DVT Prophylaxis:  Ambulation    Follow up:   4 weeks    Patient was advised that if they have any fevers, chills, chest pain, shortness of breath, redness or drainage from the incision, please let our office know immediately. No chief complaint on file. History of Present Illness: The patient is a 63 y.o. female who is being evaluated post operatively 6 weeks  status post right reverse shoulder arthroplasty. She reports she is doing well at this time. She notes she recently had two falls, one of which resulted in a nondisplaced fibular fracture. She notes physical therapy has been going really well. She reports feeling better after each physical therapy visit.  She is not having any pain at this time. She denies any numbness and tingling. The patient denies any fevers, chills, calf pain, chest pain/shortness of breath, redness or drainage from the incision. I have reviewed the past medical, surgical, social and family history, medications and allergies as documented in the EMR. Review of systems: ROS is negative other than that noted in the HPI. Constitutional: Negative for fatigue and fever. Physical Exam:    Blood pressure 112/70, height 5' 4" (1.626 m), weight 73.9 kg (163 lb). General/Constitutional: NAD, well developed, well nourished  HENT: Normocephalic, atraumatic  CV: Intact distal pulses, regular rate  Resp: No respiratory distress or labored breathing  Lymphatic: No lymphadenopathy palpated  Neuro: Alert and Oriented x 3, no focal deficits  Psych: Normal mood, normal affect, normal judgement, normal behavior  Skin: Warm, dry, no rashes, no erythema    Shoulder focused exam:        Visual inspection of the shoulder demonstrates normal contour without atrophy  No evidence of scapular dyskinesia or atrophy.   No scapular winging  Active and passive range of motion demonstrates forward flexion to 160, abduction to 100, external rotation is 45 with the arm the side, ER behind the head to C7, internal rotation to hip   Strength not tested  No tenderness to palpation at the distal clavicle, AC joint, acromion, or scapular spine  No pertinent tests performed    R ankle:  Mild swelling lateral ankle  Resolving edema, ecchymosis  TTP distal fibula  Neg squeeze  Minimal TTP medially at deltoid ligament  No TTP proximal fibula  Motor/sensory intact  Compartments soft, compressible  Cap refill brisk    Incisions show no erythema, no drainage      UE NV Exam: +2 Radial pulses bilaterally  Sensation intact to light touch C5-T1 bilaterally, Radial/median/ulnar nerve motor intact      Scribe Attestation    I,:  Moise Spann am acting as a scribe while in the presence of the attending physician.:       I,:  Addison Pond DO personally performed the services described in this documentation    as scribed in my presence.:

## 2023-08-17 ENCOUNTER — OFFICE VISIT (OUTPATIENT)
Dept: PHYSICAL THERAPY | Facility: CLINIC | Age: 63
End: 2023-08-17
Payer: COMMERCIAL

## 2023-08-17 DIAGNOSIS — Z96.611 STATUS POST REVERSE TOTAL REPLACEMENT OF RIGHT SHOULDER: ICD-10-CM

## 2023-08-17 DIAGNOSIS — M19.011 PRIMARY OSTEOARTHRITIS OF RIGHT SHOULDER: Primary | ICD-10-CM

## 2023-08-17 PROCEDURE — 97140 MANUAL THERAPY 1/> REGIONS: CPT | Performed by: PHYSICAL THERAPIST

## 2023-08-17 PROCEDURE — 97110 THERAPEUTIC EXERCISES: CPT | Performed by: PHYSICAL THERAPIST

## 2023-08-17 NOTE — PROGRESS NOTES
Daily Note     Today's date: 2023  Patient name: Whiteny Marie  : 1960  MRN: [id number]  Referring provider: Priscila Garces DO  Dx:   Encounter Diagnosis     ICD-10-CM    1. Primary osteoarthritis of right shoulder  M19.011       2. Status post reverse total replacement of right shoulder  Z96.611           Start Time: 1624  Stop Time: 1700  Total time in clinic (min): 36 minutes    Subjective: presents to therapy today in CAM boot - sustained R ankle fx (distal fib) when she fell last week. States shoulder feels really good      Objective: See treatment diary below      Assessment: Tolerated treatment well. Patient would benefit from continued PT to restore functional use of RUE. Abilio Monteiro Plan: Continue per plan of care. Progress treament per protocol.       Precautions: per RTSA protocol   POC expires:   DOS: 23    DAILY TREATMENT RECORD    Manuals 7/13 7/18 7/20 7/27 8/1 8/3 8/10 8/17     PROM R shld JR MIKI JR VR JK JK JR JR     PROM R elbow/ wrist JR Jorge Patel JR     Rhythmic stab       Alex Goodie          93' 12'       Neuro Re-Ed                                                                                                        Ther Ex             Pullies    3' scaption 3' scaption 3' scaption 3min 3min     pendulums instruc 1' 2-3' x30 ea x30 ea   1-2'     scap retraction   10x 2x10 5" 2x10 5" 2x10 5" 10x10" 10x10"     Cervical retraction 5x5" 5"x10 10x5"  10x5" 10x5"       C-AROM 5xea x10 ea 10ea          Wrist circles  x20 ea 20x  20        Elbow flex/ext  x20 20x  20 20       Gripper squeeze  Red x20 20x          iso abd/ER/IR    5"x30 ea 5"x10 5"x10 10x5" +flex/ ext 10ea     AAROM HH shld flexion     10x5" 20x5" 20x 20x     AAROM cane shld ER     10x5" 20x5" 20x 20x     Table slides flexion        20x                               AAROM ER/IR     AROM 20x AROM 20x arom 20                   Ther Activity                                       Gait Training Modalities

## 2023-08-22 ENCOUNTER — OFFICE VISIT (OUTPATIENT)
Dept: PHYSICAL THERAPY | Facility: CLINIC | Age: 63
End: 2023-08-22
Payer: COMMERCIAL

## 2023-08-22 DIAGNOSIS — M19.011 PRIMARY OSTEOARTHRITIS OF RIGHT SHOULDER: Primary | ICD-10-CM

## 2023-08-22 DIAGNOSIS — Z96.611 STATUS POST REVERSE TOTAL REPLACEMENT OF RIGHT SHOULDER: ICD-10-CM

## 2023-08-22 PROCEDURE — 97110 THERAPEUTIC EXERCISES: CPT

## 2023-08-22 PROCEDURE — 97140 MANUAL THERAPY 1/> REGIONS: CPT

## 2023-08-22 NOTE — PROGRESS NOTES
Daily Note     Today's date: 2023  Patient name: Marisol Rogers  : 1960  MRN: [id number]  Referring provider: Noemi Cervantes DO  Dx:   Encounter Diagnosis     ICD-10-CM    1. Primary osteoarthritis of right shoulder  M19.011       2. Status post reverse total replacement of right shoulder  Z96.611                      Subjective: Reports that she is feeling okay. Stomach is hurting a little. Notes that her shoulder is feeling great, good report from ortho. Ankle is bothering her a little bit, boot is annoying. Objective: See treatment diary below    Per Ortho visit 8/15: "Destiney Lal returns today for second post op visit. She has phenomenal progress in terms of ROM at this point. She unfortunately suffered several falls onto both her R shoulder and suffered a nondisplaced R ankle distal fibula fracture. I explained to Destiney Lal her ankle can be treated non-operatively as outlined by Dr. Linda Lopez yesterday. She should continue with RUE strengthening and emphasis on improving her IR with PT. She should remain WBAT in the boot and discontinue her crutch use. She can follow up with me regarding both the R ankle and R shoulder in four weeks with repeat radiographs of both. If her ankle remains stable we will DC her boot at that time."      Assessment: Tolerated treatment well. Continued with performance of POC as per primary therapist. Session emphasis on continued improvements with range of motion. Some pain with IR isometric into the wall that she reported has been typical, encouraged pain-free isometric performance with MVC up to point of pain for all isometric activity. Did well with wall slides, no discomfort with flexion or scaption. Patient demonstrated fatigue post treatment, exhibited good technique with therapeutic exercises and would benefit from continued PT      Plan: Continue per plan of care. Progress treatment as tolerated.   Continue to incorporate AROM motions dependent on patient's response to addition of wall slides during today's visit. Continue improvements per protocol and last surgeon report.       Precautions: per RTSA protocol   POC expires: 8/13  DOS: 7/6/23    DAILY TREATMENT RECORD    Manuals 7/13 7/18 7/20 7/27 8/1 8/3 8/10 8/17 8/22    PROM R shld  [address] MH    PROM R elbow/ wrist Arvilla Estefany     Rhythmic stab       [facility]          08' 12'       Neuro Re-Ed                                                                                                        Ther Ex             Pullies    3' scaption 3' scaption 3' scaption 3min 3min 5 min    pendulums instruc 1' 2-3' x30 ea x30 ea   1-2'     scap retraction   10x 2x10 5" 2x10 5" 2x10 5" 10x10" 10x10"     Cervical retraction 5x5" 5"x10 10x5"  10x5" 10x5"       C-AROM 5xea x10 ea 10ea          Wrist circles  x20 ea 20x  20        Elbow flex/ext  x20 20x  20 20       Gripper squeeze  Red x20 20x          iso abd/ER/IR    5"x30 ea 5"x10 5"x10 10x5" +flex/ ext 10ea 10x5" + flex/ext    AAROM HH shld flexion     10x5" 20x5" 20x 20x 20x3"     AAROM cane shld ER     10x5" 20x5" 20x 20x 20x3"     Table slides flexion        20x     Wall Slides         x10 flex,scap                 AAROM ER/IR     AROM 20x AROM 20x arom 20                   Ther Activity                                       Gait Training                                       Modalities

## 2023-08-24 ENCOUNTER — OFFICE VISIT (OUTPATIENT)
Dept: FAMILY MEDICINE CLINIC | Facility: HOSPITAL | Age: 63
End: 2023-08-24
Payer: COMMERCIAL

## 2023-08-24 ENCOUNTER — APPOINTMENT (OUTPATIENT)
Dept: PHYSICAL THERAPY | Facility: CLINIC | Age: 63
End: 2023-08-24
Payer: COMMERCIAL

## 2023-08-24 VITALS
HEIGHT: 64 IN | TEMPERATURE: 97.8 F | BODY MASS INDEX: 29.3 KG/M2 | DIASTOLIC BLOOD PRESSURE: 74 MMHG | HEART RATE: 81 BPM | SYSTOLIC BLOOD PRESSURE: 110 MMHG | WEIGHT: 171.6 LBS

## 2023-08-24 DIAGNOSIS — E03.9 HYPOTHYROIDISM, UNSPECIFIED TYPE: ICD-10-CM

## 2023-08-24 DIAGNOSIS — F41.1 GENERALIZED ANXIETY DISORDER: Primary | ICD-10-CM

## 2023-08-24 DIAGNOSIS — S82.831D CLOSED TRAUMATIC NONDISPLACED FRACTURE OF DISTAL END OF RIGHT FIBULA WITH ROUTINE HEALING, SUBSEQUENT ENCOUNTER: ICD-10-CM

## 2023-08-24 DIAGNOSIS — M19.011 PRIMARY LOCALIZED OSTEOARTHROSIS OF RIGHT SHOULDER: ICD-10-CM

## 2023-08-24 DIAGNOSIS — Z12.11 SCREENING FOR MALIGNANT NEOPLASM OF COLON: ICD-10-CM

## 2023-08-24 DIAGNOSIS — Z12.31 ENCOUNTER FOR SCREENING MAMMOGRAM FOR MALIGNANT NEOPLASM OF BREAST: ICD-10-CM

## 2023-08-24 DIAGNOSIS — E28.39 OVARIAN FAILURE DUE TO MENOPAUSE: ICD-10-CM

## 2023-08-24 PROCEDURE — 99214 OFFICE O/P EST MOD 30 MIN: CPT | Performed by: NURSE PRACTITIONER

## 2023-08-24 NOTE — PROGRESS NOTES
Name: Louise Murillo      : 1960      MRN: [id number]  Encounter Provider: DMITRIY Cross  Encounter Date: 2023   Encounter department: [address]     1. Generalized anxiety disorder  Assessment & Plan:  Mood remains stable  Advise she continue same doses as rx'd - declines need for refills today  Return in 6 months for next med check      2. Encounter for screening mammogram for malignant neoplasm of breast  -     Mammo screening bilateral w 3d & cad; Future; Expected date: 2023    3. Screening for malignant neoplasm of colon  -     Ambulatory Referral to Gastroenterology; Future    4. Ovarian failure due to menopause  -     DXA bone density spine hip and pelvis; Future; Expected date: 2023    5. Hypothyroidism, unspecified type  Assessment & Plan:  2023 TFTs improved/normal on levothyroxine  Continue same dose as rx'd  Update TFTs in 6 months as ordered    Orders:  -     TSH, 3rd generation; Future; Expected date: 2024  -     T4, free; Future; Expected date: 2024  -     TSH, 3rd generation  -     T4, free    6. Primary localized osteoarthrosis of right shoulder  Assessment & Plan:  Improving S/P surgical repair  Continue ortho and PT follow up as planned      7. Closed traumatic nondisplaced fracture of distal end of right fibula with routine healing, subsequent encounter  Assessment & Plan:  Continue follow up with ortho as planned      Schedule mammo, dexa and colonoscopy as previously ordered - orders reprinted  Pt agreeable to return and update gyn exam/pap in 2 months       Subjective      States she is doing OK. Had shoulder surgery in July and fractured tibia 2 weeks ago. Wearing a walking boot. States she is taking meds consistently as rx'd. Denies mood concerns despite ongoing situational stress - states she has a lot going on. Review of Systems   Respiratory: Negative.     Cardiovascular: Negative. Musculoskeletal: Positive for arthralgias (s/p right shoulder surgery - doing PT, right leg tibial fracture) and gait problem (due to leg fracture). Psychiatric/Behavioral: The patient is nervous/anxious. Current Outpatient Medications on File Prior to Visit   Medication Sig   • acetaminophen (TYLENOL) 500 mg tablet Take 2 tablets (1,000 mg total) by mouth every 8 (eight) hours as needed for mild pain   • Biotin 10 MG CAPS Take by mouth   • buPROPion (WELLBUTRIN) 75 mg tablet TAKE ONE TABLET BY MOUTH DAILY   • celecoxib (CeleBREX) 100 mg capsule Take 1 capsule (100 mg total) by mouth 2 (two) times a day   • Cyanocobalamin (VITAMIN B 12 PO) Take by mouth   • gabapentin (Neurontin) 300 mg capsule Take 1 capsule (300 mg total) by mouth daily   • hydrOXYzine HCL (ATARAX) 50 mg tablet TAKE 1 TABLET BY MOUTH EVERY 6 HOURS AS NEEDED FOR ANXIETY   • levothyroxine 25 mcg tablet TAKE ONE TABLET BY MOUTH EVERY MORNING   • Misc. Devices (Rollator Ultra-Light) MISC Use in the morning   • multivitamin (THERAGRAN) TABS Take 1 tablet by mouth daily   • pantoprazole (PROTONIX) 40 mg tablet TAKE 1 TABLET BY MOUTH DAILY   • QUEtiapine (SEROquel) 100 mg tablet Take 1 tablet (100 mg total) by mouth daily at bedtime   • sertraline (ZOLOFT) 100 mg tablet Take 200 mg by mouth daily   • traZODone (DESYREL) 100 mg tablet TAKE TWO TABLETS BY MOUTH AT BEDTIME   • [DISCONTINUED] Diclofenac Sodium (VOLTAREN) 1 % Apply 2 g topically 4 (four) times a day (Patient not taking: Reported on 8/24/2023)   • [DISCONTINUED] oxyCODONE (Roxicodone) 5 immediate release tablet Take 1 tablet (5 mg total) by mouth every 4 (four) hours as needed for moderate pain Max Daily Amount: 30 mg (Patient not taking: Reported on 8/24/2023)       Objective     /74   Pulse 81   Temp 97.8 °F (36.6 °C)   Ht 5' 4" (1.626 m)   Wt 77.8 kg (171 lb 9.6 oz)   BMI 29.46 kg/m²       Physical Exam  Vitals reviewed.    Constitutional:       General: She is not in acute distress. Appearance: Normal appearance. HENT:      Head: Normocephalic. Eyes:      General: No scleral icterus. Neck:      Thyroid: No thyromegaly. Vascular: No carotid bruit. Cardiovascular:      Rate and Rhythm: Normal rate and regular rhythm. Heart sounds: No murmur heard. Pulmonary:      Effort: Pulmonary effort is normal. No respiratory distress. Breath sounds: Normal breath sounds. Musculoskeletal:      Cervical back: Normal range of motion. Lymphadenopathy:      Cervical: No cervical adenopathy. Skin:     General: Skin is warm and dry. Neurological:      General: No focal deficit present. Mental Status: She is alert and oriented to person, place, and time. Gait: Gait abnormal (limping in right leg walking boot). Psychiatric:         Mood and Affect: Mood normal.         Behavior: Behavior normal.         Thought Content:  Thought content normal.         Judgment: Judgment normal.          DMITRIY Patrick

## 2023-08-24 NOTE — ASSESSMENT & PLAN NOTE
Mood remains stable  Advise she continue same doses as rx'd - declines need for refills today  Return in 6 months for next med check

## 2023-08-24 NOTE — ASSESSMENT & PLAN NOTE
7/2023 TFTs improved/normal on levothyroxine  Continue same dose as rx'd  Update TFTs in 6 months as ordered

## 2023-08-29 ENCOUNTER — APPOINTMENT (OUTPATIENT)
Dept: PHYSICAL THERAPY | Facility: CLINIC | Age: 63
End: 2023-08-29
Payer: COMMERCIAL

## 2023-08-31 ENCOUNTER — APPOINTMENT (OUTPATIENT)
Dept: PHYSICAL THERAPY | Facility: CLINIC | Age: 63
End: 2023-08-31
Payer: COMMERCIAL

## 2023-08-31 DIAGNOSIS — Z96.611 STATUS POST REVERSE TOTAL REPLACEMENT OF RIGHT SHOULDER: ICD-10-CM

## 2023-08-31 DIAGNOSIS — M19.011 PRIMARY OSTEOARTHRITIS OF RIGHT SHOULDER: Primary | ICD-10-CM

## 2023-08-31 NOTE — PROGRESS NOTES
Daily Note     Today's date: 2023  Patient name: Werner Fracno  : 1960  MRN: [id number]  Referring provider: Marleni Linn DO  Dx:   Encounter Diagnosis     ICD-10-CM    1. Primary osteoarthritis of right shoulder  M19.011       2. Status post reverse total replacement of right shoulder  Z96.611                      Subjective: Pt reports. ............. Objective: See treatment diary below      Assessment: Tolerated treatment well. Patient demonstrated good tolerance to activity. ................. dynamic   Patient would benefit from continued PT to improve ROM, strength and flexibility to optimize return to prior functional mobility. Plan: Continue per plan of care.       Precautions: per RTSA protocol   POC expires:   DOS: 23    DAILY TREATMENT RECORD    Manuals 7/13 7/18 7/20 7/27 8/1 8/3 8/10 8/17 8/22 2023     PROM R shld JR MIKI JR  Emely Berry  *   PROM R elbow/ wrist JR Amaury Cardoza MH *   Rhythmic stab       Francenia Minor MH  ?        15' 12'       Neuro Re-Ed                                                                                                        Ther Ex             Pullies    3' scaption 3' scaption 3' scaption 3min 3min 5 min *   pendulums instruc 1' 2-3' x30 ea x30 ea   1-2'  *   scap retraction   10x 2x10 5" 2x10 5" 2x10 5" 10x10" 10x10"     Cervical retraction 5x5" 5"x10 10x5"  10x5" 10x5"       C-AROM 5xea x10 ea 10ea          Wrist circles  x20 ea 20x  20        Elbow flex/ext  x20 20x  20 20       Gripper squeeze  Red x20 20x          iso abd/ER/IR    5"x30 ea 5"x10 5"x10 10x5" +flex/ ext 10ea 10x5" + flex/ext *   AAROM HH shld flexion     10x5" 20x5" 20x 20x 20x3"  **   AAROM cane shld ER     10x5" 20x5" 20x 20x 20x3"  **   Table slides flexion        20x  *   Wall Slides         x10 flex,scap *                AAROM ER/IR     AROM 20x AROM 20x arom 20                   Ther Activity                                       Gait Training Modalities

## 2023-09-05 ENCOUNTER — APPOINTMENT (OUTPATIENT)
Dept: PHYSICAL THERAPY | Facility: CLINIC | Age: 63
End: 2023-09-05
Payer: COMMERCIAL

## 2023-09-07 ENCOUNTER — APPOINTMENT (OUTPATIENT)
Dept: PHYSICAL THERAPY | Facility: CLINIC | Age: 63
End: 2023-09-07
Payer: COMMERCIAL

## 2023-09-12 ENCOUNTER — EVALUATION (OUTPATIENT)
Dept: PHYSICAL THERAPY | Facility: CLINIC | Age: 63
End: 2023-09-12
Payer: COMMERCIAL

## 2023-09-12 DIAGNOSIS — M19.011 PRIMARY OSTEOARTHRITIS OF RIGHT SHOULDER: Primary | ICD-10-CM

## 2023-09-12 DIAGNOSIS — Z96.611 STATUS POST REVERSE TOTAL REPLACEMENT OF RIGHT SHOULDER: ICD-10-CM

## 2023-09-12 PROCEDURE — 97110 THERAPEUTIC EXERCISES: CPT | Performed by: PHYSICAL THERAPIST

## 2023-09-12 NOTE — PROGRESS NOTES
PT Evaluation     Today's date: 2023  Patient name: Werner Franco  : 1960  MRN: [id number]  Referring provider: Marleni Linn DO  Dx:   Encounter Diagnosis     ICD-10-CM    1. Primary osteoarthritis of right shoulder  M19.011       2. Status post reverse total replacement of right shoulder  Z96.611           Start Time: 1019  Stop Time: 1105  Total time in clinic (min): 46 minutes    Assessment/Plan  This patient is 9 weeks s/p R reverse total shoulder replacement. She has made siginificant progress since beginning therapy and has met most of the goals which were set for her. Recommend a few additional visits to progress strengthening program and transition to independent HEP. Planned Interventions:  manual therapy, ROM, stretching, strengthening, therapeutic activities, neuromuscular re-ed and instruction in HEP. Duration/ Frequency:  1-2 x/week for 2-4 weeks    STG’s: 6 weeks  1. Increase PROM R shoulder flexion to  degrees - MET  2. Decrease pain 2-3 levels -MET     LTG’s: 8-12 weeks  1. Independent HEP  2. Increase strength R shoulder to 4/5 - MET  3. Increase ROM R shoulder flexion to 125 degrees, ER to 45 degrees - MET  4. Able to perform usual functional activities using RUE - mostly met    Subjective  This is a 63 y.o. female who began to have R shoulder pain about 6 years ago, hx of arthriits. Underwent surgery for reverse total shoulder replacement on 23. Current symptoms:  Shoulder feels good, just having some difficulty reaching behind back, putting bra on    Generalized pain around the R shoulder girdle, into the R upper arm and anterior chest region.   States she has had a HA (back of the head) since the block wore off  Occupation: not employed, on disability  Leisure: enjoyed horse riding, walking  Patient’s goal:  To be able to use the R arm again    Objective  Pain scale: 0-1/10  Posture/observation:  Presents to therapy w/ R ankle in cam boot  Cervical screen:     ROM WFL  Integumentary:    R shoulder incision is well approximated with immature scar tissue        right  Shoulder  AROM  PROM  Strength  flexion 150 155 5/5   Abduction 140 150 4/4   ER 70 70 5/5   IR 25 27 4/5   Elbow   AROM  PROM  Strength  Flexion WNL WNL 5/5   Extension WNL WNL 5/5   Wrist   AROM  PROM  Strength  Flexion WNL WNL 5/5   Extension WNL WNL 5/5         Precautions: per RTSA protocol   POC expires: 8/13  DOS: 7/6/23    DAILY TREATMENT RECORD    Manuals 9/12            PROM R shld JR                         Rhythmic stab                          Neuro Re-Ed                                                                                                        Ther Ex             SL flex 10x2            SL abd 10x2            SL ER 10x2            Standing scaption 10x                                                                iso abd/ER/IR/ flex/ext hep                                                   Wall Slides             TB extension  GTB 20                                                   TB ER/IR GTB 15ea                         Ther Activity                                       Gait Training                                       Modalities

## 2023-09-13 DIAGNOSIS — F31.81 BIPOLAR 2 DISORDER (HCC): ICD-10-CM

## 2023-09-14 ENCOUNTER — OFFICE VISIT (OUTPATIENT)
Dept: OBGYN CLINIC | Facility: CLINIC | Age: 63
End: 2023-09-14

## 2023-09-14 ENCOUNTER — APPOINTMENT (OUTPATIENT)
Dept: RADIOLOGY | Facility: CLINIC | Age: 63
End: 2023-09-14
Payer: COMMERCIAL

## 2023-09-14 ENCOUNTER — OFFICE VISIT (OUTPATIENT)
Dept: PHYSICAL THERAPY | Facility: CLINIC | Age: 63
End: 2023-09-14
Payer: COMMERCIAL

## 2023-09-14 VITALS
HEART RATE: 58 BPM | BODY MASS INDEX: 29.35 KG/M2 | RESPIRATION RATE: 17 BRPM | SYSTOLIC BLOOD PRESSURE: 147 MMHG | HEIGHT: 64 IN | WEIGHT: 171.9 LBS | DIASTOLIC BLOOD PRESSURE: 88 MMHG

## 2023-09-14 DIAGNOSIS — S82.831A CLOSED TRAUMATIC NONDISPLACED FRACTURE OF DISTAL END OF RIGHT FIBULA, INITIAL ENCOUNTER: Primary | ICD-10-CM

## 2023-09-14 DIAGNOSIS — S82.831A CLOSED TRAUMATIC NONDISPLACED FRACTURE OF DISTAL END OF RIGHT FIBULA, INITIAL ENCOUNTER: ICD-10-CM

## 2023-09-14 DIAGNOSIS — Z96.611 STATUS POST REVERSE TOTAL REPLACEMENT OF RIGHT SHOULDER: ICD-10-CM

## 2023-09-14 DIAGNOSIS — Z96.611 STATUS POST REVERSE ARTHROPLASTY OF RIGHT SHOULDER: ICD-10-CM

## 2023-09-14 DIAGNOSIS — M19.011 PRIMARY OSTEOARTHRITIS OF RIGHT SHOULDER: Primary | ICD-10-CM

## 2023-09-14 DIAGNOSIS — F33.42 RECURRENT MAJOR DEPRESSIVE DISORDER, IN FULL REMISSION (HCC): ICD-10-CM

## 2023-09-14 PROCEDURE — 73610 X-RAY EXAM OF ANKLE: CPT

## 2023-09-14 PROCEDURE — 97140 MANUAL THERAPY 1/> REGIONS: CPT | Performed by: PHYSICAL THERAPIST

## 2023-09-14 PROCEDURE — 99024 POSTOP FOLLOW-UP VISIT: CPT | Performed by: STUDENT IN AN ORGANIZED HEALTH CARE EDUCATION/TRAINING PROGRAM

## 2023-09-14 PROCEDURE — 73030 X-RAY EXAM OF SHOULDER: CPT

## 2023-09-14 PROCEDURE — 97110 THERAPEUTIC EXERCISES: CPT | Performed by: PHYSICAL THERAPIST

## 2023-09-14 RX ORDER — TRAZODONE HYDROCHLORIDE 100 MG/1
TABLET ORAL
Qty: 60 TABLET | Refills: 2 | Status: SHIPPED | OUTPATIENT
Start: 2023-09-14

## 2023-09-14 RX ORDER — QUETIAPINE FUMARATE 100 MG/1
100 TABLET, FILM COATED ORAL
Qty: 30 TABLET | Refills: 2 | Status: SHIPPED | OUTPATIENT
Start: 2023-09-14

## 2023-09-14 NOTE — PROGRESS NOTES
Shoulder Post Operative Visit     Assesment:     63 y.o. female 10 weeks s/p  right shoulder with reverse shoulder arthroplasty, DOS: 7/6/2023, distal fibular fracture with routine healing    Plan:  The patient's diagnosis and treatment were discussed at length today. We discussed no treatment, non-operative treatment, and operative treatment. Merlyn Lee returns today for follow-up visit regarding her right shoulder. She is doing extremely well at this time and her right shoulder demonstrates excellent range of motion with the 1 exception of some limited internal rotation, to which I explained can be expected following reverse shoulder arthroplasty. She is currently able to internally rotate to the sacrum, and I demonstrated home exercises for her to improve this with hopes of achieving internal rotation to the upper lumbar spine. In regards to her right ankle, I explained her radiographs demonstrate continued healing of her fracture however the fracture line is still visible. I would like her to continue with a cam boot for 2 additional weeks followed by transitioning to a ASO ankle brace in a lace up shoe. I will see her back in approximately 6 to 8 weeks for reevaluation with repeat radiographs of both the right shoulder and right ankle. In the meantime, she should continue with physical therapy for range of motion strengthening about the right upper extremity right lower extremity according to our protocols. Post-Operative treatment:    PT for ROM and strengthening to shoulder, rotator cuff, scapular stabilizers. Home exercise program for shoulder, including ROM and strenthening. Instructions given to patient of what exercises to perform. Let pain guide return to activities. Imaging: All imaging from today was reviewed by myself and explained to the patient. Sling:  never, as they have completed this portion of the post op period.     DVT Prophylaxis:  Ambulation    Follow up:   6 weeks    Patient was advised that if they have any fevers, chills, chest pain, shortness of breath, redness or drainage from the incision, please let our office know immediately. Chief Complaint   Patient presents with   • Right Ankle - Follow-up   • Right Shoulder - Follow-up       History of Present Illness: The patient is a 63 y.o. female who is being evaluated post operatively 10 weeks  status post right shoulder reverse shoulder arthroplasty. Since the prior visit, She reports significant improvement. She states she is doing well at this time. She has been continuing with physical therapy, which she notes is going well. She has no pain at this time in her shoulder. She notes her ankle is tender to palpation and when she is walking. She has been compliant with the use of her boot. She notes some rubbing on the medial malleolus, which as a callus formation. She denies any new trauma or injury. She denies any numbness and tingling. The patient denies any fevers, chills, calf pain, chest pain/shortness of breath, redness or drainage from the incision. I have reviewed the past medical, surgical, social and family history, medications and allergies as documented in the EMR. Review of systems: ROS is negative other than that noted in the HPI. Constitutional: Negative for fatigue and fever. Physical Exam:    Blood pressure 147/88, pulse 58, resp. rate 17, height 5' 4" (1.626 m), weight 78 kg (171 lb 14.4 oz).     General/Constitutional: NAD, well developed, well nourished  HENT: Normocephalic, atraumatic  CV: Intact distal pulses, regular rate  Resp: No respiratory distress or labored breathing  Lymphatic: No lymphadenopathy palpated  Neuro: Alert and Oriented x 3, no focal deficits  Psych: Normal mood, normal affect, normal judgement, normal behavior  Skin: Warm, dry, no rashes, no erythema    Shoulder focused exam:        Visual inspection of the shoulder demonstrates normal contour without atrophy  No evidence of scapular dyskinesia or atrophy. No scapular winging  Active and passive range of motion demonstrates forward flexion to 160, abduction to 100, external rotation is 40 with the arm the side, internal rotation to sacrum   Strength not tested  No tenderness to palpation at the distal clavicle, AC joint, acromion, or scapular spine  No pain with cross-body adduction  No pertinent tests performed      Ankle Examination (focused):    RIGHT LEFT   Palpation:  Mild tenderness No tenderness   Anterior Drawer negative negative   Calcaneal inversion negative negative   Squeeze Test negative negative       Incisions show no erythema, no drainage        UE NV Exam: +2 Radial pulses bilaterally  Sensation intact to light touch C5-T1 bilaterally, Radial/median/ulnar nerve motor intact      Scribe Attestation    I,:  Chong Mejia am acting as a scribe while in the presence of the attending physician.:       I,:  Karly Becerra DO personally performed the services described in this documentation    as scribed in my presence.:

## 2023-09-14 NOTE — PROGRESS NOTES
Daily Note     Today's date: 2023  Patient name: Melissa Berrios  : 1960  MRN: [id number]  Referring provider: Radha Fuentes DO  Dx:   Encounter Diagnosis     ICD-10-CM    1. Primary osteoarthritis of right shoulder  M19.011       2. Status post reverse total replacement of right shoulder  Z96.611           Start Time: 08  Stop Time: 918  Total time in clinic (min): 39 minutes    Subjective: offers no complaints; has follow up with surgeon later today      Objective: See treatment diary below      Assessment: progressed exercises at noted below; tolerated treatment well with moderate cueing for form. Patient would benefit from continued PT      Plan: Continue per plan of care. Progress note during next visit.         Precautions: per RTSA protocol - DO NOT AGGRESSIVELY PUSH ER ROM  POC expires:   DOS: 23    DAILY TREATMENT RECORD    Manuals            PROM R shld JR JR                        Rhythmic stab  JR                        Neuro Re-Ed                                                                                                        Ther Ex             SL flex 10x2 10X2           SL abd 10x2 10X2           SL ER 10x2 10X2           Standing scaption 10x 10X2           Standing flexion  10x2                                                  iso abd/ER/IR/ flex/ext hep            Prone extension             Prone horiz abd                          Wall Slides             TB extension  GTB 20 GTB 10                                                  TB ER/IR GTB 15ea GTB 10ea                        Ther Activity                                       Gait Training                                       Modalities

## 2023-09-15 RX ORDER — BUPROPION HYDROCHLORIDE 75 MG/1
75 TABLET ORAL DAILY
Qty: 30 TABLET | Refills: 2 | Status: SHIPPED | OUTPATIENT
Start: 2023-09-15

## 2023-09-19 ENCOUNTER — APPOINTMENT (OUTPATIENT)
Dept: PHYSICAL THERAPY | Facility: CLINIC | Age: 63
End: 2023-09-19
Payer: COMMERCIAL

## 2023-10-17 DIAGNOSIS — E03.9 HYPOTHYROIDISM, UNSPECIFIED TYPE: ICD-10-CM

## 2023-10-17 DIAGNOSIS — M19.011 PRIMARY OSTEOARTHRITIS OF RIGHT SHOULDER: ICD-10-CM

## 2023-10-17 RX ORDER — LEVOTHYROXINE SODIUM 0.03 MG/1
25 TABLET ORAL DAILY
Qty: 90 TABLET | Refills: 0 | Status: SHIPPED | OUTPATIENT
Start: 2023-10-17

## 2023-10-17 RX ORDER — GABAPENTIN 300 MG/1
300 CAPSULE ORAL DAILY
Qty: 30 CAPSULE | Refills: 0 | Status: CANCELLED | OUTPATIENT
Start: 2023-10-17

## 2023-10-17 RX ORDER — GABAPENTIN 300 MG/1
300 CAPSULE ORAL DAILY
Qty: 30 CAPSULE | Refills: 0 | Status: SHIPPED | OUTPATIENT
Start: 2023-10-17

## 2023-10-17 NOTE — TELEPHONE ENCOUNTER
Reason for call:   [x] Refill   [] Prior Auth  [] Other:     Office:   [] PCP/Provider -   [x] Specialty/Provider - Jg    Medication: Gabapentin     Dose/Frequency: 300mg QD    Quantity: 30    Pharmacy: [address] [place] [address]    Does the patient have enough for 3 days?    [] Yes   [x] No - Send as HP to POD

## 2023-10-18 NOTE — PROGRESS NOTES
Progress Note - Behavioral Health   Edi Ortega 66 y o  female MRN: [id number]  Unit/Bed#: [facility] 258-01 Encounter: [id number]    Assessment/Plan   Principal Problem:    Severe episode of recurrent major depressive disorder, without psychotic features ([facility] )  Active Problems:    Generalized anxiety disorder    Opiate abuse, continuous ([facility] )    Subjective:  Patient is compliant with medication and reports having opioid withdrawal symptoms of nausea, vomiting, chills, body ache and nasal discharge  Patient received multiple p r n  Medication yesterday including clonidine, Zofran, ativan and Bentyl  Patient's vitals are stable and agreed with considering standing dose of clonidine for withdrawal symptoms management  Patient reports persistence of depression and anxiety with passive death wishes  Agreed with plan of not increasing or adding medication till opiate withdrawal symptoms are stable  Patient is not motivated for rehabilitation at this point  She was educated on importance of considering rehabilitation after discharge  She is consenting for safety on the unit  She remained seclusive to self in her room due to active opiate withdrawal symptoms      Current Medications:    Current Facility-Administered Medications:  acetaminophen 650 mg Oral Q6H PRN Compa Stockton   aluminum-magnesium hydroxide-simethicone 30 mL Oral Q4H PRN Compa Stockton   benztropine 1 mg Intramuscular Q6H PRN Compa Stockton   benztropine 1 mg Oral Q6H PRN Compa Stockton   cloNIDine 0 1 mg Oral Q8H PRN Compa Stockton   cloNIDine 0 1 mg Oral Q12H DENISA Compa Stockton   dicyclomine 20 mg Oral Q6H PRN Compa Stockton   escitalopram 15 mg Oral Daily Compa Stockton   gabapentin 300 mg Oral After Lunch Compa Stockton   gabapentin 300 mg Oral BID Compa Stockton   haloperidol 5 mg Oral Q8H PRN Compa Stockton   haloperidol lactate 5 mg Intramuscular Q6H PRN Compa Stockton   loperamide 2 mg Oral Q4H PRN Compa Stockton   LORazepam 2 Statement Selected mg Intramuscular Q6H PRN Compa Stockton   LORazepam 1 mg Oral Q8H PRN Compa Stockton   magnesium hydroxide 30 mL Oral Daily PRN Compa Stockton   mirtazapine 15 mg Oral HS Compa Stockton   nicotine polacrilex 2 mg Oral Q2H PRN Compa Stockton   ondansetron 4 mg Intramuscular Q6H PRN Compa Stockton   ondansetron 4 mg Oral Q6H PRN Compa Stockton   traZODone 50 mg Oral HS PRN Compa Stockton       Behavioral Health Medications: all current active meds have been reviewed  Vital signs in last 24 hours:  Temp:  [98 1 °F (36 7 °C)-100 9 °F (38 3 °C)] 100 9 °F (38 3 °C)  HR:  [52-68] 68  Resp:  [18] 18  BP: (117-157)/(70-82) 117/71    Laboratory results:    I have personally reviewed all pertinent laboratory/tests results  Labs in last 72 hours: No results for input(s): WBC, RBC, HGB, HCT, PLT, RDW, NEUTROABS, SODIUM, K, CL, CO2, BUN, CREATININE, GLUC, GLUF, CALCIUM, AST, ALT, ALKPHOS, TP, ALB, TBILI, CHOLESTEROL, HDL, TRIG, LDLCALC, VALPROICTOT, CARBAMAZEPIN, LITHIUM, AMMONIA, [id number], FREET4, T3FREE, PREGTESTUR, PREGSERUM, HCG, HCGQUANT, RPR in the last 72 hours      Invalid input(s):  RBC  Admission Labs:   Admission on 02/01/2019   Component Date Value    Color, UA 02/01/2019 Yellow     Clarity, UA 02/01/2019 Clear     Specific Gravity, UA 02/01/2019 1 025     pH, UA 02/01/2019 6 0     Leukocytes, UA 02/01/2019 Negative     Nitrite, UA 02/01/2019 Negative     Protein, UA 02/01/2019 Negative     Glucose, UA 02/01/2019 Negative     Ketones, UA 02/01/2019 Negative     Urobilinogen, UA 02/01/2019 0 2     Bilirubin, UA 02/01/2019 Negative     Blood, UA 02/01/2019 Small*    RBC, UA 02/01/2019 0-1*    WBC, UA 02/01/2019 0-1*    Epithelial Cells 02/01/2019 Occasional     Bacteria, UA 02/01/2019 Occasional     MUCUS THREADS 02/01/2019 Occasional*    Preg, Serum 02/02/2019 Negative     Cholesterol 02/02/2019 188     Triglycerides 02/02/2019 41     HDL, Direct 02/02/2019 72*    LDL Calculated 02/02/2019 108*    Non-HDL-Chol (CHOL-HDL) 02/02/2019 116     RPR 02/02/2019 Non-Reactive     TSH 3RD GENERATON 02/02/2019 6 210*    TSH 3RD GENERATON 02/03/2019 1 332        Psychiatric Review of Systems:  Behavior over the last 24 hours:  regressed  Sleep: insomnia  Appetite: poor  Medication side effects: No  ROS: nausea and vomiting    Mental Status Evaluation:  Appearance:  casually dressed   Behavior:  guarded   Speech:  soft   Mood:  anxious, depressed   Affect:  constricted   Language naming objects   Thought Process:  circumstantial   Thought Content:  obsessions   Perceptual Disturbances: None   Risk Potential: Suicidal Ideations without plan, Homicidal Ideations none and Potential for Aggression No   Sensorium:  person and place   Cognition:  grossly intact   Consciousness:  awake    Attention: attention span appeared shorter than expected for age   Insight:  fair   Judgment: fair   Intellect fair   Gait/Station: normal gait/station   Motor Activity: no abnormal movements     Memory: Short and long term memory  fair     Progress Toward Goals: slow progress    Recommended Treatment:   Add clonidine 0 1 mg b i d  For opiate withdrawal management  Patient not motivated for rehabilitation at this point  Patient not stable for discharge to [facility]  at this point  Continue with group therapy, milieu therapy and occupational therapy      Continue following current medications:   Current Facility-Administered Medications:  acetaminophen 650 mg Oral Q6H PRN Compa Stockton   aluminum-magnesium hydroxide-simethicone 30 mL Oral Q4H PRN Compa Stockton   benztropine 1 mg Intramuscular Q6H PRN Compa Stockton   benztropine 1 mg Oral Q6H PRN Compa Stockton   cloNIDine 0 1 mg Oral Q8H PRN Compa Stockton   cloNIDine 0 1 mg Oral Q12H DENISA Compa Stockton   dicyclomine 20 mg Oral Q6H PRN Compa Stockton   escitalopram 15 mg Oral Daily Compa Stockton   gabapentin 300 mg [facility]   gabapentin 300 Statement Selected mg Oral BID Compa Stockton   haloperidol 5 mg Oral Q8H PRN Compa Stockton   haloperidol lactate 5 mg Intramuscular Q6H PRN Compa Stockton   loperamide 2 mg Oral Q4H PRN Compa Stockton   LORazepam 2 mg Intramuscular Q6H PRN Compa Stockton   LORazepam 1 mg Oral Q8H PRN Compa Stockton   magnesium hydroxide 30 mL Oral Daily PRN Compa Stockton   mirtazapine 15 mg Oral HS Compa Stockton   nicotine polacrilex 2 mg Oral Q2H PRN Compa Stockton   ondansetron 4 mg Intramuscular Q6H PRN Compa Stockton   ondansetron 4 mg Oral Q6H PRN Compa Stockton   traZODone 50 mg Oral HS PRN Compa Stockton       Risks, benefits and possible side effects of Medications:   Risks, benefits, and possible side effects of medications explained to patient and patient verbalizes understanding  Risks of medications in pregnancy explained if female patient  Patient verbalizes understanding and agrees to notify her doctor if she becomes pregnant  This note has been constructed using a voice recognition system  There may be translation, syntax,  or grammatical errors  If you have any questions, please contact the dictating provider  Statement Selected Statement Selected

## 2023-10-27 LAB
T4 FREE SERPL-MCNC: 0.81 NG/DL (ref 0.82–1.77)
TSH SERPL DL<=0.005 MIU/L-ACNC: 8.22 UIU/ML (ref 0.45–4.5)

## 2023-10-30 ENCOUNTER — OFFICE VISIT (OUTPATIENT)
Dept: FAMILY MEDICINE CLINIC | Facility: HOSPITAL | Age: 63
End: 2023-10-30
Payer: COMMERCIAL

## 2023-10-30 VITALS
HEART RATE: 67 BPM | DIASTOLIC BLOOD PRESSURE: 86 MMHG | TEMPERATURE: 98.7 F | SYSTOLIC BLOOD PRESSURE: 142 MMHG | WEIGHT: 176 LBS | BODY MASS INDEX: 30.05 KG/M2 | HEIGHT: 64 IN

## 2023-10-30 DIAGNOSIS — F31.81 BIPOLAR 2 DISORDER (HCC): ICD-10-CM

## 2023-10-30 DIAGNOSIS — M79.672 PAIN OF LEFT HEEL: ICD-10-CM

## 2023-10-30 DIAGNOSIS — Z12.4 SCREENING FOR CERVICAL CANCER: ICD-10-CM

## 2023-10-30 DIAGNOSIS — K59.01 SLOW TRANSIT CONSTIPATION: ICD-10-CM

## 2023-10-30 DIAGNOSIS — Z01.419 ENCOUNTER FOR GYNECOLOGICAL EXAMINATION WITHOUT ABNORMAL FINDING: Primary | ICD-10-CM

## 2023-10-30 DIAGNOSIS — Z12.11 SCREENING FOR MALIGNANT NEOPLASM OF COLON: ICD-10-CM

## 2023-10-30 DIAGNOSIS — E03.9 HYPOTHYROIDISM, UNSPECIFIED TYPE: ICD-10-CM

## 2023-10-30 PROBLEM — R79.89 ELEVATED TSH: Status: RESOLVED | Noted: 2020-06-11 | Resolved: 2023-10-30

## 2023-10-30 LAB — SL AMB POCT FECES OCC BLD: NEGATIVE

## 2023-10-30 PROCEDURE — 82270 OCCULT BLOOD FECES: CPT | Performed by: NURSE PRACTITIONER

## 2023-10-30 PROCEDURE — 99396 PREV VISIT EST AGE 40-64: CPT | Performed by: NURSE PRACTITIONER

## 2023-10-30 RX ORDER — LEVOTHYROXINE SODIUM 0.05 MG/1
50 TABLET ORAL DAILY
Qty: 90 TABLET | Refills: 0 | Status: SHIPPED | OUTPATIENT
Start: 2023-10-30

## 2023-10-30 RX ORDER — QUETIAPINE FUMARATE 100 MG/1
100 TABLET, FILM COATED ORAL
Qty: 90 TABLET | Refills: 1 | Status: SHIPPED | OUTPATIENT
Start: 2023-10-30

## 2023-10-30 NOTE — PROGRESS NOTES
Pt is a62 y.o. Sanjeev Jorge post menopausal who presents for preventive care. Denies gyn concerns/questions. + constipation - denies blood in stool. Takes dulcolax sometimes and recently started prune juice. Left heel pain    Colonoscopy: overdue for - previously ordered, has not received call to schedule  DEXA: overdue for - previously ordered  Mammo: overdue for - previously ordered    Past Medical History:   Diagnosis Date    Addiction to drug [facility])     Anxiety     Bipolar disorder current episode depressed ([address]) 12/10/2020    Chest wall pain 2020    Chronic suboxone use 2020    Depression     Disease of thyroid gland     Drug dependence ([address])     Elevated TSH 2020    GERD (gastroesophageal reflux disease)     Medical clearance for psychiatric admission 12/10/2020    Opiate abuse, continuous ([address]) 2018    Osteoarthritis     Rheumatoid arthritis ([address])     Severe episode of recurrent major depressive disorder, with psychotic features ([address]) 2018    Shoulder impingement syndrome, left 2020    Strain of right knee 2018    Substance abuse ([address])     Tubal pregnancy        Past Surgical History:   Procedure Laterality Date    BREAST IMPLANT      BUNIONECTOMY Bilateral     ECTOPIC PREGNANCY SURGERY      MT ARTHROPLASTY GLENOHUMRL JT HEMIARTHROPLASTY Right 2023    Procedure: REVERSE SHOULDER ARTHROPLASTY - SAME DAY;   Surgeon: Brett Kemp DO;  Location:  MAIN OR;  Service: Orthopedics    TUBAL LIGATION         Ob Hx:   OB History    Para Term  AB Living   3 2           SAB IAB Ectopic Multiple Live Births                  # Outcome Date GA Lbr Ricardo/2nd Weight Sex Delivery Anes PTL Lv   3             2 Para            1 Para               Obstetric Comments   Tubal pregnancy       Gyn HX:   postmenopausal      Current Outpatient Medications:     acetaminophen (TYLENOL) 500 mg tablet, Take 2 tablets (1,000 mg total) by mouth every 8 (eight) hours as needed for mild pain, Disp: 60 tablet, Rfl: 2    Biotin 10 MG CAPS, Take by mouth, Disp: , Rfl:     buPROPion (WELLBUTRIN) 75 mg tablet, TAKE 1 TABLET BY MOUTH DAILY, Disp: 30 tablet, Rfl: 2    celecoxib (CeleBREX) 100 mg capsule, Take 1 capsule (100 mg total) by mouth 2 (two) times a day, Disp: 60 capsule, Rfl: 0    Cyanocobalamin (VITAMIN B 12 PO), Take by mouth, Disp: , Rfl:     gabapentin (Neurontin) 300 mg capsule, Take 1 capsule (300 mg total) by mouth daily, Disp: 30 capsule, Rfl: 0    hydrOXYzine HCL (ATARAX) 50 mg tablet, TAKE 1 TABLET BY MOUTH EVERY 6 HOURS AS NEEDED FOR ANXIETY, Disp: 30 tablet, Rfl: 0    levothyroxine 50 mcg tablet, Take 1 tablet (50 mcg total) by mouth daily, Disp: 90 tablet, Rfl: 0    Misc.  Devices (Rollator Ultra-Light) MISC, Use in the morning, Disp: 1 each, Rfl: 0    multivitamin (THERAGRAN) TABS, Take 1 tablet by mouth daily, Disp: , Rfl:     pantoprazole (PROTONIX) 40 mg tablet, TAKE 1 TABLET BY MOUTH DAILY, Disp: 30 tablet, Rfl: 5    QUEtiapine (SEROquel) 100 mg tablet, TAKE ONE TABLET BY MOUTH AT BEDTIME, Disp: 30 tablet, Rfl: 2    sertraline (ZOLOFT) 100 mg tablet, Take 200 mg by mouth daily, Disp: , Rfl:     traZODone (DESYREL) 100 mg tablet, TAKE TWO TABLETS BY MOUTH AT BEDTIME, Disp: 60 tablet, Rfl: 2    No Known Allergies    Social History     Socioeconomic History    Marital status: /Civil Union     Spouse name: None    Number of children: None    Years of education: None    Highest education level: None   Occupational History    None   Tobacco Use    Smoking status: Former     Packs/day: 0.25     Years: 30.00     Total pack years: 7.50     Types: Cigarettes     Quit date: 2020     Years since quitting: 3.8    Smokeless tobacco: Never    Tobacco comments:     Occasional cigarette   Vaping Use    Vaping Use: Every day    Substances: Nicotine, Flavoring   Substance and Sexual Activity    Alcohol use: No    Drug use: Not Currently     Frequency: 7.0 times per week Types: Oxycodone, Prescription     Comment: daily abuse of percocet/oxy-recovering since August     Sexual activity: None   Other Topics Concern    None   Social History Narrative    None     Social Determinants of Health     Financial Resource Strain: Not on file   Food Insecurity: Not on file   Transportation Needs: Not on file   Physical Activity: Not on file   Stress: Not on file   Social Connections: Not on file   Intimate Partner Violence: Not on file   Housing Stability: Not on file       Family History   Problem Relation Age of Onset    Bipolar disorder Mother     Depression Mother     Hypertension Mother     Heart disease Father         cardiac    Prostate cancer Father     Bipolar disorder Brother     Depression Sister     Colon polyps Neg Hx     Colon cancer Neg Hx        Blood pressure 142/86, pulse 67, temperature 98.7 °F (37.1 °C), height 5' 4" (1.626 m), weight 79.8 kg (176 lb). Physical Exam  Vitals reviewed. Exam conducted with a chaperone present. Constitutional:       General: She is not in acute distress. Appearance: Normal appearance. HENT:      Head: Normocephalic. Neck:      Thyroid: No thyromegaly. Cardiovascular:      Rate and Rhythm: Normal rate and regular rhythm. Heart sounds: No murmur heard. Pulmonary:      Effort: Pulmonary effort is normal. No respiratory distress. Breath sounds: Normal breath sounds. Chest:   Breasts:     Right: Normal.      Left: Normal.      Comments: Implants bilaterally  Abdominal:      General: Bowel sounds are normal.      Palpations: Abdomen is soft. Tenderness: There is no abdominal tenderness. Musculoskeletal:      Cervical back: Normal range of motion. Right lower leg: No edema. Left lower leg: No edema. Lymphadenopathy:      Upper Body:      Right upper body: No axillary adenopathy. Left upper body: No axillary adenopathy. Skin:     General: Skin is warm and dry.    Neurological:      General: No focal deficit present. Mental Status: She is alert and oriented to person, place, and time. Psychiatric:         Mood and Affect: Mood normal.         Behavior: Behavior normal.         vulva: normal external genitalia for age, no lesions, masses, epithelial changes, or exudate, and atrophic changes  vagina: color pale and rugae  flattening of rugae  cervix: nullip and no lesions   uterus: mobile, nontender  adnexa: no masses or tenderness  rectum: no masses or nodularity    A/P:  Pt is a 63 y.o.  postmenopausal female      Wade Medina was seen today for gynecologic exam.    Diagnoses and all orders for this visit:    Encounter for gynecological examination without abnormal finding  Comments:  CBE/pelvic exam updated & pap collected; update mammo, dexa & schedule colonoscopy as previously ordered    Screening for cervical cancer  -     Cancel: IGP, Aptima HPV  -     IGP, Aptima HPV    Hypothyroidism, unspecified type  Comments:  10/2023 TSH elevated at 8.2 - Refill issued for increase in levothyroxine to 50mcg daily, Recheck TFTs in 3 months as ordered  Orders:  -     levothyroxine 50 mcg tablet; Take 1 tablet (50 mcg total) by mouth daily  -     TSH, 3rd generation; Future  -     T4, free; Future  -     TSH, 3rd generation  -     T4, free    Pain of left heel  Comments:  evaluate further w/x-ray as ordered - will determine further plan pending result (will likely need podiatry consult)  Orders:  -     XR foot 3+ vw left; Future    Screening for malignant neoplasm of colon  -     Ambulatory Referral to Gastroenterology;  Future  -     POCT hemoccult screening    Slow transit constipation  Comments:  increase high fiber foods & hydration w/water, also anticipate improvement as thyroid function improves

## 2023-10-30 NOTE — ASSESSMENT & PLAN NOTE
10/2023 TSH elevated at 8.2  Refill issued for increase in levothyroxine to 50mcg daily  Recheck TFTs in 3 months as ordered

## 2023-11-03 LAB
CYTOLOGIST CVX/VAG CYTO: NORMAL
DX ICD CODE: NORMAL
HPV I/H RISK 4 DNA CVX QL PROBE+SIG AMP: NEGATIVE
OTHER STN SPEC: NORMAL
PATH REPORT.FINAL DX SPEC: NORMAL
SL AMB NOTE:: NORMAL
SL AMB SPECIMEN ADEQUACY: NORMAL
SL AMB TEST METHODOLOGY: NORMAL

## 2023-11-07 ENCOUNTER — TELEPHONE (OUTPATIENT)
Age: 63
End: 2023-11-07

## 2023-11-08 ENCOUNTER — HOSPITAL ENCOUNTER (OUTPATIENT)
Dept: RADIOLOGY | Facility: HOSPITAL | Age: 63
Discharge: HOME | End: 2023-11-08
Payer: COMMERCIAL

## 2023-11-08 DIAGNOSIS — M79.672 PAIN OF LEFT HEEL: ICD-10-CM

## 2023-11-08 PROCEDURE — 73630 X-RAY EXAM OF FOOT: CPT

## 2023-11-14 DIAGNOSIS — M19.072 PRIMARY OSTEOARTHRITIS OF LEFT FOOT: Primary | ICD-10-CM

## 2023-11-14 DIAGNOSIS — M77.32 CALCANEAL SPUR OF LEFT FOOT: ICD-10-CM

## 2023-11-21 DIAGNOSIS — M19.011 PRIMARY OSTEOARTHRITIS OF RIGHT SHOULDER: ICD-10-CM

## 2023-11-21 RX ORDER — GABAPENTIN 300 MG/1
300 CAPSULE ORAL DAILY
Qty: 30 CAPSULE | Refills: 2 | Status: SHIPPED | OUTPATIENT
Start: 2023-11-21 | End: 2023-11-27 | Stop reason: SDUPTHER

## 2023-11-27 DIAGNOSIS — F33.42 RECURRENT MAJOR DEPRESSIVE DISORDER, IN FULL REMISSION (HCC): ICD-10-CM

## 2023-11-27 DIAGNOSIS — K21.9 GASTROESOPHAGEAL REFLUX DISEASE: ICD-10-CM

## 2023-11-27 DIAGNOSIS — F31.81 BIPOLAR 2 DISORDER (HCC): ICD-10-CM

## 2023-11-27 DIAGNOSIS — M19.011 PRIMARY OSTEOARTHRITIS OF RIGHT SHOULDER: ICD-10-CM

## 2023-11-27 NOTE — TELEPHONE ENCOUNTER
Needs a refill for     sertraline (ZOLOFT) 100 mg tablet     pantoprazole (PROTONIX) 40 mg tablet     buPROPion (WELLBUTRIN) 75 mg tablet     Gabapentin (not on list)    QUEtiapine (SEROquel) 100 mg tablet     traZODone (DESYREL) 100 mg tablet      She has changed pharmacies/ Updated ion chart        Please send to [employer].

## 2023-11-28 RX ORDER — GABAPENTIN 300 MG/1
300 CAPSULE ORAL DAILY
Qty: 90 CAPSULE | Refills: 1 | Status: SHIPPED | OUTPATIENT
Start: 2023-11-28

## 2023-11-28 RX ORDER — QUETIAPINE FUMARATE 100 MG/1
100 TABLET, FILM COATED ORAL
Qty: 90 TABLET | Refills: 1 | Status: SHIPPED | OUTPATIENT
Start: 2023-11-28

## 2023-11-28 RX ORDER — SERTRALINE HYDROCHLORIDE 100 MG/1
200 TABLET, FILM COATED ORAL DAILY
Qty: 180 TABLET | Refills: 1 | Status: SHIPPED | OUTPATIENT
Start: 2023-11-28

## 2023-11-28 RX ORDER — TRAZODONE HYDROCHLORIDE 100 MG/1
200 TABLET ORAL
Qty: 180 TABLET | Refills: 1 | Status: SHIPPED | OUTPATIENT
Start: 2023-11-28

## 2023-11-28 RX ORDER — PANTOPRAZOLE SODIUM 40 MG/1
40 TABLET, DELAYED RELEASE ORAL DAILY
Qty: 90 TABLET | Refills: 1 | Status: SHIPPED | OUTPATIENT
Start: 2023-11-28

## 2023-11-28 RX ORDER — BUPROPION HYDROCHLORIDE 75 MG/1
75 TABLET ORAL DAILY
Qty: 90 TABLET | Refills: 1 | Status: SHIPPED | OUTPATIENT
Start: 2023-11-28

## 2023-12-13 ENCOUNTER — ANESTHESIA (OUTPATIENT)
Dept: ANESTHESIOLOGY | Facility: AMBULATORY SURGERY CENTER | Age: 63
End: 2023-12-13

## 2023-12-13 ENCOUNTER — ANESTHESIA EVENT (OUTPATIENT)
Dept: ANESTHESIOLOGY | Facility: AMBULATORY SURGERY CENTER | Age: 63
End: 2023-12-13

## 2023-12-13 DIAGNOSIS — K21.9 GASTROESOPHAGEAL REFLUX DISEASE: ICD-10-CM

## 2023-12-13 RX ORDER — PANTOPRAZOLE SODIUM 40 MG/1
40 TABLET, DELAYED RELEASE ORAL DAILY
Qty: 30 TABLET | Refills: 5 | Status: SHIPPED | OUTPATIENT
Start: 2023-12-13

## 2023-12-19 ENCOUNTER — OFFICE VISIT (OUTPATIENT)
Dept: PODIATRY | Facility: CLINIC | Age: 63
End: 2023-12-19
Payer: COMMERCIAL

## 2023-12-19 VITALS
BODY MASS INDEX: 29.53 KG/M2 | SYSTOLIC BLOOD PRESSURE: 104 MMHG | WEIGHT: 173 LBS | HEART RATE: 64 BPM | HEIGHT: 64 IN | DIASTOLIC BLOOD PRESSURE: 66 MMHG

## 2023-12-19 DIAGNOSIS — M72.2 PLANTAR FASCIITIS OF LEFT FOOT: Primary | ICD-10-CM

## 2023-12-19 DIAGNOSIS — M79.672 PAIN IN LEFT FOOT: ICD-10-CM

## 2023-12-19 DIAGNOSIS — M19.072 PRIMARY OSTEOARTHRITIS OF LEFT FOOT: ICD-10-CM

## 2023-12-19 DIAGNOSIS — M77.32 CALCANEAL SPUR OF LEFT FOOT: ICD-10-CM

## 2023-12-19 PROCEDURE — 99242 OFF/OP CONSLTJ NEW/EST SF 20: CPT | Performed by: PODIATRIST

## 2023-12-19 RX ORDER — CELECOXIB 100 MG/1
100 CAPSULE ORAL 2 TIMES DAILY
Qty: 60 CAPSULE | Refills: 0 | Status: SHIPPED | OUTPATIENT
Start: 2023-12-19 | End: 2024-01-18

## 2023-12-19 NOTE — LETTER
"PLANTAR FASCIITIS & HEEL PAIN  TENDONITIS: Achilles,  Posterior Tibial,  Peroneal,  Other……  \"Physical Therapy @ Home\" is absolutely necessary to obtain, and sustain a long lasting resolution of your heel pain or tendonitis.  If you perform the following you can greatly accelerate your recovery and reduce the chance of a recurrence, which is not uncommon.  Don't get frustrated it sometimes takes months to resolve 100%    The longer you have had this problem the longer it takes to resolve it…    MEDICATION:   If prescribed take as instructed with food, never on an empty stomach.  Stop taking if you have any side effects. (ie.Rash, GI upset, Acid Reflux/Heartburn)  Motrin/Ibuprofen or ALEVE/Naproxen   Rx NSAIDS (ie. Mobic, Diclofenac, Celebrex etc)  ICE:   Apply daily for 10 min. intervals, waiting 5 min. before next application.   Three applications over 45 min. should be done after work, or immediately following an athletic/strenuous event.  Frozen water bottles can be used to ice and massage rolling on floor.  No heat unless instructed.  STRETCHING:  Any stretching activity of  your Achilles Tendon/Calf muscles in the lower leg will also stretch your Plantar Fasciia.    Hold stretch for 8-10 sec., Repeat 10-12 times per setting, for (5-6) times per day.    Stand arms length away from a wall, place foot to be stretched half a shoe length behind the other, then step forward with the other performing a lazy push-up against the wall.  MASSAGE:  Deep friction technique using moisturizer for 10-15 min. daily.  Circles/Figure Eight's with Toes reversing direction for Tendonitis  ORTHOTICS:   Can be very helpful but varies based on foot structure   Custom or basic off the shelf products.  Many options available.  Powerstep Colden is an effective OTC device ([employer]).  PROPER SHOE SELECTION:  Shoes must have adequate support and structural integrity.  Forget loafers, flip-flops, and slip on flats!  Lace up type shoes are " recommended due to their ability to fit orthotics/arch supports.  Some athletic type sandals are acceptable.  Running type sneakers are the best choice.   (Fabricioe, New Balance 1540 or 940, ASICS, Jhonny Adrenaline GTS)  GENERAL ADVICE:   If it hurts slow down or stop the activity.  Avoid certain activities (jumping, climbing ladders, steep inclines/uneven terrain)    Put foot through range of motion if tight prior to getting up after inactivity/resting/sleeping.    Distance running and Treadmills can aggravate the situation and prolong recovery.  Bicycle/Elliptical is better than Jogging/Running.

## 2023-12-19 NOTE — PROGRESS NOTES
Assessment/Plan:  Treatment options discussed in detail with regards to chronic planter fasciitis.  Recommend a conservative approach to start with consisting of anti-inflammatories stretching/icing instructions.  If he fails to progress he should consider a course of physical therapy.  Rx Celebrex 100 mg twice daily to address chronic inflammation, patient advised to watch closely since she does have a history of GERD.  X-rays personally reviewed show no evidence of fracture or dislocation.  Small infracalcaneal spur noted.  Cortical margins intact  Discussed the importance of wearing good supportive athletic shoe gear, patient currently is wearing good adequate shoe gear.  Follow-up in 6 to 8 weeks.      No problem-specific Assessment & Plan notes found for this encounter.   Diagnoses and all orders for this visit:    Plantar fasciitis of left foot  -     celecoxib (CeleBREX) 100 mg capsule; Take 1 capsule (100 mg total) by mouth 2 (two) times a day    Calcaneal spur of left foot  -     Ambulatory Referral to Podiatry    Pain in left foot    Primary osteoarthritis of left foot  -     Ambulatory Referral to Podiatry          Subjective:      Patient ID: Kimberly Betancourt is a 63 y.o. female.    12/19/2023: 63-year-old female seen today for initial evaluation of painful left heel for 3-month duration.  She reports the pain comes and goes but hurts a lot in the morning when she first stands on it.  Saw her primary care who ordered an x-ray and was told there was no fracture but had a small spur.  Denies any injury.        The following portions of the patient's history were reviewed and updated as appropriate: allergies, current medications, past family history, past medical history, past social history, past surgical history, and problem list.    Review of Systems   Constitutional: Negative.    HENT: Negative.     Eyes: Negative.    Respiratory: Negative.     Cardiovascular: Negative.    Gastrointestinal: Negative.   "  Endocrine: Negative.    Genitourinary: Negative.    Musculoskeletal: Negative.         Pain localized to left heel    Skin: Negative.    Allergic/Immunologic: Negative.    Neurological: Negative.    Hematological: Negative.    Psychiatric/Behavioral: Negative.           Objective:      /66 (BP Location: Left arm, Patient Position: Sitting, Cuff Size: Adult)   Pulse 64   Ht 5' 4\" (1.626 m) Comment: stated  Wt 78.5 kg (173 lb)   BMI 29.70 kg/m²          Physical Exam  Constitutional:       Appearance: Normal appearance.   HENT:      Head: Normocephalic.      Right Ear: External ear normal.      Left Ear: External ear normal.   Eyes:      Pupils: Pupils are equal, round, and reactive to light.   Cardiovascular:      Rate and Rhythm: Normal rate.      Pulses: Normal pulses.   Pulmonary:      Effort: Pulmonary effort is normal.   Musculoskeletal:         General: Tenderness (Moderate pain with palpation plantar central left heel.) present. No swelling.      Cervical back: Normal range of motion.   Feet:      Right foot:      Protective Sensation: 10 sites tested.  10 sites sensed.      Skin integrity: Skin integrity normal.      Left foot:      Protective Sensation: 10 sites tested.  10 sites sensed.      Skin integrity: Skin integrity normal.   Skin:     General: Skin is warm and dry.      Capillary Refill: Capillary refill takes 2 to 3 seconds.   Neurological:      General: No focal deficit present.      Mental Status: She is alert.   Psychiatric:         Mood and Affect: Mood normal.           "

## 2023-12-19 NOTE — LETTER
December 21, 2023     DMITRIY Berumen  [facility] [place] [address]    Patient: Kimberly Betancourt   YOB: 1960   Date of Visit: 12/19/2023       Dear Dr. Weems:    Thank you for referring Kimberly Betancourt to me for evaluation. Below are my notes for this consultation.    If you have questions, please do not hesitate to call me. I look forward to following your patient along with you.         Sincerely,        Santos Everett DPM        CC: No Recipients    Santos Everett DPM  12/20/2023 12:18 AM  Signed  Assessment/Plan:  Treatment options discussed in detail with regards to chronic planter fasciitis.  Recommend a conservative approach to start with consisting of anti-inflammatories stretching/icing instructions.  If he fails to progress he should consider a course of physical therapy.  Rx Celebrex 100 mg twice daily to address chronic inflammation, patient advised to watch closely since she does have a history of GERD.  X-rays personally reviewed show no evidence of fracture or dislocation.  Small infracalcaneal spur noted.  Cortical margins intact  Discussed the importance of wearing good supportive athletic shoe gear, patient currently is wearing good adequate shoe gear.  Follow-up in 6 to 8 weeks.      No problem-specific Assessment & Plan notes found for this encounter.  Diagnoses and all orders for this visit:    Plantar fasciitis of left foot  -     celecoxib (CeleBREX) 100 mg capsule; Take 1 capsule (100 mg total) by mouth 2 (two) times a day    Calcaneal spur of left foot  -     Ambulatory Referral to Podiatry    Pain in left foot    Primary osteoarthritis of left foot  -     Ambulatory Referral to Podiatry         Subjective:     Patient ID: Kimberly Betancourt is a 63 y.o. female.    12/19/2023: 63-year-old female seen today for initial evaluation of painful left heel for 3-month duration.  She reports the pain comes and goes but hurts a lot in the morning when she  "first stands on it.  Saw her primary care who ordered an x-ray and was told there was no fracture but had a small spur.  Denies any injury.        The following portions of the patient's history were reviewed and updated as appropriate: allergies, current medications, past family history, past medical history, past social history, past surgical history, and problem list.    Review of Systems   Constitutional: Negative.    HENT: Negative.     Eyes: Negative.    Respiratory: Negative.     Cardiovascular: Negative.    Gastrointestinal: Negative.    Endocrine: Negative.    Genitourinary: Negative.    Musculoskeletal: Negative.         Pain localized to left heel    Skin: Negative.    Allergic/Immunologic: Negative.    Neurological: Negative.    Hematological: Negative.    Psychiatric/Behavioral: Negative.          Objective:      /66 (BP Location: Left arm, Patient Position: Sitting, Cuff Size: Adult)   Pulse 64   Ht 5' 4\" (1.626 m) Comment: stated  Wt 78.5 kg (173 lb)   BMI 29.70 kg/m²         Physical Exam  Constitutional:       Appearance: Normal appearance.   HENT:      Head: Normocephalic.      Right Ear: External ear normal.      Left Ear: External ear normal.   Eyes:      Pupils: Pupils are equal, round, and reactive to light.   Cardiovascular:      Rate and Rhythm: Normal rate.      Pulses: Normal pulses.   Pulmonary:      Effort: Pulmonary effort is normal.   Musculoskeletal:         General: Tenderness (Moderate pain with palpation plantar central left heel.) present. No swelling.      Cervical back: Normal range of motion.   Feet:      Right foot:      Protective Sensation: 10 sites tested.  10 sites sensed.      Skin integrity: Skin integrity normal.      Left foot:      Protective Sensation: 10 sites tested.  10 sites sensed.      Skin integrity: Skin integrity normal.   Skin:     General: Skin is warm and dry.      Capillary Refill: Capillary refill takes 2 to 3 seconds.   Neurological:      " General: No focal deficit present.      Mental Status: She is alert.   Psychiatric:         Mood and Affect: Mood normal.

## 2023-12-22 DIAGNOSIS — F31.81 BIPOLAR 2 DISORDER (HCC): ICD-10-CM

## 2023-12-22 RX ORDER — QUETIAPINE FUMARATE 100 MG/1
100 TABLET, FILM COATED ORAL
Qty: 90 TABLET | Refills: 1 | Status: SHIPPED | OUTPATIENT
Start: 2023-12-22

## 2023-12-27 ENCOUNTER — ANESTHESIA (OUTPATIENT)
Dept: GASTROENTEROLOGY | Facility: AMBULATORY SURGERY CENTER | Age: 63
End: 2023-12-27

## 2023-12-27 ENCOUNTER — ANESTHESIA EVENT (OUTPATIENT)
Dept: GASTROENTEROLOGY | Facility: AMBULATORY SURGERY CENTER | Age: 63
End: 2023-12-27

## 2023-12-27 ENCOUNTER — HOSPITAL ENCOUNTER (OUTPATIENT)
Dept: GASTROENTEROLOGY | Facility: AMBULATORY SURGERY CENTER | Age: 63
Discharge: HOME | End: 2023-12-27
Attending: INTERNAL MEDICINE
Payer: COMMERCIAL

## 2023-12-27 VITALS
HEART RATE: 67 BPM | RESPIRATION RATE: 16 BRPM | DIASTOLIC BLOOD PRESSURE: 61 MMHG | TEMPERATURE: 97.8 F | SYSTOLIC BLOOD PRESSURE: 94 MMHG | WEIGHT: 160 LBS | OXYGEN SATURATION: 95 % | BODY MASS INDEX: 27.31 KG/M2 | HEIGHT: 64 IN

## 2023-12-27 DIAGNOSIS — Z12.11 SCREENING FOR COLON CANCER: ICD-10-CM

## 2023-12-27 PROCEDURE — 45378 DIAGNOSTIC COLONOSCOPY: CPT | Performed by: INTERNAL MEDICINE

## 2023-12-27 RX ORDER — SODIUM CHLORIDE, SODIUM LACTATE, POTASSIUM CHLORIDE, CALCIUM CHLORIDE 600; 310; 30; 20 MG/100ML; MG/100ML; MG/100ML; MG/100ML
75 INJECTION, SOLUTION INTRAVENOUS CONTINUOUS
Status: DISCONTINUED | OUTPATIENT
Start: 2023-12-27 | End: 2023-12-31 | Stop reason: HOSPADM

## 2023-12-27 RX ORDER — PROPOFOL 10 MG/ML
INJECTION, EMULSION INTRAVENOUS AS NEEDED
Status: DISCONTINUED | OUTPATIENT
Start: 2023-12-27 | End: 2023-12-27

## 2023-12-27 RX ADMIN — SODIUM CHLORIDE, SODIUM LACTATE, POTASSIUM CHLORIDE, CALCIUM CHLORIDE: 600; 310; 30; 20 INJECTION, SOLUTION INTRAVENOUS at 10:22

## 2023-12-27 RX ADMIN — SODIUM CHLORIDE, SODIUM LACTATE, POTASSIUM CHLORIDE, CALCIUM CHLORIDE 75 ML/HR: 600; 310; 30; 20 INJECTION, SOLUTION INTRAVENOUS at 10:00

## 2023-12-27 RX ADMIN — PROPOFOL 100 MG: 10 INJECTION, EMULSION INTRAVENOUS at 10:05

## 2023-12-27 RX ADMIN — PROPOFOL 50 MG: 10 INJECTION, EMULSION INTRAVENOUS at 10:17

## 2023-12-27 RX ADMIN — PROPOFOL 100 MG: 10 INJECTION, EMULSION INTRAVENOUS at 10:12

## 2023-12-27 NOTE — H&P
History and Physical - [facility]  Kimberly Betancourt 63 y.o. female MRN: [id number]    HPI: Kimberly Betancourt is a 63 y.o. year old female who presents for colon cancer screening    REVIEW OF SYSTEMS: Per the HPI, and otherwise unremarkable.    Historical Information   Past Medical History:   Diagnosis Date    Addiction to drug ([facility])     Anxiety     Bipolar disorder current episode depressed ([facility]) 12/10/2020    Chest wall pain 06/11/2020    Chronic suboxone use 06/11/2020    Pt has not taken for m3 years 12/27/23    Depression     Disease of thyroid gland     Drug dependence ([facility])     Elevated TSH 06/11/2020    GERD (gastroesophageal reflux disease)     Medical clearance for psychiatric admission 12/10/2020    Opiate abuse, continuous ([facility]) 08/20/2018    Osteoarthritis     Rheumatoid arthritis ([facility])     Severe episode of recurrent major depressive disorder, with psychotic features ([facility]) 08/20/2018    Shoulder impingement syndrome, left 09/24/2020    Strain of right knee 05/17/2018    Substance abuse ([facility])     Tubal pregnancy      Past Surgical History:   Procedure Laterality Date    BREAST IMPLANT      BUNIONECTOMY Bilateral 1990    ECTOPIC PREGNANCY SURGERY      SD ARTHROPLASTY GLENOHUMRL JT HEMIARTHROPLASTY Right 7/6/2023    Procedure: REVERSE SHOULDER ARTHROPLASTY - SAME DAY;  Surgeon: Abdullahi Gregorio DO;  Location:  MAIN OR;  Service: Orthopedics    TUBAL LIGATION       Social History   Social History     Substance and Sexual Activity   Alcohol Use No     Social History     Substance and Sexual Activity   Drug Use Not Currently    Frequency: 7.0 times per week    Types: Oxycodone, Prescription    Comment: daily abuse of percocet/oxy-recovering since August      Social History     Tobacco Use   Smoking Status Former    Current packs/day: 0.00    Average packs/day: 0.3 packs/day for 30.0 years (7.5 ttl pk-yrs)    Types: Cigarettes    Start date: 1990    Quit date: 2020    Years since  "quitting: 3.9   Smokeless Tobacco Never   Tobacco Comments    Occasional cigarette     Family History   Problem Relation Age of Onset    Bipolar disorder Mother     Depression Mother     Hypertension Mother     Colon polyps Father     Heart disease Father         cardiac    Prostate cancer Father     Depression Sister     Bipolar disorder Brother        Meds/Allergies       Current Outpatient Medications:     Biotin 10 MG CAPS    buPROPion (WELLBUTRIN) 75 mg tablet    celecoxib (CeleBREX) 100 mg capsule    gabapentin (Neurontin) 300 mg capsule    levothyroxine 50 mcg tablet    multivitamin (THERAGRAN) TABS    pantoprazole (PROTONIX) 40 mg tablet    QUEtiapine (SEROquel) 100 mg tablet    sertraline (ZOLOFT) 100 mg tablet    traZODone (DESYREL) 100 mg tablet    acetaminophen (TYLENOL) 500 mg tablet    celecoxib (CeleBREX) 100 mg capsule    Cyanocobalamin (VITAMIN B 12 PO)    hydrOXYzine HCL (ATARAX) 50 mg tablet    Misc. Devices (Rollator Ultra-Light) MISC    Current Facility-Administered Medications:     lactated ringers infusion, 75 mL/hr, Intravenous, Continuous, 75 mL/hr at 12/27/23 1000    No Known Allergies    Objective     /67   Pulse 68   Temp 97.8 °F (36.6 °C) (Temporal)   Resp 18   Ht 5' 4\" (1.626 m)   Wt 72.6 kg (160 lb)   SpO2 98%   BMI 27.46 kg/m²     PHYSICAL EXAM    Gen: NAD AAOx3  Head: Normocephalic, Atraumatic  CV: S1S2 RRR no m/r/g  CHEST: Clear b/l no c/r/w  ABD: soft, +BS NT/ND  EXT: no edema    ASSESSMENT/PLAN:  This is a 63 y.o. year old female here for colonoscopy, and she is stable and optimized for her procedure.        "

## 2023-12-27 NOTE — ANESTHESIA PREPROCEDURE EVALUATION
Procedure:  COLONOSCOPY    Relevant Problems   ANESTHESIA (within normal limits)      CARDIO (within normal limits)      ENDO   (+) Hypothyroidism      GI/HEPATIC   (+) Gastroesophageal reflux disease      NEURO/PSYCH   (+) Anxiety   (+) Recurrent major depressive disorder (HCC)      PULMONARY  Vapes - ready to quit   (-) Sleep apnea (Snores, no known NIRMALA)   (-) Smoking   (-) URI (upper respiratory infection)      Other   (+) Bipolar 2 disorder (HCC)      Physical Exam    Airway    Mallampati score: I  TM Distance: >3 FB  Neck ROM: full     Dental   No notable dental hx     Cardiovascular      Pulmonary      Other Findings  post-pubertal.    Lab Results   Component Value Date    WBC 3.77 (L) 06/20/2023    HGB 12.5 06/20/2023     06/20/2023     Lab Results   Component Value Date    SODIUM 140 06/20/2023    K 4.5 06/20/2023    BUN 14 06/20/2023    CREATININE 0.99 06/20/2023    EGFR 62 06/20/2023     Lab Results   Component Value Date    HGBA1C 5.5 06/20/2023     Anesthesia Plan  ASA Score- 2     Anesthesia Type- IV sedation with anesthesia with ASA Monitors.         Additional Monitors:     Airway Plan:            Plan Factors-Exercise tolerance (METS): >4 METS.    Chart reviewed.   Existing labs reviewed. Patient summary reviewed.    Patient is a current smoker.              Induction- intravenous.    Postoperative Plan-     Informed Consent- Anesthetic plan and risks discussed with patient.  I personally reviewed this patient with the CRNA. Discussed and agreed on the Anesthesia Plan with the CRNA..

## 2023-12-27 NOTE — ANESTHESIA POSTPROCEDURE EVALUATION
Post-Op Assessment Note    CV Status:  Stable  Pain Score: 0    Pain management: adequate       Mental Status:  Alert and awake   Hydration Status:  Euvolemic   PONV Controlled:  Controlled   Airway Patency:  Patent     Post Op Vitals Reviewed: Yes      Staff: CRNA               BP   90/54   Temp   98   Pulse  54   Resp   15   SpO2   97

## 2024-01-12 ENCOUNTER — TELEPHONE (OUTPATIENT)
Dept: FAMILY MEDICINE CLINIC | Facility: HOSPITAL | Age: 64
End: 2024-01-12

## 2024-01-12 ENCOUNTER — TELEMEDICINE (OUTPATIENT)
Dept: FAMILY MEDICINE CLINIC | Facility: HOSPITAL | Age: 64
End: 2024-01-12
Payer: COMMERCIAL

## 2024-01-12 VITALS — WEIGHT: 160 LBS | BODY MASS INDEX: 27.31 KG/M2 | HEIGHT: 64 IN

## 2024-01-12 DIAGNOSIS — J01.00 ACUTE NON-RECURRENT MAXILLARY SINUSITIS: ICD-10-CM

## 2024-01-12 DIAGNOSIS — U07.1 COVID-19: Primary | ICD-10-CM

## 2024-01-12 PROCEDURE — G2012 BRIEF CHECK IN BY MD/QHP: HCPCS | Performed by: INTERNAL MEDICINE

## 2024-01-12 RX ORDER — MOLNUPIRAVIR 200 MG/1
800 CAPSULE ORAL EVERY 12 HOURS
Qty: 40 CAPSULE | Refills: 0 | Status: SHIPPED | OUTPATIENT
Start: 2024-01-12 | End: 2024-01-17

## 2024-01-12 RX ORDER — FLUTICASONE PROPIONATE 50 MCG
1 SPRAY, SUSPENSION (ML) NASAL DAILY
Qty: 18 ML | Refills: 1 | Status: SHIPPED | OUTPATIENT
Start: 2024-01-12

## 2024-01-12 NOTE — TELEPHONE ENCOUNTER
Yes, Hi, this is Kimberly, now Mayor call a doctor. I had talked to doctor Fly. I had COVID and she said a script over to the [facility] pharmacy and they don't have it. So I need a script sent somewhere, I don't know, like [facility] or somebody could give me a call back. I appreciate it again. Kimberly Saeid, Birthday [phone] Thank you.

## 2024-01-12 NOTE — PROGRESS NOTES
COVID-19 Outpatient Progress Note    Assessment/Plan:    Problem List Items Addressed This Visit    None  Visit Diagnoses       COVID-19    -  Primary    Relevant Medications    molnupiravir (Lagevrio) 200 mg capsule    fluticasone (FLONASE) 50 mcg/act nasal spray    Acute non-recurrent maxillary sinusitis        Relevant Medications    molnupiravir (Lagevrio) 200 mg capsule    fluticasone (FLONASE) 50 mcg/act nasal spray           Disposition:     Patient meets criteria for Molnupiravir and they have been counseled according to EUA documentation provided by the FDA. After discussion, patient agrees to treatment.    Molnupiravir is an investigational medicine used to treat mild-to-moderate COVID-19 in adults with positive results of direct SARS-CoV-2 viral testing, and who are at high risk for progressing to severe COVID-19 including hospitalization or death, and for whom other COVID-19 treatment options authorized by the FDA are not accessible or clinically appropriate.    The FDA has authorized the emergency use of molnupiravir for the treatment of mild-tomoderate COVID-19 in adults under an EUA.    Molnupiravir is not authorized:  - for use in people less than 18 years of age.  - for prevention of COVID-19.  - for people needing hospitalization for COVID-19.  - for use for longer than 5 consecutive days    What is the most important information I should know about molnupiravir?    Molnupiravir may cause serious side effects, including:    Molnupiravir may cause harm to your unborn baby. It is not known if molnupiravir will harm your baby if you take molnupiravir during pregnancy.  - Molnupiravir is not recommended for use in pregnancy.  - Molnupiravir has not been studied in pregnancy. Molnupiravir was studied in pregnant animals only. When molnupiravir was given to pregnant animals, molnupiravir caused harm to their unborn babies.  - You and your healthcare provider may decide that you should take molnupiravir  duringpregnancy if there are no other COVID-19 treatment options authorized by the FDA that are accessible or clinically appropriate for you.  - If you and your healthcare provider decide that you should take molnupiravir during pregnancy, you and your healthcare provider should discuss the known and potential benefits and the potential risks of taking molnupiravir during pregnancy.    For individuals who are able to become pregnant:  - You should use a reliable method of birth control (contraception) consistently and correctly during treatment with molnupiravir and for 4 days after the last dose of molnupiravir. Talk to your healthcare provider about reliable birth control methods.  - Before starting treatment with molnupiravir your healthcare provider may do a pregnancy test to see if you are pregnant before starting treatment with molnupiravir.  - Tell your healthcare provider right away if you become pregnant or think you may be pregnant during treatment with molnupiravir.    Pregnancy Surveillance Program:  - There is a pregnancy surveillance program for individuals who take molnupiravir during pregnancy. The purpose of this program is to collect information about the health of you and your baby. Talk to your healthcare provider about how to take part in this program.  - If you take molnupiravir during pregnancy and you agree to participate in the pregnancy surveillance program and allow your healthcare provider to share your information with [facility], then your healthcare provider will report your use of molnupiravir during pregnancy to [facility]. by calling [phone] or [email or web address].    For individuals who are sexually active with partners who are able to become pregnant:  - It is not known if molnupiravir can affect sperm. While the risk is regarded as low, animal studies to fully assess the potential for molnupiravir to affect the babies of males treated with  molnupiravir have not been completed. A reliable method of birth control (contraception) should be used consistently and correctly during treatment with molnupiravir and for at least 3 months after the last dose. The risk to sperm beyond 3 months is not known. Studies to understand the risk to sperm beyond 3 months are ongoing. Talk to your healthcare provider about reliable birth control methods. Talk to your healthcare provider if you have questions or concerns about how molnupiravir may affect sperm.    How do I take molnupiravir?    - Take molnupiravir exactly as your healthcare provider tells you to take it.  - Take 4 capsules of molnupiravir every 12 hours (for example, at 8 am and at 8 pm)  - Take molnupiravir for 5 days. It is important that you complete the full 5 days of treatment with molnupiravir. Do not stop taking molnupiravir before you complete the full 5 days of treatment, even if you feel better.  - Take molnupiravir with or without food.  - You should stay in isolation for as long as your healthcare provider tells you to. Talk to your healthcare provider if you are not sure about how to properly isolate while you have COVID-19.  - Swallow molnupiravir capsules whole. Do not open, break, or crush the capsules. If you cannot swallow capsules whole, tell your healthcare provider.    What to do if you miss a dose:  - If it has been less than 10 hours since the missed dose, take it as soon as you remember  - If it has been more than 10 hours since the missed dose, skip the missed dose and take your dose at the next scheduled time.  - Do not double the dose of molnupiravir to make up for a missed dose.    What are the important possible side effects of molnupiravir?    Possible side effects of molnupiravir are:  - See, “What is the most important information I should know about molnupiravir?”  - Diarrhea  - Nausea  - Dizziness    These are not all the possible side effects of molnupiravir. Not many  people have taken molnupiravir. Serious and unexpected side effects may happen. This medicine is still being studied, so it is possible that all of the risks are not known at this time.    What other treatment choices are there?    Like molnupiravir, FDA may allow for the emergency use of other medicines to treat people with COVID-19. Go to https://www.fda.gov/emergency-preparedness-and-response/mcm-legalregulatory-and-policy-framework/emergency-use-authorization for more information.  It is your choice to be treated or not to be treated with molnupiravir. Should you decide not to take it, it will not change your standard medical care.    What if I am breastfeeding?  Breastfeeding is not recommended during treatment with molnupiravir and for 4 days after the last dose of molnupiravir. If you are breastfeeding or plan to breastfeed, talk to your healthcare provider about your options and specific situation before taking molnupiravir.    How do I report side effects with molnupiravir?    Contact your healthcare provider if you have any side effects that bother you or do not go away. Report side effects to FDA MedWatch at www.fda.gov/medwatch or call 6-882-VUE-6731 (1-462.781.2950).    How should I store molnupiravir?  - Store molnupiravir capsules at room temperature between 68°F to 77°F (20°C to 25°C).  - Keep molnupiravir and all medicines out of the reach of children and pets.    Full fact sheet for patients, parents, and caregivers can be found at: https://www.fda.gov/media/506166/download    I have spent a total time of 16 minutes on the day of the encounter for this patient including discussing diagnostic results, risks and benefits of treatment options and instructions for management. 5 days of isolation from onset and then 5 days of mask in public      Encounter provider: Ara Griffin DO     Provider located at: [facility] [address]  [address]  [place]  [place] [address]     Recent Visits  No visits were found meeting these conditions.  Showing recent visits within past 7 days and meeting all other requirements  Today's Visits  Date Type Provider Dept   01/12/24 Telemedicine DO [facility] [address]   Showing today's visits and meeting all other requirements  Future Appointments  No visits were found meeting these conditions.  Showing future appointments within next 150 days and meeting all other requirements     This virtual check-in was done via telephone and she agrees to proceed.    Patient agrees to participate in a virtual check in via telephone or video visit instead of presenting to the office to address urgent/immediate medical needs. Patient is aware this is a billable service. She acknowledged consent and understanding of privacy and security of the video platform. The patient has agreed to participate and understands they can discontinue the visit at any time.    After connecting through Telephone, the patient was identified by name and date of birth. Kimberly Betancourt was informed that this was a telemedicine visit and that the exam was being conducted confidentially over secure lines. My office door was closed. No one else was in the room. Kimberly Betancourt acknowledged consent and understanding of privacy and security of the telemedicine visit. I informed the patient that I have reviewed her record in Epic and presented the opportunity for her to ask any questions regarding the visit today. The patient agreed to participate.    It was my intent to perform this visit via video technology but the patient was not able to do a video connection so the visit was completed via audio telephone only.        Verification of patient location:  Patient is located in the following state in which I hold an active license: [place]    Subjective:   Kimberly Betancourt is a 63 y.o. female who is concerned about COVID-19. Patient's  "symptoms include chills, fatigue, sore throat, cough, myalgias and headache.     - Date of symptom onset: 1/9/2024      COVID-19 vaccination status: Fully vaccinated (primary series)    Symptoms started on 1/9- tested positive 1/11/24    Lab Results   Component Value Date    SARSCOV2 Negative 01/31/2022       Review of Systems   Constitutional:  Positive for chills and fatigue.   HENT:  Positive for sore throat.    Respiratory:  Positive for cough.    Musculoskeletal:  Positive for myalgias.   Neurological:  Positive for headaches.     Current Outpatient Medications on File Prior to Visit   Medication Sig    acetaminophen (TYLENOL) 500 mg tablet Take 2 tablets (1,000 mg total) by mouth every 8 (eight) hours as needed for mild pain    Biotin 10 MG CAPS Take by mouth    buPROPion (WELLBUTRIN) 75 mg tablet Take 1 tablet (75 mg total) by mouth daily    celecoxib (CeleBREX) 100 mg capsule Take 1 capsule (100 mg total) by mouth 2 (two) times a day    Cyanocobalamin (VITAMIN B 12 PO) Take by mouth    gabapentin (Neurontin) 300 mg capsule Take 1 capsule (300 mg total) by mouth daily    hydrOXYzine HCL (ATARAX) 50 mg tablet TAKE 1 TABLET BY MOUTH EVERY 6 HOURS AS NEEDED FOR ANXIETY    levothyroxine 50 mcg tablet Take 1 tablet (50 mcg total) by mouth daily    Misc. Devices (Rollator Ultra-Light) MISC Use in the morning    multivitamin (THERAGRAN) TABS Take 1 tablet by mouth daily    pantoprazole (PROTONIX) 40 mg tablet TAKE 1 TABLET BY MOUTH DAILY    QUEtiapine (SEROquel) 100 mg tablet Take 1 tablet (100 mg total) by mouth daily at bedtime    sertraline (ZOLOFT) 100 mg tablet Take 2 tablets (200 mg total) by mouth daily    traZODone (DESYREL) 100 mg tablet Take 2 tablets (200 mg total) by mouth daily at bedtime    celecoxib (CeleBREX) 100 mg capsule Take 1 capsule (100 mg total) by mouth 2 (two) times a day       Objective:    Ht 5' 4\" (1.626 m)   Wt 72.6 kg (160 lb)   BMI 27.46 kg/m²        Physical Exam  HENT:      Nose: " Congestion present.   Pulmonary:      Effort: No respiratory distress.   Neurological:      Mental Status: She is alert.       Ara Fly, DO

## 2024-02-19 ENCOUNTER — TELEPHONE (OUTPATIENT)
Dept: FAMILY MEDICINE CLINIC | Facility: HOSPITAL | Age: 64
End: 2024-02-19

## 2024-02-19 DIAGNOSIS — M19.011 PRIMARY OSTEOARTHRITIS OF RIGHT SHOULDER: ICD-10-CM

## 2024-02-19 RX ORDER — CELECOXIB 100 MG/1
100 CAPSULE ORAL 2 TIMES DAILY
Qty: 60 CAPSULE | Refills: 0 | Status: SHIPPED | OUTPATIENT
Start: 2024-02-19 | End: 2024-02-26 | Stop reason: SDUPTHER

## 2024-02-19 NOTE — TELEPHONE ENCOUNTER
Pt states she needs a refill for celecoxib (CeleBREX) 100 mg capsule sent to [facility]

## 2024-02-23 LAB
T4 FREE SERPL-MCNC: 1.28 NG/DL (ref 0.82–1.77)
TSH SERPL DL<=0.005 MIU/L-ACNC: 2.1 UIU/ML (ref 0.45–4.5)

## 2024-02-26 ENCOUNTER — OFFICE VISIT (OUTPATIENT)
Dept: FAMILY MEDICINE CLINIC | Facility: HOSPITAL | Age: 64
End: 2024-02-26
Payer: COMMERCIAL

## 2024-02-26 VITALS
WEIGHT: 167.5 LBS | SYSTOLIC BLOOD PRESSURE: 110 MMHG | DIASTOLIC BLOOD PRESSURE: 64 MMHG | HEART RATE: 68 BPM | BODY MASS INDEX: 28.6 KG/M2 | TEMPERATURE: 97.8 F | HEIGHT: 64 IN

## 2024-02-26 DIAGNOSIS — E03.9 HYPOTHYROIDISM, UNSPECIFIED TYPE: Primary | ICD-10-CM

## 2024-02-26 DIAGNOSIS — F33.1 MODERATE EPISODE OF RECURRENT MAJOR DEPRESSIVE DISORDER (HCC): ICD-10-CM

## 2024-02-26 DIAGNOSIS — F11.21 OPIOID DEPENDENCE IN REMISSION (HCC): ICD-10-CM

## 2024-02-26 DIAGNOSIS — F33.42 RECURRENT MAJOR DEPRESSIVE DISORDER, IN FULL REMISSION (HCC): ICD-10-CM

## 2024-02-26 DIAGNOSIS — F41.1 GENERALIZED ANXIETY DISORDER: ICD-10-CM

## 2024-02-26 PROBLEM — S82.831A: Status: RESOLVED | Noted: 2023-08-14 | Resolved: 2024-02-26

## 2024-02-26 PROCEDURE — 99214 OFFICE O/P EST MOD 30 MIN: CPT | Performed by: NURSE PRACTITIONER

## 2024-02-26 NOTE — ASSESSMENT & PLAN NOTE
PHQ elevated at 19 given ongoing situational stress  Will continue same meds as rx'd & advise she consider resuming therapy  Return in 3 months - sooner w/mood concerns

## 2024-02-26 NOTE — PROGRESS NOTES
Name: Kimberly Betancourt      : 1960      MRN: [id number]  Encounter Provider: DMITRIY Berumen  Encounter Date: 2024   Encounter department: [facility] [address]     Assessment & Plan     1. Hypothyroidism, unspecified type  Assessment & Plan:  Compliant w/levothyroxine as rx'd  Recheck TSH in 6 months (July) - give orders at next appt      2. Moderate episode of recurrent major depressive disorder (HCC)  Assessment & Plan:  PHQ elevated at 19 given ongoing situational stress  Will continue same meds as rx'd & advise she consider resuming therapy  Return in 3 months - sooner w/mood concerns        3. Opioid dependence in remission (HCC)  Assessment & Plan:  Recently fully dc'd suboxone therapy per [facility]  Commended her sobriety efforts      4. Generalized anxiety disorder  Assessment & Plan:  BARBARA score elevated at 21  Continue same med as rx'd & consider resuming therapy  Hydroxyzine refill issued as she requests to use as needed  Return in 3 months - sooner if needed      Update mammo & dexa as previously ordered  Pap smear and colonoscopy UTD       Subjective      Weaned off suboxone 3 weeks ago - has been clean for 3 years.   Has anxiety - family stressors. Not sleeping well and not trazodone and seroquel no longer helpful. Taking all her meds as rx'd. No longer following with [facility]. Not currently in therapy. Requests refill of hydroxyzine. Having intrusive thoughts, denies SI/HI.         Review of Systems   Constitutional:  Positive for fatigue.   Respiratory: Negative.     Cardiovascular: Negative.    Gastrointestinal: Negative.    Musculoskeletal:  Positive for arthralgias (right 4th finger locks up).   Psychiatric/Behavioral:  Positive for dysphoric mood (Patient helping son by caring for twin 18mo old boys; weaned off of Suboxone -- believes contributing to anxiety) and sleep disturbance. The patient is nervous/anxious.        Current Outpatient  "Medications on File Prior to Visit   Medication Sig    acetaminophen (TYLENOL) 500 mg tablet Take 2 tablets (1,000 mg total) by mouth every 8 (eight) hours as needed for mild pain    Biotin 10 MG CAPS Take by mouth    buPROPion (WELLBUTRIN) 75 mg tablet Take 1 tablet (75 mg total) by mouth daily    celecoxib (CeleBREX) 100 mg capsule Take 1 capsule (100 mg total) by mouth 2 (two) times a day    Cyanocobalamin (VITAMIN B 12 PO) Take by mouth    fluticasone (FLONASE) 50 mcg/act nasal spray 1 spray into each nostril daily    gabapentin (Neurontin) 300 mg capsule Take 1 capsule (300 mg total) by mouth daily    hydrOXYzine HCL (ATARAX) 50 mg tablet TAKE 1 TABLET BY MOUTH EVERY 6 HOURS AS NEEDED FOR ANXIETY    levothyroxine 50 mcg tablet Take 1 tablet (50 mcg total) by mouth daily    multivitamin (THERAGRAN) TABS Take 1 tablet by mouth daily    pantoprazole (PROTONIX) 40 mg tablet TAKE 1 TABLET BY MOUTH DAILY    QUEtiapine (SEROquel) 100 mg tablet Take 1 tablet (100 mg total) by mouth daily at bedtime    sertraline (ZOLOFT) 100 mg tablet Take 2 tablets (200 mg total) by mouth daily    traZODone (DESYREL) 100 mg tablet Take 2 tablets (200 mg total) by mouth daily at bedtime    Misc. Devices (Rollator Ultra-Light) MISC Use in the morning    [DISCONTINUED] celecoxib (CeleBREX) 100 mg capsule Take 1 capsule (100 mg total) by mouth 2 (two) times a day (Patient not taking: Reported on 2/26/2024)       Objective     /64   Pulse 68   Temp 97.8 °F (36.6 °C)   Ht 5' 4\" (1.626 m)   Wt 76 kg (167 lb 8 oz)   BMI 28.75 kg/m²       Physical Exam  Vitals and nursing note reviewed.   Neck:      Thyroid: No thyroid mass, thyromegaly or thyroid tenderness.   Cardiovascular:      Rate and Rhythm: Normal rate and regular rhythm.      Heart sounds: Normal heart sounds, S1 normal and S2 normal. No murmur heard.  Pulmonary:      Effort: Pulmonary effort is normal.      Breath sounds: Normal breath sounds and air entry. No decreased " breath sounds, wheezing, rhonchi or rales.   Musculoskeletal:      Cervical back: Normal range of motion and neck supple.   Lymphadenopathy:      Head:      Right side of head: No submental, submandibular, preauricular or posterior auricular adenopathy.      Left side of head: No submental, submandibular, preauricular or posterior auricular adenopathy.      Cervical: No cervical adenopathy.   Neurological:      Mental Status: She is alert and oriented to person, place, and time.   Psychiatric:         Mood and Affect: Mood is anxious.         Speech: Speech normal.         Behavior: Behavior is cooperative.         Thought Content: Thought content does not include homicidal or suicidal ideation. Thought content does not include homicidal or suicidal plan.         BARBARA-7 Flowsheet Screening      Flowsheet Row Most Recent Value   Over the last 2 weeks, how often have you been bothered by any of the following problems?    Feeling nervous, anxious, or on edge 3   Not being able to stop or control worrying 3   Worrying too much about different things 3   Trouble relaxing 3   Being so restless that it is hard to sit still 3   Becoming easily annoyed or irritable 3   Feeling afraid as if something awful might happen 3   BARBARA-7 Total Score 21            DMITRIY Berumen

## 2024-02-26 NOTE — ASSESSMENT & PLAN NOTE
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 She may need surgical resection and possibly would benefit from a multidisciplinary approach, thus an urgent referral to [facility] for consideration for repeat EUS and/or surgery (whipple vs. TPIAT), per Dr Chou    Is diagnosis on list of approved diagnosis: Yes    Has patient been evaluated by another Gastroenterologist? Unknown      Procedures:     Upper Endoscopy/EGD   Unknown    Endoscopic Ultrasound/EUS  Yes    ERCP   No    Colonoscopy   Unknown    Are images able/being pushed to our system? Yes    Is patient aware of request for clinc consultation and ok to be contacted to schedule? Yes   Recently fully dc'd suboxone therapy per [facility]  Commended her sobriety efforts

## 2024-02-26 NOTE — ASSESSMENT & PLAN NOTE
BARBARA score elevated at 21  Continue same med as rx'd & consider resuming therapy  Hydroxyzine refill issued as she requests to use as needed  Return in 3 months - sooner if needed

## 2024-02-27 RX ORDER — SERTRALINE HYDROCHLORIDE 100 MG/1
200 TABLET, FILM COATED ORAL DAILY
Qty: 180 TABLET | Refills: 1 | Status: SHIPPED | OUTPATIENT
Start: 2024-02-27

## 2024-03-07 DIAGNOSIS — U07.1 COVID-19: ICD-10-CM

## 2024-03-07 DIAGNOSIS — J01.00 ACUTE NON-RECURRENT MAXILLARY SINUSITIS: ICD-10-CM

## 2024-03-07 RX ORDER — FLUTICASONE PROPIONATE 50 MCG
1 SPRAY, SUSPENSION (ML) NASAL DAILY
Qty: 16 G | Refills: 1 | Status: SHIPPED | OUTPATIENT
Start: 2024-03-07

## 2024-03-12 ENCOUNTER — TELEPHONE (OUTPATIENT)
Dept: FAMILY MEDICINE CLINIC | Facility: HOSPITAL | Age: 64
End: 2024-03-12

## 2024-03-12 NOTE — TELEPHONE ENCOUNTER
Pt recently had thyroid labs done. Would like to know if she should still be taking her thyroid medication or not. Please advise

## 2024-03-12 NOTE — TELEPHONE ENCOUNTER
Advised patient that per providers ov note, she is to continue taking her thyroid medication. Patient verbalized understanding

## 2024-03-13 DIAGNOSIS — E03.9 HYPOTHYROIDISM, UNSPECIFIED TYPE: ICD-10-CM

## 2024-03-13 RX ORDER — LEVOTHYROXINE SODIUM 0.05 MG/1
50 TABLET ORAL DAILY
Qty: 90 TABLET | Refills: 0 | Status: SHIPPED | OUTPATIENT
Start: 2024-03-13

## 2024-04-01 DIAGNOSIS — F33.42 RECURRENT MAJOR DEPRESSIVE DISORDER, IN FULL REMISSION (HCC): ICD-10-CM

## 2024-04-01 RX ORDER — TRAZODONE HYDROCHLORIDE 100 MG/1
200 TABLET ORAL
Qty: 180 TABLET | Refills: 1 | Status: SHIPPED | OUTPATIENT
Start: 2024-04-01

## 2024-04-03 DIAGNOSIS — E03.9 HYPOTHYROIDISM, UNSPECIFIED TYPE: ICD-10-CM

## 2024-04-03 RX ORDER — LEVOTHYROXINE SODIUM 0.05 MG/1
50 TABLET ORAL DAILY
Qty: 90 TABLET | Refills: 0 | Status: SHIPPED | OUTPATIENT
Start: 2024-04-03

## 2024-04-03 NOTE — TELEPHONE ENCOUNTER
I need to read another refill on my thyroid medicine. I'm out of it since yesterday and I need it to go to [facility]. I need a script to go to [facility] pharmacy as soon as possible so I can get back on the medicine. Thank you. Bye    levothyroxine 50 mcg tablet

## 2024-05-03 ENCOUNTER — TELEPHONE (OUTPATIENT)
Age: 64
End: 2024-05-03

## 2024-05-03 NOTE — TELEPHONE ENCOUNTER
Pt call in to get the celecoxib (CeleBREX) 100 mg capsule again because she has inform me it working for the PT. Please advise if the PT can continue to get the celecoxib again. Thank you

## 2024-05-04 DIAGNOSIS — M72.2 PLANTAR FASCIITIS OF LEFT FOOT: ICD-10-CM

## 2024-05-04 RX ORDER — CELECOXIB 100 MG/1
100 CAPSULE ORAL 2 TIMES DAILY
Qty: 60 CAPSULE | Refills: 0 | Status: SHIPPED | OUTPATIENT
Start: 2024-05-04 | End: 2024-06-03

## 2024-05-05 NOTE — DISCHARGE INSTRUCTIONS
[facility]     Emergency Department     Faculty Note/ Attestation      Pt Name: Jose Lau                                       MRN: [id number]  Birthdate 2014  Date of evaluation: 5/5/2024    Patients PCP:    Joseline Marie MD    Note Started: 9:41 AM EDT      Attestation  I performed a history and physical examination of the patient and discussed management with the resident. I reviewed the resident’s note and agree with the documented findings and plan of care. Any areas of disagreement are noted on the chart. I was personally present for the key portions of any procedures. I have documented in the chart those procedures where I was not present during the key portions. I have reviewed the emergency nurses triage note. I agree with the chief complaint, past medical history, past surgical history, allergies, medications, social and family history as documented unless otherwise noted below.    For Physician Assistant/ Nurse Practitioner cases/documentation I have personally evaluated this patient and have completed at least one if not all key elements of the E/M (history, physical exam, and MDM). Additional findings are as noted.      Initial Screens:        [place] Coma Scale  Eye Opening: Spontaneous  Best Verbal Response: Oriented  Best Motor Response: Obeys commands  [place] Coma Scale Score: 15    Vitals:    Vitals:    05/05/24 0900 05/05/24 0917   BP: (!) 122/75    Pulse: (!) 111    Resp: 25    Temp: 99.5 °F (37.5 °C)    TempSrc: Oral    SpO2: 93% 99%   Weight: 26.4 kg (58 lb 3.2 oz)        CHIEF COMPLAINT       Chief Complaint   Patient presents with    Shortness of Breath             DIAGNOSTIC RESULTS             RADIOLOGY:   XR CHEST (2 VW)    (Results Pending)         LABS:  Labs Reviewed - No data to display      EMERGENCY DEPARTMENT COURSE:     -------------------------  BP: (!) 122/75, Temp: 99.5 °F (37.5 °C), Pulse: (!) 111, Resp: 25      Comments    Asthma  Colonoscopy   WHAT YOU NEED TO KNOW:   A colonoscopy is a procedure to examine the inside of your colon (intestine) with a scope. Polyps or tissue growths may have been removed during your colonoscopy. It is normal to feel bloated and to have some abdominal discomfort. You should be passing gas. If you have hemorrhoids or you had polyps removed, you may have a small amount of bleeding.        DISCHARGE INSTRUCTIONS:   Seek care immediately if:   You have sudden, severe abdominal pain.     You have problems swallowing.     You have a large amount of black, sticky bowel movements or blood in your bowel movements.     You have sudden trouble breathing.     You feel weak, lightheaded, or faint or your heart beats faster than normal for you.     Contact your healthcare provider if:   You have a fever and chills.      You have nausea or are vomiting.      Your abdomen is bloated or feels full and hard.     You have abdominal pain.   You have black, sticky bowel movements or blood in your bowel movements.  You have not had a bowel movement for 3 days after your procedure.  You have rash or hives.  You have questions or concerns about your procedure.    Activity:   Do not lift, strain, or run for 24 hours after your procedure.     Rest after your procedure. You have been given medicine to relax you. Do not drive or make important decisions until the day after your procedure. Return to your normal activity as directed.     Relieve gas and discomfort from bloating by lying on your right side with a heating pad on your abdomen. You may need to take short walks to help the gas move out. Eat small meals until bloating is relieved.  Follow up with your healthcare provider as directed: Write down your questions so you remember to ask them during your visits.     If you take a “blood thinner”, please review the specific instructions from your endoscopist about when you should resume it. These can be found in the “Recommendation”  and “Your Medication list” sections of this After Visit Summary.

## 2024-05-31 DIAGNOSIS — K21.9 GASTROESOPHAGEAL REFLUX DISEASE: ICD-10-CM

## 2024-05-31 RX ORDER — PANTOPRAZOLE SODIUM 40 MG/1
40 TABLET, DELAYED RELEASE ORAL DAILY
Qty: 30 TABLET | Refills: 5 | Status: SHIPPED | OUTPATIENT
Start: 2024-05-31

## 2024-06-03 DIAGNOSIS — M72.2 PLANTAR FASCIITIS OF LEFT FOOT: ICD-10-CM

## 2024-06-03 RX ORDER — CELECOXIB 100 MG/1
100 CAPSULE ORAL 2 TIMES DAILY
Qty: 60 CAPSULE | Refills: 0 | Status: SHIPPED | OUTPATIENT
Start: 2024-06-03 | End: 2024-07-03

## 2024-06-18 DIAGNOSIS — F31.81 BIPOLAR 2 DISORDER (HCC): ICD-10-CM

## 2024-06-18 RX ORDER — QUETIAPINE FUMARATE 100 MG/1
100 TABLET, FILM COATED ORAL
Qty: 90 TABLET | Refills: 0 | Status: SHIPPED | OUTPATIENT
Start: 2024-06-18

## 2024-06-18 NOTE — TELEPHONE ENCOUNTER
Medication: QUEtiapine (SEROquel)     Dose/Frequency: 100mg    Quantity: 90 Tablet     Pharmacy: [facility] - [place] - [address]      Office:   [x] PCP/Provider -   [] Speciality/Provider -     Does the patient have enough for 3 days?   [] Yes   [x] No - Send as HP to POD

## 2024-06-25 DIAGNOSIS — E03.9 HYPOTHYROIDISM, UNSPECIFIED TYPE: ICD-10-CM

## 2024-06-25 DIAGNOSIS — J01.00 ACUTE NON-RECURRENT MAXILLARY SINUSITIS: ICD-10-CM

## 2024-06-25 DIAGNOSIS — F33.42 RECURRENT MAJOR DEPRESSIVE DISORDER, IN FULL REMISSION (HCC): ICD-10-CM

## 2024-06-25 DIAGNOSIS — M72.2 PLANTAR FASCIITIS OF LEFT FOOT: ICD-10-CM

## 2024-06-25 DIAGNOSIS — U07.1 COVID-19: ICD-10-CM

## 2024-06-25 NOTE — TELEPHONE ENCOUNTER
Medication: buPROPion (WELLBUTRIN) 75 mg tablet    Dose/Frequency:   Take 1 tablet (75 mg total) by mouth daily        Quantity: 90    Pharmacy: [address]     Office:   [x] PCP/Provider -   [] Speciality/Provider -     Does the patient have enough for 3 days?   [] Yes   [x] No - Send as HP to POD    Medication: celecoxib (CeleBREX) 100 mg capsule    Dose/Frequency: Take 1 capsule (100 mg total) by mouth 2 (two) times a day     Quantity: 60    Pharmacy: [address]       Office:   [] PCP/Provider -   [x] Speciality/Provider -     Does the patient have enough for 3 days?   [] Yes   [x] No - Send as HP to POD    Medication: fluticasone (FLONASE) 50 mcg/act nasal spra    Dose/Frequency:   1 spray into each nostril daily        Quantity: 16g    Pharmacy:   Pharmacy: [address]       Office:   [x] PCP/Provider -   [] Speciality/Provider -     Does the patient have enough for 3 days?   [] Yes   [x] No - Send as HP to POD      Medication: hydrOXYzine HCL (ATARAX) 50 mg tablet    Dose/Frequency: TAKE 1 TABLET BY MOUTH EVERY 6 HOURS AS NEEDED FOR ANXIETY     Quantity: 30    Pharmacy:    [address]  Office:   [x] PCP/Provider -   [] Speciality/Provider -     Does the patient have enough for 3 days?   [] Yes   [x] No - Send as HP to POD      Medication: levothyroxine 50 mcg tablet    Dose/Frequency:   Take 1 tablet (50 mcg total) by mouth daily        Quantity: 90    Pharmacy:  [address]    Office:   [x] PCP/Provider -   [] Speciality/Provider -     Does the patient have enough for 3 days?   [] Yes   [x] No - Send as HP to POD      Medication:  traZODone (DESYREL) 100 mg tablet    Dose/Frequency: Take 2 tablets (200 mg total) by mouth daily at bedtime      Quantity: 180    Pharmacy:  [facility] - [place] - [address]    Office:   [x] PCP/Provider -   [] Speciality/Provider -     Does the patient have enough for 3 days?   [] Yes   [x] No - Send as HP to POD

## 2024-06-26 RX ORDER — HYDROXYZINE 50 MG/1
50 TABLET, FILM COATED ORAL EVERY 6 HOURS PRN
Qty: 30 TABLET | Refills: 5 | Status: SHIPPED | OUTPATIENT
Start: 2024-06-26

## 2024-06-26 RX ORDER — BUPROPION HYDROCHLORIDE 75 MG/1
75 TABLET ORAL DAILY
Qty: 90 TABLET | Refills: 1 | Status: SHIPPED | OUTPATIENT
Start: 2024-06-26

## 2024-06-26 RX ORDER — TRAZODONE HYDROCHLORIDE 100 MG/1
200 TABLET ORAL
Qty: 180 TABLET | Refills: 1 | Status: SHIPPED | OUTPATIENT
Start: 2024-06-26

## 2024-06-26 RX ORDER — CELECOXIB 100 MG/1
100 CAPSULE ORAL 2 TIMES DAILY
Qty: 60 CAPSULE | Refills: 0 | Status: SHIPPED | OUTPATIENT
Start: 2024-06-26 | End: 2024-07-26

## 2024-06-26 RX ORDER — LEVOTHYROXINE SODIUM 0.05 MG/1
50 TABLET ORAL DAILY
Qty: 90 TABLET | Refills: 1 | Status: SHIPPED | OUTPATIENT
Start: 2024-06-26

## 2024-06-26 RX ORDER — FLUTICASONE PROPIONATE 50 MCG
1 SPRAY, SUSPENSION (ML) NASAL DAILY
Qty: 16 G | Refills: 5 | Status: SHIPPED | OUTPATIENT
Start: 2024-06-26

## 2024-06-26 NOTE — TELEPHONE ENCOUNTER
Patient needs updated blood work. Please place orders. A courtesy refill was provided.   CBC/CMP    Pt needs updated blood work in order to pass protocols. Please place orders and forward to clinical staff to inform pt to complete blood work prior to next refill.

## 2024-07-02 ENCOUNTER — RA CDI HCC (OUTPATIENT)
Dept: OTHER | Facility: HOSPITAL | Age: 64
End: 2024-07-02

## 2024-08-12 ENCOUNTER — OFFICE VISIT (OUTPATIENT)
Dept: FAMILY MEDICINE CLINIC | Facility: HOSPITAL | Age: 64
End: 2024-08-12
Payer: COMMERCIAL

## 2024-08-12 VITALS
WEIGHT: 162 LBS | DIASTOLIC BLOOD PRESSURE: 68 MMHG | TEMPERATURE: 97.7 F | SYSTOLIC BLOOD PRESSURE: 112 MMHG | BODY MASS INDEX: 27.66 KG/M2 | HEIGHT: 64 IN | HEART RATE: 74 BPM

## 2024-08-12 DIAGNOSIS — E78.2 MIXED HYPERLIPIDEMIA: ICD-10-CM

## 2024-08-12 DIAGNOSIS — E28.39 OVARIAN FAILURE DUE TO MENOPAUSE: ICD-10-CM

## 2024-08-12 DIAGNOSIS — Z00.00 MEDICARE ANNUAL WELLNESS VISIT, SUBSEQUENT: ICD-10-CM

## 2024-08-12 DIAGNOSIS — F33.1 MODERATE EPISODE OF RECURRENT MAJOR DEPRESSIVE DISORDER (HCC): Primary | ICD-10-CM

## 2024-08-12 DIAGNOSIS — Z12.31 SCREENING MAMMOGRAM FOR BREAST CANCER: ICD-10-CM

## 2024-08-12 DIAGNOSIS — F31.81 BIPOLAR 2 DISORDER (HCC): ICD-10-CM

## 2024-08-12 DIAGNOSIS — E03.9 HYPOTHYROIDISM, UNSPECIFIED TYPE: ICD-10-CM

## 2024-08-12 PROCEDURE — 99214 OFFICE O/P EST MOD 30 MIN: CPT | Performed by: NURSE PRACTITIONER

## 2024-08-12 PROCEDURE — G0439 PPPS, SUBSEQ VISIT: HCPCS | Performed by: NURSE PRACTITIONER

## 2024-08-12 NOTE — PATIENT INSTRUCTIONS
Medicare Preventive Visit Patient Instructions  Thank you for completing your Welcome to Medicare Visit or Medicare Annual Wellness Visit today. Your next wellness visit will be due in one year (8/13/2025).  The screening/preventive services that you may require over the next 5-10 years are detailed below. Some tests may not apply to you based off risk factors and/or age. Screening tests ordered at today's visit but not completed yet may show as past due. Also, please note that scanned in results may not display below.  Preventive Screenings:  Service Recommendations Previous Testing/Comments   Colorectal Cancer Screening  * Colonoscopy    * Fecal Occult Blood Test (FOBT)/Fecal Immunochemical Test (FIT)  * Fecal DNA/Cologuard Test  * Flexible Sigmoidoscopy Age: 45-75 years old   Colonoscopy: every 10 years (may be performed more frequently if at higher risk)  OR  FOBT/FIT: every 1 year  OR  Cologuard: every 3 years  OR  Sigmoidoscopy: every 5 years  Screening may be recommended earlier than age 45 if at higher risk for colorectal cancer. Also, an individualized decision between you and your healthcare provider will decide whether screening between the ages of 76-85 would be appropriate. Colonoscopy: 12/27/2023  FOBT/FIT: Not on file  Cologuard: Not on file  Sigmoidoscopy: Not on file    Screening Current     Breast Cancer Screening Age: 40+ years old  Frequency: every 1-2 years  Not required if history of left and right mastectomy Mammogram: Not on file        Cervical Cancer Screening Between the ages of 21-29, pap smear recommended once every 3 years.   Between the ages of 30-65, can perform pap smear with HPV co-testing every 5 years.   Recommendations may differ for women with a history of total hysterectomy, cervical cancer, or abnormal pap smears in past. Pap Smear: 10/30/2023    Screening Current   Hepatitis C Screening Once for adults born between 1945 and 1965  More frequently in patients at high risk for  Hepatitis C Hep C Antibody: 05/02/2023    Screening Current   Diabetes Screening 1-2 times per year if you're at risk for diabetes or have pre-diabetes Fasting glucose: 84 mg/dL (6/20/2023)  A1C: 5.5 % (6/20/2023)      Cholesterol Screening Once every 5 years if you don't have a lipid disorder. May order more often based on risk factors. Lipid panel: 05/02/2023    Screening Current     Other Preventive Screenings Covered by Medicare:  Abdominal Aortic Aneurysm (AAA) Screening: covered once if your at risk. You're considered to be at risk if you have a family history of AAA.  Lung Cancer Screening: covers low dose CT scan once per year if you meet all of the following conditions: (1) Age 55-77; (2) No signs or symptoms of lung cancer; (3) Current smoker or have quit smoking within the last 15 years; (4) You have a tobacco smoking history of at least 20 pack years (packs per day multiplied by number of years you smoked); (5) You get a written order from a healthcare provider.  Glaucoma Screening: covered annually if you're considered high risk: (1) You have diabetes OR (2) Family history of glaucoma OR (3)  aged 50 and older OR (4)  American aged 65 and older  Osteoporosis Screening: covered every 2 years if you meet one of the following conditions: (1) You're estrogen deficient and at risk for osteoporosis based off medical history and other findings; (2) Have a vertebral abnormality; (3) On glucocorticoid therapy for more than 3 months; (4) Have primary hyperparathyroidism; (5) On osteoporosis medications and need to assess response to drug therapy.   Last bone density test (DXA Scan): Not on file.  HIV Screening: covered annually if you're between the age of 15-65. Also covered annually if you are younger than 15 and older than 65 with risk factors for HIV infection. For pregnant patients, it is covered up to 3 times per pregnancy.    Immunizations:  Immunization Recommendations   Influenza  Vaccine Annual influenza vaccination during flu season is recommended for all persons aged >= 6 months who do not have contraindications   Pneumococcal Vaccine   * Pneumococcal conjugate vaccine = PCV13 (Prevnar 13), PCV15 (Vaxneuvance), PCV20 (Prevnar 20)  * Pneumococcal polysaccharide vaccine = PPSV23 (Pneumovax) Adults 19-65 yo with certain risk factors or if 65+ yo  If never received any pneumonia vaccine: recommend Prevnar 20 (PCV20)  Give PCV20 if previously received 1 dose of PCV13 or PPSV23   Hepatitis B Vaccine 3 dose series if at intermediate or high risk (ex: diabetes, end stage renal disease, liver disease)   Respiratory syncytial virus (RSV) Vaccine - COVERED BY MEDICARE PART D  * RSVPreF3 (Arexvy) CDC recommends that adults 60 years of age and older may receive a single dose of RSV vaccine using shared clinical decision-making (SCDM)   Tetanus (Td) Vaccine - COST NOT COVERED BY MEDICARE PART B Following completion of primary series, a booster dose should be given every 10 years to maintain immunity against tetanus. Td may also be given as tetanus wound prophylaxis.   Tdap Vaccine - COST NOT COVERED BY MEDICARE PART B Recommended at least once for all adults. For pregnant patients, recommended with each pregnancy.   Shingles Vaccine (Shingrix) - COST NOT COVERED BY MEDICARE PART B  2 shot series recommended in those 19 years and older who have or will have weakened immune systems or those 50 years and older     Health Maintenance Due:      Topic Date Due   • Breast Cancer Screening: Mammogram  Never done   • Cervical Cancer Screening  10/30/2026   • Colorectal Cancer Screening  12/24/2033   • HIV Screening  Completed   • Hepatitis C Screening  Completed     Immunizations Due:      Topic Date Due   • COVID-19 Vaccine (3 - 2023-24 season) 09/01/2023   • Influenza Vaccine (1) 09/01/2024     Advance Directives   What are advance directives?  Advance directives are legal documents that state your wishes and  plans for medical care. These plans are made ahead of time in case you lose your ability to make decisions for yourself. Advance directives can apply to any medical decision, such as the treatments you want, and if you want to donate organs.   What are the types of advance directives?  There are many types of advance directives, and each state has rules about how to use them. You may choose a combination of any of the following:  Living will:  This is a written record of the treatment you want. You can also choose which treatments you do not want, which to limit, and which to stop at a certain time. This includes surgery, medicine, IV fluid, and tube feedings.   Durable power of  for healthcare (DPAHC):  This is a written record that states who you want to make healthcare choices for you when you are unable to make them for yourself. This person, called a proxy, is usually a family member or a friend. You may choose more than 1 proxy.  Do not resuscitate (DNR) order:  A DNR order is used in case your heart stops beating or you stop breathing. It is a request not to have certain forms of treatment, such as CPR. A DNR order may be included in other types of advance directives.  Medical directive:  This covers the care that you want if you are in a coma, near death, or unable to make decisions for yourself. You can list the treatments you want for each condition. Treatment may include pain medicine, surgery, blood transfusions, dialysis, IV or tube feedings, and a ventilator (breathing machine).  Values history:  This document has questions about your views, beliefs, and how you feel and think about life. This information can help others choose the care that you would choose.  Why are advance directives important?  An advance directive helps you control your care. Although spoken wishes may be used, it is better to have your wishes written down. Spoken wishes can be misunderstood, or not followed. Treatments  may be given even if you do not want them. An advance directive may make it easier for your family to make difficult choices about your care.   Weight Management   Why it is important to manage your weight:  Being overweight increases your risk of health conditions such as heart disease, high blood pressure, type 2 diabetes, and certain types of cancer. It can also increase your risk for osteoarthritis, sleep apnea, and other respiratory problems. Aim for a slow, steady weight loss. Even a small amount of weight loss can lower your risk of health problems.  How to lose weight safely:  A safe and healthy way to lose weight is to eat fewer calories and get regular exercise. You can lose up about 1 pound a week by decreasing the number of calories you eat by 500 calories each day.   Healthy meal plan for weight management:  A healthy meal plan includes a variety of foods, contains fewer calories, and helps you stay healthy. A healthy meal plan includes the following:  Eat whole-grain foods more often.  A healthy meal plan should contain fiber. Fiber is the part of grains, fruits, and vegetables that is not broken down by your body. Whole-grain foods are healthy and provide extra fiber in your diet. Some examples of whole-grain foods are whole-wheat breads and pastas, oatmeal, brown rice, and bulgur.  Eat a variety of vegetables every day.  Include dark, leafy greens such as spinach, kale, alvaro greens, and mustard greens. Eat yellow and orange vegetables such as carrots, sweet potatoes, and winter squash.   Eat a variety of fruits every day.  Choose fresh or canned fruit (canned in its own juice or light syrup) instead of juice. Fruit juice has very little or no fiber.  Eat low-fat dairy foods.  Drink fat-free (skim) milk or 1% milk. Eat fat-free yogurt and low-fat cottage cheese. Try low-fat cheeses such as mozzarella and other reduced-fat cheeses.  Choose meat and other protein foods that are low in fat.  Choose  beans or other legumes such as split peas or lentils. Choose fish, skinless poultry (chicken or turkey), or lean cuts of red meat (beef or pork). Before you cook meat or poultry, cut off any visible fat.   Use less fat and oil.  Try baking foods instead of frying them. Add less fat, such as margarine, sour cream, regular salad dressing and mayonnaise to foods. Eat fewer high-fat foods. Some examples of high-fat foods include french fries, doughnuts, ice cream, and cakes.  Eat fewer sweets.  Limit foods and drinks that are high in sugar. This includes candy, cookies, regular soda, and sweetened drinks.  Exercise:  Exercise at least 30 minutes per day on most days of the week. Some examples of exercise include walking, biking, dancing, and swimming. You can also fit in more physical activity by taking the stairs instead of the elevator or parking farther away from stores. Ask your healthcare provider about the best exercise plan for you.      © Copyright [employer] 2018 Information is for End User's use only and may not be sold, redistributed or otherwise used for commercial purposes. All illustrations and images included in CareNotes® are the copyrighted property of [employer] or [employer]

## 2024-08-12 NOTE — PROGRESS NOTES
Ambulatory Visit  Name: Kimberly Betancourt      : 1960      MRN: [id number]  Encounter Provider: DMITRIY Berumen  Encounter Date: 2024   Encounter department: [facility] CARE [address]     Assessment & Plan   1. Moderate episode of recurrent major depressive disorder (HCC)  Assessment & Plan:  Mood stable on current meds  Continue same doses as rx'd  2. Hypothyroidism, unspecified type  Assessment & Plan:  Compliant w/daily levothyroxine  Update TSH as ordered  Orders:  -     TSH, 3rd generation with Free T4 reflex; Future; Expected date: 2024  -     TSH, 3rd generation with Free T4 reflex  3. Bipolar 2 disorder (HCC)  Assessment & Plan:  Mood stable on current meds  Continue same doses as rx'd  Call/return with mood changes/concerns  4. Medicare annual wellness visit, subsequent  Comments:  AWV updated - next due in 1 year  5. Screening mammogram for breast cancer  -     Mammo screening bilateral w 3d & cad; Future  6. Ovarian failure due to menopause  -     DXA bone density spine hip and pelvis; Future; Expected date: 2024  7. Mixed hyperlipidemia  Assessment & Plan:  Managing w/lifestyle  Update FLP as ordered  Orders:  -     CBC and differential; Future; Expected date: 2024  -     Comprehensive metabolic panel; Future; Expected date: 2024  -     TSH, 3rd generation with Free T4 reflex; Future; Expected date: 2024  -     Lipid Panel with Direct LDL reflex; Future; Expected date: 2024  -     CBC and differential  -     Comprehensive metabolic panel  -     TSH, 3rd generation with Free T4 reflex  -     Lipid Panel with Direct LDL reflex    Update gyn exam at next appt in 3 months  Orders reprinted to schedule mammo & dexa as previously ordered     Preventive health issues were discussed with patient, and age appropriate screening tests were ordered as noted in patient's After Visit Summary. Personalized health advice and appropriate referrals  for health education or preventive services given if needed, as noted in patient's After Visit Summary.    History of Present Illness         States she has been doing well. Taking meds as rx'd. Still has trouble falling asleep and sometimes takes melatonin.          Patient Care Team:  DMITIRY Berumen as PCP - General (Family Medicine)  French Carmona MD as PCP - PCP-[facility] ([facility])  French Carmona MD as PCP - PCP-[facility] ([facility])  DO Kristine Gates MD      Review of Systems   Constitutional: Negative.    Respiratory: Negative.     Cardiovascular: Negative.    Psychiatric/Behavioral:  Positive for sleep disturbance (trouble falling asleep). Negative for dysphoric mood.        Medical History Reviewed by provider this encounter:  Tobacco  Allergies  Meds  Problems  Med Hx  Surg Hx  Fam Hx       Annual Wellness Visit Questionnaire   Kimberly is here for her Subsequent Wellness visit.     Health Risk Assessment:   Patient rates overall health as fair. Patient feels that their physical health rating is much better. Patient is satisfied with their life. Eyesight was rated as slightly worse. Hearing was rated as same. Patient feels that their emotional and mental health rating is same. Patients states they are never, rarely angry. Patient states they are often unusually tired/fatigued. Pain experienced in the last 7 days has been none. Patient states that she has experienced no weight loss or gain in last 6 months.     Depression Screening:   PHQ-9 Score: 7      Fall Risk Screening:   In the past year, patient has experienced: no history of falling in past year      Urinary Incontinence Screening:   Patient has leaked urine accidently in the last six months.     Home Safety:  Patient does not have trouble with stairs inside or outside of their home. Patient has working smoke alarms and has working carbon monoxide detector. Home safety hazards include: none.     Nutrition:    Current diet is Regular.     Medications:   Patient is currently taking over-the-counter supplements. OTC medications include: see medication list. Patient is able to manage medications.     Activities of Daily Living (ADLs)/Instrumental Activities of Daily Living (IADLs):   Walk and transfer into and out of bed and chair?: Yes  Dress and groom yourself?: Yes    Bathe or shower yourself?: Yes    Feed yourself? Yes  Do your laundry/housekeeping?: Yes  Manage your money, pay your bills and track your expenses?: Yes  Make your own meals?: Yes    Do your own shopping?: Yes    Previous Hospitalizations:   Any hospitalizations or ED visits within the last 12 months?: No      Advance Care Planning:   Living will: No    Durable POA for healthcare: No    Advanced directive: No    Five wishes given: No    Patient declined ACP directive: Yes      PREVENTIVE SCREENINGS      Cardiovascular Screening:    General: Risks and Benefits Discussed    Due for: Lipid Panel      Diabetes Screening:     General: Risks and Benefits Discussed    Due for: Blood Glucose      Colorectal Cancer Screening:     General: Screening Current      Breast Cancer Screening:     General: Risks and Benefits Discussed    Due for: Mammogram        Cervical Cancer Screening:    General: Screening Current      Osteoporosis Screening:    General: Risks and Benefits Discussed    Due for: DXA Axial      Abdominal Aortic Aneurysm (AAA) Screening:        General: Screening Not Indicated      Lung Cancer Screening:     General: Screening Not Indicated      Hepatitis C Screening:    General: Screening Current    Social Determinants of Health     Food Insecurity: No Food Insecurity (8/12/2024)    Hunger Vital Sign     Worried About Running Out of Food in the Last Year: Never true     Ran Out of Food in the Last Year: Never true   Transportation Needs: No Transportation Needs (8/12/2024)    PRAPARE - Transportation     Lack of Transportation (Medical): No     Lack of  "Transportation (Non-Medical): No   Housing Stability: Low Risk  (8/12/2024)    Housing Stability Vital Sign     Unable to Pay for Housing in the Last Year: No     Number of Times Moved in the Last Year: 0     Homeless in the Last Year: No   Utilities: Not At Risk (8/12/2024)    [facility] Utilities     Threatened with loss of utilities: No     No results found.    Objective     /68   Pulse 74   Temp 97.7 °F (36.5 °C)   Ht 5' 4\" (1.626 m)   Wt 73.5 kg (162 lb)   BMI 27.81 kg/m²       Physical Exam  Vitals reviewed.   Constitutional:       General: She is not in acute distress.     Appearance: Normal appearance.   HENT:      Head: Normocephalic.   Eyes:      General: No scleral icterus.  Neck:      Thyroid: No thyromegaly.   Cardiovascular:      Rate and Rhythm: Normal rate and regular rhythm.      Heart sounds: No murmur heard.  Pulmonary:      Effort: Pulmonary effort is normal. No respiratory distress.      Breath sounds: Normal breath sounds.   Musculoskeletal:      Cervical back: Normal range of motion.      Right lower leg: No edema.      Left lower leg: No edema.   Lymphadenopathy:      Cervical: No cervical adenopathy.   Skin:     General: Skin is warm and dry.   Neurological:      General: No focal deficit present.      Mental Status: She is alert and oriented to person, place, and time.   Psychiatric:         Mood and Affect: Mood normal.         Behavior: Behavior normal.         Thought Content: Thought content normal.         Judgment: Judgment normal.         Administrative Statements   I have spent a total time of 15 minutes in caring for this patient on the day of the visit/encounter including Diagnostic results, Risks and benefits of tx options, Instructions for management, Patient and family education, Importance of tx compliance, Impressions, Counseling / Coordination of care, Documenting in the medical record, Reviewing / ordering tests, medicine, procedures  , and Obtaining or reviewing " history  .

## 2024-08-15 DIAGNOSIS — F33.42 RECURRENT MAJOR DEPRESSIVE DISORDER, IN FULL REMISSION (HCC): ICD-10-CM

## 2024-08-16 RX ORDER — SERTRALINE HYDROCHLORIDE 100 MG/1
200 TABLET, FILM COATED ORAL DAILY
Qty: 180 TABLET | Refills: 1 | Status: SHIPPED | OUTPATIENT
Start: 2024-08-16

## 2024-09-19 DIAGNOSIS — F31.81 BIPOLAR 2 DISORDER (HCC): ICD-10-CM

## 2024-09-19 RX ORDER — QUETIAPINE FUMARATE 100 MG/1
100 TABLET, FILM COATED ORAL
Qty: 90 TABLET | Refills: 1 | Status: SHIPPED | OUTPATIENT
Start: 2024-09-19

## 2024-11-01 LAB
ALBUMIN SERPL-MCNC: 4.5 G/DL (ref 3.9–4.9)
ALP SERPL-CCNC: 92 IU/L (ref 44–121)
ALT SERPL-CCNC: 15 IU/L (ref 0–32)
AST SERPL-CCNC: 26 IU/L (ref 0–40)
BASOPHILS # BLD AUTO: 0 X10E3/UL (ref 0–0.2)
BASOPHILS NFR BLD AUTO: 1 %
BILIRUB SERPL-MCNC: 0.4 MG/DL (ref 0–1.2)
BUN SERPL-MCNC: 15 MG/DL (ref 8–27)
BUN/CREAT SERPL: 14 (ref 12–28)
CALCIUM SERPL-MCNC: 9.6 MG/DL (ref 8.7–10.3)
CHLORIDE SERPL-SCNC: 98 MMOL/L (ref 96–106)
CHOLEST SERPL-MCNC: 292 MG/DL (ref 100–199)
CO2 SERPL-SCNC: 21 MMOL/L (ref 20–29)
CREAT SERPL-MCNC: 1.05 MG/DL (ref 0.57–1)
EGFR: 60 ML/MIN/1.73
EOSINOPHIL # BLD AUTO: 0.1 X10E3/UL (ref 0–0.4)
EOSINOPHIL NFR BLD AUTO: 2 %
ERYTHROCYTE [DISTWIDTH] IN BLOOD BY AUTOMATED COUNT: 12.2 % (ref 11.7–15.4)
GLOBULIN SER-MCNC: 2.7 G/DL (ref 1.5–4.5)
GLUCOSE SERPL-MCNC: 96 MG/DL (ref 70–99)
HCT VFR BLD AUTO: 38.4 % (ref 34–46.6)
HDLC SERPL-MCNC: 67 MG/DL
HGB BLD-MCNC: 12.5 G/DL (ref 11.1–15.9)
IMM GRANULOCYTES # BLD: 0 X10E3/UL (ref 0–0.1)
IMM GRANULOCYTES NFR BLD: 0 %
LDL CALC COMMENT: ABNORMAL
LDLC SERPL CALC-MCNC: 205 MG/DL (ref 0–99)
LDLC/HDLC SERPL: 3.1 RATIO (ref 0–3.2)
LYMPHOCYTES # BLD AUTO: 1.3 X10E3/UL (ref 0.7–3.1)
LYMPHOCYTES NFR BLD AUTO: 27 %
MCH RBC QN AUTO: 30.4 PG (ref 26.6–33)
MCHC RBC AUTO-ENTMCNC: 32.6 G/DL (ref 31.5–35.7)
MCV RBC AUTO: 93 FL (ref 79–97)
MONOCYTES # BLD AUTO: 0.4 X10E3/UL (ref 0.1–0.9)
MONOCYTES NFR BLD AUTO: 7 %
NEUTROPHILS # BLD AUTO: 3 X10E3/UL (ref 1.4–7)
NEUTROPHILS NFR BLD AUTO: 63 %
PLATELET # BLD AUTO: 204 X10E3/UL (ref 150–450)
POTASSIUM SERPL-SCNC: 4.4 MMOL/L (ref 3.5–5.2)
PROT SERPL-MCNC: 7.2 G/DL (ref 6–8.5)
RBC # BLD AUTO: 4.11 X10E6/UL (ref 3.77–5.28)
SL AMB T4, FREE (DIRECT): 1.1 NG/DL (ref 0.82–1.77)
SL AMB VLDL CHOLESTEROL CALC: 20 MG/DL (ref 5–40)
SODIUM SERPL-SCNC: 139 MMOL/L (ref 134–144)
TRIGL SERPL-MCNC: 116 MG/DL (ref 0–149)
TSH SERPL DL<=0.005 MIU/L-ACNC: 6.12 UIU/ML (ref 0.45–4.5)
WBC # BLD AUTO: 4.8 X10E3/UL (ref 3.4–10.8)

## 2024-11-14 ENCOUNTER — PROCEDURE VISIT (OUTPATIENT)
Dept: FAMILY MEDICINE CLINIC | Facility: HOSPITAL | Age: 64
End: 2024-11-14
Payer: COMMERCIAL

## 2024-11-14 VITALS
SYSTOLIC BLOOD PRESSURE: 110 MMHG | HEART RATE: 70 BPM | DIASTOLIC BLOOD PRESSURE: 66 MMHG | BODY MASS INDEX: 28.38 KG/M2 | WEIGHT: 166.2 LBS | HEIGHT: 64 IN | TEMPERATURE: 98.4 F

## 2024-11-14 DIAGNOSIS — R06.83 LOUD SNORING: ICD-10-CM

## 2024-11-14 DIAGNOSIS — E78.2 MIXED HYPERLIPIDEMIA: ICD-10-CM

## 2024-11-14 DIAGNOSIS — E03.9 HYPOTHYROIDISM, UNSPECIFIED TYPE: Primary | ICD-10-CM

## 2024-11-14 PROCEDURE — G2211 COMPLEX E/M VISIT ADD ON: HCPCS | Performed by: NURSE PRACTITIONER

## 2024-11-14 PROCEDURE — 99214 OFFICE O/P EST MOD 30 MIN: CPT | Performed by: NURSE PRACTITIONER

## 2024-11-14 RX ORDER — LEVOTHYROXINE SODIUM 75 UG/1
75 TABLET ORAL DAILY
Qty: 90 TABLET | Refills: 0 | Status: SHIPPED | OUTPATIENT
Start: 2024-11-14

## 2024-11-14 RX ORDER — ATORVASTATIN CALCIUM 20 MG/1
20 TABLET, FILM COATED ORAL DAILY
Qty: 90 TABLET | Refills: 0 | Status: SHIPPED | OUTPATIENT
Start: 2024-11-14

## 2024-11-14 NOTE — ASSESSMENT & PLAN NOTE
TC/LDL very high w/poor lifestyle efforts  Recommend & she is agreeable to starting medication   Discussed/recommend improved diet and exercise efforts  Recheck labs and return in 3 months    Orders:    atorvastatin (LIPITOR) 20 mg tablet; Take 1 tablet (20 mg total) by mouth daily    Lipid Panel with Direct LDL reflex; Future    Comprehensive metabolic panel; Future    CK; Future

## 2024-11-14 NOTE — PROGRESS NOTES
"Name: Kimberly Betancourt      : 1960      MRN: [id number]  Encounter Provider: DMITRIY Berumen  Encounter Date: 2024   Encounter department: [facility] CARE [address]   :  Assessment & Plan  Hypothyroidism, unspecified type  TSH elevated at 6 compliant w/daily levothyroxine  Advise dose increase from 50 to 75mcg daily  Recheck TSH in 3 months     Orders:    levothyroxine 75 mcg tablet; Take 1 tablet (75 mcg total) by mouth daily    TSH, 3rd generation with Free T4 reflex; Future    Mixed hyperlipidemia  TC/LDL very high w/poor lifestyle efforts  Recommend & she is agreeable to starting medication   Discussed/recommend improved diet and exercise efforts  Recheck labs and return in 3 months    Orders:    atorvastatin (LIPITOR) 20 mg tablet; Take 1 tablet (20 mg total) by mouth daily    Lipid Panel with Direct LDL reflex; Future    Comprehensive metabolic panel; Future    CK; Future    Loud snoring  With witnessed apneic episodes likely indicating sleep apnea  Consult ordered to schedule home sleep study    Orders:    Ambulatory Referral to Sleep Medicine; Future    Gyn exam declined today - states she will schedule at a later date  Pap smear UTD  Schedule mammo & dexa as ordered        History of Present Illness     States she has been well but is feeling very tired during the day.  tells her she snores and has recently stopped breathing at night. Denies ever having a sleep study.   She admits to eating poorly with snacking. Denies eating out or fast food often. Not exercising except is active with 2 year old twin grandsons.        Review of Systems   Constitutional:  Positive for fatigue.   Respiratory: Negative.     Cardiovascular: Negative.    Psychiatric/Behavioral:  Positive for sleep disturbance.         Objective   /66   Pulse 70   Temp 98.4 °F (36.9 °C)   Ht 5' 4\" (1.626 m)   Wt 75.4 kg (166 lb 3.2 oz)   BMI 28.53 kg/m²        Physical Exam  Vitals " reviewed.   Constitutional:       General: She is not in acute distress.     Appearance: Normal appearance.   HENT:      Head: Normocephalic.   Eyes:      General: No scleral icterus.  Neck:      Thyroid: No thyromegaly.      Vascular: No carotid bruit.   Cardiovascular:      Rate and Rhythm: Normal rate and regular rhythm.      Heart sounds: No murmur heard.  Pulmonary:      Effort: Pulmonary effort is normal. No respiratory distress.      Breath sounds: Normal breath sounds.   Musculoskeletal:      Cervical back: Normal range of motion.      Right lower leg: No edema.      Left lower leg: No edema.   Lymphadenopathy:      Cervical: No cervical adenopathy.   Skin:     General: Skin is warm and dry.   Neurological:      General: No focal deficit present.      Mental Status: She is alert and oriented to person, place, and time.   Psychiatric:         Mood and Affect: Mood normal.         Behavior: Behavior normal.         Thought Content: Thought content normal.         Judgment: Judgment normal.         Administrative Statements   I have spent a total time of 15 minutes in caring for this patient on the day of the visit/encounter including Diagnostic results, Risks and benefits of tx options, Instructions for management, Patient and family education, Importance of tx compliance, Risk factor reductions, Impressions, Counseling / Coordination of care, Documenting in the medical record, Reviewing / ordering tests, medicine, procedures  , and Obtaining or reviewing history  .

## 2024-11-14 NOTE — ASSESSMENT & PLAN NOTE
TSH elevated at 6 compliant w/daily levothyroxine  Advise dose increase from 50 to 75mcg daily  Recheck TSH in 3 months     Orders:    levothyroxine 75 mcg tablet; Take 1 tablet (75 mcg total) by mouth daily    TSH, 3rd generation with Free T4 reflex; Future

## 2024-11-18 ENCOUNTER — TELEPHONE (OUTPATIENT)
Age: 64
End: 2024-11-18

## 2024-11-18 NOTE — TELEPHONE ENCOUNTER
Would suggest she see PT for evaluation and treatment - I will enter consult if she is agreeable. Xray can be done if she does not improved w/PT

## 2024-11-18 NOTE — TELEPHONE ENCOUNTER
Patient called stating that she forgot to mention at her 11/14 appointment to PCP that she has been having neck pain for two months.  Jany would like to know if anything can be recommended or an xray ordered.  Please advise.

## 2024-11-19 NOTE — TELEPHONE ENCOUNTER
She's mentioning a lump in prior message so it would be best for her to be evaluated and determine proper work up, so please schedule her for an appt....

## 2024-11-19 NOTE — TELEPHONE ENCOUNTER
Pt stated that she would like to speak with Miah Weems if possible about this.  Lump on neck causing pain.

## 2024-11-26 ENCOUNTER — TRANSCRIBE ORDERS (OUTPATIENT)
Dept: SLEEP CENTER | Facility: CLINIC | Age: 64
End: 2024-11-26

## 2024-11-26 DIAGNOSIS — G47.8 OTHER SLEEP DISORDERS: Primary | ICD-10-CM

## 2024-11-26 DIAGNOSIS — R06.83 LOUD SNORING: ICD-10-CM

## 2024-12-03 DIAGNOSIS — F33.42 RECURRENT MAJOR DEPRESSIVE DISORDER, IN FULL REMISSION (HCC): ICD-10-CM

## 2024-12-03 RX ORDER — BUPROPION HYDROCHLORIDE 75 MG/1
75 TABLET ORAL DAILY
Qty: 90 TABLET | Refills: 0 | Status: SHIPPED | OUTPATIENT
Start: 2024-12-03

## 2024-12-04 ENCOUNTER — TELEPHONE (OUTPATIENT)
Age: 64
End: 2024-12-04

## 2024-12-05 ENCOUNTER — TELEPHONE (OUTPATIENT)
Dept: FAMILY MEDICINE CLINIC | Facility: HOSPITAL | Age: 64
End: 2024-12-05

## 2024-12-05 DIAGNOSIS — M19.011 PRIMARY OSTEOARTHRITIS OF RIGHT SHOULDER: ICD-10-CM

## 2024-12-05 DIAGNOSIS — E78.2 MIXED HYPERLIPIDEMIA: ICD-10-CM

## 2024-12-05 DIAGNOSIS — F31.81 BIPOLAR 2 DISORDER (HCC): ICD-10-CM

## 2024-12-05 DIAGNOSIS — F33.42 RECURRENT MAJOR DEPRESSIVE DISORDER, IN FULL REMISSION (HCC): ICD-10-CM

## 2024-12-05 DIAGNOSIS — K21.9 GASTROESOPHAGEAL REFLUX DISEASE: ICD-10-CM

## 2024-12-05 DIAGNOSIS — E03.9 HYPOTHYROIDISM, UNSPECIFIED TYPE: ICD-10-CM

## 2024-12-05 NOTE — TELEPHONE ENCOUNTER
A fax from [facility] was received yesterday for wellbutrin, which was already sent to the pharm 12/3.

## 2024-12-05 NOTE — TELEPHONE ENCOUNTER
Left message for patient to return call.    Please confirm that [facility] will now be using [facility] for [facility].    Pls transfer call to kim w/ any questions.

## 2024-12-05 NOTE — TELEPHONE ENCOUNTER
Patient called back to confirm the [facility] would [facility]. Office unavailable to take her call, please advise back out to patient.

## 2024-12-06 DIAGNOSIS — F33.42 RECURRENT MAJOR DEPRESSIVE DISORDER, IN FULL REMISSION (HCC): ICD-10-CM

## 2024-12-06 RX ORDER — TRAZODONE HYDROCHLORIDE 100 MG/1
200 TABLET ORAL
Qty: 180 TABLET | Refills: 0 | OUTPATIENT
Start: 2024-12-06

## 2024-12-06 RX ORDER — SERTRALINE HYDROCHLORIDE 100 MG/1
200 TABLET, FILM COATED ORAL DAILY
Qty: 180 TABLET | Refills: 1 | Status: SHIPPED | OUTPATIENT
Start: 2024-12-06

## 2024-12-06 RX ORDER — TRAZODONE HYDROCHLORIDE 100 MG/1
200 TABLET ORAL
Qty: 180 TABLET | Refills: 1 | Status: SHIPPED | OUTPATIENT
Start: 2024-12-06

## 2024-12-06 RX ORDER — ATORVASTATIN CALCIUM 20 MG/1
20 TABLET, FILM COATED ORAL DAILY
Qty: 90 TABLET | Refills: 1 | Status: SHIPPED | OUTPATIENT
Start: 2024-12-06

## 2024-12-06 RX ORDER — GABAPENTIN 300 MG/1
300 CAPSULE ORAL DAILY
Qty: 90 CAPSULE | Refills: 1 | Status: SHIPPED | OUTPATIENT
Start: 2024-12-06

## 2024-12-06 RX ORDER — QUETIAPINE FUMARATE 100 MG/1
100 TABLET, FILM COATED ORAL
Qty: 90 TABLET | Refills: 1 | Status: SHIPPED | OUTPATIENT
Start: 2024-12-06

## 2024-12-06 RX ORDER — PANTOPRAZOLE SODIUM 40 MG/1
40 TABLET, DELAYED RELEASE ORAL DAILY
Qty: 90 TABLET | Refills: 1 | Status: SHIPPED | OUTPATIENT
Start: 2024-12-06

## 2024-12-06 RX ORDER — LEVOTHYROXINE SODIUM 75 UG/1
75 TABLET ORAL DAILY
Qty: 90 TABLET | Refills: 0 | Status: SHIPPED | OUTPATIENT
Start: 2024-12-06

## 2025-01-09 ENCOUNTER — TELEPHONE (OUTPATIENT)
Age: 65
End: 2025-01-09

## 2025-01-09 NOTE — TELEPHONE ENCOUNTER
Patient would like to know if she is supposed to be taking Zoloft or Wellbutrin. She is unsure.     Can you please call her to discuss?    Thank you!     She would also like to reschedule her gyn exam procedure.

## 2025-01-30 DIAGNOSIS — E03.9 HYPOTHYROIDISM, UNSPECIFIED TYPE: ICD-10-CM

## 2025-01-30 RX ORDER — LEVOTHYROXINE SODIUM 75 UG/1
75 TABLET ORAL DAILY
Qty: 90 TABLET | Refills: 1 | Status: SHIPPED | OUTPATIENT
Start: 2025-01-30

## 2025-01-30 NOTE — TELEPHONE ENCOUNTER
Reason for call:   [x] Refill   [] Prior Auth  [] Other:     Office:   [x] PCP/Provider -   [] Specialty/Provider -     Medication:     levothyroxine 75 mcg 1 tablet daily          Pharmacy: [facility]     Does the patient have enough for 3 days?   [x] Yes   [] No - Send as HP to POD

## 2025-02-04 NOTE — TELEPHONE ENCOUNTER
Patient called requesting refill for Levothyroxine 75 mcg. Patient made aware medication was refilled on 1/30/25 for 90 with 1 refills to [facility] pharmacy. Patient instructed to contact the pharmacy to obtain refills of medication. Patient verbalized understanding.

## 2025-02-11 DIAGNOSIS — F33.42 RECURRENT MAJOR DEPRESSIVE DISORDER, IN FULL REMISSION (HCC): ICD-10-CM

## 2025-02-11 RX ORDER — BUPROPION HYDROCHLORIDE 75 MG/1
75 TABLET ORAL DAILY
Qty: 90 TABLET | Refills: 0 | Status: SHIPPED | OUTPATIENT
Start: 2025-02-11

## 2025-02-11 NOTE — TELEPHONE ENCOUNTER
Medication: buPROPion (WELLBUTRIN) 75 mg tablet     Dose/Frequency:     TAKE 1 TABLET (75 MG TOTAL) BY MOUTH DAILY       Quantity: 90 tablet     [facility]: [facility] - [address] [address] [phone]   (Please send to mail order pharmacy as pt ins had changed, thank you.)  Office:   [x] PCP/Provider - DMITRIY Berumen   [] Speciality/Provider -     Does the patient have enough for 3 days?   [] Yes   [x] No

## 2025-02-20 ENCOUNTER — TELEPHONE (OUTPATIENT)
Age: 65
End: 2025-02-20

## 2025-02-20 NOTE — TELEPHONE ENCOUNTER
Patient called in regards to twin grandsons two years old.Patient stated they are sick with cough for week.  She wanted to know how long before they should be taken to doctor.  Children are not patients of office and explained may not be able to advise since so.  Suggested she call children's doctor as well.  Please advise.

## 2025-02-25 ENCOUNTER — TELEPHONE (OUTPATIENT)
Dept: FAMILY MEDICINE CLINIC | Facility: HOSPITAL | Age: 65
End: 2025-02-25

## 2025-02-25 NOTE — TELEPHONE ENCOUNTER
Patient called requesting refill for buPROPion (WELLBUTRIN) 75 mg tablet . Patient made aware medication was refilled on 02/11 for 90 with 0 [facility]. Patient instructed to contact the pharmacy to obtain refills of medication. Patient verbalized understanding.

## 2025-03-27 LAB
ALBUMIN SERPL-MCNC: 4.3 G/DL (ref 3.9–4.9)
ALP SERPL-CCNC: 97 IU/L (ref 44–121)
ALT SERPL-CCNC: 11 IU/L (ref 0–32)
AST SERPL-CCNC: 22 IU/L (ref 0–40)
BILIRUB SERPL-MCNC: 0.3 MG/DL (ref 0–1.2)
BUN SERPL-MCNC: 11 MG/DL (ref 8–27)
BUN/CREAT SERPL: 12 (ref 12–28)
CALCIUM SERPL-MCNC: 9.4 MG/DL (ref 8.7–10.3)
CHLORIDE SERPL-SCNC: 104 MMOL/L (ref 96–106)
CHOLEST SERPL-MCNC: 198 MG/DL (ref 100–199)
CK BB CFR SERPL ELPH: 0 %
CK MACRO1 CFR SERPL: 0 %
CK MACRO2 CFR SERPL: 0 %
CK MB CFR SERPL ELPH: 0 % (ref 0–3)
CK MM CFR SERPL ELPH: 100 % (ref 97–100)
CK SERPL-CCNC: 200 U/L (ref 32–182)
CO2 SERPL-SCNC: 25 MMOL/L (ref 20–29)
CREAT SERPL-MCNC: 0.94 MG/DL (ref 0.57–1)
EGFR: 69 ML/MIN/1.73
GLOBULIN SER-MCNC: 2.5 G/DL (ref 1.5–4.5)
GLUCOSE SERPL-MCNC: 89 MG/DL (ref 70–99)
HDLC SERPL-MCNC: 57 MG/DL
LDLC SERPL CALC-MCNC: 115 MG/DL (ref 0–99)
LDLC/HDLC SERPL: 2 RATIO (ref 0–3.2)
POTASSIUM SERPL-SCNC: 4.6 MMOL/L (ref 3.5–5.2)
PROT SERPL-MCNC: 6.8 G/DL (ref 6–8.5)
SL AMB VLDL CHOLESTEROL CALC: 26 MG/DL (ref 5–40)
SODIUM SERPL-SCNC: 143 MMOL/L (ref 134–144)
TRIGL SERPL-MCNC: 148 MG/DL (ref 0–149)
TSH SERPL DL<=0.005 MIU/L-ACNC: 3.49 UIU/ML (ref 0.45–4.5)

## 2025-03-28 ENCOUNTER — RESULTS FOLLOW-UP (OUTPATIENT)
Dept: FAMILY MEDICINE CLINIC | Facility: HOSPITAL | Age: 65
End: 2025-03-28

## 2025-04-01 ENCOUNTER — OFFICE VISIT (OUTPATIENT)
Dept: FAMILY MEDICINE CLINIC | Facility: HOSPITAL | Age: 65
End: 2025-04-01
Payer: COMMERCIAL

## 2025-04-01 VITALS
DIASTOLIC BLOOD PRESSURE: 82 MMHG | WEIGHT: 168.8 LBS | BODY MASS INDEX: 28.82 KG/M2 | SYSTOLIC BLOOD PRESSURE: 122 MMHG | HEART RATE: 81 BPM | OXYGEN SATURATION: 97 % | HEIGHT: 64 IN

## 2025-04-01 DIAGNOSIS — E78.2 MIXED HYPERLIPIDEMIA: ICD-10-CM

## 2025-04-01 DIAGNOSIS — F11.21 OPIOID DEPENDENCE IN REMISSION (HCC): ICD-10-CM

## 2025-04-01 DIAGNOSIS — M54.2 CHRONIC NECK PAIN: Primary | ICD-10-CM

## 2025-04-01 DIAGNOSIS — G89.29 CHRONIC NECK PAIN: Primary | ICD-10-CM

## 2025-04-01 DIAGNOSIS — F31.81 BIPOLAR 2 DISORDER (HCC): ICD-10-CM

## 2025-04-01 DIAGNOSIS — E66.3 OVERWEIGHT WITH BODY MASS INDEX (BMI) OF 28 TO 28.9 IN ADULT: ICD-10-CM

## 2025-04-01 DIAGNOSIS — E03.9 HYPOTHYROIDISM, UNSPECIFIED TYPE: ICD-10-CM

## 2025-04-01 PROCEDURE — 99214 OFFICE O/P EST MOD 30 MIN: CPT | Performed by: NURSE PRACTITIONER

## 2025-04-01 NOTE — PROGRESS NOTES
Name: Kimberly Betancourt      : 1960      MRN: [id number]  Encounter Provider: DMITRIY Berumen  Encounter Date: 2025   Encounter department: [facility] [address]   :  Assessment & Plan  Chronic neck pain  Given chronicity, will evaluate further w/xray as ordered  Recommend and she is agreeable to starting PT for further eval/treatment  Advise she take celebrex consistently as already rx'd     Orders:    XR spine cervical complete 4 or 5 vw non injury; Future    Ambulatory Referral to Physical Therapy; Future    Hypothyroidism, unspecified type  3/2025 TSH normal   Continue same levothyroxine dose  Plan to recheck next in 6 months       Mixed hyperlipidemia  TC/LDL improved significantly w/start of daily atorvastatin - will continue same dose as rx'd  Update labs before next appt in 4 months     Orders:    CK; Future    Comprehensive metabolic panel; Future    Lipid Panel with Direct LDL reflex; Future    CBC and differential; Future    Opioid dependence in remission (HCC)  She continues to maintain recovery        Bipolar 2 disorder (HCC)  Mood stable on current med regimen - continue same doses as rx'd       Overweight with body mass index (BMI) of 28 to 28.9 in adult  Advise she is not a candidate for weight loss medication given current BMI  Recommend she complete sleep study as previously ordered and if identified to have NIRMALA can consider treatment with zepbound - she is agreeable and plans to schedule  Lifestyle efforts w/healthy diet and regular exercise encouraged            History of Present Illness       Complaint of ongoing neck/upper back pain that causes her headaches and sometimes radiates into her left shoulder. This happens every 8-10 days causing significant pain, she says sometimes it comes on after watching children but can also come on while she is just sitting and watching TV. She tries to stretch it or takes ibuprofen. She has baselien  "weakness/numbness/tingling in b/l hands and she says that is constant and not triggered by the neck pain.  Wishes to discuss weight loss medication options, discussed that her BMI is under 30, but if she completes her sleep study that was ordered in Nov 2024 if that were to show sleep apnea, she may be a candidate for Zepbound.      Review of Systems   Respiratory: Negative.     Cardiovascular: Negative.    Gastrointestinal:  Positive for constipation (shes says d/t all her meds, she reports drinking plenty of water).   Musculoskeletal:  Positive for neck pain (\"squishy\" lump on back of neck and has constant pain, flares at times at least weekly and causes bad headache; sometimes has pain into both shoulders; stretches w/some relief, ibuprofen helps when she takes).   Neurological:  Positive for weakness (bilat hands), numbness (and tingling both hands) and headaches (when her neck is painful, causes headache).       Objective   /82 (BP Location: Left arm, Patient Position: Sitting)   Pulse 81   Ht 5' 4\" (1.626 m)   Wt 76.6 kg (168 lb 12.8 oz)   SpO2 97%   BMI 28.97 kg/m²        Physical Exam  Vitals reviewed.   Constitutional:       General: She is not in acute distress.     Appearance: Normal appearance.   Eyes:      General: No scleral icterus.     Conjunctiva/sclera: Conjunctivae normal.   Neck:      Comments: No pain on exam  Cardiovascular:      Rate and Rhythm: Normal rate and regular rhythm.      Heart sounds: Normal heart sounds.   Pulmonary:      Effort: Pulmonary effort is normal. No respiratory distress.      Breath sounds: Normal breath sounds.   Musculoskeletal:         General: No swelling.      Cervical back: No signs of trauma, rigidity or tenderness. Pain with movement (painful to turn toward the left) present. No spinous process tenderness or muscular tenderness. Normal range of motion.   Skin:     General: Skin is warm and dry.   Neurological:      Mental Status: She is alert and " oriented to person, place, and time. Mental status is at baseline.      Motor: No weakness.      Gait: Gait normal.   Psychiatric:         Mood and Affect: Mood normal.         Thought Content: Thought content normal.         Administrative Statements   I have spent a total time of 15 minutes in caring for this patient on the day of the visit/encounter including Diagnostic results, Risks and benefits of tx options, Instructions for management, Patient and family education, Importance of tx compliance, Risk factor reductions, Impressions, Counseling / Coordination of care, Documenting in the medical record, Reviewing/placing orders in the medical record (including tests, medications, and/or procedures), and Obtaining or reviewing history

## 2025-04-01 NOTE — ASSESSMENT & PLAN NOTE
TC/LDL improved significantly w/start of daily atorvastatin - will continue same dose as rx'd  Update labs before next appt in 4 months     Orders:    CK; Future    Comprehensive metabolic panel; Future    Lipid Panel with Direct LDL reflex; Future    CBC and differential; Future

## 2025-04-01 NOTE — ASSESSMENT & PLAN NOTE
Advise she is not a candidate for weight loss medication given current BMI  Recommend she complete sleep study as previously ordered and if identified to have NIRMALA can consider treatment with zepbound - she is agreeable and plans to schedule  Lifestyle efforts w/healthy diet and regular exercise encouraged

## 2025-04-10 ENCOUNTER — HOSPITAL ENCOUNTER (OUTPATIENT)
Dept: BONE DENSITY | Facility: IMAGING CENTER | Age: 65
Discharge: HOME | End: 2025-04-10
Payer: COMMERCIAL

## 2025-04-10 ENCOUNTER — HOSPITAL ENCOUNTER (OUTPATIENT)
Dept: MAMMOGRAPHY | Facility: IMAGING CENTER | Age: 65
Discharge: HOME | End: 2025-04-10
Payer: COMMERCIAL

## 2025-04-10 VITALS — HEIGHT: 65 IN | WEIGHT: 162.2 LBS | BODY MASS INDEX: 27.02 KG/M2

## 2025-04-10 DIAGNOSIS — Z12.31 SCREENING MAMMOGRAM FOR BREAST CANCER: ICD-10-CM

## 2025-04-10 DIAGNOSIS — E28.39 OVARIAN FAILURE DUE TO MENOPAUSE: ICD-10-CM

## 2025-04-10 PROCEDURE — 77067 SCR MAMMO BI INCL CAD: CPT

## 2025-04-10 PROCEDURE — 77063 BREAST TOMOSYNTHESIS BI: CPT

## 2025-04-10 PROCEDURE — 77080 DXA BONE DENSITY AXIAL: CPT

## 2025-04-14 ENCOUNTER — RESULTS FOLLOW-UP (OUTPATIENT)
Dept: FAMILY MEDICINE CLINIC | Facility: HOSPITAL | Age: 65
End: 2025-04-14

## 2025-04-14 DIAGNOSIS — J01.00 ACUTE NON-RECURRENT MAXILLARY SINUSITIS: ICD-10-CM

## 2025-04-14 DIAGNOSIS — U07.1 COVID-19: ICD-10-CM

## 2025-04-14 NOTE — TELEPHONE ENCOUNTER
Medication: fluticasone (FLONASE) 50 mcg/act nasal spray    Dose/Frequency:   1 spray into each nostril daily        Quantity: 16g    Pharmacy: [facility] - [address] [place] [address].     Office:   [x] PCP/Provider -   [] Speciality/Provider -     Does the patient have enough for 3 days?   [] Yes   [x] No - Send as HP to POD

## 2025-04-15 RX ORDER — FLUTICASONE PROPIONATE 50 MCG
1 SPRAY, SUSPENSION (ML) NASAL DAILY
Qty: 16 G | Refills: 5 | Status: SHIPPED | OUTPATIENT
Start: 2025-04-15

## 2025-04-21 DIAGNOSIS — F33.42 RECURRENT MAJOR DEPRESSIVE DISORDER, IN FULL REMISSION (HCC): ICD-10-CM

## 2025-04-21 RX ORDER — BUPROPION HYDROCHLORIDE 75 MG/1
75 TABLET ORAL DAILY
Qty: 90 TABLET | Refills: 0 | Status: SHIPPED | OUTPATIENT
Start: 2025-04-21 | End: 2025-04-28 | Stop reason: SDUPTHER

## 2025-04-28 ENCOUNTER — TELEPHONE (OUTPATIENT)
Age: 65
End: 2025-04-28

## 2025-04-28 DIAGNOSIS — F33.42 RECURRENT MAJOR DEPRESSIVE DISORDER, IN FULL REMISSION (HCC): ICD-10-CM

## 2025-04-28 DIAGNOSIS — F31.81 BIPOLAR 2 DISORDER (HCC): ICD-10-CM

## 2025-04-28 RX ORDER — TRAZODONE HYDROCHLORIDE 100 MG/1
200 TABLET ORAL
Qty: 180 TABLET | Refills: 1 | Status: SHIPPED | OUTPATIENT
Start: 2025-04-28

## 2025-04-28 RX ORDER — QUETIAPINE FUMARATE 100 MG/1
100 TABLET, FILM COATED ORAL
Qty: 90 TABLET | Refills: 1 | Status: SHIPPED | OUTPATIENT
Start: 2025-04-28

## 2025-04-28 RX ORDER — BUPROPION HYDROCHLORIDE 75 MG/1
75 TABLET ORAL DAILY
Qty: 90 TABLET | Refills: 1 | Status: SHIPPED | OUTPATIENT
Start: 2025-04-28

## 2025-04-28 NOTE — TELEPHONE ENCOUNTER
Pt calling in because she went to [facility] to  scripts but they had been sent to [facility] and pt doesn't use that anymore. I updated pharmacies in pt chart and I asked pt if she had any scripts running low that she is aware of and she stated the Seroquel and possibly Trazadone but she could not confirm any other ones as she is not currently home.    Please advise and transfer all script to [facility] in [place] and review if any need to be approved for refills soon. Please give pt a call back once completed, TY.

## 2025-05-01 ENCOUNTER — TELEPHONE (OUTPATIENT)
Age: 65
End: 2025-05-01

## 2025-05-01 NOTE — TELEPHONE ENCOUNTER
Patient called and stated that she has appointment with Social security on Tuesday 5/6/25. She is requesting a letter from Miah that she is not depressed anymore, doing well with depression medication and her sister does not need to be on her social Security. Please let patient know when letter is ready to be picked up or call with any other questions.    Thank you!!

## 2025-05-06 NOTE — TELEPHONE ENCOUNTER
Detailed message left for patient that letter is ready.  Letter is located in [address] pick-up bin.

## 2025-05-06 NOTE — TELEPHONE ENCOUNTER
Patient called back regarding her letter. The social security office is not accepting it because it would need to be physically signed by DMITRIY Berumen. Please fax singed copy to: [phone]    Thank you!

## 2025-05-07 NOTE — TELEPHONE ENCOUNTER
Kimberly called to confirm if letter was faxed to [employer] office with Miah's signature. If not please fax to previously provided fax #

## 2025-05-28 ENCOUNTER — TELEPHONE (OUTPATIENT)
Age: 65
End: 2025-05-28

## 2025-05-28 DIAGNOSIS — M19.011 PRIMARY OSTEOARTHRITIS OF RIGHT SHOULDER: ICD-10-CM

## 2025-05-28 NOTE — TELEPHONE ENCOUNTER
Mansi of [facility] called to carolinelinda Kimberly's buPROPion (WELLBUTRIN) 75 mg tablet prescription to their service.    She asked if the next refill can be sent to them.    [facility]  [address]  [address]  [place] [address]  Phone: [phone]  Fax:[phone]

## 2025-05-29 DIAGNOSIS — E03.9 HYPOTHYROIDISM, UNSPECIFIED TYPE: ICD-10-CM

## 2025-05-29 DIAGNOSIS — U07.1 COVID-19: ICD-10-CM

## 2025-05-29 DIAGNOSIS — J01.00 ACUTE NON-RECURRENT MAXILLARY SINUSITIS: ICD-10-CM

## 2025-05-29 RX ORDER — GABAPENTIN 300 MG/1
300 CAPSULE ORAL DAILY
Qty: 90 CAPSULE | Refills: 1 | Status: SHIPPED | OUTPATIENT
Start: 2025-05-29

## 2025-05-29 RX ORDER — LEVOTHYROXINE SODIUM 75 UG/1
75 TABLET ORAL DAILY
Qty: 90 TABLET | Refills: 1 | Status: SHIPPED | OUTPATIENT
Start: 2025-05-29

## 2025-05-29 RX ORDER — FLUTICASONE PROPIONATE 50 MCG
1 SPRAY, SUSPENSION (ML) NASAL DAILY
Qty: 16 G | Refills: 5 | Status: SHIPPED | OUTPATIENT
Start: 2025-05-29

## 2025-05-29 NOTE — TELEPHONE ENCOUNTER
Medication: fluticasone (FLONASE) 50 mcg/act nasal spray     Dose/Frequency:   1 spray into each nostril daily        Quantity: 16g    Pharmacy: : [address]     Office:   [x] PCP/Provider -   [] Speciality/Provider -     Does the patient have enough for 3 days?   [] Yes   [x] No - Send as HP to POD    Medication: levothyroxine 75 mcg tablet     Dose/Frequency: tiffanie 1 tablet (75 mcg total) by mouth daily    Quantity: 90    Pharmacy: [address]     Office:   [x] PCP/Provider -   [] Speciality/Provider -     Does the patient have enough for 3 days?   [] Yes   [x] No - Send as HP to POD

## 2025-06-05 DIAGNOSIS — F33.42 RECURRENT MAJOR DEPRESSIVE DISORDER, IN FULL REMISSION (HCC): ICD-10-CM

## 2025-06-05 DIAGNOSIS — K21.9 GASTROESOPHAGEAL REFLUX DISEASE: ICD-10-CM

## 2025-06-05 RX ORDER — SERTRALINE HYDROCHLORIDE 100 MG/1
200 TABLET, FILM COATED ORAL DAILY
Qty: 180 TABLET | Refills: 1 | Status: SHIPPED | OUTPATIENT
Start: 2025-06-05

## 2025-06-05 RX ORDER — PANTOPRAZOLE SODIUM 40 MG/1
40 TABLET, DELAYED RELEASE ORAL DAILY
Qty: 90 TABLET | Refills: 1 | Status: SHIPPED | OUTPATIENT
Start: 2025-06-05

## 2025-06-13 DIAGNOSIS — F33.42 RECURRENT MAJOR DEPRESSIVE DISORDER, IN FULL REMISSION (HCC): ICD-10-CM

## 2025-06-13 RX ORDER — BUPROPION HYDROCHLORIDE 75 MG/1
75 TABLET ORAL DAILY
Qty: 90 TABLET | Refills: 1 | Status: SHIPPED | OUTPATIENT
Start: 2025-06-13

## 2025-06-13 NOTE — TELEPHONE ENCOUNTER
Reason for call:   [x] Refill   [] Prior Auth  [x] Other: pharmacy change - mail order 90 day supply     Office:   [x] PCP/Provider - [facility] [address] [address] /DMITRIY [facility]umen     Medication:     buPROPion (WELLBUTRIN) 75 mg tablet       Dose/Frequency: : Take 1 tablet (75 mg total) by mouth daily     Quantity: 90    Pharmacy:   [facility] - [address]            Mail order Pharmacy   Does the patient have enough for 10 days?   [] Yes   [x] No - Send as HP to POD

## 2025-06-23 NOTE — TELEPHONE ENCOUNTER
Patient states she does not use any other pharmacy except [facility]. Do not send scripts any where else.

## 2025-07-23 DIAGNOSIS — F31.81 BIPOLAR 2 DISORDER (HCC): ICD-10-CM

## 2025-07-23 DIAGNOSIS — F33.42 RECURRENT MAJOR DEPRESSIVE DISORDER, IN FULL REMISSION (HCC): ICD-10-CM

## 2025-07-25 RX ORDER — QUETIAPINE FUMARATE 100 MG/1
100 TABLET, FILM COATED ORAL
Qty: 90 TABLET | Refills: 0 | Status: SHIPPED | OUTPATIENT
Start: 2025-07-25

## 2025-07-25 RX ORDER — TRAZODONE HYDROCHLORIDE 100 MG/1
200 TABLET ORAL
Qty: 180 TABLET | Refills: 0 | Status: SHIPPED | OUTPATIENT
Start: 2025-07-25

## (undated) DEVICE — CAST PADDING 4 IN STERILE

## (undated) DEVICE — SURGICAL GOWN, XL SMARTSLEEVE: Brand: CONVERTORS

## (undated) DEVICE — NEURO PATTIES 1/2 X 1 1/2

## (undated) DEVICE — 3M™ STERI-DRAPE™ U-DRAPE 1015: Brand: STERI-DRAPE™

## (undated) DEVICE — SCD SEQUENTIAL COMPRESSION COMFORT SLEEVE MEDIUM KNEE LENGTH: Brand: KENDALL SCD

## (undated) DEVICE — 1820 FOAM BLOCK NEEDLE COUNTER: Brand: DEVON

## (undated) DEVICE — T-MAX DISPOSABLE FACE MASK 8 PER BOX

## (undated) DEVICE — GLOVE INDICATOR PI UNDERGLOVE SZ 8 BLUE

## (undated) DEVICE — SUT ETHIBOND 2 V-37 30 IN MX69G

## (undated) DEVICE — INTENDED FOR TISSUE SEPARATION, AND OTHER PROCEDURES THAT REQUIRE A SHARP SURGICAL BLADE TO PUNCTURE OR CUT.: Brand: BARD-PARKER SAFETY BLADES SIZE 10, STERILE

## (undated) DEVICE — CHLORAPREP HI-LITE 26ML ORANGE

## (undated) DEVICE — SUT FIBERWIRE #2 1/2 CIRCLE T-5 38IN AR-7200

## (undated) DEVICE — SPONGE LAP 18 X 18 IN STRL RFD

## (undated) DEVICE — 4-PORT MANIFOLD: Brand: NEPTUNE 2

## (undated) DEVICE — INTENDED FOR TISSUE SEPARATION, AND OTHER PROCEDURES THAT REQUIRE A SHARP SURGICAL BLADE TO PUNCTURE OR CUT.: Brand: BARD-PARKER SAFETY BLADES SIZE 15, STERILE

## (undated) DEVICE — HANDPIECE SET WITH RETRACTABLE COAXIAL FAN SPRAY TIP AND SUCTION TUBE: Brand: INTERPULSE

## (undated) DEVICE — HEWSON SUTURE RETRIEVER: Brand: HEWSON SUTURE RETRIEVER

## (undated) DEVICE — DISPOSABLE OR TOWEL: Brand: CARDINAL HEALTH

## (undated) DEVICE — COBAN 4 IN STERILE

## (undated) DEVICE — MAYO STAND COVER: Brand: CONVERTORS

## (undated) DEVICE — ARTHROSCOPY FLOOR MAT

## (undated) DEVICE — GLOVE SRG BIOGEL ORTHOPEDIC 8

## (undated) DEVICE — PENCIL SMOKE EVAC TELESCOPING W/TUBING

## (undated) DEVICE — UNDYED BRAIDED (POLYGLACTIN 910), SYNTHETIC ABSORBABLE SUTURE: Brand: COATED VICRYL

## (undated) DEVICE — HEAVY DUTY TABLE COVER: Brand: CONVERTORS

## (undated) DEVICE — STANDARD SURGICAL GOWN, L: Brand: CONVERTORS

## (undated) DEVICE — POSITIONER TRIMANO LIMB BEACH CHAIR

## (undated) DEVICE — HOOD: Brand: FLYTE, SURGICOOL

## (undated) DEVICE — DRAPE SHEET THREE QUARTER

## (undated) DEVICE — IMPERVIOUS STOCKINETTE: Brand: DEROYAL

## (undated) DEVICE — KERLIX BANDAGE ROLL: Brand: KERLIX

## (undated) DEVICE — DUAL CUT SAGITTAL BLADE

## (undated) DEVICE — PACK MAJOR ORTHO W/SPLITS PBDS

## (undated) DEVICE — SUT ETHIBOND 5 V-37 30 IN MB66G

## (undated) DEVICE — LIGHT GLOVE GREEN

## (undated) DEVICE — 450 ML BOTTLE OF 0.05% CHLORHEXIDINE GLUCONATE IN 99.95% STERILE WATER FOR IRRIGATION, USP AND APPLICATOR.: Brand: IRRISEPT ANTIMICROBIAL WOUND LAVAGE